# Patient Record
Sex: FEMALE | Race: WHITE | Employment: UNEMPLOYED | URBAN - METROPOLITAN AREA
[De-identification: names, ages, dates, MRNs, and addresses within clinical notes are randomized per-mention and may not be internally consistent; named-entity substitution may affect disease eponyms.]

---

## 2020-04-06 ENCOUNTER — HOSPITAL ENCOUNTER (EMERGENCY)
Age: 45
Discharge: HOME | End: 2020-04-07
Payer: COMMERCIAL

## 2020-04-06 VITALS
HEART RATE: 94 BPM | WEIGHT: 200 LBS | BODY MASS INDEX: 39.27 KG/M2 | SYSTOLIC BLOOD PRESSURE: 164 MMHG | OXYGEN SATURATION: 98 % | RESPIRATION RATE: 18 BRPM | TEMPERATURE: 97.6 F | DIASTOLIC BLOOD PRESSURE: 100 MMHG | HEIGHT: 60 IN

## 2020-04-06 LAB
HCG, URINE, POC: NEGATIVE
Lab: NORMAL
NEGATIVE QC PASS/FAIL: NORMAL
POSITIVE QC PASS/FAIL: NORMAL

## 2020-04-06 PROCEDURE — 81001 URINALYSIS AUTO W/SCOPE: CPT

## 2020-04-06 PROCEDURE — 99283 EMERGENCY DEPT VISIT LOW MDM: CPT

## 2020-04-06 RX ORDER — CYCLOBENZAPRINE HCL 10 MG
10 TABLET ORAL ONCE
Status: COMPLETED | OUTPATIENT
Start: 2020-04-07 | End: 2020-04-07

## 2020-04-06 RX ORDER — OXYCODONE HYDROCHLORIDE AND ACETAMINOPHEN 5; 325 MG/1; MG/1
1 TABLET ORAL ONCE
Status: COMPLETED | OUTPATIENT
Start: 2020-04-07 | End: 2020-04-07

## 2020-04-06 ASSESSMENT — PAIN SCALES - GENERAL: PAINLEVEL_OUTOF10: 8

## 2020-04-06 ASSESSMENT — PAIN DESCRIPTION - PAIN TYPE: TYPE: ACUTE PAIN

## 2020-04-06 ASSESSMENT — PAIN DESCRIPTION - LOCATION: LOCATION: BACK

## 2020-04-07 ENCOUNTER — APPOINTMENT (OUTPATIENT)
Dept: CT IMAGING | Age: 45
End: 2020-04-07
Payer: COMMERCIAL

## 2020-04-07 LAB
BACTERIA: ABNORMAL /HPF
BILIRUBIN URINE: NEGATIVE
BLOOD, URINE: ABNORMAL
CLARITY: ABNORMAL
COLOR: YELLOW
EPITHELIAL CELLS, UA: ABNORMAL /HPF
GLUCOSE URINE: NEGATIVE MG/DL
KETONES, URINE: NEGATIVE MG/DL
LEUKOCYTE ESTERASE, URINE: NEGATIVE
NITRITE, URINE: NEGATIVE
PH UA: 5.5 (ref 5–9)
PROTEIN UA: ABNORMAL MG/DL
RBC UA: ABNORMAL /HPF (ref 0–2)
SPECIFIC GRAVITY UA: >=1.03 (ref 1–1.03)
UROBILINOGEN, URINE: 0.2 E.U./DL
WBC UA: ABNORMAL /HPF (ref 0–5)

## 2020-04-07 PROCEDURE — 6370000000 HC RX 637 (ALT 250 FOR IP): Performed by: NURSE PRACTITIONER

## 2020-04-07 PROCEDURE — 72131 CT LUMBAR SPINE W/O DYE: CPT

## 2020-04-07 RX ORDER — ORPHENADRINE CITRATE 100 MG/1
100 TABLET, EXTENDED RELEASE ORAL 2 TIMES DAILY
Qty: 20 TABLET | Refills: 0 | Status: SHIPPED | OUTPATIENT
Start: 2020-04-07 | End: 2020-04-17

## 2020-04-07 RX ORDER — NAPROXEN 500 MG/1
500 TABLET ORAL 2 TIMES DAILY PRN
Qty: 28 TABLET | Refills: 0 | Status: SHIPPED | OUTPATIENT
Start: 2020-04-07 | End: 2020-05-05 | Stop reason: SDUPTHER

## 2020-04-07 RX ORDER — PREDNISONE 10 MG/1
TABLET ORAL
Qty: 20 TABLET | Refills: 0 | Status: SHIPPED | OUTPATIENT
Start: 2020-04-07 | End: 2020-04-17

## 2020-04-07 RX ORDER — HYDROCODONE BITARTRATE AND ACETAMINOPHEN 5; 325 MG/1; MG/1
1 TABLET ORAL EVERY 6 HOURS PRN
Qty: 8 TABLET | Refills: 0 | Status: SHIPPED | OUTPATIENT
Start: 2020-04-07 | End: 2020-04-10

## 2020-04-07 RX ADMIN — OXYCODONE AND ACETAMINOPHEN 1 TABLET: 5; 325 TABLET ORAL at 00:01

## 2020-04-07 RX ADMIN — CYCLOBENZAPRINE HYDROCHLORIDE 10 MG: 10 TABLET, FILM COATED ORAL at 00:00

## 2020-04-07 ASSESSMENT — PAIN SCALES - GENERAL
PAINLEVEL_OUTOF10: 9
PAINLEVEL_OUTOF10: 9

## 2020-04-07 NOTE — ED PROVIDER NOTES
Soft, non tender, non distended,   Extremities: Moves all extremities x 4. Warm and well perfused, cervical, thoracic, lumbar sacral spine are all midline. No step-off noted. Patient does have point tenderness to the left lateral lumbar region as well as left sacroiliac joint. Compartments are all soft. 2+ left and right dorsal pedal pulses. Sensations intact. Skin: warm and dry without rash  Neurologic: GCS 15,  Psych: Normal Affect      ------------------------------ ED COURSE/MEDICAL DECISION MAKING----------------------  Medications   oxyCODONE-acetaminophen (PERCOCET) 5-325 MG per tablet 1 tablet (1 tablet Oral Given 4/7/20 0001)   cyclobenzaprine (FLEXERIL) tablet 10 mg (10 mg Oral Given 4/7/20 0000)         ED COURSE:       Medical Decision Making:    Plan will be for labs will also obtain CT scan lumbar spine and medicate for symptom relief. Urinalysis negative for any concerning infectious etiology, pregnancy test negative. CT lumbar spine showing mild to moderate spinal canal stenosis in L3 and L4 as well as L4 and 5 levels showing broad posterior disc displacement which are more prominent on the left side. Patient was made aware of these findings. She was provided with referral to neurosurgery. Patient will be discharged home with Naprosyn, Norflex, Prednisone and Norco.  Patient was educated on good body mechanics as well as when to return back to the emergency department. Patient neurovascular and hemodynamically intact. Patient expressed understanding and safely discharged home    Counseling: The emergency provider has spoken with the patient and discussed todays results, in addition to providing specific details for the plan of care and counseling regarding the diagnosis and prognosis. Questions are answered at this time and they are agreeable with the plan.      --------------------------------- IMPRESSION AND DISPOSITION ---------------------------------    IMPRESSION  1.  Strain of lumbar region, initial encounter    2. Sciatica of left side        DISPOSITION  Disposition: Discharge to home  Patient condition is good      NOTE: This report was transcribed using voice recognition software.  Every effort was made to ensure accuracy; however, inadvertent computerized transcription errors may be present     LLUVIA Trevino CNP  04/07/20 0114

## 2021-11-23 ENCOUNTER — HOSPITAL ENCOUNTER (OUTPATIENT)
Dept: INFUSION THERAPY | Age: 46
Setting detail: INFUSION SERIES
Discharge: HOME | End: 2021-11-23
Payer: COMMERCIAL

## 2021-11-23 VITALS
HEART RATE: 87 BPM | SYSTOLIC BLOOD PRESSURE: 135 MMHG | TEMPERATURE: 97.3 F | OXYGEN SATURATION: 99 % | RESPIRATION RATE: 18 BRPM | DIASTOLIC BLOOD PRESSURE: 94 MMHG

## 2021-11-23 DIAGNOSIS — U07.1 COVID: Primary | ICD-10-CM

## 2021-11-23 PROCEDURE — 2580000003 HC RX 258: Performed by: INTERNAL MEDICINE

## 2021-11-23 PROCEDURE — 96365 THER/PROPH/DIAG IV INF INIT: CPT

## 2021-11-23 PROCEDURE — 6360000002 HC RX W HCPCS: Performed by: INTERNAL MEDICINE

## 2021-11-23 RX ORDER — DIPHENHYDRAMINE HYDROCHLORIDE 50 MG/ML
50 INJECTION INTRAMUSCULAR; INTRAVENOUS
Status: CANCELLED | OUTPATIENT
Start: 2021-11-23

## 2021-11-23 RX ORDER — SODIUM CHLORIDE 9 MG/ML
INJECTION, SOLUTION INTRAVENOUS CONTINUOUS
Status: DISCONTINUED | OUTPATIENT
Start: 2021-11-23 | End: 2021-11-24 | Stop reason: HOSPADM

## 2021-11-23 RX ORDER — SODIUM CHLORIDE 9 MG/ML
INJECTION, SOLUTION INTRAVENOUS CONTINUOUS
Status: CANCELLED | OUTPATIENT
Start: 2021-11-23

## 2021-11-23 RX ORDER — ALBUTEROL SULFATE 90 UG/1
4 AEROSOL, METERED RESPIRATORY (INHALATION) PRN
Status: CANCELLED | OUTPATIENT
Start: 2021-11-23

## 2021-11-23 RX ORDER — ONDANSETRON 2 MG/ML
8 INJECTION INTRAMUSCULAR; INTRAVENOUS
Status: CANCELLED | OUTPATIENT
Start: 2021-11-23

## 2021-11-23 RX ORDER — SODIUM CHLORIDE 0.9 % (FLUSH) 0.9 %
5-40 SYRINGE (ML) INJECTION PRN
Status: DISCONTINUED | OUTPATIENT
Start: 2021-11-23 | End: 2021-11-24 | Stop reason: HOSPADM

## 2021-11-23 RX ORDER — HEPARIN SODIUM (PORCINE) LOCK FLUSH IV SOLN 100 UNIT/ML 100 UNIT/ML
500 SOLUTION INTRAVENOUS PRN
Status: CANCELLED | OUTPATIENT
Start: 2021-11-23

## 2021-11-23 RX ORDER — EPINEPHRINE 1 MG/ML
0.3 INJECTION, SOLUTION, CONCENTRATE INTRAVENOUS PRN
Status: CANCELLED | OUTPATIENT
Start: 2021-11-23

## 2021-11-23 RX ORDER — ACETAMINOPHEN 325 MG/1
650 TABLET ORAL
Status: CANCELLED | OUTPATIENT
Start: 2021-11-23

## 2021-11-23 RX ORDER — HEPARIN SODIUM (PORCINE) LOCK FLUSH IV SOLN 100 UNIT/ML 100 UNIT/ML
500 SOLUTION INTRAVENOUS PRN
Status: DISCONTINUED | OUTPATIENT
Start: 2021-11-23 | End: 2021-11-24 | Stop reason: HOSPADM

## 2021-11-23 RX ORDER — SODIUM CHLORIDE 9 MG/ML
25 INJECTION, SOLUTION INTRAVENOUS PRN
Status: DISCONTINUED | OUTPATIENT
Start: 2021-11-23 | End: 2021-11-24 | Stop reason: HOSPADM

## 2021-11-23 RX ORDER — SODIUM CHLORIDE 0.9 % (FLUSH) 0.9 %
5-40 SYRINGE (ML) INJECTION PRN
Status: CANCELLED | OUTPATIENT
Start: 2021-11-23

## 2021-11-23 RX ORDER — SODIUM CHLORIDE 9 MG/ML
25 INJECTION, SOLUTION INTRAVENOUS PRN
Status: CANCELLED | OUTPATIENT
Start: 2021-11-23

## 2021-11-23 RX ADMIN — CASIRIVIMAB AND IMDEVIMAB: 600; 600 INJECTION, SOLUTION, CONCENTRATE INTRAVENOUS at 08:40

## 2021-11-23 RX ADMIN — SODIUM CHLORIDE: 9 INJECTION, SOLUTION INTRAVENOUS at 09:15

## 2021-11-23 RX ADMIN — SODIUM CHLORIDE, PRESERVATIVE FREE 10 ML: 5 INJECTION INTRAVENOUS at 08:40

## 2021-11-23 NOTE — PROGRESS NOTES
Pt tolerated infusion without problems. Pt waited 1 hour after NSS flush then was discharged with stable vitals and discharge instructions.

## 2021-11-28 ENCOUNTER — HOSPITAL ENCOUNTER (EMERGENCY)
Age: 46
Discharge: HOME | End: 2021-11-28
Attending: STUDENT IN AN ORGANIZED HEALTH CARE EDUCATION/TRAINING PROGRAM
Payer: COMMERCIAL

## 2021-11-28 ENCOUNTER — APPOINTMENT (OUTPATIENT)
Dept: CT IMAGING | Age: 46
End: 2021-11-28
Payer: COMMERCIAL

## 2021-11-28 VITALS
HEART RATE: 77 BPM | WEIGHT: 225 LBS | HEIGHT: 60 IN | BODY MASS INDEX: 44.17 KG/M2 | RESPIRATION RATE: 16 BRPM | SYSTOLIC BLOOD PRESSURE: 154 MMHG | DIASTOLIC BLOOD PRESSURE: 97 MMHG | OXYGEN SATURATION: 96 % | TEMPERATURE: 98.1 F

## 2021-11-28 DIAGNOSIS — T80.69XA SERUM SICKNESS, INITIAL ENCOUNTER: Primary | ICD-10-CM

## 2021-11-28 LAB
ALBUMIN SERPL-MCNC: 4.1 G/DL (ref 3.5–5.2)
ALP BLD-CCNC: 55 U/L (ref 35–104)
ALT SERPL-CCNC: 16 U/L (ref 0–32)
ANION GAP SERPL CALCULATED.3IONS-SCNC: 13 MMOL/L (ref 7–16)
AST SERPL-CCNC: 16 U/L (ref 0–31)
BACTERIA: ABNORMAL /HPF
BASOPHILS ABSOLUTE: 0.02 E9/L (ref 0–0.2)
BASOPHILS RELATIVE PERCENT: 0.3 % (ref 0–2)
BILIRUB SERPL-MCNC: 0.2 MG/DL (ref 0–1.2)
BILIRUBIN URINE: NEGATIVE
BLOOD, URINE: ABNORMAL
BUN BLDV-MCNC: 7 MG/DL (ref 6–20)
CALCIUM SERPL-MCNC: 9.4 MG/DL (ref 8.6–10.2)
CHLORIDE BLD-SCNC: 105 MMOL/L (ref 98–107)
CLARITY: ABNORMAL
CO2: 21 MMOL/L (ref 22–29)
COLOR: YELLOW
CREAT SERPL-MCNC: 0.5 MG/DL (ref 0.5–1)
EOSINOPHILS ABSOLUTE: 0.12 E9/L (ref 0.05–0.5)
EOSINOPHILS RELATIVE PERCENT: 2 % (ref 0–6)
EPITHELIAL CELLS, UA: ABNORMAL /HPF
GFR AFRICAN AMERICAN: >60
GFR NON-AFRICAN AMERICAN: >60 ML/MIN/1.73
GLUCOSE BLD-MCNC: 127 MG/DL (ref 74–99)
GLUCOSE URINE: NEGATIVE MG/DL
HCT VFR BLD CALC: 38 % (ref 34–48)
HEMOGLOBIN: 13 G/DL (ref 11.5–15.5)
IMMATURE GRANULOCYTES #: 0.02 E9/L
IMMATURE GRANULOCYTES %: 0.3 % (ref 0–5)
KETONES, URINE: NEGATIVE MG/DL
LEUKOCYTE ESTERASE, URINE: NEGATIVE
LYMPHOCYTES ABSOLUTE: 2.23 E9/L (ref 1.5–4)
LYMPHOCYTES RELATIVE PERCENT: 37.9 % (ref 20–42)
MAGNESIUM: 2.1 MG/DL (ref 1.6–2.6)
MCH RBC QN AUTO: 30.5 PG (ref 26–35)
MCHC RBC AUTO-ENTMCNC: 34.2 % (ref 32–34.5)
MCV RBC AUTO: 89.2 FL (ref 80–99.9)
MONOCYTES ABSOLUTE: 0.39 E9/L (ref 0.1–0.95)
MONOCYTES RELATIVE PERCENT: 6.6 % (ref 2–12)
NEUTROPHILS ABSOLUTE: 3.1 E9/L (ref 1.8–7.3)
NEUTROPHILS RELATIVE PERCENT: 52.9 % (ref 43–80)
NITRITE, URINE: NEGATIVE
PDW BLD-RTO: 13.3 FL (ref 11.5–15)
PH UA: 6 (ref 5–9)
PLATELET # BLD: 331 E9/L (ref 130–450)
PMV BLD AUTO: 9.8 FL (ref 7–12)
POTASSIUM SERPL-SCNC: 3.6 MMOL/L (ref 3.5–5)
PROTEIN UA: NEGATIVE MG/DL
RBC # BLD: 4.26 E12/L (ref 3.5–5.5)
RBC UA: ABNORMAL /HPF (ref 0–2)
SODIUM BLD-SCNC: 139 MMOL/L (ref 132–146)
SPECIFIC GRAVITY UA: <=1.005 (ref 1–1.03)
TOTAL CK: 87 U/L (ref 20–180)
TOTAL PROTEIN: 7 G/DL (ref 6.4–8.3)
TROPONIN, HIGH SENSITIVITY: <6 NG/L (ref 0–9)
UROBILINOGEN, URINE: 0.2 E.U./DL
WBC # BLD: 5.9 E9/L (ref 4.5–11.5)
WBC UA: ABNORMAL /HPF (ref 0–5)

## 2021-11-28 PROCEDURE — 83735 ASSAY OF MAGNESIUM: CPT

## 2021-11-28 PROCEDURE — 93005 ELECTROCARDIOGRAM TRACING: CPT | Performed by: NURSE PRACTITIONER

## 2021-11-28 PROCEDURE — 85025 COMPLETE CBC W/AUTO DIFF WBC: CPT

## 2021-11-28 PROCEDURE — 99284 EMERGENCY DEPT VISIT MOD MDM: CPT

## 2021-11-28 PROCEDURE — 80053 COMPREHEN METABOLIC PANEL: CPT

## 2021-11-28 PROCEDURE — 6370000000 HC RX 637 (ALT 250 FOR IP): Performed by: STUDENT IN AN ORGANIZED HEALTH CARE EDUCATION/TRAINING PROGRAM

## 2021-11-28 PROCEDURE — 81001 URINALYSIS AUTO W/SCOPE: CPT

## 2021-11-28 PROCEDURE — 82550 ASSAY OF CK (CPK): CPT

## 2021-11-28 PROCEDURE — 70450 CT HEAD/BRAIN W/O DYE: CPT

## 2021-11-28 PROCEDURE — 84484 ASSAY OF TROPONIN QUANT: CPT

## 2021-11-28 RX ORDER — GUAIFENESIN 400 MG/1
400 TABLET ORAL 4 TIMES DAILY PRN
COMMUNITY
End: 2021-12-20

## 2021-11-28 RX ORDER — DIPHENHYDRAMINE HCL 25 MG
25 CAPSULE ORAL NIGHTLY
Qty: 10 CAPSULE | Refills: 0 | Status: SHIPPED | OUTPATIENT
Start: 2021-11-28 | End: 2021-12-08

## 2021-11-28 RX ORDER — LORATADINE 10 MG/1
10 TABLET ORAL DAILY
COMMUNITY

## 2021-11-28 RX ORDER — PREDNISONE 20 MG/1
20 TABLET ORAL DAILY
Qty: 5 TABLET | Refills: 0 | Status: SHIPPED | OUTPATIENT
Start: 2021-11-28 | End: 2021-12-03

## 2021-11-28 RX ORDER — PREDNISONE 20 MG/1
60 TABLET ORAL ONCE
Status: COMPLETED | OUTPATIENT
Start: 2021-11-28 | End: 2021-11-28

## 2021-11-28 RX ORDER — DIPHENHYDRAMINE HCL 25 MG
50 TABLET ORAL ONCE
Status: COMPLETED | OUTPATIENT
Start: 2021-11-28 | End: 2021-11-28

## 2021-11-28 RX ADMIN — PREDNISONE 60 MG: 20 TABLET ORAL at 20:21

## 2021-11-28 RX ADMIN — DIPHENHYDRAMINE HCL 50 MG: 25 TABLET ORAL at 20:21

## 2021-11-29 ENCOUNTER — APPOINTMENT (OUTPATIENT)
Dept: CT IMAGING | Age: 46
End: 2021-11-29
Payer: COMMERCIAL

## 2021-11-29 ENCOUNTER — HOSPITAL ENCOUNTER (EMERGENCY)
Age: 46
Discharge: HOME | End: 2021-11-29
Attending: EMERGENCY MEDICINE
Payer: COMMERCIAL

## 2021-11-29 VITALS
HEART RATE: 96 BPM | DIASTOLIC BLOOD PRESSURE: 89 MMHG | SYSTOLIC BLOOD PRESSURE: 135 MMHG | TEMPERATURE: 97.2 F | WEIGHT: 225 LBS | HEIGHT: 60 IN | RESPIRATION RATE: 16 BRPM | BODY MASS INDEX: 44.17 KG/M2 | OXYGEN SATURATION: 98 %

## 2021-11-29 DIAGNOSIS — R53.1 GENERAL WEAKNESS: ICD-10-CM

## 2021-11-29 DIAGNOSIS — R53.83 FATIGUE, UNSPECIFIED TYPE: Primary | ICD-10-CM

## 2021-11-29 LAB
ALBUMIN SERPL-MCNC: 4.5 G/DL (ref 3.5–5.2)
ALP BLD-CCNC: 56 U/L (ref 35–104)
ALT SERPL-CCNC: 18 U/L (ref 0–32)
ANION GAP SERPL CALCULATED.3IONS-SCNC: 16 MMOL/L (ref 7–16)
AST SERPL-CCNC: 18 U/L (ref 0–31)
BACTERIA: ABNORMAL /HPF
BASOPHILS ABSOLUTE: 0.02 E9/L (ref 0–0.2)
BASOPHILS RELATIVE PERCENT: 0.3 % (ref 0–2)
BILIRUB SERPL-MCNC: 0.3 MG/DL (ref 0–1.2)
BILIRUBIN URINE: NEGATIVE
BLOOD, URINE: ABNORMAL
BUN BLDV-MCNC: 8 MG/DL (ref 6–20)
CALCIUM SERPL-MCNC: 9.8 MG/DL (ref 8.6–10.2)
CHLORIDE BLD-SCNC: 101 MMOL/L (ref 98–107)
CHP ED QC CHECK: YES
CLARITY: CLEAR
CO2: 22 MMOL/L (ref 22–29)
COLOR: YELLOW
CREAT SERPL-MCNC: 0.5 MG/DL (ref 0.5–1)
EKG ATRIAL RATE: 79 BPM
EKG ATRIAL RATE: 87 BPM
EKG ATRIAL RATE: 88 BPM
EKG P AXIS: 59 DEGREES
EKG P AXIS: 63 DEGREES
EKG P AXIS: 67 DEGREES
EKG P-R INTERVAL: 138 MS
EKG P-R INTERVAL: 138 MS
EKG P-R INTERVAL: 140 MS
EKG Q-T INTERVAL: 362 MS
EKG Q-T INTERVAL: 400 MS
EKG Q-T INTERVAL: 608 MS
EKG QRS DURATION: 72 MS
EKG QRS DURATION: 76 MS
EKG QRS DURATION: 78 MS
EKG QTC CALCULATION (BAZETT): 435 MS
EKG QTC CALCULATION (BAZETT): 458 MS
EKG QTC CALCULATION (BAZETT): 735 MS
EKG R AXIS: 36 DEGREES
EKG R AXIS: 47 DEGREES
EKG R AXIS: 52 DEGREES
EKG T AXIS: 41 DEGREES
EKG T AXIS: 47 DEGREES
EKG T AXIS: 65 DEGREES
EKG VENTRICULAR RATE: 79 BPM
EKG VENTRICULAR RATE: 87 BPM
EKG VENTRICULAR RATE: 88 BPM
EOSINOPHILS ABSOLUTE: 0.01 E9/L (ref 0.05–0.5)
EOSINOPHILS RELATIVE PERCENT: 0.1 % (ref 0–6)
EPITHELIAL CELLS, UA: ABNORMAL /HPF
GFR AFRICAN AMERICAN: >60
GFR NON-AFRICAN AMERICAN: >60 ML/MIN/1.73
GLUCOSE BLD-MCNC: 103 MG/DL (ref 74–99)
GLUCOSE URINE: NEGATIVE MG/DL
HCG(URINE) PREGNANCY TEST: NEGATIVE
HCT VFR BLD CALC: 39 % (ref 34–48)
HEMOGLOBIN: 13.5 G/DL (ref 11.5–15.5)
IMMATURE GRANULOCYTES #: 0.02 E9/L
IMMATURE GRANULOCYTES %: 0.3 % (ref 0–5)
KETONES, URINE: ABNORMAL MG/DL
LEUKOCYTE ESTERASE, URINE: NEGATIVE
LYMPHOCYTES ABSOLUTE: 1.99 E9/L (ref 1.5–4)
LYMPHOCYTES RELATIVE PERCENT: 24.9 % (ref 20–42)
MCH RBC QN AUTO: 29.7 PG (ref 26–35)
MCHC RBC AUTO-ENTMCNC: 34.6 % (ref 32–34.5)
MCV RBC AUTO: 85.9 FL (ref 80–99.9)
MONOCYTES ABSOLUTE: 0.47 E9/L (ref 0.1–0.95)
MONOCYTES RELATIVE PERCENT: 5.9 % (ref 2–12)
NEUTROPHILS ABSOLUTE: 5.47 E9/L (ref 1.8–7.3)
NEUTROPHILS RELATIVE PERCENT: 68.5 % (ref 43–80)
NITRITE, URINE: NEGATIVE
PDW BLD-RTO: 13.4 FL (ref 11.5–15)
PH UA: 6 (ref 5–9)
PLATELET # BLD: 388 E9/L (ref 130–450)
PMV BLD AUTO: 10.2 FL (ref 7–12)
POTASSIUM REFLEX MAGNESIUM: 3.8 MMOL/L (ref 3.5–5)
PROTEIN UA: NEGATIVE MG/DL
RBC # BLD: 4.54 E12/L (ref 3.5–5.5)
RBC UA: ABNORMAL /HPF (ref 0–2)
SODIUM BLD-SCNC: 139 MMOL/L (ref 132–146)
SPECIFIC GRAVITY UA: <=1.005 (ref 1–1.03)
TOTAL PROTEIN: 7.8 G/DL (ref 6.4–8.3)
UROBILINOGEN, URINE: 0.2 E.U./DL
WBC # BLD: 8 E9/L (ref 4.5–11.5)
WBC UA: ABNORMAL /HPF (ref 0–5)

## 2021-11-29 PROCEDURE — 81025 URINE PREGNANCY TEST: CPT

## 2021-11-29 PROCEDURE — 93010 ELECTROCARDIOGRAM REPORT: CPT | Performed by: INTERNAL MEDICINE

## 2021-11-29 PROCEDURE — 80053 COMPREHEN METABOLIC PANEL: CPT

## 2021-11-29 PROCEDURE — 72131 CT LUMBAR SPINE W/O DYE: CPT

## 2021-11-29 PROCEDURE — 81001 URINALYSIS AUTO W/SCOPE: CPT

## 2021-11-29 PROCEDURE — 85025 COMPLETE CBC W/AUTO DIFF WBC: CPT

## 2021-11-29 PROCEDURE — 93005 ELECTROCARDIOGRAM TRACING: CPT | Performed by: STUDENT IN AN ORGANIZED HEALTH CARE EDUCATION/TRAINING PROGRAM

## 2021-11-29 PROCEDURE — 99283 EMERGENCY DEPT VISIT LOW MDM: CPT

## 2021-11-29 ASSESSMENT — ENCOUNTER SYMPTOMS
SINUS PRESSURE: 0
EYE DISCHARGE: 0
CONSTIPATION: 1
COUGH: 0
WHEEZING: 0
EYE PAIN: 0
DIARRHEA: 0
SHORTNESS OF BREATH: 0
ABDOMINAL PAIN: 1
VOMITING: 0
NAUSEA: 0
EYE REDNESS: 0
ABDOMINAL DISTENTION: 0
SORE THROAT: 0
BACK PAIN: 1

## 2021-11-29 NOTE — ED PROVIDER NOTES
Department of Emergency Medicine   ED  Provider Note  Admit Date/RoomTime: 11/28/2021  7:41 PM  ED Room: [place] [facility]    History of Present Illness:  11/28/21, Time: 8:16 PM EST  Chief Complaint   Patient presents with    Tingling     Patient states that she had the antibody infusion for covid on tuesday now has numbness and tingling entire body and is having a hard time controlling her legs when she is walking      Adam Ward is a 48 y.o. female presenting to the ED for Tingling. Patient states she has tingling throughout her whole body. She had a Regeneron antibody infusion this past Tuesday. She states nothing makes it better or worse. She has not tried any medication for it at home. She states that when her pets lay on her this causes her symptoms act up. They are moderate in severity. She denies any weakness in her lower extremities but she states with movement she feels that her extremities feel like \"Jell-O.\"  Patient is not have any cervical thoracic or lumbar spinal tenderness. Denies any formal weakness in the lower extremities. Denies any fall subsequent to this. Denies any exposures to new medications aside from the Regeneron infusion she is aware of. Review of Systems:  Review of systems obtained and negative unless stated otherwise above in the HPI.    --------------------------------------------- PAST HISTORY ---------------------------------------------  Past Medical History:  has no past medical history on file. Past Surgical History:  has a past surgical history that includes LEEP; Tubal ligation; Ankle surgery; Elbow surgery; Dilation and curettage of uterus; Endometrial ablation (07/2016); Gallbladder surgery; and Appendectomy. Social History:  reports that she has been smoking. She has been smoking about 1.50 packs per day. She has never used smokeless tobacco. She reports current alcohol use. She reports that she does not use drugs.     Family History: family history includes Heart Failure in her mother. . Unless otherwise noted, family history is non contributory    The patients home medications have been reviewed. Allergies: Patient has no known allergies. I have reviewed the past medical history, past surgical history, social history, and family history    ---------------------------------------------------PHYSICAL EXAM--------------------------------------    Constitutional: Appears in no distress  Head: Normocephalic, atraumatic  Eyes: Non-icteric slcera, no conjunctival injection  ENT: Moist mucous membranes,  Neck: Trachea midline, no JVD  Respiratory: Nonlabored respirations. Lungs clear to auscultation bilaterally, no wheezes, rales, or rhonchi. Cardiovascular: Regular rate. Regular rhythm. No murmurs, no gallops, no rubs. Gastrointestinal: Abdomen Soft, Non tender, Non distended. No rebound tenderness, guarding, or rigidity. Extremities: No lower extremity edema  Genitourinary: No CVA tenderness, no suprapubic tenderness  Musculoskeletal: Moves all extremities, no deformity, 5 out of 5 muscle strength in the bilateral upper and lower extremities to all ranges of motion  Skin: Papular pruritic patches on the lower extremities as well as partially on the upper extremities  Neurologic: Alert, symmetric facies, no aphasia, 2-4 reflexes in the patellar and Achilles region, 24 brachial reflex, ambulates normally    -------------------------------------------------- RESULTS -------------------------------------------------  I have personally reviewed all laboratory and imaging results for this patient. Results are listed below.      LABS: (Lab results interpreted by me)  Results for orders placed or performed during the hospital encounter of 11/28/21   CBC auto differential   Result Value Ref Range    WBC 5.9 4.5 - 11.5 E9/L    RBC 4.26 3.50 - 5.50 E12/L    Hemoglobin 13.0 11.5 - 15.5 g/dL    Hematocrit 38.0 34.0 - 48.0 %    MCV 89.2 80.0 - 99.9 fL    MCH 30.5 26.0 - 0 - 2 /HPF    Epithelial Cells, UA MODERATE /HPF    Bacteria, UA FEW (A) None Seen /HPF   EKG 12 Lead   Result Value Ref Range    Ventricular Rate 79 BPM    Atrial Rate 79 BPM    P-R Interval 138 ms    QRS Duration 76 ms    Q-T Interval 400 ms    QTc Calculation (Bazett) 458 ms    P Axis 59 degrees    R Axis 36 degrees    T Axis 47 degrees   ,       RADIOLOGY:  Interpreted by Radiologist unless otherwise specified  CT HEAD WO CONTRAST   Final Result   No acute intracranial abnormality.               ------------------------- NURSING NOTES AND VITALS REVIEWED ---------------------------   The nursing notes within the ED encounter and vital signs as below have been reviewed by myself  BP (!) 154/97   Pulse 77   Temp 98.1 °F (36.7 °C)   Resp 16   Ht 5' (1.524 m)   Wt 225 lb (102.1 kg)   LMP 11/19/2021   SpO2 96%   BMI 43.94 kg/m²     Oxygen Saturation Interpretation: Normal    The patients available past medical records and past encounters were reviewed. ------------------------------ ED COURSE/MEDICAL DECISION MAKING----------------------  Medications   diphenhydrAMINE (BENADRYL) tablet 50 mg (has no administration in time range)   predniSONE (DELTASONE) tablet 60 mg (has no administration in time range)        Re-Evaluations: This patient's ED course included:a personal history and physicial examination, re-evaluation prior to disposition and multiple bedside re-evaluations    This patient has remained hemodynamically stable during their ED course. Consultations:  None      Medical Decision Making:   Patient presents emerged department with whole body tingling. Vital signs interpreted by myself as hypertensive otherwise normal.    History and physical examination findings most likely consistent with serum sickness secondary to Regeneron antibody infusion. Patient has no loss of sensation to light touch.   Additionally, the patient has normal reflexes as well as normal lower extremity strength and gait. Labs/imaging: Labs reviewed unremarkable for any acute concerning abnormality. Urinalysis negative. Date of EKG: November 28, 2021  Time: 1650 8 PM    Rhythm: Sinus  Rate: 79 bpm  Axis: Normal  Conduction: Normal  ST Segments: Normal  T Waves: Normal    Clinical Impression: Normal sinus rhythm no ischemia  Comparison to prior EKG: None    Patient is given Benadryl as well as prednisone in the emergency department with improvement. Patient will be discharged with outpatient follow-up. I asked pharmacy as well as perform review of Regeneron side effects. Patient has no red flag symptoms with her presentation can be discharged with outpatient follow-up and given return precautions. Counseling: The emergency provider has spoken with the patient and discussed todays results, in addition to providing specific details for the plan of care and counseling regarding the diagnosis and prognosis. Questions are answered at this time and they are agreeable with the plan.       --------------------------------- IMPRESSION AND DISPOSITION ---------------------------------    IMPRESSION  1. Serum sickness, initial encounter        DISPOSITION  Disposition: Discharge to home  Patient condition is stable    IDr. Mayra, am the primary provider of record    Mayra Mays DO  Emergency Medicine    NOTE: This report was transcribed using voice recognition software.  Every effort was made to ensure accuracy; however, inadvertent computerized transcription errors may be present         Mis Howard DO  11/28/21 2039

## 2021-11-29 NOTE — ED NOTES
Pt. Ambulated to bathroom with standby assistance from nurse. Pt. Used handrails for assistance to ambulate to bathroom.       Mehul Reyes RN  11/29/21 3563

## 2021-11-29 NOTE — ED NOTES
Bed: HAA  Expected date:   Expected time:   Means of arrival:   Comments:  triage     [address] [facility] 121, RN  11/28/21 1941

## 2021-11-29 NOTE — ED PROVIDER NOTES
The history is provided by the patient and medical records. 40-year-old female presented to the emergency department complaint of left leg weakness and feel unsteady on her feet. Patient states she got Covid over 1 week ago. Did receive the monoclonal antibody on Tuesday approximately 6 days ago and she states she was feeling better initially however over the past 4 to 5 days she has been getting progressively more fatigued and unsteady on her feet. She did come in yesterday had a negative CT scan of her head and negative work-up was discharged home. She states she is still weak, weaker in her left leg and it did cause her stumble and have a slight fall where she fell onto that left knee. She has no pain in that knee and is still able to walk. She is describes just feeling unsteady on her feet, is not lightheaded or dizzy. She also was having some slight periumbilical abdominal pain constipation. Otherwise no other complaints at this time. She denies any numbness to her groin or rectal region, is not losing control of her bowel bladder is is able to go on her own as well. No cauda equina symptoms. No trauma to the back as well. She also does have a history of a herniated disc at L4-L5 in her back that she does get epidurals for. She has had left leg weakness previously due to this as well as pain that shoots down her left leg. The patient presents with left leg weakness that has been going on for 1 week. These symptoms are moderate in severity. Symptoms are made better by nothig. Symptoms are made worse by nothng. Associated symptoms include fatigue abd pain, constipation. Review of Systems   Constitutional: Positive for fatigue. Negative for chills and fever. HENT: Negative for ear pain, sinus pressure and sore throat. Eyes: Negative for pain, discharge and redness. Respiratory: Negative for cough, shortness of breath and wheezing. Cardiovascular: Negative for chest pain. Gastrointestinal: Positive for abdominal pain and constipation. Negative for abdominal distention, diarrhea, nausea and vomiting. Genitourinary: Negative for dysuria and frequency. Musculoskeletal: Positive for back pain. Negative for arthralgias. Skin: Negative for rash and wound. Neurological: Negative for weakness and headaches. Hematological: Negative for adenopathy. Psychiatric/Behavioral: Negative for agitation. All other systems reviewed and are negative. Physical Exam  Vitals and nursing note reviewed. Constitutional:       Appearance: Normal appearance. She is normal weight. HENT:      Head: Normocephalic and atraumatic. Eyes:      Conjunctiva/sclera: Conjunctivae normal.   Cardiovascular:      Rate and Rhythm: Normal rate and regular rhythm. Pulses: Normal pulses. Heart sounds: Normal heart sounds. No murmur heard. No gallop. Pulmonary:      Effort: Pulmonary effort is normal. No respiratory distress. Breath sounds: Normal breath sounds. No wheezing or rales. Abdominal:      General: Abdomen is flat. Bowel sounds are normal. There is no distension. Palpations: Abdomen is soft. Tenderness: There is no abdominal tenderness. There is no guarding. Skin:     General: Skin is warm and dry. Capillary Refill: Capillary refill takes less than 2 seconds. Neurological:      General: No focal deficit present. Mental Status: She is alert and oriented to person, place, and time. Cranial Nerves: Cranial nerves are intact. No cranial nerve deficit, dysarthria or facial asymmetry. Sensory: Sensation is intact. No sensory deficit. Motor: Motor function is intact. No weakness. Coordination: Coordination is intact. Finger-Nose-Finger Test and Heel to Shin Test normal.          Procedures     MDM   58-year-old female present emergency department complaint of fatigue and lower extremity weakness.   Neurologically she is fully intact no signs of weakness in the lower extremities significant for 1 greater than other. She was able to ambulate on her own here in the emergency department. CT scan lumbar spine was unremarkable for any acute emergent pathology. Her EKG was unremarkable for any acute ST elevation depression or arrhythmia. Remaining laboratory work-up unremarkable urinalysis unremarkable for urinary tract infection. Patient was feeling better here in the emergency department and is safe to be discharged home at this time. Did advise she can continue with her medication she was prescribed here yesterday as well as following up with her primary care doctor. Patient agreeable this plan. ED Course as of 11/30/21 0618 Mon Nov 29, 2021   1245 EKG reviewed, no reading for the lead V3 and significant baseline artifact needs repeat EKG [CB]      ED Course User Index  [CB] Marco Cortez MD      EKG: This EKG is signed by emergency department physician. Rate: 88  Rhythm: Sinus  Interpretation: No acute ST elevation depressions sinus rhythm, normal axis, concern for possible prolonged QT, hard to discern the T waves on the EKG however. Comparison: changes compared to previous EKG         ED Course as of 11/30/21 0618 Mon Nov 29, 2021   1245 EKG reviewed, no reading for the lead V3 and significant baseline artifact needs repeat EKG [CB]      ED Course User Index  [ANGÉLICA] Marco Cortez MD       --------------------------------------------- PAST HISTORY ---------------------------------------------  Past Medical History:  has no past medical history on file. Past Surgical History:  has a past surgical history that includes LEEP; Tubal ligation; Ankle surgery; Elbow surgery; Dilation and curettage of uterus; Endometrial ablation (07/2016); Gallbladder surgery; and Appendectomy. Social History:  reports that she has been smoking. She has been smoking about 1.50 packs per day.  She has never used smokeless tobacco. She reports current alcohol use. She reports that she does not use drugs. Family History: family history includes Heart Failure in her mother. The patients home medications have been reviewed. Allergies: Patient has no known allergies.     -------------------------------------------------- RESULTS -------------------------------------------------  Labs:  Results for orders placed or performed during the hospital encounter of 11/29/21   CBC Auto Differential   Result Value Ref Range    WBC 8.0 4.5 - 11.5 E9/L    RBC 4.54 3.50 - 5.50 E12/L    Hemoglobin 13.5 11.5 - 15.5 g/dL    Hematocrit 39.0 34.0 - 48.0 %    MCV 85.9 80.0 - 99.9 fL    MCH 29.7 26.0 - 35.0 pg    MCHC 34.6 (H) 32.0 - 34.5 %    RDW 13.4 11.5 - 15.0 fL    Platelets 376 320 - 163 E9/L    MPV 10.2 7.0 - 12.0 fL    Neutrophils % 68.5 43.0 - 80.0 %    Immature Granulocytes % 0.3 0.0 - 5.0 %    Lymphocytes % 24.9 20.0 - 42.0 %    Monocytes % 5.9 2.0 - 12.0 %    Eosinophils % 0.1 0.0 - 6.0 %    Basophils % 0.3 0.0 - 2.0 %    Neutrophils Absolute 5.47 1.80 - 7.30 E9/L    Immature Granulocytes # 0.02 E9/L    Lymphocytes Absolute 1.99 1.50 - 4.00 E9/L    Monocytes Absolute 0.47 0.10 - 0.95 E9/L    Eosinophils Absolute 0.01 (L) 0.05 - 0.50 E9/L    Basophils Absolute 0.02 0.00 - 0.20 E9/L   Comprehensive Metabolic Panel w/ Reflex to MG   Result Value Ref Range    Sodium 139 132 - 146 mmol/L    Potassium reflex Magnesium 3.8 3.5 - 5.0 mmol/L    Chloride 101 98 - 107 mmol/L    CO2 22 22 - 29 mmol/L    Anion Gap 16 7 - 16 mmol/L    Glucose 103 (H) 74 - 99 mg/dL    BUN 8 6 - 20 mg/dL    CREATININE 0.5 0.5 - 1.0 mg/dL    GFR Non-African American >60 >=60 mL/min/1.73    GFR African American >60     Calcium 9.8 8.6 - 10.2 mg/dL    Total Protein 7.8 6.4 - 8.3 g/dL    Albumin 4.5 3.5 - 5.2 g/dL    Total Bilirubin 0.3 0.0 - 1.2 mg/dL    Alkaline Phosphatase 56 35 - 104 U/L    ALT 18 0 - 32 U/L    AST 18 0 - 31 U/L   Urinalysis, reflex to microscopic   Result Value Ref Range    Color, UA Yellow Straw/Yellow    Clarity, UA Clear Clear    Glucose, Ur Negative Negative mg/dL    Bilirubin Urine Negative Negative    Ketones, Urine TRACE (A) Negative mg/dL    Specific Gravity, UA <=1.005 1.005 - 1.030    Blood, Urine SMALL (A) Negative    pH, UA 6.0 5.0 - 9.0    Protein, UA Negative Negative mg/dL    Urobilinogen, Urine 0.2 <2.0 E.U./dL    Nitrite, Urine Negative Negative    Leukocyte Esterase, Urine Negative Negative   Pregnancy, urine   Result Value Ref Range    HCG(Urine) Pregnancy Test NEGATIVE NEGATIVE   Microscopic Urinalysis   Result Value Ref Range    WBC, UA NONE 0 - 5 /HPF    RBC, UA 0-1 0 - 2 /HPF    Epithelial Cells, UA MODERATE /HPF    Bacteria, UA FEW (A) None Seen /HPF   POCT Glucose   Result Value Ref Range    QC OK? yes    EKG 12 Lead   Result Value Ref Range    Ventricular Rate 87 BPM    Atrial Rate 87 BPM    P-R Interval 138 ms    QRS Duration 72 ms    Q-T Interval 362 ms    QTc Calculation (Bazett) 435 ms    P Axis 67 degrees    R Axis 52 degrees    T Axis 65 degrees   EKG 12 Lead   Result Value Ref Range    Ventricular Rate 88 BPM    Atrial Rate 88 BPM    P-R Interval 140 ms    QRS Duration 78 ms    Q-T Interval 608 ms    QTc Calculation (Bazett) 735 ms    P Axis 63 degrees    R Axis 47 degrees    T Axis 41 degrees       Radiology:  CT Lumbar Spine WO Contrast   Final Result   1. L3-4 acquired spinal stenosis with mild central canal narrowing and mild   foraminal narrowing on the left at this level. 2.  L4-5 acquired spinal stenosis with mild central canal narrowing and   moderate biforaminal narrowing of this level. 3.  L4-5 advanced facet joint arthritis and with mild subluxation of the   facets.   Further correlation could be obtained with lumbar spine flexion   extension views on a nonemergent basis to assess for lumbar spine instability.             ------------------------- NURSING NOTES AND VITALS REVIEWED ---------------------------  Date / Time Roomed: 11/29/2021 10:27 AM  ED Bed Assignment:  16/16    The nursing notes within the ED encounter and vital signs as below have been reviewed. /89   Pulse 96   Temp 97.2 °F (36.2 °C)   Resp 16   Ht 5' (1.524 m)   Wt 225 lb (102.1 kg)   LMP 11/19/2021   SpO2 98%   BMI 43.94 kg/m²   Oxygen Saturation Interpretation: Normal      ------------------------------------------ PROGRESS NOTES ------------------------------------------  10:39 AM EST  I have spoken with the patient and discussed todays results, in addition to providing specific details for the plan of care and counseling regarding the diagnosis and prognosis. Their questions are answered at this time and they are agreeable with the plan. I discussed at length with them reasons for immediate return here for re evaluation. They will followup with their primary care physician by calling their office on Monday.      --------------------------------- ADDITIONAL PROVIDER NOTES ---------------------------------  At this time the patient is without objective evidence of an acute process requiring hospitalization or inpatient management. They have remained hemodynamically stable throughout their entire ED visit and are stable for discharge with outpatient follow-up. The plan has been discussed in detail and they are aware of the specific conditions for emergent return, as well as the importance of follow-up. Discharge Medication List as of 11/29/2021  5:58 PM          Diagnosis:  1. Fatigue, unspecified type    2. General weakness        Disposition:  Patient's disposition: Discharge to home  Patient's condition is stable.        Gisela Burnett MD  Resident  11/30/21 8745

## 2021-12-20 ENCOUNTER — APPOINTMENT (OUTPATIENT)
Dept: MRI IMAGING | Age: 46
DRG: 421 | End: 2021-12-20
Payer: COMMERCIAL

## 2021-12-20 ENCOUNTER — APPOINTMENT (OUTPATIENT)
Dept: GENERAL RADIOLOGY | Age: 46
DRG: 421 | End: 2021-12-20
Payer: COMMERCIAL

## 2021-12-20 ENCOUNTER — HOSPITAL ENCOUNTER (INPATIENT)
Age: 46
LOS: 15 days | Discharge: NURSING, REHAB OR LONG-TERM CARE | DRG: 421 | End: 2022-01-05
Attending: EMERGENCY MEDICINE | Admitting: INTERNAL MEDICINE
Payer: COMMERCIAL

## 2021-12-20 DIAGNOSIS — M48.061 SPINAL STENOSIS OF LUMBAR REGION, UNSPECIFIED WHETHER NEUROGENIC CLAUDICATION PRESENT: ICD-10-CM

## 2021-12-20 DIAGNOSIS — R29.898 WEAKNESS OF BOTH LOWER EXTREMITIES: Primary | ICD-10-CM

## 2021-12-20 DIAGNOSIS — R90.89 T2 HYPERINTENSE FOCI PRESENT IN CEREBRAL CORTEX ON MAGNETIC RESONANCE IMAGING: ICD-10-CM

## 2021-12-20 LAB
ALBUMIN SERPL-MCNC: 4.3 G/DL (ref 3.5–5.2)
ALP BLD-CCNC: 5 U/L (ref 35–104)
ALT SERPL-CCNC: 15 U/L (ref 0–32)
ANION GAP SERPL CALCULATED.3IONS-SCNC: 12 MMOL/L (ref 7–16)
APTT: 29.8 SEC (ref 24.5–35.1)
AST SERPL-CCNC: 15 U/L (ref 0–31)
BACTERIA: ABNORMAL /HPF
BASOPHILS ABSOLUTE: 0.05 E9/L (ref 0–0.2)
BASOPHILS RELATIVE PERCENT: 0.5 % (ref 0–2)
BILIRUB SERPL-MCNC: <0.2 MG/DL (ref 0–1.2)
BILIRUBIN URINE: NEGATIVE
BLOOD, URINE: ABNORMAL
BUN BLDV-MCNC: 12 MG/DL (ref 6–20)
CALCIUM SERPL-MCNC: 10 MG/DL (ref 8.6–10.2)
CHLORIDE BLD-SCNC: 103 MMOL/L (ref 98–107)
CLARITY: CLEAR
CO2: 23 MMOL/L (ref 22–29)
COLOR: YELLOW
CREAT SERPL-MCNC: 0.6 MG/DL (ref 0.5–1)
EOSINOPHILS ABSOLUTE: 0.2 E9/L (ref 0.05–0.5)
EOSINOPHILS RELATIVE PERCENT: 1.9 % (ref 0–6)
GFR AFRICAN AMERICAN: >60
GFR NON-AFRICAN AMERICAN: >60 ML/MIN/1.73
GLUCOSE BLD-MCNC: 100 MG/DL (ref 74–99)
GLUCOSE URINE: NEGATIVE MG/DL
HCG(URINE) PREGNANCY TEST: NEGATIVE
HCT VFR BLD CALC: 38.9 % (ref 34–48)
HEMOGLOBIN: 13.2 G/DL (ref 11.5–15.5)
IMMATURE GRANULOCYTES #: 0.03 E9/L
IMMATURE GRANULOCYTES %: 0.3 % (ref 0–5)
INR BLD: 1
KETONES, URINE: NEGATIVE MG/DL
LEUKOCYTE ESTERASE, URINE: NEGATIVE
LYMPHOCYTES ABSOLUTE: 3.02 E9/L (ref 1.5–4)
LYMPHOCYTES RELATIVE PERCENT: 29.4 % (ref 20–42)
MCH RBC QN AUTO: 30.6 PG (ref 26–35)
MCHC RBC AUTO-ENTMCNC: 33.9 % (ref 32–34.5)
MCV RBC AUTO: 90.3 FL (ref 80–99.9)
MONOCYTES ABSOLUTE: 0.44 E9/L (ref 0.1–0.95)
MONOCYTES RELATIVE PERCENT: 4.3 % (ref 2–12)
NEUTROPHILS ABSOLUTE: 6.54 E9/L (ref 1.8–7.3)
NEUTROPHILS RELATIVE PERCENT: 63.6 % (ref 43–80)
NITRITE, URINE: NEGATIVE
PDW BLD-RTO: 13.6 FL (ref 11.5–15)
PH UA: 6 (ref 5–9)
PLATELET # BLD: 378 E9/L (ref 130–450)
PMV BLD AUTO: 9.4 FL (ref 7–12)
POTASSIUM REFLEX MAGNESIUM: 4.3 MMOL/L (ref 3.5–5)
PROTEIN UA: NEGATIVE MG/DL
PROTHROMBIN TIME: 10.5 SEC (ref 9.3–12.4)
RBC # BLD: 4.31 E12/L (ref 3.5–5.5)
RBC UA: ABNORMAL /HPF (ref 0–2)
SODIUM BLD-SCNC: 138 MMOL/L (ref 132–146)
SPECIFIC GRAVITY UA: 1.02 (ref 1–1.03)
TOTAL CK: 99 U/L (ref 20–180)
TOTAL PROTEIN: 7.6 G/DL (ref 6.4–8.3)
TROPONIN, HIGH SENSITIVITY: <6 NG/L (ref 0–9)
UROBILINOGEN, URINE: 0.2 E.U./DL
WBC # BLD: 10.3 E9/L (ref 4.5–11.5)
WBC UA: ABNORMAL /HPF (ref 0–5)

## 2021-12-20 PROCEDURE — 71045 X-RAY EXAM CHEST 1 VIEW: CPT

## 2021-12-20 PROCEDURE — 93005 ELECTROCARDIOGRAM TRACING: CPT | Performed by: EMERGENCY MEDICINE

## 2021-12-20 PROCEDURE — 85610 PROTHROMBIN TIME: CPT

## 2021-12-20 PROCEDURE — 82550 ASSAY OF CK (CPK): CPT

## 2021-12-20 PROCEDURE — 36415 COLL VENOUS BLD VENIPUNCTURE: CPT

## 2021-12-20 PROCEDURE — 80053 COMPREHEN METABOLIC PANEL: CPT

## 2021-12-20 PROCEDURE — 81025 URINE PREGNANCY TEST: CPT

## 2021-12-20 PROCEDURE — 99284 EMERGENCY DEPT VISIT MOD MDM: CPT

## 2021-12-20 PROCEDURE — 72146 MRI CHEST SPINE W/O DYE: CPT

## 2021-12-20 PROCEDURE — 85025 COMPLETE CBC W/AUTO DIFF WBC: CPT

## 2021-12-20 PROCEDURE — 72141 MRI NECK SPINE W/O DYE: CPT

## 2021-12-20 PROCEDURE — 85730 THROMBOPLASTIN TIME PARTIAL: CPT

## 2021-12-20 PROCEDURE — 70551 MRI BRAIN STEM W/O DYE: CPT

## 2021-12-20 PROCEDURE — G0378 HOSPITAL OBSERVATION PER HR: HCPCS

## 2021-12-20 PROCEDURE — 84484 ASSAY OF TROPONIN QUANT: CPT

## 2021-12-20 PROCEDURE — 81001 URINALYSIS AUTO W/SCOPE: CPT

## 2021-12-20 PROCEDURE — 72148 MRI LUMBAR SPINE W/O DYE: CPT

## 2021-12-20 RX ORDER — CYANOCOBALAMIN, ISOPROPYL ALCOHOL 1000MCG/ML
KIT INJECTION
Status: ON HOLD | COMMUNITY
End: 2021-12-24 | Stop reason: HOSPADM

## 2021-12-20 RX ORDER — ACETAMINOPHEN 500 MG
500 TABLET ORAL EVERY 6 HOURS PRN
COMMUNITY

## 2021-12-20 RX ORDER — DIPHENHYDRAMINE HCL 25 MG
25 CAPSULE ORAL NIGHTLY PRN
Status: ON HOLD | COMMUNITY
End: 2022-02-04 | Stop reason: HOSPADM

## 2021-12-20 RX ORDER — ERGOCALCIFEROL 1.25 MG/1
50000 CAPSULE ORAL WEEKLY
COMMUNITY
End: 2022-06-22

## 2021-12-20 ASSESSMENT — ENCOUNTER SYMPTOMS
ABDOMINAL PAIN: 0
BACK PAIN: 0
SORE THROAT: 0
WHEEZING: 0
NAUSEA: 0
SINUS PRESSURE: 0
COUGH: 0
DIARRHEA: 0
EYE PAIN: 0
SHORTNESS OF BREATH: 0
VOMITING: 0
EYE DISCHARGE: 0

## 2021-12-20 ASSESSMENT — PAIN DESCRIPTION - LOCATION: LOCATION: BACK

## 2021-12-20 ASSESSMENT — PAIN SCALES - GENERAL: PAINLEVEL_OUTOF10: 5

## 2021-12-20 ASSESSMENT — PAIN DESCRIPTION - PAIN TYPE: TYPE: CHRONIC PAIN

## 2021-12-20 NOTE — ED PROVIDER NOTES
ATTENDING PROVIDER ATTESTATION:     Edy Doe presented to the emergency department for evaluation of Extremity Weakness (leg weakness started after recieving covid antibody infusion. Infusion recieved on 11/23, Waist down weakness.)   and was initially evaluated by the Medical Resident. See Original ED Note for H&P and ED course above. I have reviewed and discussed the case, including pertinent history (medical, surgical, family and social) and exam findings with the Medical Resident assigned to Edy Doe. I have personally performed and/or participated in the history, exam, medical decision making, and procedures and agree with all pertinent clinical information and any additional changes or corrections are noted below in my assessment and plan. I have discussed this patient in detail with the resident, and provided the instruction and education,     I have reviewed my findings and recommendations with the assigned Medical Resident, Edy Doe and members of family present at the time of disposition. I have performed a history and physical examination of this patient and directly supervised the resident caring for this patient    HPI  This is a 40-year-old female who presents from [address] [place] [address] emergency department for lower extremity weakness as a transfer. The patient states that she had Covid on November 20. She received monoclonal infusion on November 23. Starting November 28 she has had lower extremity weakness. Bilateral lower extremities are weak and this seems to be progressively worsening. Since last Friday 10 days ago, the patient has been receiving vitamin B12. Over this time, her weakness is increasing and she has also become incontinent of both urine and stool. The patient is now experiencing perirectal paresthesias as well. The patient denies any trauma. She does have chronic low back pain with herniated disc and feels this pain is unchanged.   Laying flat seems to worsen her paresthesias and weakness. Nothing seems to improve them. She denies any history of IV drug use. She does have history of prior epidural steroid injections most recently 3 months ago however. The patient has been getting around with a walker that she has borrowed from her daughter's boyfriend. The patient denies headache, double vision, or slurred speech. Patient denies history of MS or family history of MS or neurological syndrome. ROS  A complete review of systems was performed and pertinent positives and negatives are stated within HPI, all other systems reviewed and are negative.    --------------------------------------------- PAST HISTORY ---------------------------------------------  Past Medical History:  has no past medical history on file. Past Surgical History:  has a past surgical history that includes LEEP; Tubal ligation; Ankle surgery; Elbow surgery; Dilation and curettage of uterus; Endometrial ablation (07/2016); Gallbladder surgery; and Appendectomy. Social History:  reports that she has been smoking. She has been smoking about 1.00 pack per day. She has never used smokeless tobacco. She reports current alcohol use. She reports that she does not use drugs. Family History: family history includes Heart Failure in her mother. Unless otherwise noted, family history is non contributory    The patients home medications have been reviewed. Allergies: Patient has no known allergies. Physical Exam  General: The patient is conversational and lying comfortably in bed. Head: Normocephalic and atraumatic. Eyes: Sclera non-icteric and no conjunctival injection  ENT: Nasal and oral membranes moist  Neck: Trachea midline. No JVD  Respiratory: Lungs clear to auscultation bilaterally. Patient speaking in full sentences. Cardiovascular: Regular rate. No pedal edema. Gastrointestinal:  Abdomen is soft and nondistended. Bowel sounds present. There is no tenderness.  There is no guarding or rebound. Musculoskeletal: No deformity. Minimal tenderness of the lumbar spine. No step-offs or deformities. Left-sided paraspinal muscle tenderness in the left lumbar. Negative straight leg raise. Pelvis stable. No bony tenderness of the lower extremities. Skin: Skin warm and dry. No rashes. Neurologic: Patient has decreased rank of the bilateral lower extremity. She is able to keep the legs up off of the bed for 5 seconds however does have drift noted. She cannot bend the hip at 40 degrees. She has some difficulty flexing at the knee as well. Able to wiggle her toes bilaterally. Patient endorses hyperesthesias from the mid abdomen down bilaterally. Resident physician performed rectal examination and does note rectal tone however the patient has decreased rectal squeeze. Perirectal paresthesias. 5 or 5 strength of the upper extremities. Negative ear nose testing. Pupils equal react light. Alert and oriented to person, place and time. Extraocular eye movements intact. Hyperreflexia of the lower extremities. No meningismus. Psychiatric: Normal affect. MDM  This is a 48 y.o. female presenting to the ED for lower extremity weakness. On initial presentation, the patient's vital signs are interpreted as hypertensive, afebrile and saturating well on room air. Based on history and physical exam, we have a broad differential.  Patient's physical exam is concerning for intracranial or spinal process. She has evidence of upper motor neuron lesions with hyperesthesias and hyperreflexia, we do feel she requires MRI of the brain and spine. Patient denies history of trauma or infectious symptoms. The patient was transferred from [facility] emergency department where they obtain basic laboratory studies, EKG. We will add a pregnancy test.    Laboratory studies show electrolytes within normal limits. Troponin negative. CPK not elevated. LFTs within normal limits. No leukocytosis or anemia. Urinalysis shows hematuria but no evidence of urinary tract infection. Pregnancy test negative. Chest xray shows No pneumonia or pleural effusion. MRI shows no high-grade canal or foraminal stenosis of the cervical spine, thoracic spine. No cord signal abnormality and no cauda equina. Brain MRI shows no acute infarction or intracranial hemorrhage or mass lesion. The patient does have mild nonspecific periventricular subcortical cerebral white matter FLAIR and hyperintensity. This can include migraine related angiopathy, trauma related white matter change, early chronic micropathic ischemic disease and cannot exclude demyelinating lesions. This is interpreted by radiology. With the patient's persistent symptoms, we do feel she requires neurology evaluation. She will be admitted for further work-up. She is resting comfortably and vitals are stable. Resident physician spoke with the admitting service was agreeable to the plan. She will be admitted to the care of Dr. Anand Henderson. I directly supervised any procedures performed by the resident and was present for the procedure including all critical portions of the procedure    The cardiac monitor revealed sinus rhythm with a heart rate in the 70s as interpreted by me. The cardiac monitor was ordered secondary to the patient's weakness and to monitor the patient for dysrhythmia. CPT [id number]    I, Dr. Catracho Ley, am the primary provider of record    Impression  1. Weakness of both lower extremities    2. T2 hyperintense foci present in cerebral cortex on magnetic resonance imaging    3.  Spinal stenosis of lumbar region, unspecified whether neurogenic claudication present           Catracho Ley MD  12/21/21 2619

## 2021-12-20 NOTE — ED PROVIDER NOTES
78-year-old female presented emergency department for lower extremity weakness. Is been ongoing since she got the Covid infusion on 11/23 with a [employer], has been going to physical therapy for it, but recently started vitamin B12 shots a week ago, and started developing saddle anesthesia, urinary and fecal incontinence, this was a new concerning symptom for her so she went and saw her primary care doctor about it, and then went to the ER. She denies any trauma to the back, denies any fevers, chills, night sweats, weight loss, IV drug use. States she still has sensation in her legs, just feels diminished, associated with fecal and urinary incontinence, symptoms persistent, gradually worsening, began close to a month ago, moderate in severity. Review of Systems   Constitutional: Negative for chills and fever. HENT: Negative for ear pain, sinus pressure and sore throat. Eyes: Negative for pain and discharge. Respiratory: Negative for cough, shortness of breath and wheezing. Cardiovascular: Negative for chest pain. Gastrointestinal: Negative for abdominal pain, diarrhea, nausea and vomiting. Genitourinary: Negative for dysuria and frequency. Musculoskeletal: Negative for arthralgias and back pain. Skin: Negative for rash and wound. Neurological: Positive for weakness and numbness. Negative for headaches. Hematological: Negative for adenopathy. All other systems reviewed and are negative. Physical Exam  Vitals and nursing note reviewed. Constitutional:       Appearance: Normal appearance. HENT:      Head: Normocephalic and atraumatic. Right Ear: External ear normal.      Left Ear: External ear normal.      Nose: Nose normal.      Mouth/Throat:      Mouth: Mucous membranes are moist.   Eyes:      Extraocular Movements: Extraocular movements intact. Pupils: Pupils are equal, round, and reactive to light.    Cardiovascular:      Rate and Rhythm: Normal rate and presented emerged part for lower extremity weakness, ongoing for some time, had hyperesthesia and hyperreflexia on lower extremity exam so performed a MRI brain to rule out a demyelinating lesion. Did see a T2 flair but no other cord compression seen on MRI of spine, patient diminished saddle sensation, was complaining of fecal and urine incontinence, patient was admitted to hospital for further work-up management.  --------------------------------------------- PAST HISTORY ---------------------------------------------  Past Medical History:  has no past medical history on file. Past Surgical History:  has a past surgical history that includes LEEP; Tubal ligation; Ankle surgery; Elbow surgery; Dilation and curettage of uterus; Endometrial ablation (07/2016); Gallbladder surgery; and Appendectomy. Social History:  reports that she has been smoking. She has been smoking about 1.00 pack per day. She has never used smokeless tobacco. She reports current alcohol use. She reports that she does not use drugs. Family History: family history includes Heart Failure in her mother. The patients home medications have been reviewed. Allergies: Patient has no known allergies.     -------------------------------------------------- RESULTS -------------------------------------------------    Lab  Results for orders placed or performed during the hospital encounter of 12/20/21   CBC Auto Differential   Result Value Ref Range    WBC 10.3 4.5 - 11.5 E9/L    RBC 4.31 3.50 - 5.50 E12/L    Hemoglobin 13.2 11.5 - 15.5 g/dL    Hematocrit 38.9 34.0 - 48.0 %    MCV 90.3 80.0 - 99.9 fL    MCH 30.6 26.0 - 35.0 pg    MCHC 33.9 32.0 - 34.5 %    RDW 13.6 11.5 - 15.0 fL    Platelets 232 109 - 287 E9/L    MPV 9.4 7.0 - 12.0 fL    Neutrophils % 63.6 43.0 - 80.0 %    Immature Granulocytes % 0.3 0.0 - 5.0 %    Lymphocytes % 29.4 20.0 - 42.0 %    Monocytes % 4.3 2.0 - 12.0 %    Eosinophils % 1.9 0.0 - 6.0 %    Basophils % 0.5 0.0 - 2.0 %    Neutrophils Absolute 6.54 1.80 - 7.30 E9/L    Immature Granulocytes # 0.03 E9/L    Lymphocytes Absolute 3.02 1.50 - 4.00 E9/L    Monocytes Absolute 0.44 0.10 - 0.95 E9/L    Eosinophils Absolute 0.20 0.05 - 0.50 E9/L    Basophils Absolute 0.05 0.00 - 0.20 E9/L   Comprehensive Metabolic Panel w/ Reflex to MG   Result Value Ref Range    Sodium 138 132 - 146 mmol/L    Potassium reflex Magnesium 4.3 3.5 - 5.0 mmol/L    Chloride 103 98 - 107 mmol/L    CO2 23 22 - 29 mmol/L    Anion Gap 12 7 - 16 mmol/L    Glucose 100 (H) 74 - 99 mg/dL    BUN 12 6 - 20 mg/dL    CREATININE 0.6 0.5 - 1.0 mg/dL    GFR Non-African American >60 >=60 mL/min/1.73    GFR African American >60     Calcium 10.0 8.6 - 10.2 mg/dL    Total Protein 7.6 6.4 - 8.3 g/dL    Albumin 4.3 3.5 - 5.2 g/dL    Total Bilirubin <0.2 0.0 - 1.2 mg/dL    Alkaline Phosphatase 5 (L) 35 - 104 U/L    ALT 15 0 - 32 U/L    AST 15 0 - 31 U/L   Troponin   Result Value Ref Range    Troponin, High Sensitivity <6 0 - 9 ng/L   Protime-INR   Result Value Ref Range    Protime 10.5 9.3 - 12.4 sec    INR 1.0    APTT   Result Value Ref Range    aPTT 29.8 24.5 - 35.1 sec   Urinalysis, reflex to microscopic   Result Value Ref Range    Color, UA Yellow Straw/Yellow    Clarity, UA Clear Clear    Glucose, Ur Negative Negative mg/dL    Bilirubin Urine Negative Negative    Ketones, Urine Negative Negative mg/dL    Specific Gravity, UA 1.020 1.005 - 1.030    Blood, Urine LARGE (A) Negative    pH, UA 6.0 5.0 - 9.0    Protein, UA Negative Negative mg/dL    Urobilinogen, Urine 0.2 <2.0 E.U./dL    Nitrite, Urine Negative Negative    Leukocyte Esterase, Urine Negative Negative   CK   Result Value Ref Range    Total CK 99 20 - 180 U/L   Microscopic Urinalysis   Result Value Ref Range    WBC, UA NONE 0 - 5 /HPF    RBC, UA 10-20 (A) 0 - 2 /HPF    Bacteria, UA FEW (A) None Seen /HPF   Pregnancy, urine   Result Value Ref Range    HCG(Urine) Pregnancy Test NEGATIVE NEGATIVE   EKG 12 Lead   Result Value Ref Range    Ventricular Rate 83 BPM    Atrial Rate 83 BPM    P-R Interval 134 ms    QRS Duration 72 ms    Q-T Interval 394 ms    QTc Calculation (Bazett) 462 ms    P Axis 62 degrees    R Axis 33 degrees    T Axis 42 degrees       Radiology  MRI LUMBAR SPINE WO CONTRAST   Final Result   Cervical spine MRI: No cord signal abnormality. No high-grade canal or foraminal stenosis. Thoracic spine: No cord signal abnormality. No high-grade canal or foraminal stenosis. Lumbar spine MRI:      No cord signal abnormality and no cauda equina nerve root compression. At L4-L5, mild canal stenosis and severe bilateral neural foraminal narrowing. At L3-L4, moderate canal stenosis and moderate bilateral neural foraminal   narrowing. Brain MRI:      No acute infarction, intracranial hemorrhage or mass lesion. Mild nonspecific foci of periventricular and subcortical cerebral white   matter T2/FLAIR hyperintensity. .  Differential possibilities include migraine   related angiopathy, trauma related white matter change, early chronic   microangiopathic ischemic disease. The lesions do not have typical   distribution of demyelinating lesions however this diagnosis cannot be   completely excluded. RECOMMENDATIONS:   Unavailable         MRI THORACIC SPINE WO CONTRAST   Final Result   Cervical spine MRI: No cord signal abnormality. No high-grade canal or foraminal stenosis. Thoracic spine: No cord signal abnormality. No high-grade canal or foraminal stenosis. Lumbar spine MRI:      No cord signal abnormality and no cauda equina nerve root compression. At L4-L5, mild canal stenosis and severe bilateral neural foraminal narrowing. At L3-L4, moderate canal stenosis and moderate bilateral neural foraminal   narrowing. Brain MRI:      No acute infarction, intracranial hemorrhage or mass lesion.       Mild nonspecific foci of periventricular and subcortical cerebral white   matter T2/FLAIR hyperintensity. .  Differential possibilities include migraine   related angiopathy, trauma related white matter change, early chronic   microangiopathic ischemic disease. The lesions do not have typical   distribution of demyelinating lesions however this diagnosis cannot be   completely excluded. RECOMMENDATIONS:   Unavailable         MRI CERVICAL SPINE WO CONTRAST   Final Result   Cervical spine MRI: No cord signal abnormality. No high-grade canal or foraminal stenosis. Thoracic spine: No cord signal abnormality. No high-grade canal or foraminal stenosis. Lumbar spine MRI:      No cord signal abnormality and no cauda equina nerve root compression. At L4-L5, mild canal stenosis and severe bilateral neural foraminal narrowing. At L3-L4, moderate canal stenosis and moderate bilateral neural foraminal   narrowing. Brain MRI:      No acute infarction, intracranial hemorrhage or mass lesion. Mild nonspecific foci of periventricular and subcortical cerebral white   matter T2/FLAIR hyperintensity. .  Differential possibilities include migraine   related angiopathy, trauma related white matter change, early chronic   microangiopathic ischemic disease. The lesions do not have typical   distribution of demyelinating lesions however this diagnosis cannot be   completely excluded. RECOMMENDATIONS:   Unavailable         MRI BRAIN WO CONTRAST   Final Result   Cervical spine MRI: No cord signal abnormality. No high-grade canal or foraminal stenosis. Thoracic spine: No cord signal abnormality. No high-grade canal or foraminal stenosis. Lumbar spine MRI:      No cord signal abnormality and no cauda equina nerve root compression. At L4-L5, mild canal stenosis and severe bilateral neural foraminal narrowing. At L3-L4, moderate canal stenosis and moderate bilateral neural foraminal   narrowing.       Brain MRI:      No acute infarction, intracranial hemorrhage or mass lesion. Mild nonspecific foci of periventricular and subcortical cerebral white   matter T2/FLAIR hyperintensity. .  Differential possibilities include migraine   related angiopathy, trauma related white matter change, early chronic   microangiopathic ischemic disease. The lesions do not have typical   distribution of demyelinating lesions however this diagnosis cannot be   completely excluded. RECOMMENDATIONS:   Unavailable         XR CHEST PORTABLE   Final Result   No pneumonia or pleural effusion.               ------------------------- NURSING NOTES AND VITALS REVIEWED ---------------------------  Date / Time Roomed:  12/20/2021  1:09 PM  ED Bed Assignment:  [id number]    The nursing notes within the ED encounter and vital signs as below have been reviewed. Patient Vitals for the past 24 hrs:   BP Temp Temp src Pulse Resp SpO2   12/20/21 2307 (!) 142/89 -- -- 79 -- 97 %   12/20/21 1946 (!) 154/87 -- -- 79 17 96 %   12/20/21 1826 (!) 154/87 -- -- 84 18 96 %   12/20/21 1707 (!) 166/88 -- -- 88 18 97 %   12/20/21 1624 (!) 164/128 -- -- 90 18 98 %   12/20/21 1619 (!) 127/107 98.3 °F (36.8 °C) -- 90 16 98 %   12/20/21 1309 -- -- -- -- 18 --   12/20/21 1231 (!) 148/90 97.9 °F (36.6 °C) Temporal 95 -- 97 %       Oxygen Saturation Interpretation: Normal      ------------------------------------------ PROGRESS NOTES ------------------------------------------      I have spoken with the patient and discussed todays results, in addition to providing specific details for the plan of care and counseling regarding the diagnosis and prognosis. Their questions are answered at this time and they are agreeable with the plan.      --------------------------------- ADDITIONAL PROVIDER NOTES ---------------------------------  Consultations:  Spoke with Dr. Shannan Pisano,  They will admit this patient.     This patient's ED course included: a personal history and physicial examination, re-evaluation prior to disposition, multiple bedside re-evaluations, IV medications, cardiac monitoring, continuous pulse oximetry and complex medical decision making and emergency management    This patient has remained hemodynamically stable and been closely monitored during their ED course. Please note that the withdrawal or failure to initiate urgent interventions for this patient would likely result in a life threatening deterioration or permanent disability. Accordingly this patient received 33 minutes of critical care time, excluding separately billable procedures. Clinical Impression  1. Weakness of both lower extremities    2. T2 hyperintense foci present in cerebral cortex on magnetic resonance imaging    3. Spinal stenosis of lumbar region, unspecified whether neurogenic claudication present          Disposition  Patient's disposition: Admit to med/surg floor  Patient's condition is stable.        Gagan Radford MD  Resident  12/20/21 0254

## 2021-12-20 NOTE — LETTER
RE:  Janneth Stevenson    Dear Ms YVONNE JOVANA Acharya,     Enclosed you will find a copy of the requested EMG on your patient, Janneth Stevenson completed 12/27/2021. EMG Findings:       Stu Leblanc MD   Physician   Internal Medicine   Procedures      Signed   Date of Service:  12/27/2021  4:51 PM              Procedure Orders   EMG [[id number]] ordered by LLUVIA Taylor CNP at 12/25/21 1321     Procedures   EMG [NEU5]          Signed              Show:Clear all  [x]Manual[x]Template[x]Copied    Added by:  Jesusita Jacome MD      []Zandra for details    EMG [facility]   Electrodiagnostic Laboratory  [place]         Full Name:    Missy Vázquez               Gender:             Female  MRN:             [id number]                 YOB: 1975  Location[de-identified]     7366T      Visit Date:                          12/27/2021 15:22  Age:                                   48 Years 1 Months Old  Examining Physician:      Dr. Lucy Mccullough  Referring Physician:        Lashaun Mcmahon. JOVANA Abdullahi  Technician:                       Yeison Kim   Height:                               5 feet 0 inch  Weight:                              226 lbs  Notes:                                Weakness both lower ext. Motor NCS      Nerve / Sites Lat. Amplitude Distance Lat Diff Velocity Temp. Amp. 1-2     ms mV cm ms m/s °C %   R Peroneal - EDB      Ankle 4.69 4.9 8     31.1 100      Pop fossa 11.25 4.8 31 6.56 47 31.1 99   L Peroneal - EDB      Ankle 6.04 2.7 8     31 100      Pop fossa 13.33 2.7 33 7.29 45 31 98.3   R Tibial - AH      Ankle 3.49 0.9 8     31.1 100      Pop fossa 11.04 0.9 36 7.55 48 31.1 102   L Tibial - AH      Ankle 4.53 3.2 8     31 100      Pop fossa 12.24 1.2 35 7.71 45 31 37.8       Sensory NCS      Nerve / Sites Onset Lat Peak Lat PP Amp Distance Velocity Temp.     ms ms µV cm m/s °C   R Superficial peroneal - Ankle      Lat leg 2.45 3.07 7.4 10 41 31.2 L Superficial peroneal - Ankle      Lat leg 2.50 3.13 11.5 10 40 31   R Sural - Ankle (Calf)      Calf 3.54 3.96 8.9 14 40 31.1   L Sural - Ankle (Calf)      Calf 4.32 4.95 5.3 14 32 31       F  Wave      Nerve F Lat M Lat F-M Lat     ms ms ms   R Peroneal - EDB 44.8 4.8 40.0   R Tibial - AH 46.3 4.4 41.9   L Peroneal - EDB 50.8 6.7 44.1   L Tibial - AH 48.6 6.4 42.2       H Reflex      Nerve Lat Hmax     ms   R Tibial - Soleus 29.9   L Tibial - Soleus 28.4       EMG                     EMG Summary Table       Spontaneous MUAP Recruitment   Muscle IA Fib PSW Fasc H.F. Amp Dur. PPP Pattern   L. Extensor hallucis longus N None None None 3+Serrated 3+ 3+ 3+ Markedly Dec   R. Extensor hallucis longus N None None None 3+Serrated 3+ 3+ 3+ Markedly Dec   L. Flexor digitorum longus N None None None 3+Serrated 3+ 3+ 3+ Markedly Dec   R. Flexor digitorum longus N None None None 3+Serrated 3+ 3+ 3+ Markedly Dec   L. Tibialis anterior N None None None 3+Serrated 3+ 3+ 3+ Markedly Dec   R. Tibialis anterior N None None None 3+Serrated 3+ 3+ 3+ Markedly Dec   L. Gastrocnemius (Medial head) N None None None 3+Serrated 3+ 3+ 3+ Markedly Dec   R. Gastrocnemius (Medial head) N None None None 3+Serrated 3+ 3+ 3+ Markedly Dec   L. Vastus medialis N None None None 2+Serrated 2+ 2+ 2+ Decr   R. Vastus medialis N None None None 2+Serrated 2+ 2+ 2+ Decr   L.&R Vastus lateralis N None None None 2+Serrated 2+ 2+ 2+ Decr   L. Lumbar paraspinals (low) N None None None 3+Serrated N 3+ 3+ N   R. Lumbar paraspinals (low) N None None None 3+Serrated N 3+ 3+ N          Is the first electrodiagnostic study for this patient. Was informed of the benefits, risks, indications, and alternatives to this examination. She voices understanding and consent to proceed. She tolerated the procedure well without complications. There were no serious restrictions, or limitations to this examination.   Examination revealed abnormalities in muscle examination as well as nerve conductions.     Summary:  Nerve conduction studies in the lower extremities reveal normal mixed motor latency of the right peroneal and upper limits of normal left peroneal.  Amplitudes were within normal limits. Conduction velocity within normal limits. Temperature measured as normal.     Tibial mixed motor latencies were normal, amplitudes were reduced bilaterally. Velocities normal..     Sensory studies in the lower extremities reveals peak latency of the bilateral superficial peroneal and right sural nerves to be within normal limits. Amplitudes were also normal.  Left sural peak latency was prolonged amplitude normal.     F responses for the peroneal and tibial nerves were within normal limits. H reflex normal bilaterally. .     Insertional activity in the lower extremity revealing no evidence of fasciculations, fibrillations, or positive waves in the muscles examined as can be reflected in the summarization table found above. Serrated potentials, polyphasic units, marked diminishment in number and marked increased in amplitude and duration is present diffusely in all muscles sampled in the lower extremities as well as in the paraspinal region. .     Impression:  Study compatible with the following:     #1 nerve conduction studies reflect diminished amplitude in the tibial distribution bilaterally, reflecting axonal pathology. Minor sensory abnormalities in the left sural distribution reflex possible changes in the mylin portion of this nerve. .     #2 following nerves were within normal limits to stimulation: Right peroneal, left peroneal, right superficial peroneal, left superficial peroneal and right sural.     #3 on insertional examination changes of serrated potentials, polyphasic units, and change in amplitude and duration as well as numbers reflect neuropathic changes and pathology diffusely involving L2-S1 fibers both in anterior and posterior rami distributions     Recommendations: This is predominantly a neuropathic process, not myopathic in nature. Post viral inflammatory processes can present early on with this picture however usually progress to development of slowing of velocity with the development of myelin abnormalities as well as increasing axonal pathology.     Clinical correlation recommended.           Electronically signed by Bear Shelton MD on 13/94/7176 at 4:51 PM                          History:  Darya Scott was admitted to the hospital 1220. She relates that in November she was exposed to Covid and 8 days later began complaining of progressive weakness that was initially diagnosed as \"serum sickness\". She was appropriately treated and released from the emergency room. Because of progressive weakness in the lower extremities, she was admitted on 12/20. She has undergone several tests including CT and MRIs of the cervical and thoracic through the lumbar spines. .     ROS:   In general she states that she is enjoyed good health other than problems with weight control, as well as vitamin B 12 deficiency, and ankle surgery. Further system review negative    Meds:    Prior to Admission medications    Medication Sig Start Date End Date Taking?  Authorizing Provider   ferrous sulfate (IRON 325) 325 (65 Fe) MG tablet Take 1 tablet by mouth daily (with breakfast) 12/26/21  Yes Félix Campos MD   Multiple Vitamin (MULTIVITAMIN) TABS tablet Take 1 tablet by mouth daily 12/27/21  Yes Félix Campos MD   vitamin B-12 (CYANOCOBALAMIN) 100 MCG tablet Take 10 tablets by mouth daily 12/24/21 12/24/22 Yes Félix Campos MD   vitamin D (ERGOCALCIFEROL) 1.25 MG (86056 UT) CAPS capsule Take 50,000 Units by mouth once a week   Yes Historical Provider, MD   acetaminophen (TYLENOL) 500 MG tablet Take 500 mg by mouth every 6 hours as needed for Pain   Yes Historical Provider, MD   diphenhydrAMINE (BENADRYL) 25 MG capsule Take 25 mg by mouth nightly as needed for Itching   Yes Historical Provider, MD   loratadine (CLARITIN) 10 MG tablet Take 10 mg by mouth daily   Yes Historical Provider, MD   mirabegron (MYRBETRIQ) 25 MG TB24 Take 25 mg by mouth daily   Yes Historical Provider, MD   naproxen (NAPROSYN) 500 MG tablet Take 1 tablet by mouth 2 times daily as needed for Pain 8/5/20 12/20/21 Yes Lacy Mckeon MD   fluticasone (FLONASE) 50 MCG/ACT nasal spray 1 spray by Nasal route daily   Yes Historical Provider, MD       PMH:   History reviewed. No pertinent past medical history. PSH:    Past Surgical History:   Procedure Laterality Date    ANKLE SURGERY      bilateral    APPENDECTOMY      DILATION AND CURETTAGE OF UTERUS      ELBOW SURGERY      right    ENDOMETRIAL ABLATION  07/2016    GALLBLADDER SURGERY      LEEP      cervical conization    TUBAL LIGATION         Social History:    Social History     Socioeconomic History    Marital status:      Spouse name: None    Number of children: None    Years of education: None    Highest education level: None   Occupational History    None   Tobacco Use    Smoking status: Current Every Day Smoker     Packs/day: 1.00    Smokeless tobacco: Never Used   Vaping Use    Vaping Use: Never used   Substance and Sexual Activity    Alcohol use: Yes     Comment: rarely per patient questionnaire    Drug use: No    Sexual activity: None   Other Topics Concern    None   Social History Narrative    None     Social Determinants of Health     Financial Resource Strain:     Difficulty of Paying Living Expenses: Not on file   Food Insecurity:     Worried About Running Out of Food in the Last Year: Not on file    Shaina of Food in the Last Year: Not on file   Transportation Needs:     Lack of Transportation (Medical): Not on file    Lack of Transportation (Non-Medical):  Not on file   Physical Activity:     Days of Exercise per Week: Not on file    Minutes of Exercise per Session: Not on file Stress:     Feeling of Stress : Not on file   Social Connections:     Frequency of Communication with Friends and Family: Not on file    Frequency of Social Gatherings with Friends and Family: Not on file    Attends Restorationism Services: Not on file    Active Member of [address] [place] [address] [place] or Organizations: Not on file    Attends Club or Organization Meetings: Not on file    Marital Status: Not on file   Intimate Partner Violence:     Fear of Current or Ex-Partner: Not on file    Emotionally Abused: Not on file    Physically Abused: Not on file    Sexually Abused: Not on file   Housing Stability:     Unable to Pay for Housing in the Last Year: Not on file    Number of [place] in the Last Year: Not on file    Unstable Housing in the Last Year: Not on file       Family History:    Family History   Problem Relation Age of Onset    Heart Failure Mother        Exam: Exam reveals a 75-year-old female 5 foot 0 inches, 226 pounds. Cognitively intact and following commands. Skin: Skin the lower extremity is normal in color, temperature, texture. No open lesions. Motor examination of the lower extremities reveals P to P- strength in most musculature of the lower extremities bilaterally. Patient is not antigravity lying position. Alexis Rich Upon stimulation of the distal lower extremities some spasticity is noted. Sensory examination is intact to pinprick distally in the lower extremities. .     Reflexes are hyperactive in the lower extremities with upgoing toes. .     Discussion: Please see the results of the electrodiagnostic study. If there are any questions, please contact me for discussion. Thank you once again for allowing me to participate along with you in his behalf.             Electronically signed by Solitario Connell MD on 62/25/2763 at 5:04 PM

## 2021-12-20 NOTE — ED PROVIDER NOTES
HPI:  12/20/21, Time: 2:43 PM MARY Hyman is a 48 y.o. female presenting to the ED for bilateral lower extremity weakness, beginning on 11/28/21  The complaint has been persistent, moderate in severity, and worsened by nothing. Patient states that she had Covid and received the monoclonal antibody infusion in November. She states that a few after the infusion she began having bilateral lower extremity weakness. She was seen in the emergency department. Workup was negative. She was diagnosed with serum sickness and discharged home on Benadryl and prednisone. Patient says that she went to the emergency department the next day because the weakness progressed. She had another negative workup and was discharged home with a diagnosis of generalized weakness and fatigue. She was told to continue the benadryl and prednisone and follow up with her PCP. Patient states that she has been following up with her primary care physician for the weakness. She's been having to ambulate with the aid of a walker. She has an MRI of her head scheduled for tomorrow. Patient states that over the last few days she has been having worsening numbness, tingling, weakness to her bilateral lower extremities as well as bowel and bladder incontinence. Patient states that she has been wearing a diaper as she has been urinating and defecating on herself. She has been unable to ambulate secondary to the weakness. She contacted her primary care physician today and was sent to the emergency department to rule out cauda equina syndrome. ROS:   Pertinent positives and negatives are stated within HPI, all other systems reviewed and are negative.  --------------------------------------------- PAST HISTORY ---------------------------------------------  Past Medical History:  has no past medical history on file. Past Surgical History:  has a past surgical history that includes LEEP; Tubal ligation;  Ankle surgery; Elbow surgery; Dilation and curettage of uterus; Endometrial ablation (07/2016); Gallbladder surgery; and Appendectomy. Social History:  reports that she has been smoking. She has been smoking about 1.00 pack per day. She has never used smokeless tobacco. She reports current alcohol use. She reports that she does not use drugs. Family History: family history includes Heart Failure in her mother. The patients home medications have been reviewed. Allergies: Patient has no known allergies. ---------------------------------------------------PHYSICAL EXAM--------------------------------------    Constitutional/General: Alert and oriented x3, well appearing, non toxic in NAD  Head: Normocephalic and atraumatic  Eyes: PERRL, EOMI  Mouth: Oropharynx clear, handling secretions, no trismus  Neck: Supple, full ROM, non tender to palpation in the midline, no stridor, no crepitus, no meningeal signs  Pulmonary: Lungs clear to auscultation bilaterally, no wheezes, rales, or rhonchi. Not in respiratory distress  Cardiovascular:  Regular rate. Regular rhythm. No murmurs, gallops, or rubs. 2+ distal pulses  Chest: no chest wall tenderness  Abdomen: Soft. Non tender. Non distended. +BS. No rebound, guarding, or rigidity. No pulsatile masses appreciated. Musculoskeletal: Moves all extremities x 4. Warm and well perfused, no clubbing, cyanosis, or edema. Capillary refill <3 seconds  Skin: warm and dry. No rashes. Neurologic: GCS 15, CN 2-12 grossly intact, no focal deficits, strength in the lower extremities is a 2/5. Numbness and tingling to the bilateral lower extremities. Patient has 1+ patella reflex. Patient has poor rectal tone. Minimal rectal sensation. No rectal squeeze. Psych: Normal Affect    -------------------------------------------------- RESULTS -------------------------------------------------  I have personally reviewed all laboratory and imaging results for this patient. Results are listed below. LABS:  Results for orders placed or performed during the hospital encounter of 12/20/21   CBC Auto Differential   Result Value Ref Range    WBC 10.3 4.5 - 11.5 E9/L    RBC 4.31 3.50 - 5.50 E12/L    Hemoglobin 13.2 11.5 - 15.5 g/dL    Hematocrit 38.9 34.0 - 48.0 %    MCV 90.3 80.0 - 99.9 fL    MCH 30.6 26.0 - 35.0 pg    MCHC 33.9 32.0 - 34.5 %    RDW 13.6 11.5 - 15.0 fL    Platelets 103 893 - 156 E9/L    MPV 9.4 7.0 - 12.0 fL    Neutrophils % 63.6 43.0 - 80.0 %    Immature Granulocytes % 0.3 0.0 - 5.0 %    Lymphocytes % 29.4 20.0 - 42.0 %    Monocytes % 4.3 2.0 - 12.0 %    Eosinophils % 1.9 0.0 - 6.0 %    Basophils % 0.5 0.0 - 2.0 %    Neutrophils Absolute 6.54 1.80 - 7.30 E9/L    Immature Granulocytes # 0.03 E9/L    Lymphocytes Absolute 3.02 1.50 - 4.00 E9/L    Monocytes Absolute 0.44 0.10 - 0.95 E9/L    Eosinophils Absolute 0.20 0.05 - 0.50 E9/L    Basophils Absolute 0.05 0.00 - 0.20 E9/L   Protime-INR   Result Value Ref Range    Protime 10.5 9.3 - 12.4 sec    INR 1.0    APTT   Result Value Ref Range    aPTT 29.8 24.5 - 35.1 sec   Urinalysis, reflex to microscopic   Result Value Ref Range    Color, UA Yellow Straw/Yellow    Clarity, UA Clear Clear    Glucose, Ur Negative Negative mg/dL    Bilirubin Urine Negative Negative    Ketones, Urine Negative Negative mg/dL    Specific Gravity, UA 1.020 1.005 - 1.030    Blood, Urine LARGE (A) Negative    pH, UA 6.0 5.0 - 9.0    Protein, UA Negative Negative mg/dL    Urobilinogen, Urine 0.2 <2.0 E.U./dL    Nitrite, Urine Negative Negative    Leukocyte Esterase, Urine Negative Negative   CK   Result Value Ref Range    Total CK 99 20 - 180 U/L   EKG 12 Lead   Result Value Ref Range    Ventricular Rate 83 BPM    Atrial Rate 83 BPM    P-R Interval 134 ms    QRS Duration 72 ms    Q-T Interval 394 ms    QTc Calculation (Bazett) 462 ms    P Axis 62 degrees    R Axis 33 degrees    T Axis 42 degrees       RADIOLOGY:  Interpreted by Radiologist.  XR CHEST PORTABLE   Final Result No pneumonia or pleural effusion. EKG Interpretation  Interpreted by emergency department physician    Rhythm: normal sinus   Rate: normal  Axis: normal  Conduction: normal  ST Segments: no acute change  T Waves: inversion in  v4, v5 and v6    Clinical Impression: non-specific EKG  Comparison to prior EKG: changes compared to previous EKG      ------------------------- NURSING NOTES AND VITALS REVIEWED ---------------------------   The nursing notes within the ED encounter and vital signs as below have been reviewed by myself. BP (!) 148/90 Comment: manual righ  Pulse 95   Temp 97.9 °F (36.6 °C) (Temporal)   Resp 18   SpO2 97%   Oxygen Saturation Interpretation: Normal    The patients available past medical records and past encounters were reviewed. ------------------------------ ED COURSE/MEDICAL DECISION MAKING----------------------  Medications - No data to display          Medical Decision Making:    Vitals stable. Physical exam remarkable for diminished strength and pins/needle sensation in the lower extremities with poor patella reflexes. Rectal exam done. Poor tone. Minimal anal sensation. No squeeze. Patient's complaints are concerning for cauda equina syndrome versus guillian barre syndrome. Emergent MRI needed. I spoke with Dr. Debbie Clarke at [facility]. He accepts transfer. Re-Evaluations:             Re-evaluation. Patients symptoms show no change        This patient's ED course included: a personal history and physicial examination, re-evaluation prior to disposition, multiple bedside re-evaluations, cardiac monitoring, continuous pulse oximetry and a personal history and physicial eaxmination    This patient has remained hemodynamically stable during their ED course. Counseling:    The emergency provider has spoken with the patient and discussed todays results, in addition to providing specific details for the plan of care and counseling regarding the diagnosis and prognosis. Questions are answered at this time and they are agreeable with the plan.       --------------------------------- IMPRESSION AND DISPOSITION ---------------------------------    IMPRESSION  1. Weakness of both lower extremities        DISPOSITION  Disposition: Transfer to [facility] to ED  Patient condition is stable        NOTE: This report was transcribed using voice recognition software.  Every effort was made to ensure accuracy; however, inadvertent computerized transcription errors may be present       Sunitha Mancilla MD  12/20/21 Gamaliel Cabrales MD  12/20/21 9650

## 2021-12-20 NOTE — Clinical Note
Patient Class: Inpatient [101]   REQUIRED: Diagnosis: Complaints of weakness of lower extremity [[id number]]   Estimated Length of Stay: Estimated stay of more than 2 midnights   Future Attending Provider: Khadra Snyder [[id number]]   Telemetry/Cardiac Monitoring Required?: No

## 2021-12-20 NOTE — ED NOTES
Bed: 18B-18  Expected date:   Expected time:   Means of arrival:   Comments:  Ha Styles RN  12/20/21 7678

## 2021-12-20 NOTE — LETTER
[place] Medicaid  CERTIFICATION OF NECESSITY  FOR NON-EMERGENCY TRANSPORTATION   BY GROUND AMBULANCE      Individual Information   1. Name: Keenan Hinojosa 2. [place] Medicaid Billing Number:    3. Address: [place] [address]      Transportation Provider Information   4. Provider Name:    5. [place] Medicaid Provider Number:  National Provider Identifier (NPI):      Certification  7. Criteria:  During transport, this individual requires:  [x] Medical treatment or continuous     supervision by an EMT. [] The administration or regulation of oxygen by another person. [] Supervised protective restraint. 8. Period Beginning Date: 12/30/2021     9. Length  [x] Not more than 10 day(s)  [] One Year     Additional Information Relevant to Certification   10. Comments or Explanations, If Necessary or Appropriate Bilateral Leg Weakness, Weakness of both lower extremities, Spina Stenosis of lower extremities. Ambulatory dysfunction,Iron Deficiency Anemia, Bilateral sciatica, Vit. B12 deficeincy     Certifying Practitioner Information   11. Name of Practitioner: Dr. Chava Coelho MD   12. [place] Medicaid Provider Number, If Applicable:  [address] Provider Identifier (NPI):      Signature Information   14. Date of Signature: 12/30/2021   15. Name of Person Signing:   Agnes Hartman     16. Signature and Professional Designation:   Electronically signed by KING Hartman on 12/30/2021 at 3:02 PM       OD 55470  Rev. 7/2015          [address] [place] [address] Encounter Date/Time: 12/20/2021 [id number] [employer] Account: [de-identified]    MRN: [id number]    Patient: Keenan Hinojosa    Contact Serial #: [id number]      ENCOUNTER          Patient Class: I Private Enc? No Unit  [id number] Service: Med/Surg   Encounter DX:  Bilateral leg weakness [*   ADM Provider: Dorene Joel DO   Procedure:     ATT Provider: Chava Coelho MD   REF Provider:        Admission DX: Bilateral leg weakness, Weakness of both lower extremities, Complaints of weakness of lower extremity, T2 hyperintense foci present in cerebral cortex on magnetic resonance imaging, Spinal stenosis of lumbar region, unspecified whether neurogenic claudication present, Ambulatory dysfunction and DX codes: R29.898, R29.898, R29.898, R90.89, M48.061, R26.2      PATIENT                 Name: Nathaly Roman : 1975  (48 yrs)   Address: [address] [place] [address] Sex: Female   [facility] [address]         Marital Status:    Employer: NOT EMPLOYED         Temple: [employer]   Primary Care Provider: Lalo Torres DO         Primary Phone: [phone]   EMERGENCY CONTACT   Contact Name Legal Guardian? Relationship to Patient Home Phone Work Phone   1. Inga Wright  2. Holdennithin Mariella    No Other  Spouse [phone]              GUARANTOR            Guarantor: Nathaly Roman     : 1975   Address: [facility] Sex: Female     [place] [address]     Relation to Patient: Self       Home Phone: [phone]   Guarantor ID: [id number]       Work Phone:     Guarantor Employer: [employer]         Status: FULL TIME      COVERAGE  PRIMARY INSURANCE   Payor: Fleet  Plan: [facility] MEDICAID   Payor Address: CLAIMS DEPARTMENT; [address] [facility],  [address] [place] [address], [address] [place] [address]       Group Number: [id number] Insurance Type: INDEMNITY   Subscriber Name: Norris Lr : 1975   Subscriber ID: [id number] Pat. Rel. to Sub: Self   SECONDARY INSURANCE   Payor:   Plan:     Payor Address:  ,           Group Number:   Insurance Type:     Subscriber Name:   Subscriber :     Subscriber ID:   Pat.  Rel. to Sub:             CSN: [id number]

## 2021-12-21 LAB
ANION GAP SERPL CALCULATED.3IONS-SCNC: 13 MMOL/L (ref 7–16)
BUN BLDV-MCNC: 12 MG/DL (ref 6–20)
CALCIUM SERPL-MCNC: 9.1 MG/DL (ref 8.6–10.2)
CHLORIDE BLD-SCNC: 104 MMOL/L (ref 98–107)
CO2: 21 MMOL/L (ref 22–29)
CREAT SERPL-MCNC: 0.5 MG/DL (ref 0.5–1)
GFR AFRICAN AMERICAN: >60
GFR NON-AFRICAN AMERICAN: >60 ML/MIN/1.73
GLUCOSE BLD-MCNC: 93 MG/DL (ref 74–99)
HCT VFR BLD CALC: 37.3 % (ref 34–48)
HEMOGLOBIN: 12.4 G/DL (ref 11.5–15.5)
MCH RBC QN AUTO: 30.1 PG (ref 26–35)
MCHC RBC AUTO-ENTMCNC: 33.2 % (ref 32–34.5)
MCV RBC AUTO: 90.5 FL (ref 80–99.9)
PDW BLD-RTO: 13.8 FL (ref 11.5–15)
PLATELET # BLD: 365 E9/L (ref 130–450)
PMV BLD AUTO: 9.4 FL (ref 7–12)
POTASSIUM REFLEX MAGNESIUM: 3.7 MMOL/L (ref 3.5–5)
RBC # BLD: 4.12 E12/L (ref 3.5–5.5)
SODIUM BLD-SCNC: 138 MMOL/L (ref 132–146)
WBC # BLD: 7.1 E9/L (ref 4.5–11.5)

## 2021-12-21 PROCEDURE — 97165 OT EVAL LOW COMPLEX 30 MIN: CPT

## 2021-12-21 PROCEDURE — G0378 HOSPITAL OBSERVATION PER HR: HCPCS

## 2021-12-21 PROCEDURE — 99254 IP/OBS CNSLTJ NEW/EST MOD 60: CPT | Performed by: NEUROLOGICAL SURGERY

## 2021-12-21 PROCEDURE — 6360000002 HC RX W HCPCS: Performed by: FAMILY MEDICINE

## 2021-12-21 PROCEDURE — 36415 COLL VENOUS BLD VENIPUNCTURE: CPT

## 2021-12-21 PROCEDURE — 85027 COMPLETE CBC AUTOMATED: CPT

## 2021-12-21 PROCEDURE — 80048 BASIC METABOLIC PNL TOTAL CA: CPT

## 2021-12-21 PROCEDURE — 6370000000 HC RX 637 (ALT 250 FOR IP): Performed by: FAMILY MEDICINE

## 2021-12-21 PROCEDURE — 2580000003 HC RX 258: Performed by: FAMILY MEDICINE

## 2021-12-21 PROCEDURE — 1200000000 HC SEMI PRIVATE

## 2021-12-21 PROCEDURE — 97530 THERAPEUTIC ACTIVITIES: CPT

## 2021-12-21 PROCEDURE — 99254 IP/OBS CNSLTJ NEW/EST MOD 60: CPT | Performed by: PSYCHIATRY & NEUROLOGY

## 2021-12-21 PROCEDURE — 97162 PT EVAL MOD COMPLEX 30 MIN: CPT

## 2021-12-21 RX ORDER — PROMETHAZINE HYDROCHLORIDE 25 MG/1
12.5 TABLET ORAL EVERY 6 HOURS PRN
Status: DISCONTINUED | OUTPATIENT
Start: 2021-12-21 | End: 2022-01-05 | Stop reason: HOSPADM

## 2021-12-21 RX ORDER — ACETAMINOPHEN 650 MG/1
650 SUPPOSITORY RECTAL EVERY 6 HOURS PRN
Status: DISCONTINUED | OUTPATIENT
Start: 2021-12-21 | End: 2021-12-24

## 2021-12-21 RX ORDER — SODIUM CHLORIDE 9 MG/ML
25 INJECTION, SOLUTION INTRAVENOUS PRN
Status: DISCONTINUED | OUTPATIENT
Start: 2021-12-21 | End: 2021-12-28 | Stop reason: SDUPTHER

## 2021-12-21 RX ORDER — SODIUM CHLORIDE 0.9 % (FLUSH) 0.9 %
10 SYRINGE (ML) INJECTION EVERY 12 HOURS SCHEDULED
Status: DISCONTINUED | OUTPATIENT
Start: 2021-12-21 | End: 2021-12-28 | Stop reason: SDUPTHER

## 2021-12-21 RX ORDER — SODIUM CHLORIDE 0.9 % (FLUSH) 0.9 %
10 SYRINGE (ML) INJECTION PRN
Status: DISCONTINUED | OUTPATIENT
Start: 2021-12-21 | End: 2021-12-28 | Stop reason: SDUPTHER

## 2021-12-21 RX ORDER — FLUTICASONE PROPIONATE 50 MCG
1 SPRAY, SUSPENSION (ML) NASAL DAILY
Status: DISCONTINUED | OUTPATIENT
Start: 2021-12-21 | End: 2022-01-05 | Stop reason: HOSPADM

## 2021-12-21 RX ORDER — ACETAMINOPHEN 325 MG/1
650 TABLET ORAL EVERY 6 HOURS PRN
Status: DISCONTINUED | OUTPATIENT
Start: 2021-12-21 | End: 2021-12-24

## 2021-12-21 RX ORDER — TROSPIUM CHLORIDE 20 MG/1
20 TABLET, FILM COATED ORAL
Status: DISCONTINUED | OUTPATIENT
Start: 2021-12-21 | End: 2021-12-29

## 2021-12-21 RX ORDER — ONDANSETRON 2 MG/ML
4 INJECTION INTRAMUSCULAR; INTRAVENOUS EVERY 6 HOURS PRN
Status: DISCONTINUED | OUTPATIENT
Start: 2021-12-21 | End: 2022-01-05 | Stop reason: HOSPADM

## 2021-12-21 RX ORDER — POLYETHYLENE GLYCOL 3350 17 G/17G
17 POWDER, FOR SOLUTION ORAL DAILY PRN
Status: DISCONTINUED | OUTPATIENT
Start: 2021-12-21 | End: 2022-01-05 | Stop reason: HOSPADM

## 2021-12-21 RX ORDER — NAPROXEN 500 MG/1
500 TABLET ORAL 2 TIMES DAILY PRN
Status: DISCONTINUED | OUTPATIENT
Start: 2021-12-21 | End: 2022-01-05 | Stop reason: HOSPADM

## 2021-12-21 RX ADMIN — ACETAMINOPHEN 650 MG: 325 TABLET ORAL at 17:26

## 2021-12-21 RX ADMIN — ENOXAPARIN SODIUM 40 MG: 100 INJECTION SUBCUTANEOUS at 07:41

## 2021-12-21 RX ADMIN — TROSPIUM CHLORIDE 20 MG: 20 TABLET, FILM COATED ORAL at 05:37

## 2021-12-21 RX ADMIN — ACETAMINOPHEN 650 MG: 325 TABLET ORAL at 04:52

## 2021-12-21 RX ADMIN — Medication 10 ML: at 20:22

## 2021-12-21 RX ADMIN — Medication 10 ML: at 07:42

## 2021-12-21 RX ADMIN — TROSPIUM CHLORIDE 20 MG: 20 TABLET, FILM COATED ORAL at 15:51

## 2021-12-21 RX ADMIN — FLUTICASONE PROPIONATE 1 SPRAY: 50 SPRAY, METERED NASAL at 07:41

## 2021-12-21 ASSESSMENT — PAIN DESCRIPTION - LOCATION: LOCATION: FOOT

## 2021-12-21 ASSESSMENT — PAIN DESCRIPTION - PAIN TYPE: TYPE: ACUTE PAIN

## 2021-12-21 ASSESSMENT — PAIN SCALES - GENERAL
PAINLEVEL_OUTOF10: 5
PAINLEVEL_OUTOF10: 5
PAINLEVEL_OUTOF10: 7
PAINLEVEL_OUTOF10: 0
PAINLEVEL_OUTOF10: 7

## 2021-12-21 ASSESSMENT — PAIN DESCRIPTION - FREQUENCY: FREQUENCY: CONTINUOUS

## 2021-12-21 ASSESSMENT — PAIN DESCRIPTION - DESCRIPTORS: DESCRIPTORS: THROBBING

## 2021-12-21 NOTE — CARE COORDINATION
Care Coordination  The patient was admitted die to lower extremity weakness and she was feeling fine until she received her covid antibody Infusion regeneron  On 11/23/21. She developed saddle anesthesia and urinary incontinence. Mri of the T spine,and L spine  And C spine are all pending. Ua + large amount of blood. Neurology and neurosurgery on consult. Pt Ot to see. Plan to r.o demyelination disease. She is on lovenox sq . I went in and spoke to the patient and she lives with her fience who has covid currently at home and she saty on the first floor. She has a borrowed wheelchair and a wheeled walker from a friend but will need to give it back soon. She has no pmh of [facility] or a skilled facility. Her PCP is Dr Leti Ta and her pharmacy is [facility] and this is new. Pt Ot to see  And if she is able to go home she will need a wheeled walker on discharge and I offered choice list and she has no preference. She is interested in home care as well if that is the plan.  I will follow

## 2021-12-21 NOTE — PLAN OF CARE
Problem: Falls - Risk of:  Goal: Will remain free from falls  Description: Will remain free from falls  12/21/2021 0320 by Michelle Cuello RN  Outcome: Met This Shift  12/21/2021 0124 by Michelle Cuello RN  Outcome: Met This Shift  Goal: Absence of physical injury  Description: Absence of physical injury  12/21/2021 0320 by Michelle Cuello RN  Outcome: Met This Shift  12/21/2021 0124 by Michelle Cuello RN  Outcome: Met This Shift     Problem: Pain:  Goal: Pain level will decrease  Description: Pain level will decrease  12/21/2021 0320 by Michelle Cuello RN  Outcome: Met This Shift  12/21/2021 0124 by Michelle Cuello RN  Outcome: Met This Shift  Goal: Control of acute pain  Description: Control of acute pain  12/21/2021 0320 by Michelle Cuello RN  Outcome: Met This Shift  12/21/2021 0124 by Michelle Cuello RN  Outcome: Met This Shift  Goal: Control of chronic pain  Description: Control of chronic pain  12/21/2021 0320 by Michelle Cuello RN  Outcome: Met This Shift  12/21/2021 0124 by Michelle Cuello RN  Outcome: Met This Shift

## 2021-12-21 NOTE — CONSULTS
NEUROSURGERY CONSULTATION     Chief Complaint: bilateral lower extremity weakness    HPI:   Fabienne Still is a 48 y.o.  female seen by neurosurgery for bilateral lower extremity weakness. She states this first began about 1 month ago when she was diagnosed with Covid. She received an antibody infusion and after that began to experience lower extremity weakness. She has gotten no relief from her symptoms and states they have gotten progressively worse since onset. History reviewed. No pertinent past medical history. Past Surgical History:   Procedure Laterality Date    ANKLE SURGERY      bilateral    APPENDECTOMY      DILATION AND CURETTAGE OF UTERUS      ELBOW SURGERY      right    ENDOMETRIAL ABLATION  07/2016    GALLBLADDER SURGERY      LEEP      cervical conization    TUBAL LIGATION        Family History   Problem Relation Age of Onset    Heart Failure Mother       Social History     Socioeconomic History    Marital status:      Spouse name: Not on file    Number of children: Not on file    Years of education: Not on file    Highest education level: Not on file   Occupational History    Not on file   Tobacco Use    Smoking status: Current Every Day Smoker     Packs/day: 1.00    Smokeless tobacco: Never Used   Vaping Use    Vaping Use: Never used   Substance and Sexual Activity    Alcohol use: Yes     Comment: rarely per patient questionnaire    Drug use: No    Sexual activity: Not on file   Other Topics Concern    Not on file   Social History Narrative    Not on file     Social Determinants of Health     Financial Resource Strain:     Difficulty of Paying Living Expenses: Not on file   Food Insecurity:     Worried About Running Out of Food in the Last Year: Not on file    Sahina of Food in the Last Year: Not on file   Transportation Needs:     Lack of Transportation (Medical): Not on file    Lack of Transportation (Non-Medical):  Not on file   Physical Activity:     Days of Exercise per Week: Not on file    Minutes of Exercise per Session: Not on file   Stress:     Feeling of Stress : Not on file   Social Connections:     Frequency of Communication with Friends and Family: Not on file    Frequency of Social Gatherings with Friends and Family: Not on file    Attends Mormon Services: Not on file    Active Member of [address] [place] [address] or Organizations: Not on file    Attends Club or Organization Meetings: Not on file    Marital Status: Not on file   Intimate Partner Violence:     Fear of Current or Ex-Partner: Not on file    Emotionally Abused: Not on file    Physically Abused: Not on file    Sexually Abused: Not on file   Housing Stability:     Unable to Pay for Housing in the Last Year: Not on file    Number of [place] in the Last Year: Not on file    Unstable Housing in the Last Year: Not on file       Medications:   Current Facility-Administered Medications   Medication Dose Route Frequency Provider Last Rate Last Admin    fluticasone (FLONASE) 50 MCG/ACT nasal spray 1 spray  1 spray Nasal Daily Flor Frantz, DO   1 spray at 12/21/21 0741    trospium (SANCTURA) tablet 20 mg  20 mg Oral BID AC Flor Frantz, DO   20 mg at 12/21/21 0537    sodium chloride flush 0.9 % injection 10 mL  10 mL IntraVENous 2 times per day Flor Frantz, DO   10 mL at 12/21/21 0742    sodium chloride flush 0.9 % injection 10 mL  10 mL IntraVENous PRN Flor Frantz, DO        0.9 % sodium chloride infusion  25 mL IntraVENous PRN Flor Frantz, DO        enoxaparin (LOVENOX) injection 40 mg  40 mg SubCUTAneous Daily Flor Frantz, DO   40 mg at 12/21/21 0741    promethazine (PHENERGAN) tablet 12.5 mg  12.5 mg Oral Q6H PRN Flor Frantz, DO        Or    ondansetron [facility]) injection 4 mg  4 mg IntraVENous Q6H PRN Flor Frantz, DO        polyethylene glycol (GLYCOLAX) packet 17 g  17 g Oral Daily PRN Flor Frantz, DO        acetaminophen (TYLENOL) tablet 650 mg  650 mg Oral Q6H PRN Roxianne Elliest Jorge Luis, DO   650 mg at 12/21/21 2701    Or    acetaminophen (TYLENOL) suppository 650 mg  650 mg Rectal Q6H PRN Flor Landry DO            Allergies:    Patient has no known allergies. Review of Systems   Constitutional: Negative for fever. HENT: Negative for trouble swallowing. Eyes: Negative for visual disturbance. Respiratory: Negative for shortness of breath. Cardiovascular: Negative for chest pain. Gastrointestinal: Negative for abdominal distention, nausea and vomiting. Endocrine: Negative for polyuria. Genitourinary: Negative for dysuria. Musculoskeletal: Negative for back pain and neck pain. Skin: Negative for rash. Neurological: Negative for facial asymmetry and numbness. Psychiatric/Behavioral: Negative for confusion. Physical Exam  Constitutional:       Appearance: Normal appearance. HENT:      Head: Normocephalic and atraumatic. Eyes:      Extraocular Movements: Extraocular movements intact. Conjunctiva/sclera: Conjunctivae normal.      Pupils: Pupils are equal, round, and reactive to light. Cardiovascular:      Rate and Rhythm: Normal rate. Pulmonary:      Effort: Pulmonary effort is normal.      Breath sounds: Normal breath sounds. Abdominal:      General: Abdomen is flat. There is no distension. Palpations: Abdomen is soft. Musculoskeletal:         General: Normal range of motion. Cervical back: Normal range of motion and neck supple. Skin:     General: Skin is warm and dry. Neurological:      Mental Status: She is alert. Comments: Alert and oriented x3  CN 3-12 intact  Motor strength 5/5 in hip flexors bilaterally. 3/5 dorsiflexion bilaterally. Sensation intact to LT  Reflexes normal  Bilateral upgoing toes on babinski exam  + Montoya's sign bilaterally   Psychiatric:         Mood and Affect: Mood normal.         Thought Content:  Thought content normal.          /87   Pulse 85   Temp 96.8 °F (36 °C) (Temporal) Resp 14   Ht 5' (1.524 m)   Wt 225 lb (102.1 kg)   SpO2 95%   BMI 43.94 kg/m²        Assessment:   · 48year old female with bilateral lower extremity weakness. Abnormal brain MRI with T2 FLAIR. Plan:  · MRI brain w/wo contrast  · MRI cervical, thoracic, and lumbar spine w/wo contrast  · Further planning once imaging complete. Electronically signed by Uzair Owens on 12/21/2021 at 11:28 AM     Nsx Attending:    Patient was seen and examined by me with the team.  I personally reviewed all pertinent radiological images. I concur with Mr. Cherie Parnell clinical assessment and plan except as below. In brief, 48year old woman presents with progressive LE weakness. She denied radicular pain. There is no loss of bowel/bladder. 2/5 HF, 4/5 KE DF1/5 PF 3/5 with upgoing plantar response. Plan as above. Thank you so much for allowing us to participate in the care of this patient. NOTE: This report was transcribed using voice recognition software.  Every effort was made to ensure accuracy; however, inadvertent computerized transcription errors may be present

## 2021-12-21 NOTE — ED NOTES
Report received from DEPARTMENT OF [facility]. Pt on monitor, call light in reach, waiting for MRI, NAD.       Lea Gibbs RN  12/20/21 1922

## 2021-12-21 NOTE — ED NOTES
Pt brought to bathroom in wheelchair. Pt able to pivot independently but unable to walk.  Urine Preg obtained and sent     Bautista Courtney RN  12/20/21 2013

## 2021-12-21 NOTE — H&P
Hospitalist History & Physical      PCP: Tati Kendall DO    Date of Service: Pt seen/examined on 12/20/2021     Chief Complaint:  had concerns including Extremity Weakness (leg weakness started after recieving covid antibody infusion. Infusion recieved on 11/23, Waist down weakness. ). History Of Present Illness:    Ms. Edy Doe, a 48y.o. year old female  who  has no past medical history on file. Patient presented to the emergency department with complaints of lower extremity weakness. This has been ongoing since November 23 when she received Regeneron. She is seen physical therapy and recently started getting B12 shots. She then started to develop saddle anesthesia, urinary and fecal incontinence. Reports that feeling in her legs has diminished. Denies fever, chills, nausea, vomiting, shortness of breath, chest pain, abdominal pain, trauma, dysuria. Vital signs within normal limits and stable. MRI of the C-spine, T-spine and L-spine was obtained and revealed L4-L5 severe bilateral neural foraminal narrowing and nonspecific foci of periventricular and subcortical cerebral white matter T2 flair hyperintensity. Differential possibilities include migraine related  angiopathy, trauma related white matter change, early chronic microangiopathic ischemic disease. These lesions do not have typical distribution of demyelinating lesions however this diagnosis cannot be completely excluded. Medicine consulted for admission. History reviewed. No pertinent past medical history. Past Surgical History:   Procedure Laterality Date    ANKLE SURGERY      bilateral    APPENDECTOMY      DILATION AND CURETTAGE OF UTERUS      ELBOW SURGERY      right    ENDOMETRIAL ABLATION  07/2016    GALLBLADDER SURGERY      LEEP      cervical conization    TUBAL LIGATION         Prior to Admission medications    Medication Sig Start Date End Date Taking?  Authorizing Provider   Cyanocobalamin (B-12 COMPLIANCE INJECTION) 1000 MCG/ML KIT Inject as directed   Yes Historical Provider, MD   vitamin D (ERGOCALCIFEROL) 1.25 MG (71273 UT) CAPS capsule Take 50,000 Units by mouth once a week   Yes Historical Provider, MD   acetaminophen (TYLENOL) 500 MG tablet Take 500 mg by mouth every 6 hours as needed for Pain   Yes Historical Provider, MD   diphenhydrAMINE (BENADRYL) 25 MG capsule Take 25 mg by mouth nightly as needed for Itching   Yes Historical Provider, MD   loratadine (CLARITIN) 10 MG tablet Take 10 mg by mouth daily   Yes Historical Provider, MD   mirabegron (MYRBETRIQ) 25 MG TB24 Take 25 mg by mouth daily   Yes Historical Provider, MD   naproxen (NAPROSYN) 500 MG tablet Take 1 tablet by mouth 2 times daily as needed for Pain 8/5/20 12/20/21 Yes Steven Trevizo MD   fluticasone (FLONASE) 50 MCG/ACT nasal spray 1 spray by Nasal route daily   Yes Historical Provider, MD         Allergies:  Patient has no known allergies. Social History:    TOBACCO:   reports that she has been smoking. She has been smoking about 1.00 pack per day. She has never used smokeless tobacco.  ETOH:   reports current alcohol use. Family History:    Reviewed in detail and negative for DM, CAD, Cancer, CVA. Positive as follows\"      Problem Relation Age of Onset    Heart Failure Mother        REVIEW OF SYSTEMS:   Pertinent positives as noted in the HPI. All other systems reviewed and negative. PHYSICAL EXAM:  BP (!) 142/89   Pulse 79   Temp 98.3 °F (36.8 °C)   Resp 17   SpO2 97%   General appearance: No apparent distress, appears stated age and cooperative. HEENT: Normal cephalic, atraumatic without obvious deformity. Pupils equal, round, and reactive to light. Extra ocular muscles intact. Conjunctivae/corneas clear. Neck: Supple, with full range of motion. No jugular venous distention. Trachea midline.   Respiratory: Clear to auscultation bilaterally  Cardiovascular: Regular rate and rhythm  Abdomen: Soft, nontender, FACTORS, NEURO EXAM NORMAL TECHNOLOGIST PROVIDED HISTORY: Has a \"code stroke\" or \"stroke alert\" been called? ->No Reason for exam:->bodywide tingling Decision Support Exception - unselect if not a suspected or confirmed emergency medical condition->Emergency Medical Condition ([place]) What reading provider will be dictating this exam?->CRC FINDINGS: BRAIN/VENTRICLES: There is no acute intracranial hemorrhage, mass effect or midline shift. No abnormal extra-axial fluid collection. The gray-white differentiation is maintained without evidence of an acute infarct. There is no evidence of hydrocephalus. ORBITS: The visualized portion of the orbits demonstrate no acute abnormality. SINUSES: The visualized paranasal sinuses and mastoid air cells demonstrate no acute abnormality. SOFT TISSUES/SKULL:  No acute abnormality of the visualized skull or soft tissues. No acute intracranial abnormality. CT Lumbar Spine WO Contrast    Result Date: 11/29/2021  EXAMINATION: CT OF THE LUMBAR SPINE WITHOUT CONTRAST  11/29/2021 TECHNIQUE: CT of the lumbar spine was performed without the administration of intravenous contrast. Multiplanar reformatted images are provided for review. Adjustment of mA and/or kV according to patient size was utilized. Dose modulation, iterative reconstruction, and/or weight based adjustment of the mA/kV was utilized to reduce the radiation dose to as low as reasonably achievable. COMPARISON: 04/07/2020 HISTORY: ORDERING SYSTEM PROVIDED HISTORY: back pain, left leg weakness TECHNOLOGIST PROVIDED HISTORY: Reason for exam:->back pain, left leg weakness Decision Support Exception - unselect if not a suspected or confirmed emergency medical condition->Emergency Medical Condition ([place]) What reading provider will be dictating this exam?->CRC FINDINGS: The lumbar vertebra are normal in height. No fracture or acute osseous abnormality. Atherosclerotic abdominal aorta. Bilateral SI joints remain open.  L1-2 and L2-3: The disc space heights are preserved. No posterior disc protrusion, foraminal narrowing, or spinal stenosis. L3-4: The disc space height is preserved and with mild posterior endplate spurring on the left. Mild circumferential disc bulging and with degenerative calcification of the posterior disc annulus on the far lateral left. Bilateral facet joint arthritis and ligamentum flavum thickening and dystrophic calcification of the ligamentum flavum on the right with secondary mild central canal narrowing. Foraminal narrowing on the left which is multifactorial secondary to facet joint arthritis and disc-osteophyte complex. L4-5: Moderate circumferential disc bulging and mild disc space narrowing posteriorly. Advanced facet joint arthritis with mild subluxation of the facets and accompanied by minor anterolisthesis of L4 on L5. Bilateral ligamentum flavum thickening. Acquired spinal stenosis with mild central canal narrowing and moderate biforaminal narrowing of this level. L5-S1: The disc space height is preserved. Minor posterior disc bulging. No significant central canal or foraminal narrowing. 1. L3-4 acquired spinal stenosis with mild central canal narrowing and mild foraminal narrowing on the left at this level. 2.  L4-5 acquired spinal stenosis with mild central canal narrowing and moderate biforaminal narrowing of this level. 3.  L4-5 advanced facet joint arthritis and with mild subluxation of the facets. Further correlation could be obtained with lumbar spine flexion extension views on a nonemergent basis to assess for lumbar spine instability.      MRI CERVICAL SPINE WO CONTRAST    Result Date: 12/20/2021  EXAMINATION: MRI OF THE CERVICAL SPINE WITHOUT CONTRAST; MRI OF THE THORACIC SPINE WITHOUT CONTRAST; MRI OF THE LUMBAR SPINE WITHOUT CONTRAST; MRI OF THE BRAIN WITHOUT CONTRAST 12/20/2021 8:46 pm TECHNIQUE: Multiplanar multisequence MRI of the cervical spine was performed without the administration of intravenous contrast.; Multiplanar multisequence MRI of the thoracic spine was performed without the administration of intravenous contrast.; Multiplanar multisequence MRI of the lumbar spine was performed without the administration of intravenous contrast.; Multiplanar multisequence MRI of the brain was performed without the administration of intravenous contrast. COMPARISON: None. HISTORY: ORDERING SYSTEM PROVIDED HISTORY: concern for cauda equina TECHNOLOGIST PROVIDED HISTORY: Reason for exam:->concern for cauda equina Decision Support Exception - unselect if not a suspected or confirmed emergency medical condition->Emergency Medical Condition ([place]) What reading provider will be dictating this exam?->CRC FINDINGS: BONES/ALIGNMENT: There is normal alignment of the spine. The vertebral body heights are maintained. The bone marrow signal appears unremarkable. SPINAL CORD: No abnormal cord signal is seen. SOFT TISSUES: No paraspinal mass identified. C2-C3: There is no significant disc protrusion, spinal canal stenosis or neural foraminal narrowing. Subtle disc bulging. C3-C4: There is no significant disc protrusion, spinal canal stenosis or neural foraminal narrowing. C4-C5: There is no significant disc protrusion, spinal canal stenosis or neural foraminal narrowing. 2 mm disc bulging effaces the anterior thecal sac. C5-C6: 2 mm disc bulging. Mild left facet arthropathy. Mild left neural foraminal narrowing C6-C7: 3 mm disc bulging effaces the anterior thecal sac. Mild bilateral facet arthropathy. Mild left neural foraminal narrowing. C7-T1: There is no significant disc protrusion, spinal canal stenosis or neural foraminal narrowing. Thoracic spine: BONES/ALIGNMENT: There is normal alignment of the spine. The vertebral body heights are maintained. The bone marrow signal appears unremarkable.  Scattered areas of T1 and T2 hyperintensity within the T11-T10 T6 and T4 vertebral bodies are mostly consistent with focal fat/hemangioma. SPINAL CORD: No abnormal cord signal is seen. SOFT TISSUES: No paraspinal mass identified. DEGENERATIVE CHANGES: At T11-T12: 2 mm disc bulging effaces the anterior thecal sac. No canal or foraminal stenosis. At T9-T10, 2 mm disc bulging effaces the anterior thecal sac. Mild bilateral facet arthropathy. Mild bilateral neural foraminal narrowing. Lumbar spine: BONES/ALIGNMENT: There is normal alignment of the spine. The vertebral body heights are maintained. The bone marrow signal appears unremarkable. SPINAL CORD: No abnormal cord signal is seen. SOFT TISSUES: No paraspinal mass identified. DEGENERATIVE CHANGES: L5-S1: Moderate bilateral facet arthropathy results in mild bilateral neural foraminal narrowing. L4-L5: Mild loss of disc signal.  2 mm disc bulge effaces the anterior thecal sac. Severe bilateral facet arthropathy. 5 mm left-sided intraspinal synovial cyst contributes to narrowing of left subarticular recess. Findings resulting in severe bilateral neural foraminal narrowing and mild canal stenosis. L3-L4, mild loss of disc signal.  Grade 1 retrolisthesis, disc bulging with 2 mm central disc protrusion effaces the anterior thecal sac. Severe bilateral facet arthropathy and ligamentum flavum thickening. Findings resulting in moderate canal stenosis and moderate bilateral neural foraminal narrowing L2-L3: Mild loss of disc signal.  2 mm disc bulge effaces the anterior thecal sac. Mild bilateral facet arthropathy. Mild bilateral neural foraminal narrowing. L1-L2 and T12-L1: Unremarkable. MRI brain: There is no acute infarction, intracranial hemorrhage, or intracranial mass lesion. No mass effect, midline shift, or extra-axial collection is noted. There are mild nonspecific foci of periventricular and subcortical cerebral white matter T2/FLAIR hyperintensity. .  The brain parenchyma is otherwise normal.  The pituitary gland is normal in appearance.  The cerebellar tonsils are in normal position. The ventricles, sulci, and cisterns are within normal size and range. No significant volume loss. .  The intracranial flow voids are preserved. The globes and orbits are within normal limits. The visualized extracranial structures including paranasal sinuses and mastoid air cells are unremarkable. Cervical spine MRI: No cord signal abnormality. No high-grade canal or foraminal stenosis. Thoracic spine: No cord signal abnormality. No high-grade canal or foraminal stenosis. Lumbar spine MRI: No cord signal abnormality and no cauda equina nerve root compression. At L4-L5, mild canal stenosis and severe bilateral neural foraminal narrowing. At L3-L4, moderate canal stenosis and moderate bilateral neural foraminal narrowing. Brain MRI: No acute infarction, intracranial hemorrhage or mass lesion. Mild nonspecific foci of periventricular and subcortical cerebral white matter T2/FLAIR hyperintensity. .  Differential possibilities include migraine related angiopathy, trauma related white matter change, early chronic microangiopathic ischemic disease. The lesions do not have typical distribution of demyelinating lesions however this diagnosis cannot be completely excluded.  RECOMMENDATIONS: Unavailable     MRI THORACIC SPINE WO CONTRAST    Result Date: 12/20/2021  EXAMINATION: MRI OF THE CERVICAL SPINE WITHOUT CONTRAST; MRI OF THE THORACIC SPINE WITHOUT CONTRAST; MRI OF THE LUMBAR SPINE WITHOUT CONTRAST; MRI OF THE BRAIN WITHOUT CONTRAST 12/20/2021 8:46 pm TECHNIQUE: Multiplanar multisequence MRI of the cervical spine was performed without the administration of intravenous contrast.; Multiplanar multisequence MRI of the thoracic spine was performed without the administration of intravenous contrast.; Multiplanar multisequence MRI of the lumbar spine was performed without the administration of intravenous contrast.; Multiplanar multisequence MRI of the brain was performed without the administration of intravenous contrast. COMPARISON: None. HISTORY: ORDERING SYSTEM PROVIDED HISTORY: concern for cauda equina TECHNOLOGIST PROVIDED HISTORY: Reason for exam:->concern for cauda equina Decision Support Exception - unselect if not a suspected or confirmed emergency medical condition->Emergency Medical Condition ([place]) What reading provider will be dictating this exam?->CRC FINDINGS: BONES/ALIGNMENT: There is normal alignment of the spine. The vertebral body heights are maintained. The bone marrow signal appears unremarkable. SPINAL CORD: No abnormal cord signal is seen. SOFT TISSUES: No paraspinal mass identified. C2-C3: There is no significant disc protrusion, spinal canal stenosis or neural foraminal narrowing. Subtle disc bulging. C3-C4: There is no significant disc protrusion, spinal canal stenosis or neural foraminal narrowing. C4-C5: There is no significant disc protrusion, spinal canal stenosis or neural foraminal narrowing. 2 mm disc bulging effaces the anterior thecal sac. C5-C6: 2 mm disc bulging. Mild left facet arthropathy. Mild left neural foraminal narrowing C6-C7: 3 mm disc bulging effaces the anterior thecal sac. Mild bilateral facet arthropathy. Mild left neural foraminal narrowing. C7-T1: There is no significant disc protrusion, spinal canal stenosis or neural foraminal narrowing. Thoracic spine: BONES/ALIGNMENT: There is normal alignment of the spine. The vertebral body heights are maintained. The bone marrow signal appears unremarkable. Scattered areas of T1 and T2 hyperintensity within the T11-T10 T6 and T4 vertebral bodies are mostly consistent with focal fat/hemangioma. SPINAL CORD: No abnormal cord signal is seen. SOFT TISSUES: No paraspinal mass identified. DEGENERATIVE CHANGES: At T11-T12: 2 mm disc bulging effaces the anterior thecal sac. No canal or foraminal stenosis. At T9-T10, 2 mm disc bulging effaces the anterior thecal sac. Mild bilateral facet arthropathy.   Mild bilateral neural foraminal narrowing. Lumbar spine: BONES/ALIGNMENT: There is normal alignment of the spine. The vertebral body heights are maintained. The bone marrow signal appears unremarkable. SPINAL CORD: No abnormal cord signal is seen. SOFT TISSUES: No paraspinal mass identified. DEGENERATIVE CHANGES: L5-S1: Moderate bilateral facet arthropathy results in mild bilateral neural foraminal narrowing. L4-L5: Mild loss of disc signal.  2 mm disc bulge effaces the anterior thecal sac. Severe bilateral facet arthropathy. 5 mm left-sided intraspinal synovial cyst contributes to narrowing of left subarticular recess. Findings resulting in severe bilateral neural foraminal narrowing and mild canal stenosis. L3-L4, mild loss of disc signal.  Grade 1 retrolisthesis, disc bulging with 2 mm central disc protrusion effaces the anterior thecal sac. Severe bilateral facet arthropathy and ligamentum flavum thickening. Findings resulting in moderate canal stenosis and moderate bilateral neural foraminal narrowing L2-L3: Mild loss of disc signal.  2 mm disc bulge effaces the anterior thecal sac. Mild bilateral facet arthropathy. Mild bilateral neural foraminal narrowing. L1-L2 and T12-L1: Unremarkable. MRI brain: There is no acute infarction, intracranial hemorrhage, or intracranial mass lesion. No mass effect, midline shift, or extra-axial collection is noted. There are mild nonspecific foci of periventricular and subcortical cerebral white matter T2/FLAIR hyperintensity. .  The brain parenchyma is otherwise normal.  The pituitary gland is normal in appearance. The cerebellar tonsils are in normal position. The ventricles, sulci, and cisterns are within normal size and range. No significant volume loss. .  The intracranial flow voids are preserved. The globes and orbits are within normal limits. The visualized extracranial structures including paranasal sinuses and mastoid air cells are unremarkable.      Cervical spine MRI: No cord signal abnormality. No high-grade canal or foraminal stenosis. Thoracic spine: No cord signal abnormality. No high-grade canal or foraminal stenosis. Lumbar spine MRI: No cord signal abnormality and no cauda equina nerve root compression. At L4-L5, mild canal stenosis and severe bilateral neural foraminal narrowing. At L3-L4, moderate canal stenosis and moderate bilateral neural foraminal narrowing. Brain MRI: No acute infarction, intracranial hemorrhage or mass lesion. Mild nonspecific foci of periventricular and subcortical cerebral white matter T2/FLAIR hyperintensity. .  Differential possibilities include migraine related angiopathy, trauma related white matter change, early chronic microangiopathic ischemic disease. The lesions do not have typical distribution of demyelinating lesions however this diagnosis cannot be completely excluded. RECOMMENDATIONS: Unavailable     MRI LUMBAR SPINE WO CONTRAST    Result Date: 12/20/2021  EXAMINATION: MRI OF THE CERVICAL SPINE WITHOUT CONTRAST; MRI OF THE THORACIC SPINE WITHOUT CONTRAST; MRI OF THE LUMBAR SPINE WITHOUT CONTRAST; MRI OF THE BRAIN WITHOUT CONTRAST 12/20/2021 8:46 pm TECHNIQUE: Multiplanar multisequence MRI of the cervical spine was performed without the administration of intravenous contrast.; Multiplanar multisequence MRI of the thoracic spine was performed without the administration of intravenous contrast.; Multiplanar multisequence MRI of the lumbar spine was performed without the administration of intravenous contrast.; Multiplanar multisequence MRI of the brain was performed without the administration of intravenous contrast. COMPARISON: None.  HISTORY: ORDERING SYSTEM PROVIDED HISTORY: concern for cauda equina TECHNOLOGIST PROVIDED HISTORY: Reason for exam:->concern for cauda equina Decision Support Exception - unselect if not a suspected or confirmed emergency medical condition->Emergency Medical Condition ([place]) What reading provider will be dictating this exam?->CRC FINDINGS: BONES/ALIGNMENT: There is normal alignment of the spine. The vertebral body heights are maintained. The bone marrow signal appears unremarkable. SPINAL CORD: No abnormal cord signal is seen. SOFT TISSUES: No paraspinal mass identified. C2-C3: There is no significant disc protrusion, spinal canal stenosis or neural foraminal narrowing. Subtle disc bulging. C3-C4: There is no significant disc protrusion, spinal canal stenosis or neural foraminal narrowing. C4-C5: There is no significant disc protrusion, spinal canal stenosis or neural foraminal narrowing. 2 mm disc bulging effaces the anterior thecal sac. C5-C6: 2 mm disc bulging. Mild left facet arthropathy. Mild left neural foraminal narrowing C6-C7: 3 mm disc bulging effaces the anterior thecal sac. Mild bilateral facet arthropathy. Mild left neural foraminal narrowing. C7-T1: There is no significant disc protrusion, spinal canal stenosis or neural foraminal narrowing. Thoracic spine: BONES/ALIGNMENT: There is normal alignment of the spine. The vertebral body heights are maintained. The bone marrow signal appears unremarkable. Scattered areas of T1 and T2 hyperintensity within the T11-T10 T6 and T4 vertebral bodies are mostly consistent with focal fat/hemangioma. SPINAL CORD: No abnormal cord signal is seen. SOFT TISSUES: No paraspinal mass identified. DEGENERATIVE CHANGES: At T11-T12: 2 mm disc bulging effaces the anterior thecal sac. No canal or foraminal stenosis. At T9-T10, 2 mm disc bulging effaces the anterior thecal sac. Mild bilateral facet arthropathy. Mild bilateral neural foraminal narrowing. Lumbar spine: BONES/ALIGNMENT: There is normal alignment of the spine. The vertebral body heights are maintained. The bone marrow signal appears unremarkable. SPINAL CORD: No abnormal cord signal is seen. SOFT TISSUES: No paraspinal mass identified.  DEGENERATIVE CHANGES: L5-S1: Moderate bilateral facet arthropathy results in mild bilateral neural foraminal narrowing. L4-L5: Mild loss of disc signal.  2 mm disc bulge effaces the anterior thecal sac. Severe bilateral facet arthropathy. 5 mm left-sided intraspinal synovial cyst contributes to narrowing of left subarticular recess. Findings resulting in severe bilateral neural foraminal narrowing and mild canal stenosis. L3-L4, mild loss of disc signal.  Grade 1 retrolisthesis, disc bulging with 2 mm central disc protrusion effaces the anterior thecal sac. Severe bilateral facet arthropathy and ligamentum flavum thickening. Findings resulting in moderate canal stenosis and moderate bilateral neural foraminal narrowing L2-L3: Mild loss of disc signal.  2 mm disc bulge effaces the anterior thecal sac. Mild bilateral facet arthropathy. Mild bilateral neural foraminal narrowing. L1-L2 and T12-L1: Unremarkable. MRI brain: There is no acute infarction, intracranial hemorrhage, or intracranial mass lesion. No mass effect, midline shift, or extra-axial collection is noted. There are mild nonspecific foci of periventricular and subcortical cerebral white matter T2/FLAIR hyperintensity. .  The brain parenchyma is otherwise normal.  The pituitary gland is normal in appearance. The cerebellar tonsils are in normal position. The ventricles, sulci, and cisterns are within normal size and range. No significant volume loss. .  The intracranial flow voids are preserved. The globes and orbits are within normal limits. The visualized extracranial structures including paranasal sinuses and mastoid air cells are unremarkable. Cervical spine MRI: No cord signal abnormality. No high-grade canal or foraminal stenosis. Thoracic spine: No cord signal abnormality. No high-grade canal or foraminal stenosis. Lumbar spine MRI: No cord signal abnormality and no cauda equina nerve root compression. At L4-L5, mild canal stenosis and severe bilateral neural foraminal narrowing.  At L3-L4, moderate canal stenosis and moderate bilateral neural foraminal narrowing. Brain MRI: No acute infarction, intracranial hemorrhage or mass lesion. Mild nonspecific foci of periventricular and subcortical cerebral white matter T2/FLAIR hyperintensity. .  Differential possibilities include migraine related angiopathy, trauma related white matter change, early chronic microangiopathic ischemic disease. The lesions do not have typical distribution of demyelinating lesions however this diagnosis cannot be completely excluded. RECOMMENDATIONS: Unavailable     XR CHEST PORTABLE    Result Date: 12/20/2021  EXAMINATION: ONE XRAY VIEW OF THE CHEST 12/20/2021 3:02 pm COMPARISON: December 25, 2007 HISTORY: ORDERING SYSTEM PROVIDED HISTORY: weakness TECHNOLOGIST PROVIDED HISTORY: Reason for exam:->weakness FINDINGS: No airspace opacity or pleural effusion. The heart is normal size. No pneumothorax. No free air beneath the hemidiaphragms. No pneumonia or pleural effusion. MRI BRAIN WO CONTRAST    Result Date: 12/20/2021  EXAMINATION: MRI OF THE CERVICAL SPINE WITHOUT CONTRAST; MRI OF THE THORACIC SPINE WITHOUT CONTRAST; MRI OF THE LUMBAR SPINE WITHOUT CONTRAST; MRI OF THE BRAIN WITHOUT CONTRAST 12/20/2021 8:46 pm TECHNIQUE: Multiplanar multisequence MRI of the cervical spine was performed without the administration of intravenous contrast.; Multiplanar multisequence MRI of the thoracic spine was performed without the administration of intravenous contrast.; Multiplanar multisequence MRI of the lumbar spine was performed without the administration of intravenous contrast.; Multiplanar multisequence MRI of the brain was performed without the administration of intravenous contrast. COMPARISON: None.  HISTORY: ORDERING SYSTEM PROVIDED HISTORY: concern for cauda equina TECHNOLOGIST PROVIDED HISTORY: Reason for exam:->concern for cauda equina Decision Support Exception - unselect if not a suspected or confirmed emergency medical condition->Emergency Medical Condition (MA) What reading provider will be dictating this exam?->CRC FINDINGS: BONES/ALIGNMENT: There is normal alignment of the spine. The vertebral body heights are maintained. The bone marrow signal appears unremarkable. SPINAL CORD: No abnormal cord signal is seen. SOFT TISSUES: No paraspinal mass identified. C2-C3: There is no significant disc protrusion, spinal canal stenosis or neural foraminal narrowing. Subtle disc bulging. C3-C4: There is no significant disc protrusion, spinal canal stenosis or neural foraminal narrowing. C4-C5: There is no significant disc protrusion, spinal canal stenosis or neural foraminal narrowing. 2 mm disc bulging effaces the anterior thecal sac. C5-C6: 2 mm disc bulging. Mild left facet arthropathy. Mild left neural foraminal narrowing C6-C7: 3 mm disc bulging effaces the anterior thecal sac. Mild bilateral facet arthropathy. Mild left neural foraminal narrowing. C7-T1: There is no significant disc protrusion, spinal canal stenosis or neural foraminal narrowing. Thoracic spine: BONES/ALIGNMENT: There is normal alignment of the spine. The vertebral body heights are maintained. The bone marrow signal appears unremarkable. Scattered areas of T1 and T2 hyperintensity within the T11-T10 T6 and T4 vertebral bodies are mostly consistent with focal fat/hemangioma. SPINAL CORD: No abnormal cord signal is seen. SOFT TISSUES: No paraspinal mass identified. DEGENERATIVE CHANGES: At T11-T12: 2 mm disc bulging effaces the anterior thecal sac. No canal or foraminal stenosis. At T9-T10, 2 mm disc bulging effaces the anterior thecal sac. Mild bilateral facet arthropathy. Mild bilateral neural foraminal narrowing. Lumbar spine: BONES/ALIGNMENT: There is normal alignment of the spine. The vertebral body heights are maintained. The bone marrow signal appears unremarkable. SPINAL CORD: No abnormal cord signal is seen. SOFT TISSUES: No paraspinal mass identified. DEGENERATIVE CHANGES: L5-S1: Moderate bilateral facet arthropathy results in mild bilateral neural foraminal narrowing. L4-L5: Mild loss of disc signal.  2 mm disc bulge effaces the anterior thecal sac. Severe bilateral facet arthropathy. 5 mm left-sided intraspinal synovial cyst contributes to narrowing of left subarticular recess. Findings resulting in severe bilateral neural foraminal narrowing and mild canal stenosis. L3-L4, mild loss of disc signal.  Grade 1 retrolisthesis, disc bulging with 2 mm central disc protrusion effaces the anterior thecal sac. Severe bilateral facet arthropathy and ligamentum flavum thickening. Findings resulting in moderate canal stenosis and moderate bilateral neural foraminal narrowing L2-L3: Mild loss of disc signal.  2 mm disc bulge effaces the anterior thecal sac. Mild bilateral facet arthropathy. Mild bilateral neural foraminal narrowing. L1-L2 and T12-L1: Unremarkable. MRI brain: There is no acute infarction, intracranial hemorrhage, or intracranial mass lesion. No mass effect, midline shift, or extra-axial collection is noted. There are mild nonspecific foci of periventricular and subcortical cerebral white matter T2/FLAIR hyperintensity. .  The brain parenchyma is otherwise normal.  The pituitary gland is normal in appearance. The cerebellar tonsils are in normal position. The ventricles, sulci, and cisterns are within normal size and range. No significant volume loss. .  The intracranial flow voids are preserved. The globes and orbits are within normal limits. The visualized extracranial structures including paranasal sinuses and mastoid air cells are unremarkable. Cervical spine MRI: No cord signal abnormality. No high-grade canal or foraminal stenosis. Thoracic spine: No cord signal abnormality. No high-grade canal or foraminal stenosis. Lumbar spine MRI: No cord signal abnormality and no cauda equina nerve root compression.  At L4-L5, mild canal stenosis and severe bilateral neural foraminal narrowing. At L3-L4, moderate canal stenosis and moderate bilateral neural foraminal narrowing. Brain MRI: No acute infarction, intracranial hemorrhage or mass lesion. Mild nonspecific foci of periventricular and subcortical cerebral white matter T2/FLAIR hyperintensity. .  Differential possibilities include migraine related angiopathy, trauma related white matter change, early chronic microangiopathic ischemic disease. The lesions do not have typical distribution of demyelinating lesions however this diagnosis cannot be completely excluded. RECOMMENDATIONS: Unavailable       ASSESSMENT:  -Bilateral lower extremity weakness  -Bowel and bladder incontinence  -Ambulatory dysfunction      PLAN:  -Admit to medicine  -Consult neurology  -Consult neurosurgery  -PT/OT        Diet: No diet orders on file  Code Status: No Order  Surrogate decision maker confirmed with patient:   Extended Emergency Contact Information  Primary Emergency Contact: Inga Wright  Address: [address] [place] [address] Phone: [phone]  Relation: Other  Secondary Emergency Contact: belia Shahab[address]  Phone: [phone]  Relation: Spouse  Preferred language: English   needed? No    DVT Prophylaxis: []Lovenox []Heparin []PCD [] [address] []Encouraged ambulation  Disposition: []Med/Surg [] Intermediate [] ICU/CCU  Admit status: [] Observation [] Inpatient     +++++++++++++++++++++++++++++++++++++++++++++++++  Judson Carpenter DO  [address]  +++++++++++++++++++++++++++++++++++++++++++++++++  NOTE: This report was transcribed using voice recognition software. Every effort was made to ensure accuracy; however, inadvertent computerized transcription errors may be present.

## 2021-12-21 NOTE — CONSULTS
[facility]  Neurology Consult    Date:  12/21/2021  Patient Name:  Rishabh Lopez  YOB: 1975  MRN: [id number]     PCP:  Lopez Garcia DO   Referring:  No ref. provider found      Chief Complaint: Bilateral leg weakness    History obtained from: Patient, daughter, Grabiel Wade is a 48 y.o. female   who presents due to progressively worsening bilateral leg weakness associated with numbness tingling most prominent along L3 through L5 dermatomes. Patient felt symptoms worst after receiving regeneron and receiving B12 shots. Presentation concerning for lumbar radiculopathy that correlates with MRI findings. No evidence for CNS lesions including MS or myelitis on MRI brain/spine imaging. Plan  Recommend EMG/Nerve conduction study  Follow-up neurosurgery recs  PT/OT        History of Present Illness:  Rishabh Lopez is a 48 y.o. right handed female presenting for evaluation of sudden onset bilateral leg weakness. Patient has a PMHX of sciatica, B12 deficiency, previous Covid 19 infection s/p regeneron and Vit D deficiency and follows with pain management for epidural injections for her sciatica. Patient reports that about a month ago after receiving regeneron, she experienced worsening weakness that started in her lower extremities and became more pronounced over time. Weakness is associated with urinary and fecal incontinence, as well as numbness/tingling and pins and needles sensations. After being diagnosed with B12 deficiency at her last ED visit, she received a shot of B12 and noticed a significant decline. Patient went to ED after falling on her L knee d/t the weakness while going to walk her dog. Daughter notes that her mother was so weak she couldn't lift her legs to get into her car. Nothing improves or worsens the symptoms. Patient denies hx of migraine.     Review of Systems:  Constitutional  Weight loss: No  Fever: No    Eyes  Double Vision: No  Visions loss: No    Ears, Nose, Mouth, and Throat  Difficulty swallowing: No    Cardiovascular  Chest Pain: No    Respiratory  Shortness of Breath: No    Gastrointestinal  Abdominal Pain: No    Genitourinary  Difficulty with Urination: Yes      Musculoskeletal  Back Pain: Yes-chronically    Neurological  Headaches: No  Weakness: Yes  Numbness: Yes  Seizures: No  Difficulty with Memory: No  Further symptoms noted in HPI    Complete 10-point review of systems is negative except as noted above in my HPI      Medical History:   History reviewed. No pertinent past medical history.      Surgical History:   Past Surgical History:   Procedure Laterality Date    ANKLE SURGERY      bilateral    APPENDECTOMY      DILATION AND CURETTAGE OF UTERUS      ELBOW SURGERY      right    ENDOMETRIAL ABLATION  07/2016    GALLBLADDER SURGERY      LEEP      cervical conization    TUBAL LIGATION          Family History:   Family History   Problem Relation Age of Onset    Heart Failure Mother        Social History:  Social History     Tobacco Use    Smoking status: Current Every Day Smoker     Packs/day: 1.00    Smokeless tobacco: Never Used   Vaping Use    Vaping Use: Never used   Substance Use Topics    Alcohol use: Yes     Comment: rarely per patient questionnaire    Drug use: No        Current Medications:      Current Facility-Administered Medications   Medication Dose Route Frequency Provider Last Rate Last Admin    fluticasone (FLONASE) 50 MCG/ACT nasal spray 1 spray  1 spray Nasal Daily [place]e Sessions, DO   1 spray at 12/21/21 0741    trospium (SANCTURA) tablet 20 mg  20 mg Oral BID AC [place] Sessions, DO   20 mg at 12/21/21 0537    sodium chloride flush 0.9 % injection 10 mL  10 mL IntraVENous 2 times per day [place] Sessions, DO   10 mL at 12/21/21 0742    sodium chloride flush 0.9 % injection 10 mL  10 mL IntraVENous PRN [place] Sessions, DO        0.9 % sodium chloride infusion  25 mL IntraVENous PRN [place] Sessions, DO        enoxaparin (LOVENOX) injection 40 mg  40 mg SubCUTAneous Daily Rains Pane, DO   40 mg at 12/21/21 0741    promethazine (PHENERGAN) tablet 12.5 mg  12.5 mg Oral Q6H PRN Rains Pane, DO        Or    [facility]ron [facility]) injection 4 mg  4 mg IntraVENous Q6H PRN Rains Pane, DO        polyethylene glycol (GLYCOLAX) packet 17 g  17 g Oral Daily PRN Rains Pane, DO        acetaminophen (TYLENOL) tablet 650 mg  650 mg Oral Q6H PRN Rains Pane, DO   650 mg at 12/21/21 2747    Or    acetaminophen (TYLENOL) suppository 650 mg  650 mg Rectal Q6H PRN Rains Pane, DO            Allergies:      No Known Allergies     Physical Examination  Vitals   Vitals:    12/20/21 1946 12/20/21 2307 12/21/21 0115 12/21/21 0730   BP: (!) 154/87 (!) 142/89 (!) 138/96 124/87   Pulse: 79 79 81 85   Resp: 17  16 14   Temp:   97.2 °F (36.2 °C) 96.8 °F (36 °C)   TempSrc:   Temporal Temporal   SpO2: 96% 97%  95%   Weight:   225 lb (102.1 kg)    Height:   5' (1.524 m)         General: Patient appears in no acute distress. Awake and oriented x 3. HEENT: Normocephalic, atraumatic  Chest: Clear to auscultation bilaterally  Heart: No murmurs appreciated  Extremities/Peripheral vascular: No edema/swelling noted. No cold limbs noted. Neurologic Examination    Mental Status  Alert, and oriented to person, place and time. Speech is fluent with intact comprehension. No evidence of memory impairment. Attention and concentration appeared normal.     Cranial Nerves  II. Visual fields full to confrontation bilaterally. III, IV, VI: Pupils equally round and reactive to light, 3 to 2 mm bilaterally. EOMs: full, no nystagmus. V. Facial sensation intact to light touch bilaterally  VII: Facial movements symmetric and strong  VIII: Hearing intact to voice  IX,X: Palate elevates symmetrically.  No dysarthria  XI: Sternocleidomastoid and trapezius 5/5 bilaterally   XII: Tongue is midline    Motor     Right Left   Right Left   Deltoid 5 5  Hip Flexion 4 4 Biceps      5  5  Knee Extension 3+ 3+   Triceps 5 5  Knee Flexion 4+ 4+   Handgrip 5 5  Ankle Dorsiflexion 3 3       Ankle Plantarflexion 4 4   Great toe extension: R: 3, L:3+    Tone: Normal in all four limbs    Bulk: Normal in all four limbs with no evidence of atrophy    Pronator drift: absent bilaterally    Sensation  Light Touch: Intact superior to umbilicus. Pins and needles sensation progressively worse distally. Worst at great toe, inner thigh. Proprioception: Intact distally in all four limbs    Reflexes     Right Left   Biceps 2+ 2+   Brachioradialis 2+ 2+   Triceps - -   Patellar 3 3   Achilles 3 3   ankle clonus none none     Absent babinski x2    Coordination  Rapid alternating movements normal in bilateral upper extremities  Finger to nose testing normal bilaterally    Gait  Deferred for safety/fall consideration      Labs  Recent Labs     12/20/21  1500 12/20/21  1500 12/21/21  0426      < > 138   K 4.3   < > 3.7      < > 104   CO2 23   < > 21*   BUN 12   < > 12   CREATININE 0.6   < > 0.5   GLUCOSE 100*   < > 93   CALCIUM 10.0   < > 9.1   PROT 7.6  --   --    LABALBU 4.3  --   --    BILITOT <0.2  --   --    ALKPHOS 5*  --   --    AST 15  --   --    ALT 15  --   --    WBC 10.3   < > 7.1   RBC 4.31   < > 4.12   HGB 13.2   < > 12.4   HCT 38.9   < > 37.3   MCV 90.3   < > 90.5   MCH 30.6   < > 30.1   MCHC 33.9   < > 33.2   RDW 13.6   < > 13.8      < > 365   MPV 9.4   < > 9.4    < > = values in this interval not displayed. Imaging  MRI LUMBAR SPINE WO CONTRAST   Final Result   Cervical spine MRI: No cord signal abnormality. No high-grade canal or foraminal stenosis. Thoracic spine: No cord signal abnormality. No high-grade canal or foraminal stenosis. Lumbar spine MRI:      No cord signal abnormality and no cauda equina nerve root compression. At L4-L5, mild canal stenosis and severe bilateral neural foraminal narrowing.       At L3-L4, moderate canal stenosis and moderate bilateral neural foraminal   narrowing. Brain MRI:      No acute infarction, intracranial hemorrhage or mass lesion. Mild nonspecific foci of periventricular and subcortical cerebral white   matter T2/FLAIR hyperintensity. .  Differential possibilities include migraine   related angiopathy, trauma related white matter change, early chronic   microangiopathic ischemic disease. The lesions do not have typical   distribution of demyelinating lesions however this diagnosis cannot be   completely excluded. RECOMMENDATIONS:   Unavailable         MRI THORACIC SPINE WO CONTRAST   Final Result   Cervical spine MRI: No cord signal abnormality. No high-grade canal or foraminal stenosis. Thoracic spine: No cord signal abnormality. No high-grade canal or foraminal stenosis. Lumbar spine MRI:      No cord signal abnormality and no cauda equina nerve root compression. At L4-L5, mild canal stenosis and severe bilateral neural foraminal narrowing. At L3-L4, moderate canal stenosis and moderate bilateral neural foraminal   narrowing. Brain MRI:      No acute infarction, intracranial hemorrhage or mass lesion. Mild nonspecific foci of periventricular and subcortical cerebral white   matter T2/FLAIR hyperintensity. .  Differential possibilities include migraine   related angiopathy, trauma related white matter change, early chronic   microangiopathic ischemic disease. The lesions do not have typical   distribution of demyelinating lesions however this diagnosis cannot be   completely excluded. RECOMMENDATIONS:   Unavailable         MRI CERVICAL SPINE WO CONTRAST   Final Result   Cervical spine MRI: No cord signal abnormality. No high-grade canal or foraminal stenosis. Thoracic spine: No cord signal abnormality. No high-grade canal or foraminal stenosis.       Lumbar spine MRI:      No cord signal abnormality and no cauda equina nerve root compression. At L4-L5, mild canal stenosis and severe bilateral neural foraminal narrowing. At L3-L4, moderate canal stenosis and moderate bilateral neural foraminal   narrowing. Brain MRI:      No acute infarction, intracranial hemorrhage or mass lesion. Mild nonspecific foci of periventricular and subcortical cerebral white   matter T2/FLAIR hyperintensity. .  Differential possibilities include migraine   related angiopathy, trauma related white matter change, early chronic   microangiopathic ischemic disease. The lesions do not have typical   distribution of demyelinating lesions however this diagnosis cannot be   completely excluded. RECOMMENDATIONS:   Unavailable         MRI BRAIN WO CONTRAST   Final Result   Cervical spine MRI: No cord signal abnormality. No high-grade canal or foraminal stenosis. Thoracic spine: No cord signal abnormality. No high-grade canal or foraminal stenosis. Lumbar spine MRI:      No cord signal abnormality and no cauda equina nerve root compression. At L4-L5, mild canal stenosis and severe bilateral neural foraminal narrowing. At L3-L4, moderate canal stenosis and moderate bilateral neural foraminal   narrowing. Brain MRI:      No acute infarction, intracranial hemorrhage or mass lesion. Mild nonspecific foci of periventricular and subcortical cerebral white   matter T2/FLAIR hyperintensity. .  Differential possibilities include migraine   related angiopathy, trauma related white matter change, early chronic   microangiopathic ischemic disease. The lesions do not have typical   distribution of demyelinating lesions however this diagnosis cannot be   completely excluded. RECOMMENDATIONS:   Unavailable         XR CHEST PORTABLE   Final Result   No pneumonia or pleural effusion.                  Electronically signed by Nhi Argueta MD on 12/21/2021 at 10:09 AM     I have reviewed the chief complaint and history of present illness and review of symptoms as well as the past medical/social/family history sections for this patient. I have examined this patient, and participated in the care of this patient. I have reviewed the pertinent clinical information including physical exam, labs, radiographic studies and the plan of care. This patient was seen in coordination and conjunction with the Internal Medicine Resident. I have modified the note as necessary to accurately reflect my findings and recommendations.     Davon Bond MD

## 2021-12-21 NOTE — PROGRESS NOTES
[address]       YI:                                                  Patient Name: Noe Nath  MRN: [id number]  : 1975  Room: [address] [place] [address]    Evaluating OT: Alicia Marcus OTR/L #[id number]    Referring Provider and Specific Provider Orders/Date:      21  OT eval and treat  Start:  21,   End:  21,   ONE TIME,   Standing Count:  1 Occurrences,   R         Alicia Bañuelos,      Diagnosis: Bilateral leg weakness [R29.898]  Weakness of both lower extremities [R29.898]  Complaints of weakness of lower extremity [R29.898]  T2 hyperintense foci present in cerebral cortex on magnetic resonance imaging [R90.89]  Spinal stenosis of lumbar region, unspecified whether neurogenic claudication present [M48.061]     Surgery: None      Pertinent Medical History:  has no past medical history on file.        Precautions:  Fall Risk, B LE weakness and tingling/numbess     Assessment of current deficits   [x] Functional mobility  [x]ADLs  [x] Strength               []Cognition   [x] Functional transfers   [x] IADLs         [] Safety Awareness   []Endurance   [] Fine Coordination              [x] Balance      [] Vision/perception   [x]Sensation    []Gross Motor Coordination  [] ROM  [] Delirium                   [] Motor Control     OT PLAN OF CARE   OT POC based on physician orders, patient diagnosis and results of clinical assessment    Frequency/Duration:  1-3 days/wk for 2 weeks PRN   Specific OT Treatment to include:   * Instruction/training on adapted ADL techniques and AE recommendations to increase functional independence within precautions       * Training on energy conservation strategies, correct breathing pattern and techniques to improve independence/tolerance for self-care routine  * Functional transfer/mobility training/DME recommendations for increased independence, safety, and fall prevention  * Patient/Family education to increase follow through with safety techniques and functional independence  * Recommendation of environmental modifications for increased safety with functional transfers/mobility and ADLs  * Therapeutic exercise to improve motor endurance, ROM, and functional strength for ADLs/functional transfers  * Therapeutic activities to facilitate/challenge dynamic balance, stand tolerance for increased safety and independence with ADLs    Recommended Adaptive Equipment: wheeled walker, sock aide/reacher, possible bedside commode and tub transfer bench (pt states her tub is too high for one)    Home Living: Lives with fiance and 22 y/o son, single family home, 2 story with 1st floor set-up, 3+1 step to enter without. Pt's workspace is located on 2nd level. Bathroom set-up: tub/shower         Equipment owned: pt borrowed a wheeled walker, rollator, and tub transfer bench from a family member - does not own any DME    Prior Level of Function: Independent with ADLs , Independent with IADLs; ambulated indep at baseline but has recently been using a wheeled walker d/t LE weakness. 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 Cognition: A&O: 4/4; Follows 3 step directions   Memory: good    Sequencing: good    Problem solving: good    Judgement/safety: good     Functional Assessment:  AM-PAC Daily Activity Raw Score: 19/24   Initial Eval Status  Date: 12/21/21 Treatment Status  Date: STGs = LTGs  Time frame: 10-14 days   Feeding Independent     Independent    Grooming Stand by Assist     Moderate [place]    UB Dressing Stand by Assist     Moderate [place]    LB Dressing Maximal Assist   From seated position to doff/don socks initially.    Pt trialed AE to doff/don socks requiring Supervision/min cueing for technique. Moderate [place]    Bathing Minimal Assist     Moderate [place]    Toileting Minimal Assist     Moderate [place]    Bed Mobility  Supine to sit: Supervision   Sit to supine: Supervision   Supine to sit: Independent   Sit to supine: Independent    Functional Transfers Sit to stand: Minimal Assist  Stand to sit: Minimal Assist    Transfer training with verbal cues for hand placement to improve safety/prevent pulling on on wheeled walker. Moderate [place] with use of walker    Functional Mobility Minimal Assist with walker to improve balance short distances at bedside, verbal cues for safety. Moderate [place] with use of walker    Balance Sitting:     Static: Supervision     Dynamic: Supervision   Standing: CGA/MIN A with walker   Sitting:     Static: indep    Dynamic: indep   Standing: indep with walker      Activity Tolerance fair  plus   Increase standing tolerance >3 minutes for improved engagement with functional transfers and indep in ADLs   Visual/  Perceptual Glasses: Yes   NA      Hand Dominance: Right      AROM (PROM) Strength Additional Info:    RUE  WFL 4/5 Good  and wfl FMC/dexterity noted during ADL tasks     LUE WFL 4/5 Good  and wfl FMC/dexterity noted during ADL tasks       Hearing:  WFL   Sensation:   No c/o numbness or tingling  Tone:  WFL   Edema:    Comments: RN cleared patient for OT. Upon arrival patient in supine. Therapist facilitated and instructed pt on adapted  techniques & compensatory strategies to improve safety and independence with basic ADLs, bed mobility, functional transfers and mobility to allow pt to achieve highest level of independence and safely. AE trialed and recommended to ease indep and safety with LB dressing. Pt demonstrated good understanding of education & follow through. At end of session, patient was supine with call light and phone within reach, all lines and tubes intact. Overall, patient demonstrated  decreased independence and safety during completion of ADL tasks. Pt would benefit from continued skilled OT to increase safety and independence with completion of ADL tasks and functional mobility for improved quality of life. Rehab Potential: Good for established goals. LTG: maximize independence with ADLs to return to PLOF     Patient / Family Goal: Return home       Patient and/or family were instructed on functional diagnosis, prognosis/goals and OT plan of care. Demonstrated good understanding. Eval Complexity: Low    Time In: 2:35 PM   Time Out: 2:55 PM    Total Treatment Time: 5       Min Units   OT Eval Low 97165  X  1    OT Eval Medium 34809      OT Eval High 94884      OT Re-Eval [id number]            ADL/Self Care 33325     Therapeutic Activities 01000       Therapeutic Ex 96866       Orthotic Management 06076       Manual 90453     Neuro Re-Ed 93280       Non-Billable Time        Evaluation Time additionally includes thorough review of current medical information, gathering information on past medical history/social history and prior level of function, interpretation of standardized testing/informal observation of tasks, assessment of data and development of plan of care and goals.         Evaluating OT: Mireya Arciniega OTR/L #[id number]

## 2021-12-21 NOTE — PROGRESS NOTES
Hospitalist Progress Note      SYNOPSIS: Patient admitted on 2021  presented to the emergency department with complaints of lower extremity weakness. This has been ongoing since  when she received Regeneron. She is seen physical therapy and recently started getting B12 shots. She then started to develop saddle anesthesia, urinary and fecal incontinence. Reports that feeling in her legs has diminished. Denies fever, chills, nausea, vomiting, shortness of breath, chest pain, abdominal pain, trauma, dysuria. Vital signs within normal limits and stable. MRI of the C-spine, T-spine and L-spine was obtained and revealed L4-L5 severe bilateral neural foraminal narrowing and nonspecific foci of periventricular and subcortical cerebral white matter T2 flair hyperintensity. Differential possibilities include migraine related  angiopathy, trauma related white matter change, early chronic microangiopathic ischemic disease. These lesions do not have typical distribution of demyelinating lesions however this diagnosis cannot be completely excluded. Medicine consulted for admission. SUBJECTIVE:  Stable overnight. No other overnight issues reported. Patient seen and examined  Records reviewed. Admits to bilateral lower extremity weakness  Neuro and neurosurgery following      Temp (24hrs), Av.6 °F (36.4 °C), Min:96.8 °F (36 °C), Max:98.3 °F (36.8 °C)    DIET: ADULT DIET; Regular  CODE: Full Code  No intake or output data in the 24 hours ending 21 1041    Review of Systems  All bolded are positive; please see HPI  General:  Fever, chills, diaphoresis, fatigue, malaise, night sweats, weight loss  Psychological:  Anxiety, disorientation, hallucinations. ENT:  Epistaxis, headaches, vertigo, visual changes. Cardiovascular:  Chest pain, irregular heartbeats, palpitations, paroxysmal nocturnal dyspnea.   Respiratory:  Shortness of breath, coughing, sputum production, hemoptysis, wheezing, orthopnea. Gastrointestinal:  Nausea, vomiting, diarrhea, heartburn, constipation, abdominal pain, hematemesis, hematochezia, melena, acholic stools  Genito-Urinary:  Dysuria, urgency, frequency, hematuria  Musculoskeletal:  Joint pain, joint stiffness, joint swelling, muscle pain  Neurology:  Headache, focal neurological deficits, weakness, numbness, paresthesia  Derm:  Rashes, ulcers, excoriations, bruising  Extremities:  Decreased ROM, peripheral edema, mottling      OBJECTIVE:    /87   Pulse 85   Temp 96.8 °F (36 °C) (Temporal)   Resp 14   Ht 5' (1.524 m)   Wt 225 lb (102.1 kg)   SpO2 95%   BMI 43.94 kg/m²     General appearance:  awake, alert, and oriented to person, place, time, and purpose; appears stated age and cooperative; no apparent distress no labored breathing  HEENT:  Conjunctivae/corneas clear. Neck: Supple. No jugular venous distention. Respiratory: symmetrical; clear to auscultation bilaterally; no wheezes; no rhonchi; no rales  Cardiovascular: rhythm regular; rate controlled; no murmurs  Abdomen: Soft, nontender, nondistended  Extremities:  peripheral pulses present; no peripheral edema; no ulcers  Musculoskeletal: No clubbing, cyanosis, no bilateral lower extremity edema. Brisk capillary refill. 3/5 strength lowers  Skin:  No rashes  on visible skin  Neurologic: awake, alert and following commands     ASSESSMENT and PLAN:  · Bilateral lower extremity weakness-  Poss lumbar radiculopathyAt L4-L5, mild canal stenosis and severe bilateral neural foraminal narrowing. At L3-L4, moderate canal stenosis and moderate bilateral neural foraminal narrowing. Mild nonspecific foci of periventricular and subcortical cerebral white matter T2/FLAIR hyperintensity. .  Differential possibilities include migraine related angiopathy, trauma related white matter change, early chronic microangiopathic ischemic disease Neuro following along with neurosurgery.  Check MRI brain/cervical/thoracic/lumbar spine. Further recommendations pending results. · Bowel and bladder incontinence  · Ambulatory dysfunction            DISPOSITION: Continue current plan of care    Medications:  REVIEWED DAILY    Infusion Medications    sodium chloride       Scheduled Medications    fluticasone  1 spray Nasal Daily    trospium  20 mg Oral BID AC    sodium chloride flush  10 mL IntraVENous 2 times per day    enoxaparin  40 mg SubCUTAneous Daily     PRN Meds: sodium chloride flush, sodium chloride, promethazine **OR** ondansetron, polyethylene glycol, acetaminophen **OR** acetaminophen    Labs:     Recent Labs     12/20/21  1500 12/21/21  0426   WBC 10.3 7.1   HGB 13.2 12.4   HCT 38.9 37.3    365       Recent Labs     12/20/21  1500 12/21/21  0426    138   K 4.3 3.7    104   CO2 23 21*   BUN 12 12   CREATININE 0.6 0.5   CALCIUM 10.0 9.1       Recent Labs     12/20/21  1500   PROT 7.6   ALKPHOS 5*   ALT 15   AST 15   BILITOT <0.2       Recent Labs     12/20/21  1500   INR 1.0       Recent Labs     12/20/21  1514   CKTOTAL 99       Chronic labs:    Lab Results   Component Value Date    INR 1.0 12/20/2021       Radiology: REVIEWED DAILY    +++++++++++++++++++++++++++++++++++++++++++++++++  Vince Bright DO   [address]  +++++++++++++++++++++++++++++++++++++++++++++++++  NOTE: This report was transcribed using voice recognition software. Every effort was made to ensure accuracy; however, inadvertent computerized transcription errors may be present.

## 2021-12-21 NOTE — PROGRESS NOTES
Physical Therapy   Initial Assessment     Name: Dimitry Unger  : 1975  MRN: [id number]      Date of Service: 2021    Evaluating PT:  Yovany Caldwell. [place], [id number]    Room #:  1361/8202-R  Diagnosis:  Bilateral leg weakness [R29.898]  Weakness of both lower extremities [R29.898]  Complaints of weakness of lower extremity [R29.898]  T2 hyperintense foci present in cerebral cortex on magnetic resonance imaging [R90.89]  Spinal stenosis of lumbar region, unspecified whether neurogenic claudication present [M48.061]  PMHx/PSHx:     has no past medical history on file. has a past surgical history that includes LEEP; Tubal ligation; Ankle surgery; Elbow surgery; Dilation and curettage of uterus; Endometrial ablation (2016); Gallbladder surgery; and Appendectomy. Precautions:  Fall risk  Equipment Needs: railing or ramp to enter home    SUBJECTIVE:    Pt lives with her fiancee (currently on O2 and her son who works 50 hours/week) in a 2 story home with first floor set up with 3+1 stairs to enter and no rail. Pt ambulated with no AD PTA but had been using a FWW and Rolator WW in the days leading up to admission due to BLE weakness. OBJECTIVE:   Initial Evaluation  Date: 21 Treatment Short Term/ Long Term   Goals   AM-PAC 6 Clicks 85/49     Was pt agreeable to Eval/treatment? Yes     Does pt have pain?  No c/o pain     Bed Mobility  Rolling: Independent  Supine to sit: Modified Independent  Sit to supine: Modified Independent  Scooting: Modified Independent     Transfers Sit to stand: [place]  Stand to sit: [place]  Stand pivot: [place] with [employer]  Modified Independent   Ambulation    20 feet with [employer] with Vannessa  >100 feet with AAD with Modified [place]   Stair negotiation: ascended and descended  NT  3 steps with 1 rail with SBA   BLE ROM WNL     BLE Strength RLE: grossly 4-/5  LLE: grossly 3+/5     Balance Sitting: Independent  Standing: [place] with [employer]  Modified Independent     Pt is A & O x 4  Sensation: Reported \"pins and needles\" from middle of the trunk to toes  Edema:  None noted in BLE    Therapeutic Exercises:    Ambulation: 2x20 feet with [place]  Sit<>stand x2 with [place]    Patient education  Pt educated on sequencing with [employer], transfer technique. Patient response to education:   Pt verbalized understanding Pt demonstrated skill Pt requires further education in this area   Yes Yes Reinforce      ASSESSMENT:    Conditions Requiring Skilled Therapeutic Intervention:    [x]Decreased strength     []Decreased ROM  [x]Decreased functional mobility  [x]Decreased balance   [x]Decreased endurance   []Decreased posture  []Decreased sensation  []Decreased coordination   []Decreased vision  [x]Decreased safety awareness   []Increased pain       Comments: The pt was assisted with upright posture during ambulation, required cuing for sequencing with walker, L knee \"locking\" per pt, difficulty bringing the LLE forward during ambulation due to rigidity in the LLE. The pt ambulates with circumducted gait pattern due to B ankle weakness resulting in poor B dorsiflexion during swing phase. The pt uses trunk rotation to advance feet bilaterally. The pt was able to ambulate within the room, BLE weakness prevented her from ambulating farther and caused her to require increased time for all functional mobility. Treatment:  Patient practiced and was instructed in the following treatment:     Transfer training: verbal cues for hand placement sit to stand transfer and assistance for anterior weight shift in order to facilitate initiation of the stand.  Gait training: verbal cues for step sequencing and safety, verbal cues for appropriate step length and positioning within walker with dynamic activities, physical assist for walker management, and steadying     Pt's/ family goals   1. To return home and get stronger    Prognosis is good for reaching above PT goals.     Patient and or family understand(s) diagnosis, prognosis, and plan of care. Yes    PHYSICAL THERAPY PLAN OF CARE:    PT POC is established based on physician order and patient diagnosis     Referring provider/PT Order:  Solitario Lujan DO  Diagnosis:  Bilateral leg weakness [R29.898]  Weakness of both lower extremities [R29.898]  Complaints of weakness of lower extremity [R29.898]  T2 hyperintense foci present in cerebral cortex on magnetic resonance imaging [R90.89]  Spinal stenosis of lumbar region, unspecified whether neurogenic claudication present [M48.061]  Specific instructions for next treatment:  Progress ambulation    Current Treatment Recommendations:     [x] Strengthening to improve independence with functional mobility   [] ROM to improve independence with functional mobility   [x] Balance Training to improve static/dynamic balance and to reduce fall risk  [x] Endurance Training to improve activity tolerance during functional mobility   [x] Transfer Training to improve safety and independence with all functional transfers   [x] Gait Training to improve gait mechanics, endurance and assess need for appropriate assistive device  [x] Stair Training in preparation for safe discharge home and/or into the community   [] Positioning to prevent skin breakdown and contractures  [x] Safety and Education Training   [] Patient/Caregiver Education   [] HEP  [] Other     PT long term treatment goals are located in above grid    Frequency of treatments: 2-5x/week x 1-2 weeks. Time in  1114  Time out  1145    Total Treatment Time  15 minutes     Evaluation Time includes thorough review of current medical information, gathering information on past medical history/social history and prior level of function, completion of standardized testing/informal observation of tasks, assessment of data and education on plan of care and goals.     CPT codes:  [] Low Complexity PT evaluation 83255  [x] Moderate Complexity PT evaluation 72919  [] High Complexity PT evaluation 89997  [] PT Re-evaluation [id number]  [] Gait training 55720 0 minutes  [] Manual therapy 49519 0 minutes  [x] Therapeutic activities 61920 15 minutes  [] Therapeutic exercises 41803 0 minutes  [] Neuromuscular reeducation 50124 0 minutes     Alejandro Code.  [address]  [id number]

## 2021-12-21 NOTE — PLAN OF CARE
Problem: Falls - Risk of:  Goal: Will remain free from falls  Description: Will remain free from falls  12/21/2021 7883 by Violette Steinberg RN  Outcome: Ongoing  12/21/2021 0320 by Jessica Morales RN  Outcome: Met This Shift  12/21/2021 0124 by Jessica Morales RN  Outcome: Met This Shift  Goal: Absence of physical injury  Description: Absence of physical injury  12/21/2021 1406 by Violette Steinberg RN  Outcome: Ongoing  12/21/2021 0320 by Jessica Morales RN  Outcome: Met This Shift  12/21/2021 0124 by Jessica Morales RN  Outcome: Met This Shift     Problem: Pain:  Goal: Pain level will decrease  Description: Pain level will decrease  12/21/2021 2722 by Violette Steinberg RN  Outcome: Ongoing  12/21/2021 0320 by Jessica Morales RN  Outcome: Met This Shift  12/21/2021 0124 by Jessica Morales RN  Outcome: Met This Shift  Goal: Control of acute pain  Description: Control of acute pain  12/21/2021 7766 by Violette Steinberg RN  Outcome: Ongoing  12/21/2021 0320 by Jessica Morales RN  Outcome: Met This Shift  12/21/2021 0124 by Jessica Morales RN  Outcome: Met This Shift  Goal: Control of chronic pain  Description: Control of chronic pain  12/21/2021 7755 by Violette Steinberg RN  Outcome: Ongoing  12/21/2021 0320 by Jessica Moarles RN  Outcome: Met This Shift  12/21/2021 0124 by Jessica Morales RN  Outcome: Met This Shift     Problem: Skin Integrity:  Goal: Will show no infection signs and symptoms  Description: Will show no infection signs and symptoms  Outcome: Ongoing  Goal: Absence of new skin breakdown  Description: Absence of new skin breakdown  Outcome: Ongoing

## 2021-12-22 ENCOUNTER — APPOINTMENT (OUTPATIENT)
Dept: MRI IMAGING | Age: 46
DRG: 421 | End: 2021-12-22
Payer: COMMERCIAL

## 2021-12-22 PROBLEM — R26.2 AMBULATORY DYSFUNCTION: Status: ACTIVE | Noted: 2021-12-22

## 2021-12-22 LAB
EKG ATRIAL RATE: 83 BPM
EKG P AXIS: 62 DEGREES
EKG P-R INTERVAL: 134 MS
EKG Q-T INTERVAL: 394 MS
EKG QRS DURATION: 72 MS
EKG QTC CALCULATION (BAZETT): 462 MS
EKG R AXIS: 33 DEGREES
EKG T AXIS: 42 DEGREES
EKG VENTRICULAR RATE: 83 BPM
FOLATE: 8.9 NG/ML (ref 4.8–24.2)
VITAMIN B-12: 575 PG/ML (ref 211–946)

## 2021-12-22 PROCEDURE — 6360000004 HC RX CONTRAST MEDICATION: Performed by: RADIOLOGY

## 2021-12-22 PROCEDURE — 99233 SBSQ HOSP IP/OBS HIGH 50: CPT | Performed by: NURSE PRACTITIONER

## 2021-12-22 PROCEDURE — 82746 ASSAY OF FOLIC ACID SERUM: CPT

## 2021-12-22 PROCEDURE — 6360000002 HC RX W HCPCS: Performed by: FAMILY MEDICINE

## 2021-12-22 PROCEDURE — A9579 GAD-BASE MR CONTRAST NOS,1ML: HCPCS | Performed by: RADIOLOGY

## 2021-12-22 PROCEDURE — G0378 HOSPITAL OBSERVATION PER HR: HCPCS

## 2021-12-22 PROCEDURE — 6370000000 HC RX 637 (ALT 250 FOR IP): Performed by: FAMILY MEDICINE

## 2021-12-22 PROCEDURE — 1200000000 HC SEMI PRIVATE

## 2021-12-22 PROCEDURE — 36415 COLL VENOUS BLD VENIPUNCTURE: CPT

## 2021-12-22 PROCEDURE — 97110 THERAPEUTIC EXERCISES: CPT

## 2021-12-22 PROCEDURE — 82607 VITAMIN B-12: CPT

## 2021-12-22 PROCEDURE — 2580000003 HC RX 258: Performed by: FAMILY MEDICINE

## 2021-12-22 PROCEDURE — 72158 MRI LUMBAR SPINE W/O & W/DYE: CPT

## 2021-12-22 PROCEDURE — 97530 THERAPEUTIC ACTIVITIES: CPT

## 2021-12-22 PROCEDURE — 93010 ELECTROCARDIOGRAM REPORT: CPT | Performed by: INTERNAL MEDICINE

## 2021-12-22 PROCEDURE — 70553 MRI BRAIN STEM W/O & W/DYE: CPT

## 2021-12-22 RX ADMIN — ACETAMINOPHEN 650 MG: 325 TABLET ORAL at 20:01

## 2021-12-22 RX ADMIN — GADOTERIDOL 20 ML: 279.3 INJECTION, SOLUTION INTRAVENOUS at 07:53

## 2021-12-22 RX ADMIN — Medication 10 ML: at 08:48

## 2021-12-22 RX ADMIN — ENOXAPARIN SODIUM 40 MG: 100 INJECTION SUBCUTANEOUS at 08:48

## 2021-12-22 RX ADMIN — ACETAMINOPHEN 650 MG: 325 TABLET ORAL at 04:25

## 2021-12-22 RX ADMIN — FLUTICASONE PROPIONATE 1 SPRAY: 50 SPRAY, METERED NASAL at 13:02

## 2021-12-22 RX ADMIN — Medication 10 ML: at 20:01

## 2021-12-22 RX ADMIN — TROSPIUM CHLORIDE 20 MG: 20 TABLET, FILM COATED ORAL at 08:48

## 2021-12-22 RX ADMIN — TROSPIUM CHLORIDE 20 MG: 20 TABLET, FILM COATED ORAL at 17:09

## 2021-12-22 ASSESSMENT — PAIN SCALES - GENERAL
PAINLEVEL_OUTOF10: 0
PAINLEVEL_OUTOF10: 4
PAINLEVEL_OUTOF10: 2
PAINLEVEL_OUTOF10: 6

## 2021-12-22 NOTE — PROGRESS NOTES
Perfect serve message sent and read by Cornell MCDERMOTT regarding results of MRI from this morning as requested by radiology.

## 2021-12-22 NOTE — PROGRESS NOTES
Physical Therapy   Initial Assessment     Name: Sheryl Keys  : 1975  MRN: [id number]      Date of Service: 2021    Evaluating PT:  Dino Ga. Thony Monzon, [id number]    Room #:  5582/7427-W  Diagnosis:  Bilateral leg weakness [R29.898]  Weakness of both lower extremities [R29.898]  Complaints of weakness of lower extremity [R29.898]  T2 hyperintense foci present in cerebral cortex on magnetic resonance imaging [R90.89]  Spinal stenosis of lumbar region, unspecified whether neurogenic claudication present [M48.061]  PMHx/PSHx:     has no past medical history on file. has a past surgical history that includes LEEP; Tubal ligation; Ankle surgery; Elbow surgery; Dilation and curettage of uterus; Endometrial ablation (2016); Gallbladder surgery; and Appendectomy. Precautions:  Fall risk  Equipment Needs: railing or ramp to enter home    SUBJECTIVE:    Pt lives with her fiancee (currently on O2 and her son who works 50 hours/week) in a 2 story home with first floor set up with 3+1 stairs to enter and no rail. Pt ambulated with no AD PTA but had been using a FWW and Rolator WW in the days leading up to admission due to BLE weakness. OBJECTIVE:   Initial Evaluation  Date: 21 Treatment  21 Short Term/ Long Term   Goals   AM-PAC 6 Clicks 45/36     Was pt agreeable to Eval/treatment? Yes Yes     Does pt have pain? No c/o pain No c/o pain    Bed Mobility  Rolling: Independent  Supine to sit: Modified Independent  Sit to supine: Modified Independent  Scooting: Modified Independent Rolling: Independent  Supine to sit: Supervision   Sit to supine: NT  Scooting: SBA to EOB Rolling: Independent  Supine to sit: Mod I  Sit to supine: Mod I  Scooting:  Mod I   Transfers Sit to stand: [place]  Stand to sit: [place]  Stand pivot: [place]essa with [employer] Sit to stand: [place]  Stand to sit: [place]  Stand pivot: [place] with [employer] Modified Independent   Ambulation    20 feet with [employer] with Vannessa 15 feet and 10 feet with [employer] Vannessa >100 feet with AAD with Modified [place]   Stair negotiation: ascended and descended  NT NT 3 steps with 1 rail with SBA   BLE ROM WNL     BLE Strength RLE: grossly 4-/5  LLE: grossly 3+/5     Balance Sitting: Independent  Standing: [place] with [employer] Sitting: SBA  Dynamic Standing: [place] with [employer] Modified Independent     Pt is A & O x 4  Sensation:  Pt reports tingling from mid trunk to B toes  Edema:  None noted      Therapeutic Exercises:    Seated hamstring curl with towel under feet to decrease friction 2x5 simultaneously   Seated LAQ 2x5 B  Seated AAROM ankle dorsiflexion 2x5 B     Patient education  Pt educated on continued mobility to maintain functional strength, potential need for rehabilitation at [place], maintaining ankle dorsiflexion with use of towel to stretch, safe use of [employer]    Patient response to education:   Pt verbalized understanding Pt demonstrated skill Pt requires further education in this area   yes yes yes     ASSESSMENT:    Comments:  Patient semi-supine in bed upon entry and agreeable to PT treatment. Patient able to perform bed mobility with no hands on assist, but required extra time and use of bed rail to complete. At EOB, patient demonstrated initial unsteadiness, but was able to self correct. Patient able to stand and pivot to bedside chair with light steadying assist. Two bouts of ambulation with WW performed with patient demonstrating significantly limited B hip flexion with L more affected than R as well as B foot drop and extensive use of trunk and weight shifting to advance BLEs. SOB with activity noted that resolved quickly with seated rest. Therapeutic exercises completed as listed above following ambulation bouts. Education provided about potential need for rehabilitation at [place]. Treatment:  Patient practiced and was instructed in the following treatment:     Bed Mobility: VCs provided for sequencing and safety during mobility.    Transfer Training: Verbal and tactile cueing provided for sequencing and safety during mobility. Manual assist provided for completion of task   Gait Training: Ambulation with [employer] and verbal cues for proper technique and safety. Manual assist provided for completion of task   Therapeutic exercises: hamstring curls, LAQs, AAROM ankle dorsiflexion   Patient Education: Education provided on safe use of WW, rehabilitation procedures, continued mobility of B ankles into dorsiflexion to maintain functional ROM    PLAN:    Patient is making good progress towards established goals. Will continue with current POC.       Time in  0901  Time out  0942    Total Treatment Time  41 minutes     CPT codes:  [] Gait training 55108 - minutes  [] Manual therapy 73278 - minutes  [x] Therapeutic activities 47509 41 minutes  [] Therapeutic exercises 73814 - minutes  [] Neuromuscular reeducation 41164 - minutes    Lucy Rhoades PT, DPT  [id number]

## 2021-12-22 NOTE — PLAN OF CARE
Problem: Falls - Risk of:  Goal: Will remain free from falls  Description: Will remain free from falls  12/22/2021 1801 by Kayla Castillo RN  Outcome: Met This Shift  12/22/2021 1101 by Beata Welch RN  Outcome: Met This Shift  Goal: Absence of physical injury  Description: Absence of physical injury  12/22/2021 1801 by Kayla Castillo RN  Outcome: Met This Shift  12/22/2021 1101 by Beata Welch RN  Outcome: Met This Shift     Problem: Pain:  Goal: Pain level will decrease  Description: Pain level will decrease  12/22/2021 1801 by Kayla Castillo RN  Outcome: Met This Shift  12/22/2021 1101 by Beata Welch RN  Outcome: Met This Shift  Goal: Control of acute pain  Description: Control of acute pain  12/22/2021 1801 by Kayla Castillo RN  Outcome: Met This Shift  12/22/2021 1101 by Beata Welch RN  Outcome: Met This Shift  Goal: Control of chronic pain  Description: Control of chronic pain  12/22/2021 1801 by Kayla Castillo RN  Outcome: Met This Shift  12/22/2021 1101 by Beata Welch RN  Outcome: Met This Shift     Problem: Skin Integrity:  Goal: Will show no infection signs and symptoms  Description: Will show no infection signs and symptoms  12/22/2021 1801 by Kayla Castillo RN  Outcome: Met This Shift  12/22/2021 1101 by Beata Welch RN  Outcome: Met This Shift  Goal: Absence of new skin breakdown  Description: Absence of new skin breakdown  12/22/2021 1801 by Kayla Castillo RN  Outcome: Met This Shift  12/22/2021 1101 by Beata Welch RN  Outcome: Met This Shift

## 2021-12-22 NOTE — PROGRESS NOTES
Hospitalist Progress Note      SYNOPSIS: Patient admitted on 2021  presented to the emergency department with complaints of lower extremity weakness. This has been ongoing since  when she received Regeneron. She is seen physical therapy and recently started getting B12 shots. She then started to develop saddle anesthesia, urinary and fecal incontinence. Reports that feeling in her legs has diminished. Denies fever, chills, nausea, vomiting, shortness of breath, chest pain, abdominal pain, trauma, dysuria. Vital signs within normal limits and stable. MRI of the C-spine, T-spine and L-spine was obtained and revealed L4-L5 severe bilateral neural foraminal narrowing and nonspecific foci of periventricular and subcortical cerebral white matter T2 flair hyperintensity. Differential possibilities include migraine related  angiopathy, trauma related white matter change, early chronic microangiopathic ischemic disease. These lesions do not have typical distribution of demyelinating lesions however this diagnosis cannot be completely excluded. Medicine consulted for admission. SUBJECTIVE:  Stable overnight. No other overnight issues reported. Patient seen and examined  Records reviewed. Admits to bilateral lower extremity weakness  Neuro and neurosurgery following  Awaiting MRI results      Temp (24hrs), Av.8 °F (36.6 °C), Min:97.5 °F (36.4 °C), Max:98 °F (36.7 °C)    DIET: ADULT DIET; Regular  CODE: Full Code  No intake or output data in the 24 hours ending 21 4280    Review of Systems  All bolded are positive; please see HPI  General:  Fever, chills, diaphoresis, fatigue, malaise, night sweats, weight loss  Psychological:  Anxiety, disorientation, hallucinations. ENT:  Epistaxis, headaches, vertigo, visual changes. Cardiovascular:  Chest pain, irregular heartbeats, palpitations, paroxysmal nocturnal dyspnea.   Respiratory:  Shortness of breath, coughing, sputum production, hemoptysis, wheezing, orthopnea. Gastrointestinal:  Nausea, vomiting, diarrhea, heartburn, constipation, abdominal pain, hematemesis, hematochezia, melena, acholic stools  Genito-Urinary:  Dysuria, urgency, frequency, hematuria  Musculoskeletal:  Joint pain, joint stiffness, joint swelling, muscle pain  Neurology:  Headache, focal neurological deficits, weakness, numbness, paresthesia  Derm:  Rashes, ulcers, excoriations, bruising  Extremities:  Decreased ROM, peripheral edema, mottling      OBJECTIVE:    /83   Pulse 83   Temp 97.5 °F (36.4 °C) (Oral)   Resp 18   Ht 5' (1.524 m)   Wt 226 lb 12.8 oz (102.9 kg)   SpO2 97%   BMI 44.29 kg/m²     General appearance:  awake, alert, and oriented to person, place, time, and purpose; appears stated age and cooperative; no apparent distress no labored breathing  HEENT:  Conjunctivae/corneas clear. Neck: Supple. No jugular venous distention. Respiratory: symmetrical; clear to auscultation bilaterally; no wheezes; no rhonchi; no rales  Cardiovascular: rhythm regular; rate controlled; no murmurs  Abdomen: Soft, nontender, nondistended  Extremities:  peripheral pulses present; no peripheral edema; no ulcers  Musculoskeletal: No clubbing, cyanosis, no bilateral lower extremity edema. Brisk capillary refill. 3/5 strength lowers  Skin:  No rashes  on visible skin  Neurologic: awake, alert and following commands     ASSESSMENT and PLAN:  · Bilateral lower extremity weakness-  Poss lumbar radiculopathyAt L4-L5, mild canal stenosis and severe bilateral neural foraminal narrowing. At L3-L4, moderate canal stenosis and moderate bilateral neural foraminal narrowing. Mild nonspecific foci of periventricular and subcortical cerebral white matter T2/FLAIR hyperintensity. .  Differential possibilities include migraine related angiopathy, trauma related white matter change, early chronic microangiopathic ischemic disease Neuro following along with neurosurgery.  Check MRI brain/cervical/thoracic/lumbar spine. Further recommendations pending results. · Bowel and bladder incontinence  · Ambulatory dysfunction            DISPOSITION: Continue current plan of care    Medications:  REVIEWED DAILY    Infusion Medications    sodium chloride       Scheduled Medications    fluticasone  1 spray Nasal Daily    trospium  20 mg Oral BID AC    sodium chloride flush  10 mL IntraVENous 2 times per day    enoxaparin  40 mg SubCUTAneous Daily     PRN Meds: sodium chloride flush, sodium chloride, promethazine **OR** ondansetron, polyethylene glycol, acetaminophen **OR** acetaminophen, naproxen    Labs:     Recent Labs     12/20/21  1500 12/21/21  0426   WBC 10.3 7.1   HGB 13.2 12.4   HCT 38.9 37.3    365       Recent Labs     12/20/21  1500 12/21/21  0426    138   K 4.3 3.7    104   CO2 23 21*   BUN 12 12   CREATININE 0.6 0.5   CALCIUM 10.0 9.1       Recent Labs     12/20/21  1500   PROT 7.6   ALKPHOS 5*   ALT 15   AST 15   BILITOT <0.2       Recent Labs     12/20/21  1500   INR 1.0       Recent Labs     12/20/21  1514   CKTOTAL 99       Chronic labs:    Lab Results   Component Value Date    INR 1.0 12/20/2021       Radiology: REVIEWED DAILY    +++++++++++++++++++++++++++++++++++++++++++++++++  Ashleigh Duarte DO   [address]  +++++++++++++++++++++++++++++++++++++++++++++++++  NOTE: This report was transcribed using voice recognition software. Every effort was made to ensure accuracy; however, inadvertent computerized transcription errors may be present.

## 2021-12-22 NOTE — CARE COORDINATION
Spoke with Pt to confirm that no preference for [address] or DME. Pt needs FWW and PT /OT at home at discharge. Referral made to [facility]. Referral made to [address] [place] [address]. Discharge plan is to return home when medically stable. SW/CM to follow for discharge needs.    CRISTAL Rodriguez.  [phone]

## 2021-12-22 NOTE — PLAN OF CARE
Problem: Falls - Risk of:  Goal: Will remain free from falls  Description: Will remain free from falls  12/22/2021 1101 by Rj Clark RN  Outcome: Met This Shift  12/22/2021 0226 by Fanta Lemus RN  Outcome: Met This Shift  Goal: Absence of physical injury  Description: Absence of physical injury  12/22/2021 1101 by Rj Clark RN  Outcome: Met This Shift  12/22/2021 0226 by Fanta Lemus RN  Outcome: Met This Shift     Problem: Pain:  Goal: Pain level will decrease  Description: Pain level will decrease  12/22/2021 1101 by Rj Clark RN  Outcome: Met This Shift  12/22/2021 0226 by Fanta Lemus RN  Outcome: Met This Shift  Goal: Control of acute pain  Description: Control of acute pain  12/22/2021 1101 by Rj Clark RN  Outcome: Met This Shift  12/22/2021 0226 by Fanta Lemus RN  Outcome: Met This Shift  Goal: Control of chronic pain  Description: Control of chronic pain  12/22/2021 1101 by Rj Clark RN  Outcome: Met This Shift  12/22/2021 0226 by Fanta Lemus RN  Outcome: Met This Shift     Problem: Skin Integrity:  Goal: Will show no infection signs and symptoms  Description: Will show no infection signs and symptoms  12/22/2021 1101 by Rj Clark RN  Outcome: Met This Shift  12/22/2021 0226 by Fanta Lemus RN  Outcome: Met This Shift  Goal: Absence of new skin breakdown  Description: Absence of new skin breakdown  12/22/2021 1101 by Rj Clark RN  Outcome: Met This Shift  12/22/2021 0226 by Fanta Lemus RN  Outcome: Met This Shift

## 2021-12-22 NOTE — PROGRESS NOTES
Keenan Hinojosa is a 48 y.o. right handed female     Neurology following for BLE weakness    PMH of covid 19 back in November, lumbar epidurals    Presented to ED with worsening BLE weakness associated with tingling in dermatomes L3 - L5. She notes worsening started with B12 injections and receiving regeneron after having covid. MRI brain showed a couple T2 hyperintensities seen axial and sagital.  MRI L spine showed possible arachnoiditis at L4/5 with moderate bilateral facet joint effusions and heterogenous enhancement as well. NSGY is following. Spoke with patient and she has bowel incontinence, numbness/tingling and sensation of heaviness in her BLE. Her legs are weak and she has trouble standing and walking worse when lying down. She notes having her most recent lumbar NASRIN Sept 21 this year she has gotten 5 total every four months. She has no symptoms or complaints with her BUE. Discussed MRI brain results and L spine results. Would follow up MRI brain in a year but L spine scan will be evaluated by NSGY for recommendations. No chest pain or palpitations  No coughing or wheezing    No itching or bruising appreciated    ROS otherwise negative      Objective:     BP (!) 116/93   Pulse 94   Temp 98.5 °F (36.9 °C) (Oral)   Resp 17   Ht 5' (1.524 m)   Wt 226 lb 12.8 oz (102.9 kg)   SpO2 96%   BMI 44.29 kg/m²     General appearance: alert, appears stated age, cooperative and no distress  Head: normocephalic, without obvious abnormality, atraumatic  Eyes: conjunctivae/corneas clear  Neck: supple, symmetrical, trachea midline   Lungs: respirations non labored   Extremities:  normal, atraumatic, no cyanosis or edema  Skin: color, texture, turgor normal---no rashes or lesions      Mental Status: Alert to self, time and place. Follows commands as able.     Appropriate attention/concentration  Intact fundus of knowledge  Repetition intact  Intact memories      Speech: no dysarthria  Language: no aphasias     Cranial Nerves:  I: smell NA   II: visual acuity  NA   II: visual fields Full to confrontation   II: pupils JONNIE   III,VII: ptosis None   III,IV,VI: extraocular muscles  Full ROM   V: mastication Normal   V: facial light touch sensation  Normal   V,VII: corneal reflex     VII: facial muscle function - upper  Normal   VII: facial muscle function - lower Normal   VIII: hearing Normal   IX: soft palate elevation  Normal   IX,X: gag reflex    XI: trapezius strength  5/5   XI: sternocleidomastoid strength 5/5   XI: neck extension strength  5/5   XII: tongue strength  Normal     Motor:  5/5 throughout, 2/5 strength BLE  Normal bulk and tone  + drift BLE  No abnormal movements    Sensory:  LT and PP normal  Vibration normal    Coordination:   FN, FFM and JOSE CARLOS normal  HS unable to perform    DTR:   Right Brachioradialis reflex 2+  Left Brachioradialis reflex 2+  Right Biceps reflex 2+  Left Biceps reflex 2+  Right Triceps reflex 2+  Left Triceps reflex 2+  Right Quadriceps reflex 3+  Left Quadriceps reflex 3+  Right Achilles reflex 3+  Left Achilles reflex 3+    No Babinskis  No Montoya's    No other pathological reflexes  Laboratory/Radiology:     CBC with Differential:    Lab Results   Component Value Date    WBC 7.1 12/21/2021    RBC 4.12 12/21/2021    HGB 12.4 12/21/2021    HCT 37.3 12/21/2021     12/21/2021    MCV 90.5 12/21/2021    MCH 30.1 12/21/2021    MCHC 33.2 12/21/2021    RDW 13.8 12/21/2021    LYMPHOPCT 29.4 12/20/2021    MONOPCT 4.3 12/20/2021    BASOPCT 0.5 12/20/2021    MONOSABS 0.44 12/20/2021    LYMPHSABS 3.02 12/20/2021    EOSABS 0.20 12/20/2021    BASOSABS 0.05 12/20/2021     CMP:    Lab Results   Component Value Date     12/21/2021    K 3.7 12/21/2021     12/21/2021    CO2 21 12/21/2021    BUN 12 12/21/2021    CREATININE 0.5 12/21/2021    GFRAA >60 12/21/2021    LABGLOM >60 12/21/2021    GLUCOSE 93 12/21/2021    PROT 7.6 12/20/2021    LABALBU 4.3 12/20/2021    CALCIUM 9.1 12/21/2021    BILITOT <0.2 12/20/2021    ALKPHOS 5 12/20/2021    AST 15 12/20/2021    ALT 15 12/20/2021     Hepatic Function Panel:    Lab Results   Component Value Date    ALKPHOS 5 12/20/2021    ALT 15 12/20/2021    AST 15 12/20/2021    PROT 7.6 12/20/2021    BILITOT <0.2 12/20/2021    LABALBU 4.3 12/20/2021     MRI L spine: 1.  Slightly limited exam  2.  There appear to be post-surgical alterations of prior left-sided  hemilaminotomy at L3 and L4, for prior partial discectomy at L3/4 and L4/5.  3.  Moderate bilateral facet joint effusions and heterogeneous enhancement  involve both L4/5 facet joints and surrounding marrow, with slightly lesser  extension into both subjacent posterolateral aspects of the traversing  epidural space, followed by surrounding paraspinal musculature, lessening  both superiorly to L3/4, and inferiorly to L5/S1.  While the above findings  could be degenerative and/or inflammatory arthro-pathic in nature, clinical  correlation is advised to exclude developing bilateral septic  arthritis/facet-itis. 4.  At L4/5, there is minimal enhancement of several traversing bilateral nerve roots, and clinical correlation is advised to exclude early  Arachnoiditis. 5.  Otherwise, no significant change since the most recent MR and CT  examinations of the lumbar spine. MRI brain: 1. No evidence of acute intracranial abnormality. 2. Mild white matter signal abnormality as described above.  No significant  change.  The findings could be related to chronic microvascular disease  chronic migraines, vasculitis or Lyme disease.  The possibility of  demyelinating disease cannot be excluded although the findings are not  typical for demyelinating plaques.     All labs and imaging studies reviewed independently today    Assessment:     BLE weakness--- likely due to inflammation noted on MRI L spine   --- hx of LESI with most recent 9/21/21  --- hx of Covid 19 in Nov 2021 with regeneron infusion  ---

## 2021-12-22 NOTE — PROGRESS NOTES
Occupational Therapy  OT BEDSIDE TREATMENT NOTE      Date:2021  Patient Name: James Mccauley  MRN: [id number]  : 1975  Room: [address] [place], [place] [address]     Evaluating OT: Robson Sarabia OTR/L #[id number]     Referring Provider and Specific Provider Orders/Date:      21   OT eval and treat  Start:  21,   End:  21,   ONE TIME,   Standing Count:  1 Occurrences,   R         Ellie Checo, DO      Diagnosis: Bilateral leg weakness [R29.898]  Weakness of both lower extremities [R29.898]  Complaints of weakness of lower extremity [R29.898]  T2 hyperintense foci present in cerebral cortex on magnetic resonance imaging [R90.89]  Spinal stenosis of lumbar region, unspecified whether neurogenic claudication present [M48.061]      Surgery: None       Pertinent Medical History:  has no past medical history on file. Precautions:  Fall Risk, B LE weakness and tingling/numbess      Assessment of current deficits   [x]? Functional mobility           [x]? ADLs           [x]? Strength                  []?Cognition   [x]? Functional transfers         [x]? IADLs         []? Safety Awareness   []? Endurance   []? Fine Coordination                         [x]? Balance      []? Vision/perception   [x]? Sensation     []? Gross Motor Coordination             []? ROM           []?  Delirium                   []? Motor Control      OT PLAN OF CARE   OT POC based on physician orders, patient diagnosis and results of clinical assessment     Frequency/Duration:  1-3 days/wk for 2 weeks PRN   Specific OT Treatment to include:   * Instruction/training on adapted ADL techniques and AE recommendations to increase functional independence within precautions       * Training on energy conservation strategies, correct breathing pattern and techniques to improve independence/tolerance for self-care routine  * Functional transfer/mobility training/DME recommendations for increased independence, safety, and fall prevention  * Patient/Family education to increase follow through with safety techniques and functional independence  * Recommendation of environmental modifications for increased safety with functional transfers/mobility and ADLs  * Therapeutic exercise to improve motor endurance, ROM, and functional strength for ADLs/functional transfers  * Therapeutic activities to facilitate/challenge dynamic balance, stand tolerance for increased safety and independence with ADLs     Recommended Adaptive Equipment: wheeled walker, sock aide/reacher, possible bedside commode and tub transfer bench (pt states her tub is too high for one)     Home Living: Lives with fiance and 22 y/o son, single family home, 2 story with 1st floor set-up, 3+1 step to enter without. Pt's workspace is located on 2nd level. Bathroom set-up: tub/shower          Equipment owned: pt borrowed a wheeled walker, rollator, and tub transfer bench from a family member - does not own any DME     Prior Level of Function: Independent with ADLs , Independent with IADLs; ambulated indep at baseline but has recently been using a wheeled walker d/t LE weakness.      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   Enjoys: Crafting       Pain Level: No c/o pain            Cognition: A&O: 4/4             Memory: good              Sequencing: good              Problem solving: good              Judgement/safety: good      Functional Assessment:  AM-PAC Daily Activity Raw Score: 19/24    Initial Eval Status  Date: 12/21/21 Treatment Status  Date:12/22/21 STGs = LTGs  Time frame: 10-14 days   Feeding Independent       Independent    Grooming Stand by Assist      Moderate [place]    UB Dressing Stand by Assist       Moderate [place]    LB Dressing Maximal Assist   From seated position to doff/don socks initially. Pt trialed AE to doff/don socks requiring Supervision/min cueing for technique.   Pt declined need to review reacher/ sock aid.   Pt stated manage/training  [id number]     Non-Billable Time       Time In: 10:50  Time Out: 11:08  Total Time: [address] [place] [id number]

## 2021-12-23 ENCOUNTER — APPOINTMENT (OUTPATIENT)
Dept: MRI IMAGING | Age: 46
DRG: 421 | End: 2021-12-23
Payer: COMMERCIAL

## 2021-12-23 PROCEDURE — 6370000000 HC RX 637 (ALT 250 FOR IP): Performed by: INTERNAL MEDICINE

## 2021-12-23 PROCEDURE — 2580000003 HC RX 258: Performed by: FAMILY MEDICINE

## 2021-12-23 PROCEDURE — 1200000000 HC SEMI PRIVATE

## 2021-12-23 PROCEDURE — 6360000004 HC RX CONTRAST MEDICATION: Performed by: RADIOLOGY

## 2021-12-23 PROCEDURE — 72157 MRI CHEST SPINE W/O & W/DYE: CPT

## 2021-12-23 PROCEDURE — 97530 THERAPEUTIC ACTIVITIES: CPT

## 2021-12-23 PROCEDURE — 6360000002 HC RX W HCPCS: Performed by: FAMILY MEDICINE

## 2021-12-23 PROCEDURE — A9579 GAD-BASE MR CONTRAST NOS,1ML: HCPCS | Performed by: RADIOLOGY

## 2021-12-23 PROCEDURE — 6370000000 HC RX 637 (ALT 250 FOR IP): Performed by: FAMILY MEDICINE

## 2021-12-23 PROCEDURE — 72156 MRI NECK SPINE W/O & W/DYE: CPT

## 2021-12-23 RX ADMIN — FLUTICASONE PROPIONATE 1 SPRAY: 50 SPRAY, METERED NASAL at 10:24

## 2021-12-23 RX ADMIN — Medication 10 ML: at 10:23

## 2021-12-23 RX ADMIN — Medication 10 ML: at 20:31

## 2021-12-23 RX ADMIN — GADOTERIDOL 20 ML: 279.3 INJECTION, SOLUTION INTRAVENOUS at 08:27

## 2021-12-23 RX ADMIN — NAPROXEN 500 MG: 500 TABLET ORAL at 17:32

## 2021-12-23 RX ADMIN — ACETAMINOPHEN 650 MG: 325 TABLET ORAL at 05:47

## 2021-12-23 RX ADMIN — ENOXAPARIN SODIUM 40 MG: 100 INJECTION SUBCUTANEOUS at 10:23

## 2021-12-23 RX ADMIN — TROSPIUM CHLORIDE 20 MG: 20 TABLET, FILM COATED ORAL at 05:45

## 2021-12-23 RX ADMIN — TROSPIUM CHLORIDE 20 MG: 20 TABLET, FILM COATED ORAL at 17:32

## 2021-12-23 ASSESSMENT — PAIN SCALES - GENERAL
PAINLEVEL_OUTOF10: 6
PAINLEVEL_OUTOF10: 0
PAINLEVEL_OUTOF10: 6
PAINLEVEL_OUTOF10: 0

## 2021-12-23 ASSESSMENT — PAIN DESCRIPTION - PAIN TYPE: TYPE: ACUTE PAIN

## 2021-12-23 NOTE — PROGRESS NOTES
Case reviewed with Dr. Kyler Arnold. Patient has potential for ARU, but workup needs to be completed to determine etiological diagnosis. We will continue to follow patient's progress. Art Minor RN, Pre-screener for Acute Rehab  P: [phone]  F: [phone]  [email or web address]

## 2021-12-23 NOTE — PROGRESS NOTES
Consult received. Dr. Daphnie Hernandez to evaluate patient's appropriateness for ARU. Dorene Epperson RN, Pre-screener for Acute Rehab  P: [phone]  F: [phone]  [email or web address]

## 2021-12-23 NOTE — PROGRESS NOTES
stools  Genito-Urinary:  Dysuria, urgency, frequency, hematuria  Musculoskeletal:  Joint pain, joint stiffness, joint swelling, muscle pain  Neurology:  Headache, focal neurological deficits, weakness, numbness, paresthesia  Derm:  Rashes, ulcers, excoriations, bruising  Extremities:  Decreased ROM, peripheral edema, mottling      OBJECTIVE:    BP (!) 144/98   Pulse 97   Temp 97.8 °F (36.6 °C) (Oral)   Resp 18   Ht 5' (1.524 m)   Wt 226 lb 12.8 oz (102.9 kg)   SpO2 98%   BMI 44.29 kg/m²     General appearance:  awake, alert, and oriented to person, place, time, and purpose; appears stated age and cooperative; no apparent distress no labored breathing  HEENT:  Conjunctivae/corneas clear. Neck: Supple. No jugular venous distention. Respiratory: symmetrical; clear to auscultation bilaterally; no wheezes; no rhonchi; no rales  Cardiovascular: rhythm regular; rate controlled; no murmurs  Abdomen: Soft, nontender, nondistended  Extremities:  peripheral pulses present; no peripheral edema; no ulcers  Musculoskeletal: No clubbing, cyanosis, no bilateral lower extremity edema. Brisk capillary refill. 3/5 strength lowers  Skin:  No rashes  on visible skin  Neurologic: awake, alert and following commands     ASSESSMENT and PLAN:  · Bilateral lower extremity weakness-  Poss lumbar radiculopathyAt L4-L5, mild canal stenosis and severe bilateral neural foraminal narrowing. At L3-L4, moderate canal stenosis and moderate bilateral neural foraminal narrowing. Mild nonspecific foci of periventricular and subcortical cerebral white matter T2/FLAIR hyperintensity. .  Differential possibilities include migraine related angiopathy, trauma related white matter change, early chronic microangiopathic ischemic disease Neuro following along with neurosurgery. Check MRI brain/cervical/thoracic/lumbar spine. Further recommendations pending results.    · Bowel and bladder incontinence  · Ambulatory dysfunction            DISPOSITION:

## 2021-12-23 NOTE — PROGRESS NOTES
At 0400 today patient was getting up tp bedside commode and leg gave out. Pt slid to the ground and then used to the call light for help to get off the floor. Vitals were stable and pt did not hit her head. Physician was notified via [employer].

## 2021-12-23 NOTE — CARE COORDINATION
Pt agreed to a PM & R consult. Pt wants to know what is wrong so that she can get better. Discharge Plan is to return home if able verses ARU. JOEL/CM to follow for discharge needs.    Theoplis Query, L.S.W.  [phone]

## 2021-12-23 NOTE — PROGRESS NOTES
Physical Therapy   Initial Assessment     Name: Dany Morataya  : 1975  MRN: [id number]      Date of Service: 2021    Evaluating PT:  Rayo Serrano, [id number]    Room #:  5679/3044-W  Diagnosis:  Bilateral leg weakness [R29.898]  Weakness of both lower extremities [R29.898]  Complaints of weakness of lower extremity [R29.898]  T2 hyperintense foci present in cerebral cortex on magnetic resonance imaging [R90.89]  Spinal stenosis of lumbar region, unspecified whether neurogenic claudication present [M48.061]  Ambulatory dysfunction [R26.2]  PMHx/PSHx:     has no past medical history on file. has a past surgical history that includes LEEP; Tubal ligation; Ankle surgery; Elbow surgery; Dilation and curettage of uterus; Endometrial ablation (2016); Gallbladder surgery; and Appendectomy. Precautions:  Fall risk  Equipment Needs: railing or ramp to enter home    SUBJECTIVE:    Pt lives with her fiancee (currently on O2 and her son who works 50 hours/week) in a 2 story home with first floor set up with 3+1 stairs to enter and no rail. Pt ambulated with no AD PTA but had been using a FWW and Rolator WW in the days leading up to admission due to BLE weakness. OBJECTIVE:   Initial Evaluation  Date: 21 Treatment  21 Short Term/ Long Term   Goals   AM-PAC 6 Clicks 89/71 92/57    Was pt agreeable to Eval/treatment? Yes Yes     Does pt have pain? No c/o pain No c/o pain    Bed Mobility  Rolling: Independent  Supine to sit: Modified Independent  Sit to supine: Modified Independent  Scooting: Modified Independent Rolling: NT  Supine to sit: NT  Sit to supine: NT  Scooting: NT Rolling: Independent  Supine to sit: Mod I  Sit to supine: Mod I  Scooting:  Mod I   Transfers Sit to stand: Vann[place]  Stand to sit: Vannessa  Stand pivot: Vannessa with [employer] Sit to stand: Vannessa  Stand to sit: Vannessa  Stand pivot: Vannessa with [employer] Modified Independent   Ambulation    20 feet with [employer] with Vannessa 10 feet and 8 feet with [employer] >100 feet with AAD with Modified [place]   Stair negotiation: ascended and descended  NT NT 3 steps with 1 rail with SBA   BLE ROM WNL     BLE Strength RLE: grossly 4-/5  LLE: grossly 3+/5     Balance Sitting: Independent  Standing: Vannessa with [employer] Sitting: NT, patient in chair   Dynamic Standing: Vannessa with [employer] Modified Independent     Pt is A & O x 4  Sensation:  Pt reports tingling from mid trunk to B toes  Edema:  None noted      Therapeutic Exercises:    Seated hamstring curl with towel under feet to decrease friction 2x5 simultaneously     Patient education  Pt educated on continued mobility to maintain functional strength, maintaining ankle dorsiflexion with use of towel to stretch, safe use of [employer]    Patient response to education:   Pt verbalized understanding Pt demonstrated skill Pt requires further education in this area   yes yes yes     ASSESSMENT:    Comments:  Patient sitting in bedside chair upon entry and agreeable to PT treatment. Patient with increased fatigue this date as well as experienced a LOB to B knees this AM with no injury occurring per patient. Patient able to stand and ambulate short distances this date with light assist for balance as well as to flex and advance LLE. During ambulation, patient demonstrates inversion of L ankle with fatigue requiring light external support. Therapeutic exercises completed as listed above. Patient left sitting in chair at end of session with all needs met. Treatment:  Patient practiced and was instructed in the following treatment:     Transfer Training: Verbal and tactile cueing provided for sequencing and safety during mobility. Manual assist provided for completion of task   Gait Training: Ambulation with [employer] and verbal cues for proper technique and safety.  Manual assist provided for completion of task   Therapeutic exercises: hamstring curls with towel for decreased friction   Patient Education: Education provided on safe use of [employer],

## 2021-12-24 PROCEDURE — 6370000000 HC RX 637 (ALT 250 FOR IP): Performed by: FAMILY MEDICINE

## 2021-12-24 PROCEDURE — 6360000002 HC RX W HCPCS: Performed by: FAMILY MEDICINE

## 2021-12-24 PROCEDURE — 1200000000 HC SEMI PRIVATE

## 2021-12-24 PROCEDURE — 6370000000 HC RX 637 (ALT 250 FOR IP): Performed by: INTERNAL MEDICINE

## 2021-12-24 PROCEDURE — 2580000003 HC RX 258: Performed by: FAMILY MEDICINE

## 2021-12-24 RX ORDER — UBIDECARENONE 75 MG
1000 CAPSULE ORAL DAILY
Qty: 30 TABLET | Refills: 3 | Status: ON HOLD | OUTPATIENT
Start: 2021-12-24 | End: 2022-02-04 | Stop reason: HOSPADM

## 2021-12-24 RX ORDER — MECOBALAMIN 5000 MCG
5 TABLET,DISINTEGRATING ORAL NIGHTLY
Status: DISCONTINUED | OUTPATIENT
Start: 2021-12-24 | End: 2022-01-05 | Stop reason: HOSPADM

## 2021-12-24 RX ORDER — ACETAMINOPHEN 500 MG
1000 TABLET ORAL EVERY 6 HOURS PRN
Status: DISCONTINUED | OUTPATIENT
Start: 2021-12-24 | End: 2022-01-05 | Stop reason: HOSPADM

## 2021-12-24 RX ORDER — MECOBALAMIN 5000 MCG
5 TABLET,DISINTEGRATING ORAL NIGHTLY PRN
Status: DISCONTINUED | OUTPATIENT
Start: 2021-12-24 | End: 2022-01-05 | Stop reason: HOSPADM

## 2021-12-24 RX ADMIN — Medication 10 ML: at 21:23

## 2021-12-24 RX ADMIN — NAPROXEN 500 MG: 500 TABLET ORAL at 06:20

## 2021-12-24 RX ADMIN — ACETAMINOPHEN 1000 MG: 500 TABLET ORAL at 17:41

## 2021-12-24 RX ADMIN — TROSPIUM CHLORIDE 20 MG: 20 TABLET, FILM COATED ORAL at 17:41

## 2021-12-24 RX ADMIN — FLUTICASONE PROPIONATE 1 SPRAY: 50 SPRAY, METERED NASAL at 10:45

## 2021-12-24 RX ADMIN — Medication 5 MG: at 21:23

## 2021-12-24 RX ADMIN — ENOXAPARIN SODIUM 40 MG: 100 INJECTION SUBCUTANEOUS at 10:46

## 2021-12-24 RX ADMIN — Medication 10 ML: at 10:46

## 2021-12-24 RX ADMIN — TROSPIUM CHLORIDE 20 MG: 20 TABLET, FILM COATED ORAL at 06:17

## 2021-12-24 ASSESSMENT — PAIN DESCRIPTION - DESCRIPTORS: DESCRIPTORS: ACHING

## 2021-12-24 ASSESSMENT — PAIN DESCRIPTION - FREQUENCY: FREQUENCY: CONTINUOUS

## 2021-12-24 ASSESSMENT — PAIN DESCRIPTION - ORIENTATION: ORIENTATION: LOWER

## 2021-12-24 ASSESSMENT — PAIN DESCRIPTION - LOCATION: LOCATION: BACK

## 2021-12-24 ASSESSMENT — PAIN SCALES - GENERAL
PAINLEVEL_OUTOF10: 3
PAINLEVEL_OUTOF10: 6
PAINLEVEL_OUTOF10: 5
PAINLEVEL_OUTOF10: 0

## 2021-12-24 ASSESSMENT — PAIN DESCRIPTION - PAIN TYPE: TYPE: ACUTE PAIN

## 2021-12-24 NOTE — PROGRESS NOTES
0850-Assessment complete and charted patient awake in the chair denies chest pain and Sob with BLE weakness noted call light within reach will continue to monitor.

## 2021-12-24 NOTE — PROGRESS NOTES
Hospitalist Progress Note            Patient: Lisy Jernigan Age: 48 y.o.   DOA: 12/20/2021 Admit Dx / CC: Bilateral leg weakness [R29.898]  Weakness of both lower extremities [R29.898]  Complaints of weakness of lower extremity [R29.898]  T2 hyperintense foci present in cerebral cortex on magnetic resonance imaging [R90.89]  Spinal stenosis of lumbar region, unspecified whether neurogenic claudication present [M48.061]  Ambulatory dysfunction [R26.2]   LOS:  LOS: 3 days      Assessment/ Plan:     SYNOPSIS: Patient admitted on 12/20/2021  presented to ED with complaints of lower extremity weakness.  This has been ongoing since November 23 when she received Regeneron. She then started to develop saddle anesthesia, urinary and fecal incontinence.  Reports that feeling in her legs has diminished.   MRI of the C-spine, T-spine and L-spine was obtained and revealed L4-L5 severe bilateral neural foraminal narrowing and nonspecific foci of periventricular and subcortical cerebral white matter T2 flair hyperintensity.  Differential possibilities include migraine related  angiopathy, trauma related white matter change, early chronic microangiopathic ischemic disease.  These lesions do not have typical distribution of demyelinating lesions however this diagnosis cannot be completely excluded.      Bilateral lower extremity weakness/Ambulatory dysfunction with report of bowel and bladder incontinance  ? lumbar radiculopathy at L4-L5, mild canal stenosis and severe bilateral neural foraminal narrowing. At L3-L4, moderate canal stenosis and moderate bilateral neural foraminal narrowing. Mild nonspecific foci of periventricular and subcortical cerebral white matter T2/FLAIR hyperintensity.   hx of LESI with most recent 9/21/21  hx of Covid 19 in Nov 2021 with regeneron infusion  paresthesias to BLE but her exam showed no sensory deficit, per neuro pt was able to feel LT, PP and Vibration, hyperreflexia   Per neuro not typical presentation of GBS   Awaiting NSx input    DVT ppx: Loveox  Code status: Full code    Plan discharge to ARU pend NSx input/plans    Plan of care discussed with: patient and bedside RN    MRI C/T spine:  Impression   1.  Somewhat limited exams       2.  Given technical artifacts, the visualized portions of the spinal cord and   intracranial contents appear grossly negative for pathologic enhancement and   otherwise unremarkable/not significantly changed, within the limits of this   exam.       3.  Mildly-exaggerated thoracic kyphosis, with grossly unchanged multilevel   degenerative disease, without high-grade central spinal canal stenosis,   neural foraminal narrowing, or mass effect upon neural elements throughout   the cervical and thoracic region, as described.       4.  Mild diffuse paraspinal muscular atrophy       5.  Otherwise, no definite correlate for the patient's reported bilateral   lower extremity weakness, within the limits of these studies.       6.  Incidental findings, most notable for small fluid signal is scattered   about the left mastoid air cell complex, with trace involvement on the right   side, incompletely and only incidentally demonstrated.  Please see above   comments.      MRI L spine:  Impression   1.  Slightly limited exam       2.  There appear to be post-surgical alterations of prior left-sided   hemilaminotomy at L3 and L4, for prior partial discectomy at L3/4 and L4/5.       3.  Moderate bilateral facet joint effusions and heterogeneous enhancement   involve both L4/5 facet joints and surrounding marrow, with slightly lesser   extension into both subjacent posterolateral aspects of the traversing   epidural space, followed by surrounding paraspinal musculature, lessening   both superiorly to L3/4, and inferiorly to L5/S1.  While the above findings   could be degenerative and/or inflammatory arthro-pathic in nature, clinical   correlation is advised to exclude developing bilateral septic   arthritis/facet-itis.       4.  At L4/5, there is minimal enhancement of several traversing bilateral   nerve roots, and clinical correlation is advised to exclude early   arachnoiditis.       5.  Otherwise, no significant change since the most recent MR and CT   examinations of the lumbar spine.       .       RECOMMENDATIONS:   1.  Impression 1: Recommend post-treatment follow-up MRI of the lumbar spine,   without and with contrast, in 4 - 6 weeks, or as directed clinically. MRI brain; Impression   1. No evidence of acute intracranial abnormality. 2. Mild white matter signal abnormality as described above.  No significant   change.  The findings could be related to chronic microvascular disease   chronic migraines, vasculitis or Lyme disease.  The possibility of   demyelinating disease cannot be excluded although the findings are not   typical for demyelinating plaques. Patient Active Problem List   Diagnosis    COVID    Complaints of weakness of lower extremity    Bilateral leg weakness    Ambulatory dysfunction        Medications:  Reviewed    Infusion Medications    sodium chloride       Scheduled Medications    melatonin  5 mg Oral Nightly    fluticasone  1 spray Nasal Daily    trospium  20 mg Oral BID AC    sodium chloride flush  10 mL IntraVENous 2 times per day    enoxaparin  40 mg SubCUTAneous Daily     PRN Meds: acetaminophen **OR** [DISCONTINUED] acetaminophen, melatonin, sodium chloride flush, sodium chloride, promethazine **OR** ondansetron, polyethylene glycol, naproxen    I/O    Intake/Output Summary (Last 24 hours) at 12/24/2021 1348  Last data filed at 12/24/2021 0450  Gross per 24 hour   Intake 300 ml   Output --   Net 300 ml       Labs:   No results for input(s): WBC, HGB, HCT, PLT in the last 72 hours. No results for input(s): NA, K, CL, CO2, BUN, CREATININE, CALCIUM, PHOS in the last 72 hours.     Invalid input(s): MAGNES    No results for input(s):

## 2021-12-24 NOTE — PLAN OF CARE
Problem: Falls - Risk of:  Goal: Will remain free from falls  Description: Will remain free from falls  Outcome: Ongoing     Problem: Falls - Risk of:  Goal: Absence of physical injury  Description: Absence of physical injury  Outcome: Ongoing     Problem: Pain:  Description: Pain management should include both nonpharmacologic and pharmacologic interventions. Goal: Pain level will decrease  Description: Pain level will decrease  Outcome: Ongoing     Problem: Pain:  Description: Pain management should include both nonpharmacologic and pharmacologic interventions. Goal: Control of acute pain  Description: Control of acute pain  Outcome: Ongoing     Problem: Pain:  Description: Pain management should include both nonpharmacologic and pharmacologic interventions.   Goal: Control of chronic pain  Description: Control of chronic pain  Outcome: Ongoing     Problem: Skin Integrity:  Goal: Will show no infection signs and symptoms  Description: Will show no infection signs and symptoms  Outcome: Ongoing     Problem: Skin Integrity:  Goal: Absence of new skin breakdown  Description: Absence of new skin breakdown  Outcome: Ongoing

## 2021-12-24 NOTE — PLAN OF CARE
Plan of care    Stopped in to check on patient. She was crying and unsure what will happen. She would like to speak to [address] [place] [address] regarding their evaluation of her MRI's. She continues to complain of BLE weakness.

## 2021-12-25 ENCOUNTER — APPOINTMENT (OUTPATIENT)
Dept: ULTRASOUND IMAGING | Age: 46
DRG: 421 | End: 2021-12-25
Payer: COMMERCIAL

## 2021-12-25 PROCEDURE — 2580000003 HC RX 258: Performed by: FAMILY MEDICINE

## 2021-12-25 PROCEDURE — 93925 LOWER EXTREMITY STUDY: CPT | Performed by: RADIOLOGY

## 2021-12-25 PROCEDURE — 93971 EXTREMITY STUDY: CPT | Performed by: RADIOLOGY

## 2021-12-25 PROCEDURE — 99232 SBSQ HOSP IP/OBS MODERATE 35: CPT | Performed by: NEUROLOGICAL SURGERY

## 2021-12-25 PROCEDURE — 93925 LOWER EXTREMITY STUDY: CPT

## 2021-12-25 PROCEDURE — 1200000000 HC SEMI PRIVATE

## 2021-12-25 PROCEDURE — 93970 EXTREMITY STUDY: CPT

## 2021-12-25 PROCEDURE — 6360000002 HC RX W HCPCS: Performed by: FAMILY MEDICINE

## 2021-12-25 PROCEDURE — 6370000000 HC RX 637 (ALT 250 FOR IP): Performed by: NURSE PRACTITIONER

## 2021-12-25 PROCEDURE — 99233 SBSQ HOSP IP/OBS HIGH 50: CPT | Performed by: NURSE PRACTITIONER

## 2021-12-25 PROCEDURE — 6370000000 HC RX 637 (ALT 250 FOR IP): Performed by: INTERNAL MEDICINE

## 2021-12-25 PROCEDURE — 6370000000 HC RX 637 (ALT 250 FOR IP): Performed by: FAMILY MEDICINE

## 2021-12-25 RX ORDER — BACLOFEN 10 MG/1
10 TABLET ORAL 2 TIMES DAILY
Status: DISCONTINUED | OUTPATIENT
Start: 2021-12-25 | End: 2022-01-05 | Stop reason: HOSPADM

## 2021-12-25 RX ORDER — LANOLIN ALCOHOL/MO/W.PET/CERES
1000 CREAM (GRAM) TOPICAL DAILY
Status: DISCONTINUED | OUTPATIENT
Start: 2021-12-25 | End: 2022-01-05 | Stop reason: HOSPADM

## 2021-12-25 RX ORDER — MULTIVITAMIN WITH IRON
1 TABLET ORAL DAILY
Status: DISCONTINUED | OUTPATIENT
Start: 2021-12-25 | End: 2022-01-05 | Stop reason: HOSPADM

## 2021-12-25 RX ADMIN — BACLOFEN 10 MG: 10 TABLET ORAL at 20:51

## 2021-12-25 RX ADMIN — TROSPIUM CHLORIDE 20 MG: 20 TABLET, FILM COATED ORAL at 17:10

## 2021-12-25 RX ADMIN — FLUTICASONE PROPIONATE 1 SPRAY: 50 SPRAY, METERED NASAL at 11:03

## 2021-12-25 RX ADMIN — Medication 10 ML: at 11:04

## 2021-12-25 RX ADMIN — ACETAMINOPHEN 1000 MG: 500 TABLET ORAL at 05:20

## 2021-12-25 RX ADMIN — NAPROXEN 500 MG: 500 TABLET ORAL at 07:15

## 2021-12-25 RX ADMIN — ENOXAPARIN SODIUM 40 MG: 100 INJECTION SUBCUTANEOUS at 11:03

## 2021-12-25 RX ADMIN — Medication 10 ML: at 20:53

## 2021-12-25 RX ADMIN — TROSPIUM CHLORIDE 20 MG: 20 TABLET, FILM COATED ORAL at 07:15

## 2021-12-25 ASSESSMENT — PAIN SCALES - GENERAL: PAINLEVEL_OUTOF10: 6

## 2021-12-25 NOTE — PROGRESS NOTES
Nila Snow is a 48 y.o. right handed female     Neurology following for BLE weakness    PMH of covid 19 back in November, lumbar epidurals    Synopsis:  November she was diagnosed with Covid--she received antibiotic treatment--and 3 days later she developed generalized numbness and tingling followed by BLE weakness. She was seen in the hospital diagnosed with \"serum sickness\" and put on steroids. She was also recently diagnosed with B12 deficiency and had started shots. Unfortunately, her BLE weakness worsened prompting readmission. She does have a history of LS radiculopathy and gets epidurals, MRI L spine showed possible arachnoiditis at L4/5 with moderate bilateral facet joint effusions and heterogenous enhancement as well. NSGY is following. HPI  She feels that her leg weakness and paresthesia have worsened. She is able to stand but is unable to walk. She also feels spasticity and stiffness in both legs, but no severe pain--chronic low back pains are unchanged. Mild tingling in L hand but no UE weakness. No bulbar symptoms. Neurosurgery has ordered RENETTA and venous duplex of her legs due to cold and mottled appearance. The patient is frustrated and emotional again today. B12 and folate levels were drawn on 12/22 and normal.  The patient states she is due for another injection.  No family present    No chest pain or palpitations  No coughing or wheezing    No itching or bruising appreciated  No speech or swallowing troubles    ROS otherwise negative      Current Facility-Administered Medications   Medication Dose Route Frequency Provider Last Rate Last Admin    vitamin B-12 (CYANOCOBALAMIN) tablet 1,000 mcg  1,000 mcg Oral Daily Noe Tirado MD        multivitamin 1 tablet  1 tablet Oral Daily Noe Tirado MD        acetaminophen (TYLENOL) tablet 1,000 mg  1,000 mg Oral Q6H PRN Noe Tirado MD   1,000 mg at 12/25/21 0520    melatonin disintegrating tablet 5 mg  5 mg Oral Nightly PRN Elizabeth Pope MD        melatonin disintegrating tablet 5 mg  5 mg Oral Nightly Elizabeth Pope MD   5 mg at 12/24/21 2123    fluticasone (FLONASE) 50 MCG/ACT nasal spray 1 spray  1 spray Nasal Daily Tip Nugentn, DO   1 spray at 12/25/21 1103    trospium (SANCTURA) tablet 20 mg  20 mg Oral BID AC Tip Nugentn, DO   20 mg at 12/25/21 0715    sodium chloride flush 0.9 % injection 10 mL  10 mL IntraVENous 2 times per day Tip Nugentn, DO   10 mL at 12/25/21 1104    sodium chloride flush 0.9 % injection 10 mL  10 mL IntraVENous PRN Tip Figeuroa, DO        0.9 % sodium chloride infusion  25 mL IntraVENous PRN Tip Figueroa, DO        enoxaparin (LOVENOX) injection 40 mg  40 mg SubCUTAneous Daily Tip Figueroa, DO   40 mg at 12/25/21 1103    promethazine (PHENERGAN) tablet 12.5 mg  12.5 mg Oral Q6H PRN Tip Figueroa, DO        Or    ondansetron [facility]) injection 4 mg  4 mg IntraVENous Q6H PRN Tip Nugentn, DO        polyethylene glycol (GLYCOLAX) packet 17 g  17 g Oral Daily PRN Tip Figueroa, DO        naproxen (NAPROSYN) tablet 500 mg  500 mg Oral BID PRN Sharmin Yang, DO   500 mg at 12/25/21 0715     Objective:     BP (!) 142/94   Pulse 92   Temp 96.4 °F (35.8 °C) (Temporal)   Resp 18   Ht 5' (1.524 m)   Wt 226 lb 12.8 oz (102.9 kg)   SpO2 98%   BMI 44.29 kg/m²     General appearance: alert, appears stated age, cooperative and no distress sitting up in the chair  Head: normocephalic, without obvious abnormality, atraumatic  Eyes: conjunctivae/corneas clear  Neck: Full range of motion without cervicalgia  Lungs: respirations non labored   Heart: Regular rate and rhythm  Extremities: Cool, mottled, no edema--cap refill greater than 3 seconds  Pulses: 2+ radial; 1+ DP      Mental Status: Alert, oriented x4--emotional but pleasant and cooperative    Appropriate attention/concentration  Intact fundus of knowledge  Repetition intact  Intact memories    Speech: no exclude developing bilateral septic  arthritis/facet-itis. 4.  At L4/5, there is minimal enhancement of several traversing bilateral nerve roots, and clinical correlation is advised to exclude early  Arachnoiditis. 5.  Otherwise, no significant change since the most recent MR and CT  examinations of the lumbar spine. MRI brain: 1. No evidence of acute intracranial abnormality. 2. Mild white matter signal abnormality as described above.  No significant  change.  The findings could be related to chronic microvascular disease  chronic migraines, vasculitis or Lyme disease.  The possibility of  demyelinating disease cannot be excluded although the findings are not  typical for demyelinating plaques. MRI cervical and thoracic spines: Somewhat limited exams   2. Given technical artifacts, the visualized portions of the spinal cord and intracranial contents appear grossly negative for pathologic enhancement and otherwise unremarkable/not significantly changed, within the limits of this exam.   3. Mildly-exaggerated thoracic kyphosis, with grossly unchanged multilevel degenerative disease, without high-grade central spinal canal stenosis, neural foraminal narrowing, or mass effect upon neural elements throughout the cervical and thoracic region, as described. 4. Mild diffuse paraspinal muscular atrophy   5. Otherwise, no definite correlate for the patient's reported bilateral lower extremity weakness, within the limits of these studies. 6. Incidental findings, most notable for small fluid signal is scattered about the left mastoid air cell complex, with trace involvement on the right side, incompletely and only incidentally demonstrated     All labs and imaging studies reviewed independently today    Assessment:     Bilateral leg weakness: progressing since COVID-19 infection, though her symptoms and exam do not fit with typical GBS.  She has upper motor neuron signs on exam (leg spasticity), with intact quad reflexes but absent ankle reflexes. Her chronic lumbosacral disease, possible arachnoiditis, and B12 deficiency, are contributing, but she requires EMG to further classify her symptoms.     Plan:     EMG both legs--will not be done until Monday    Consider LP pending clinical course    Baclofen 10 mg BID    Recommend continuation of B12 supplementation--defer to primary    PT/OT    Discussed with Dr. Norman Samples    Will follow    LLUVIA Lambert CNP  12:05 PM  12/25/2021

## 2021-12-25 NOTE — PROGRESS NOTES
Hospitalist Progress Note            Patient: Janneth Stevenson Age: 48 y.o.   DOA: 12/20/2021 Admit Dx / CC: Bilateral leg weakness [R29.898]  Weakness of both lower extremities [R29.898]  Complaints of weakness of lower extremity [R29.898]  T2 hyperintense foci present in cerebral cortex on magnetic resonance imaging [R90.89]  Spinal stenosis of lumbar region, unspecified whether neurogenic claudication present [M48.061]  Ambulatory dysfunction [R26.2]   LOS:  LOS: 4 days      Assessment/ Plan:     SYNOPSIS: Patient admitted on 12/20/2021  presented to ED with complaints of lower extremity weakness.  This has been ongoing since November 23 when she received Regeneron. She then started to develop saddle anesthesia, urinary and fecal incontinence.  Reports that feeling in her legs has diminished.   MRI of the C-spine, T-spine and L-spine was obtained and revealed L4-L5 severe bilateral neural foraminal narrowing and nonspecific foci of periventricular and subcortical cerebral white matter T2 flair hyperintensity.  Differential possibilities include migraine related  angiopathy, trauma related white matter change, early chronic microangiopathic ischemic disease.  These lesions do not have typical distribution of demyelinating lesions however this diagnosis cannot be completely excluded.      Bilateral lower extremity weakness/Ambulatory dysfunction with report of bowel and bladder incontinance  ? lumbar radiculopathy at L4-L5, mild canal stenosis and severe bilateral neural foraminal narrowing. At L3-L4, moderate canal stenosis and moderate bilateral neural foraminal narrowing. Mild nonspecific foci of periventricular and subcortical cerebral white matter T2/FLAIR hyperintensity.   hx of LESI with most recent 9/21/21  hx of Covid 19 in Nov 2021 with regeneron infusion  Today she c/o cold b/l LE  paresthesias to BLE but her exam showed no sensory deficit, per neuro pt was able to feel LT, PP and Vibration, hyperreflexia   Per neuro not typical presentation of GBS   D/w Dr Sanders/NSx and advised MRI does not explain pt's complaints. Recommended B/L LE arterial and venous PVL and EMG if can be done as OP    DVT ppx: Loveox  Code status: Full code    Plan discharge to ARU when bed available    Plan of care discussed with: patient and bedside RN, d/w Tommy    MRI C/T spine:  Impression   1.  Somewhat limited exams       2.  Given technical artifacts, the visualized portions of the spinal cord and   intracranial contents appear grossly negative for pathologic enhancement and   otherwise unremarkable/not significantly changed, within the limits of this   exam.       3.  Mildly-exaggerated thoracic kyphosis, with grossly unchanged multilevel   degenerative disease, without high-grade central spinal canal stenosis,   neural foraminal narrowing, or mass effect upon neural elements throughout   the cervical and thoracic region, as described.       4.  Mild diffuse paraspinal muscular atrophy       5.  Otherwise, no definite correlate for the patient's reported bilateral   lower extremity weakness, within the limits of these studies.       6.  Incidental findings, most notable for small fluid signal is scattered   about the left mastoid air cell complex, with trace involvement on the right   side, incompletely and only incidentally demonstrated.  Please see above   comments.      MRI L spine:  Impression   1.  Slightly limited exam       2.  There appear to be post-surgical alterations of prior left-sided   hemilaminotomy at L3 and L4, for prior partial discectomy at L3/4 and L4/5.       3.  Moderate bilateral facet joint effusions and heterogeneous enhancement   involve both L4/5 facet joints and surrounding marrow, with slightly lesser   extension into both subjacent posterolateral aspects of the traversing   epidural space, followed by surrounding paraspinal musculature, lessening   both superiorly to L3/4, and inferiorly to L5/S1.  While the above findings   could be degenerative and/or inflammatory arthro-pathic in nature, clinical   correlation is advised to exclude developing bilateral septic   arthritis/facet-itis.       4.  At L4/5, there is minimal enhancement of several traversing bilateral   nerve roots, and clinical correlation is advised to exclude early   arachnoiditis.       5.  Otherwise, no significant change since the most recent MR and CT   examinations of the lumbar spine.       .       RECOMMENDATIONS:   1.  Impression 1: Recommend post-treatment follow-up MRI of the lumbar spine,   without and with contrast, in 4 - 6 weeks, or as directed clinically. MRI brain; Impression   1. No evidence of acute intracranial abnormality. 2. Mild white matter signal abnormality as described above.  No significant   change.  The findings could be related to chronic microvascular disease   chronic migraines, vasculitis or Lyme disease.  The possibility of   demyelinating disease cannot be excluded although the findings are not   typical for demyelinating plaques.          Patient Active Problem List   Diagnosis    COVID    Complaints of weakness of lower extremity    Bilateral leg weakness    Ambulatory dysfunction        Medications:  Reviewed    Infusion Medications    sodium chloride       Scheduled Medications    vitamin B-12  1,000 mcg Oral Daily    multivitamin  1 tablet Oral Daily    melatonin  5 mg Oral Nightly    fluticasone  1 spray Nasal Daily    trospium  20 mg Oral BID AC    sodium chloride flush  10 mL IntraVENous 2 times per day    enoxaparin  40 mg SubCUTAneous Daily     PRN Meds: acetaminophen **OR** [DISCONTINUED] acetaminophen, melatonin, sodium chloride flush, sodium chloride, promethazine **OR** ondansetron, polyethylene glycol, naproxen    I/O    Intake/Output Summary (Last 24 hours) at 12/25/2021 1123  Last data filed at 12/25/2021 0521  Gross per 24 hour   Intake 240 ml   Output --   Net 240 ml       Labs:   No results for input(s): WBC, HGB, HCT, PLT in the last 72 hours. No results for input(s): NA, K, CL, CO2, BUN, CREATININE, CALCIUM, PHOS in the last 72 hours. Invalid input(s): MAGNES    No results for input(s): PROT, ALB, ALKPHOS, ALT, AST, BILITOT, AMYLASE, LIPASE in the last 72 hours. No results for input(s): INR in the last 72 hours. No results for input(s): Chyrel Lions in the last 72 hours. Chronic labs:  Lab Results   Component Value Date    INR 1.0 12/20/2021       Radiology:  Imaging studies reviewed today. Subjective:     Fabienne Cheeks is tearful, she got herself up to chair and has been getting up to bedside commode, today's her complaints not so much her back and numbness in LE but cold LE and swelling    Objective:     Physical Exam:   BP (!) 142/94   Pulse 92   Temp 96.4 °F (35.8 °C) (Temporal)   Resp 18   Ht 5' (1.524 m)   Wt 226 lb 12.8 oz (102.9 kg)   SpO2 98%   BMI 44.29 kg/m²     General appearance:in no distress, up in chair  Lungs: Clear to auscultation bilaterally, no wheezing or crackles   Heart: Regular rate and rhythm, S1, S2 normal   Abdomen: Soft, non-tender and not-distended. Bowel sounds normal.   Extremities: no edema but there is mottling of B/L LE up to thighs L>R   Skin: LE slightly cold to touch  Neurologic: Grossly normal and non focal, CN II-XII intact.  Patient able to move B/L LE against resistance      Marco Chan MD   Hospitalist Service   12/25/21 11:23 AM

## 2021-12-25 NOTE — PLAN OF CARE
Problem: Falls - Risk of:  Goal: Will remain free from falls  Description: Will remain free from falls  12/25/2021 0146 by Yuli Polanco RN  Outcome: Met This Shift  12/24/2021 1502 by Samara Cooper RN  Outcome: Ongoing     Problem: Skin Integrity:  Goal: Will show no infection signs and symptoms  Description: Will show no infection signs and symptoms  12/25/2021 0146 by Yuli Polanco RN  Outcome: Met This Shift  12/24/2021 1502 by Samara Cooper RN  Outcome: Ongoing

## 2021-12-25 NOTE — PROGRESS NOTES
Department of Neurosurgery  Progress Note    CHIEF COMPLAINT: weakness    SUBJECTIVE:  Tearful again today. No new neurological changes but complains that her legs are cold    REVIEW OF SYSTEMS :  Constitutional: Negative for chills and fever. Neurological: Negative for dizziness, tremors and speech change.      OBJECTIVE:   VITALS:  BP (!) 142/94   Pulse 92   Temp 96.4 °F (35.8 °C) (Temporal)   Resp 18   Ht 5' (1.524 m)   Wt 226 lb 12.8 oz (102.9 kg)   SpO2 98%   BMI 44.29 kg/m²   PHYSICAL:  Sitting in chair  Mottled LEs cool to touch cf UEs  Power 2/5 HF, KE 4/5, DF1/5, PF 3/5    DATA:  CBC:   Lab Results   Component Value Date    WBC 7.1 12/21/2021    RBC 4.12 12/21/2021    HGB 12.4 12/21/2021    HCT 37.3 12/21/2021    MCV 90.5 12/21/2021    MCH 30.1 12/21/2021    MCHC 33.2 12/21/2021    RDW 13.8 12/21/2021     12/21/2021    MPV 9.4 12/21/2021     BMP:    Lab Results   Component Value Date     12/21/2021    K 3.7 12/21/2021     12/21/2021    CO2 21 12/21/2021    BUN 12 12/21/2021    LABALBU 4.3 12/20/2021    CREATININE 0.5 12/21/2021    CALCIUM 9.1 12/21/2021    GFRAA >60 12/21/2021    LABGLOM >60 12/21/2021    GLUCOSE 93 12/21/2021     PT/INR:    Lab Results   Component Value Date    PROTIME 10.5 12/20/2021    INR 1.0 12/20/2021     PTT:    Lab Results   Component Value Date    APTT 29.8 12/20/2021   [APTT}    Current Inpatient Medications  Current Facility-Administered Medications: vitamin B-12 (CYANOCOBALAMIN) tablet 1,000 mcg, 1,000 mcg, Oral, Daily  multivitamin 1 tablet, 1 tablet, Oral, Daily  acetaminophen (TYLENOL) tablet 1,000 mg, 1,000 mg, Oral, Q6H PRN **OR** [DISCONTINUED] acetaminophen (TYLENOL) suppository 650 mg, 650 mg, Rectal, Q6H PRN  melatonin disintegrating tablet 5 mg, 5 mg, Oral, Nightly PRN  melatonin disintegrating tablet 5 mg, 5 mg, Oral, Nightly  fluticasone (FLONASE) 50 MCG/ACT nasal spray 1 spray, 1 spray, Nasal, Daily  trospium (SANCTURA) tablet 20 mg, 20 mg, Oral, BID AC  sodium chloride flush 0.9 % injection 10 mL, 10 mL, IntraVENous, 2 times per day  sodium chloride flush 0.9 % injection 10 mL, 10 mL, IntraVENous, PRN  0.9 % sodium chloride infusion, 25 mL, IntraVENous, PRN  enoxaparin (LOVENOX) injection 40 mg, 40 mg, SubCUTAneous, Daily  promethazine (PHENERGAN) tablet 12.5 mg, 12.5 mg, Oral, Q6H PRN **OR** ondansetron (ZOFRAN) injection 4 mg, 4 mg, IntraVENous, Q6H PRN  polyethylene glycol (GLYCOLAX) packet 17 g, 17 g, Oral, Daily PRN  naproxen (NAPROSYN) tablet 500 mg, 500 mg, Oral, BID PRN    ASSESSMENT:   · LE weakness    PLAN:  · D/w both IM and Neurology and appreciate their input  · EMG, RENETTA and venous duplex USD  · All patient questions were answered at bedside yesterday and today. Thank you so much for allowing us to participate in the care of this patient.       Electronically signed by Roger Prescott MD on 12/25/2021 at 12:04 PM

## 2021-12-26 PROBLEM — M54.32 BILATERAL SCIATICA: Status: ACTIVE | Noted: 2021-12-26

## 2021-12-26 PROBLEM — M54.31 BILATERAL SCIATICA: Status: ACTIVE | Noted: 2021-12-26

## 2021-12-26 PROBLEM — D50.9 IRON DEFICIENCY ANEMIA: Status: ACTIVE | Noted: 2021-12-26

## 2021-12-26 LAB
ALBUMIN SERPL-MCNC: 3.8 G/DL (ref 3.5–5.2)
ALP BLD-CCNC: 52 U/L (ref 35–104)
ALT SERPL-CCNC: 12 U/L (ref 0–32)
ANION GAP SERPL CALCULATED.3IONS-SCNC: 12 MMOL/L (ref 7–16)
AST SERPL-CCNC: 13 U/L (ref 0–31)
BASOPHILS ABSOLUTE: 0.04 E9/L (ref 0–0.2)
BASOPHILS RELATIVE PERCENT: 0.6 % (ref 0–2)
BILIRUB SERPL-MCNC: 0.3 MG/DL (ref 0–1.2)
BUN BLDV-MCNC: 10 MG/DL (ref 6–20)
CALCIUM SERPL-MCNC: 9.2 MG/DL (ref 8.6–10.2)
CHLORIDE BLD-SCNC: 105 MMOL/L (ref 98–107)
CO2: 25 MMOL/L (ref 22–29)
CREAT SERPL-MCNC: 0.6 MG/DL (ref 0.5–1)
EOSINOPHILS ABSOLUTE: 0.3 E9/L (ref 0.05–0.5)
EOSINOPHILS RELATIVE PERCENT: 4.3 % (ref 0–6)
GFR AFRICAN AMERICAN: >60
GFR NON-AFRICAN AMERICAN: >60 ML/MIN/1.73
GLUCOSE BLD-MCNC: 96 MG/DL (ref 74–99)
HCT VFR BLD CALC: 36.6 % (ref 34–48)
HEMOGLOBIN: 12.2 G/DL (ref 11.5–15.5)
IMMATURE GRANULOCYTES #: 0.02 E9/L
IMMATURE GRANULOCYTES %: 0.3 % (ref 0–5)
IRON SATURATION: 13 % (ref 15–50)
IRON: 54 MCG/DL (ref 37–145)
LYMPHOCYTES ABSOLUTE: 2.36 E9/L (ref 1.5–4)
LYMPHOCYTES RELATIVE PERCENT: 34 % (ref 20–42)
MAGNESIUM: 2 MG/DL (ref 1.6–2.6)
MCH RBC QN AUTO: 29.5 PG (ref 26–35)
MCHC RBC AUTO-ENTMCNC: 33.3 % (ref 32–34.5)
MCV RBC AUTO: 88.6 FL (ref 80–99.9)
MONOCYTES ABSOLUTE: 0.35 E9/L (ref 0.1–0.95)
MONOCYTES RELATIVE PERCENT: 5 % (ref 2–12)
NEUTROPHILS ABSOLUTE: 3.88 E9/L (ref 1.8–7.3)
NEUTROPHILS RELATIVE PERCENT: 55.8 % (ref 43–80)
PDW BLD-RTO: 13.4 FL (ref 11.5–15)
PLATELET # BLD: 390 E9/L (ref 130–450)
PMV BLD AUTO: 9.6 FL (ref 7–12)
POTASSIUM SERPL-SCNC: 4.5 MMOL/L (ref 3.5–5)
RBC # BLD: 4.13 E12/L (ref 3.5–5.5)
SODIUM BLD-SCNC: 142 MMOL/L (ref 132–146)
TOTAL IRON BINDING CAPACITY: 402 MCG/DL (ref 250–450)
TOTAL PROTEIN: 6.9 G/DL (ref 6.4–8.3)
TSH SERPL DL<=0.05 MIU/L-ACNC: 1.46 UIU/ML (ref 0.27–4.2)
VITAMIN D 25-HYDROXY: 34 NG/ML (ref 30–100)
WBC # BLD: 7 E9/L (ref 4.5–11.5)

## 2021-12-26 PROCEDURE — 82306 VITAMIN D 25 HYDROXY: CPT

## 2021-12-26 PROCEDURE — 83550 IRON BINDING TEST: CPT

## 2021-12-26 PROCEDURE — 6360000002 HC RX W HCPCS: Performed by: INTERNAL MEDICINE

## 2021-12-26 PROCEDURE — 80053 COMPREHEN METABOLIC PANEL: CPT

## 2021-12-26 PROCEDURE — 6370000000 HC RX 637 (ALT 250 FOR IP): Performed by: NURSE PRACTITIONER

## 2021-12-26 PROCEDURE — 84630 ASSAY OF ZINC: CPT

## 2021-12-26 PROCEDURE — 6370000000 HC RX 637 (ALT 250 FOR IP): Performed by: FAMILY MEDICINE

## 2021-12-26 PROCEDURE — 83735 ASSAY OF MAGNESIUM: CPT

## 2021-12-26 PROCEDURE — 1200000000 HC SEMI PRIVATE

## 2021-12-26 PROCEDURE — 6370000000 HC RX 637 (ALT 250 FOR IP): Performed by: INTERNAL MEDICINE

## 2021-12-26 PROCEDURE — 36415 COLL VENOUS BLD VENIPUNCTURE: CPT

## 2021-12-26 PROCEDURE — 85025 COMPLETE CBC W/AUTO DIFF WBC: CPT

## 2021-12-26 PROCEDURE — 84443 ASSAY THYROID STIM HORMONE: CPT

## 2021-12-26 PROCEDURE — 2580000003 HC RX 258: Performed by: INTERNAL MEDICINE

## 2021-12-26 PROCEDURE — 6360000002 HC RX W HCPCS: Performed by: FAMILY MEDICINE

## 2021-12-26 PROCEDURE — 2580000003 HC RX 258: Performed by: FAMILY MEDICINE

## 2021-12-26 PROCEDURE — 83540 ASSAY OF IRON: CPT

## 2021-12-26 RX ORDER — MULTIVITAMIN WITH IRON
1 TABLET ORAL DAILY
Qty: 1 TABLET | Refills: 0 | Status: ON HOLD
Start: 2021-12-27 | End: 2022-02-04 | Stop reason: SDUPTHER

## 2021-12-26 RX ORDER — FERROUS SULFATE 325(65) MG
325 TABLET ORAL
Qty: 180 TABLET | Refills: 1 | Status: ON HOLD
Start: 2021-12-26 | End: 2022-02-04 | Stop reason: SDUPTHER

## 2021-12-26 RX ORDER — METHYLPREDNISOLONE SODIUM SUCCINATE 125 MG/2ML
125 INJECTION, POWDER, LYOPHILIZED, FOR SOLUTION INTRAMUSCULAR; INTRAVENOUS ONCE
Status: COMPLETED | OUTPATIENT
Start: 2021-12-26 | End: 2021-12-26

## 2021-12-26 RX ADMIN — Medication 5 MG: at 21:12

## 2021-12-26 RX ADMIN — Medication 10 ML: at 21:12

## 2021-12-26 RX ADMIN — ACETAMINOPHEN 1000 MG: 500 TABLET ORAL at 03:05

## 2021-12-26 RX ADMIN — Medication 10 ML: at 09:01

## 2021-12-26 RX ADMIN — TROSPIUM CHLORIDE 20 MG: 20 TABLET, FILM COATED ORAL at 06:33

## 2021-12-26 RX ADMIN — TROSPIUM CHLORIDE 20 MG: 20 TABLET, FILM COATED ORAL at 17:03

## 2021-12-26 RX ADMIN — FLUTICASONE PROPIONATE 1 SPRAY: 50 SPRAY, METERED NASAL at 09:01

## 2021-12-26 RX ADMIN — Medication 1000 MCG: at 09:01

## 2021-12-26 RX ADMIN — SODIUM CHLORIDE 125 MG: 9 INJECTION, SOLUTION INTRAVENOUS at 14:11

## 2021-12-26 RX ADMIN — ENOXAPARIN SODIUM 40 MG: 100 INJECTION SUBCUTANEOUS at 09:00

## 2021-12-26 RX ADMIN — METHYLPREDNISOLONE SODIUM SUCCINATE 125 MG: 125 INJECTION, POWDER, FOR SOLUTION INTRAMUSCULAR; INTRAVENOUS at 14:11

## 2021-12-26 RX ADMIN — NAPROXEN 500 MG: 500 TABLET ORAL at 06:33

## 2021-12-26 RX ADMIN — BACLOFEN 10 MG: 10 TABLET ORAL at 09:01

## 2021-12-26 RX ADMIN — Medication 1 TABLET: at 09:01

## 2021-12-26 RX ADMIN — BACLOFEN 10 MG: 10 TABLET ORAL at 21:12

## 2021-12-26 ASSESSMENT — PAIN SCALES - GENERAL
PAINLEVEL_OUTOF10: 6
PAINLEVEL_OUTOF10: 0
PAINLEVEL_OUTOF10: 6
PAINLEVEL_OUTOF10: 0
PAINLEVEL_OUTOF10: 0

## 2021-12-26 NOTE — PLAN OF CARE
Problem: Falls - Risk of:  Goal: Will remain free from falls  Description: Will remain free from falls  12/26/2021 1026 by Juani Dior RN  Outcome: Met This Shift  12/26/2021 0110 by Shantell Patrick RN  Outcome: Met This Shift  Goal: Absence of physical injury  Description: Absence of physical injury  12/26/2021 1026 by Juani Dior RN  Outcome: Met This Shift  12/26/2021 0110 by Shantell Patrick RN  Outcome: Met This Shift     Problem: Pain:  Goal: Pain level will decrease  Description: Pain level will decrease  12/26/2021 1026 by Juani Dior RN  Outcome: Met This Shift  12/26/2021 0110 by Shantell Patrick RN  Outcome: Met This Shift  Goal: Control of acute pain  Description: Control of acute pain  12/26/2021 1026 by Juani Dior RN  Outcome: Met This Shift  12/26/2021 0110 by Shantell Patrick RN  Outcome: Met This Shift  Goal: Control of chronic pain  Description: Control of chronic pain  12/26/2021 1026 by Juani Dior RN  Outcome: Met This Shift  12/26/2021 0110 by Shantell Patrick RN  Outcome: Met This Shift     Problem: Skin Integrity:  Goal: Will show no infection signs and symptoms  Description: Will show no infection signs and symptoms  12/26/2021 1026 by Juani Dior RN  Outcome: Met This Shift  12/26/2021 0110 by Shantell Patrick RN  Outcome: Met This Shift  Goal: Absence of new skin breakdown  Description: Absence of new skin breakdown  12/26/2021 1026 by Juani Dior RN  Outcome: Met This Shift  12/26/2021 0110 by Shantell Patrick RN  Outcome: Met This Shift

## 2021-12-26 NOTE — PLAN OF CARE
Notes reviewed. No neuro changes reported overnight. EMG planned for tomorrow. Will follow up at that time. Please call with any neuro concerns in the interim.

## 2021-12-26 NOTE — PROGRESS NOTES
Hospitalist Progress Note            Patient: Delia Silva Age: 48 y.o.   DOA: 12/20/2021 Admit Dx / CC: Bilateral leg weakness [R29.898]  Weakness of both lower extremities [R29.898]  Complaints of weakness of lower extremity [R29.898]  T2 hyperintense foci present in cerebral cortex on magnetic resonance imaging [R90.89]  Spinal stenosis of lumbar region, unspecified whether neurogenic claudication present [M48.061]  Ambulatory dysfunction [R26.2]   LOS:  LOS: 5 days      Assessment/ Plan:     SYNOPSIS: Patient admitted on 12/20/2021  presented to ED with complaints of lower extremity weakness.  This has been ongoing since November 23 when she received Regeneron. She then started to develop saddle anesthesia, urinary and fecal incontinence.  Reports that feeling in her legs has diminished.   MRI of the C-spine, T-spine and L-spine was obtained and revealed L4-L5 severe bilateral neural foraminal narrowing and nonspecific foci of periventricular and subcortical cerebral white matter T2 flair hyperintensity.  Differential possibilities include migraine related  angiopathy, trauma related white matter change, early chronic microangiopathic ischemic disease.  These lesions do not have typical distribution of demyelinating lesions however this diagnosis cannot be completely excluded.      Bilateral lower extremity weakness/Ambulatory dysfunction with report of bowel and bladder incontinance  ? lumbar radiculopathy at L4-L5, mild canal stenosis and severe bilateral neural foraminal narrowing. At L3-L4, moderate canal stenosis and moderate bilateral neural foraminal narrowing. Mild nonspecific foci of periventricular and subcortical cerebral white matter T2/FLAIR hyperintensity.   hx of LESI with most recent 9/21/21  hx of Covid 19 in Nov 2021 with regeneron infusion  paresthesias to BLE but her exam showed no sensory deficit, per neuro pt was able to feel LT, PP and Vibration, hyperreflexia   Per neuro not typical presentation of GBS   B/L LE arterial and venous PVLs are negative for DVT or stenosis  EMG ? tomorrow otherwise as OP  x1 dose IV methylprednisolone for sciatica (pt was due for her NASRIN with pain mx as OP)  D/w Dr Sanders/Sophia and advised MRI does not explain pt's complaints. Iron deficiency anemia  Load with IV iron    DVT ppx: Loveox  Code status: Full code    Medically stable for discharge to ARU when bed available    Plan of care discussed with: patient and bedside RN    MRI C/T spine:  Impression   1.  Somewhat limited exams       2.  Given technical artifacts, the visualized portions of the spinal cord and   intracranial contents appear grossly negative for pathologic enhancement and   otherwise unremarkable/not significantly changed, within the limits of this   exam.       3.  Mildly-exaggerated thoracic kyphosis, with grossly unchanged multilevel   degenerative disease, without high-grade central spinal canal stenosis,   neural foraminal narrowing, or mass effect upon neural elements throughout   the cervical and thoracic region, as described.       4.  Mild diffuse paraspinal muscular atrophy       5.  Otherwise, no definite correlate for the patient's reported bilateral   lower extremity weakness, within the limits of these studies.       6.  Incidental findings, most notable for small fluid signal is scattered   about the left mastoid air cell complex, with trace involvement on the right   side, incompletely and only incidentally demonstrated.  Please see above   comments.      MRI L spine:  Impression   1.  Slightly limited exam       2.  There appear to be post-surgical alterations of prior left-sided   hemilaminotomy at L3 and L4, for prior partial discectomy at L3/4 and L4/5.       3.  Moderate bilateral facet joint effusions and heterogeneous enhancement   involve both L4/5 facet joints and surrounding marrow, with slightly lesser   extension into both subjacent posterolateral aspects of the traversing   epidural space, followed by surrounding paraspinal musculature, lessening   both superiorly to L3/4, and inferiorly to L5/S1.  While the above findings   could be degenerative and/or inflammatory arthro-pathic in nature, clinical   correlation is advised to exclude developing bilateral septic   arthritis/facet-itis.       4.  At L4/5, there is minimal enhancement of several traversing bilateral   nerve roots, and clinical correlation is advised to exclude early   arachnoiditis.       5.  Otherwise, no significant change since the most recent MR and CT   examinations of the lumbar spine.       .       RECOMMENDATIONS:   1.  Impression 1: Recommend post-treatment follow-up MRI of the lumbar spine,   without and with contrast, in 4 - 6 weeks, or as directed clinically. MRI brain; Impression   1. No evidence of acute intracranial abnormality. 2. Mild white matter signal abnormality as described above.  No significant   change.  The findings could be related to chronic microvascular disease   chronic migraines, vasculitis or Lyme disease.  The possibility of   demyelinating disease cannot be excluded although the findings are not   typical for demyelinating plaques.          Patient Active Problem List   Diagnosis    COVID    Complaints of weakness of lower extremity    Bilateral leg weakness    Ambulatory dysfunction    Iron deficiency anemia    Bilateral sciatica        Medications:  Reviewed    Infusion Medications    sodium chloride       Scheduled Medications    ferric gluconate (FERRLECIT) IVPB  125 mg IntraVENous Once    methylPREDNISolone  125 mg IntraVENous Once    vitamin B-12  1,000 mcg Oral Daily    multivitamin  1 tablet Oral Daily    baclofen  10 mg Oral BID    melatonin  5 mg Oral Nightly    fluticasone  1 spray Nasal Daily    trospium  20 mg Oral BID AC    sodium chloride flush  10 mL IntraVENous 2 times per day    enoxaparin  40 mg SubCUTAneous Daily     PRN Meds:

## 2021-12-26 NOTE — PROGRESS NOTES
Physician informed that patient was found sitting on her backside on the floor. She claims to have not hit her head or have any injuries from the fall. No new orders given.

## 2021-12-27 ENCOUNTER — APPOINTMENT (OUTPATIENT)
Dept: NEUROLOGY | Age: 46
DRG: 421 | End: 2021-12-27
Payer: COMMERCIAL

## 2021-12-27 PROCEDURE — 1200000000 HC SEMI PRIVATE

## 2021-12-27 PROCEDURE — 95886 MUSC TEST DONE W/N TEST COMP: CPT

## 2021-12-27 PROCEDURE — 6370000000 HC RX 637 (ALT 250 FOR IP): Performed by: INTERNAL MEDICINE

## 2021-12-27 PROCEDURE — 95911 NRV CNDJ TEST 9-10 STUDIES: CPT

## 2021-12-27 PROCEDURE — 97535 SELF CARE MNGMENT TRAINING: CPT

## 2021-12-27 PROCEDURE — 6360000002 HC RX W HCPCS: Performed by: FAMILY MEDICINE

## 2021-12-27 PROCEDURE — 6370000000 HC RX 637 (ALT 250 FOR IP): Performed by: NURSE PRACTITIONER

## 2021-12-27 PROCEDURE — 97530 THERAPEUTIC ACTIVITIES: CPT

## 2021-12-27 PROCEDURE — 99232 SBSQ HOSP IP/OBS MODERATE 35: CPT | Performed by: PHYSICIAN ASSISTANT

## 2021-12-27 PROCEDURE — 2580000003 HC RX 258: Performed by: FAMILY MEDICINE

## 2021-12-27 PROCEDURE — 6370000000 HC RX 637 (ALT 250 FOR IP): Performed by: FAMILY MEDICINE

## 2021-12-27 RX ADMIN — BACLOFEN 10 MG: 10 TABLET ORAL at 09:04

## 2021-12-27 RX ADMIN — TROSPIUM CHLORIDE 20 MG: 20 TABLET, FILM COATED ORAL at 17:58

## 2021-12-27 RX ADMIN — Medication 10 ML: at 21:28

## 2021-12-27 RX ADMIN — NAPROXEN 500 MG: 500 TABLET ORAL at 05:22

## 2021-12-27 RX ADMIN — NAPROXEN 500 MG: 500 TABLET ORAL at 21:27

## 2021-12-27 RX ADMIN — Medication 1 TABLET: at 09:04

## 2021-12-27 RX ADMIN — Medication 5 MG: at 21:27

## 2021-12-27 RX ADMIN — Medication 1000 MCG: at 09:04

## 2021-12-27 RX ADMIN — ENOXAPARIN SODIUM 40 MG: 100 INJECTION SUBCUTANEOUS at 09:04

## 2021-12-27 RX ADMIN — TROSPIUM CHLORIDE 20 MG: 20 TABLET, FILM COATED ORAL at 05:19

## 2021-12-27 RX ADMIN — BACLOFEN 10 MG: 10 TABLET ORAL at 21:27

## 2021-12-27 RX ADMIN — Medication 10 ML: at 09:04

## 2021-12-27 RX ADMIN — FLUTICASONE PROPIONATE 1 SPRAY: 50 SPRAY, METERED NASAL at 11:26

## 2021-12-27 ASSESSMENT — PAIN SCALES - GENERAL
PAINLEVEL_OUTOF10: 0
PAINLEVEL_OUTOF10: 0
PAINLEVEL_OUTOF10: 7
PAINLEVEL_OUTOF10: 6

## 2021-12-27 ASSESSMENT — ENCOUNTER SYMPTOMS
VOMITING: 0
ABDOMINAL DISTENTION: 0
SHORTNESS OF BREATH: 0
BACK PAIN: 0
NAUSEA: 0
TROUBLE SWALLOWING: 0

## 2021-12-27 NOTE — CARE COORDINATION
Discharge order noted. Per Marlene from our Acute Rehab note today, PMR continues to follow patient's progress. Medical workup is pending. Patient to have EMG today. OT note is in from today. Therapy department notified of need for PT note.   Nancie Mejía RN CM

## 2021-12-27 NOTE — PLAN OF CARE
Problem: Falls - Risk of:  Goal: Will remain free from falls  Description: Will remain free from falls  12/26/2021 2334 by Chuyita Maldonado RN  Outcome: Met This Shift  12/26/2021 1026 by Marcia Belle RN  Outcome: Met This Shift  Goal: Absence of physical injury  Description: Absence of physical injury  12/26/2021 2334 by Chuyita Maldonado RN  Outcome: Met This Shift  12/26/2021 1026 by Marcia Belle RN  Outcome: Met This Shift     Problem: Pain:  Goal: Pain level will decrease  Description: Pain level will decrease  12/26/2021 2334 by Chuyita Maldonado RN  Outcome: Met This Shift  12/26/2021 1026 by Marcia Belle RN  Outcome: Met This Shift  Goal: Control of acute pain  Description: Control of acute pain  12/26/2021 2334 by Chuyita Maldonado RN  Outcome: Met This Shift  12/26/2021 1026 by Marcia Belle RN  Outcome: Met This Shift  Goal: Control of chronic pain  Description: Control of chronic pain  12/26/2021 2334 by Chuyita Maldonado RN  Outcome: Met This Shift  12/26/2021 1026 by Marcia Belle RN  Outcome: Met This Shift     Problem: Skin Integrity:  Goal: Will show no infection signs and symptoms  Description: Will show no infection signs and symptoms  12/26/2021 2334 by Chuyita Maldonado RN  Outcome: Met This Shift  12/26/2021 1026 by Marcia Belle RN  Outcome: Met This Shift  Goal: Absence of new skin breakdown  Description: Absence of new skin breakdown  12/26/2021 2334 by Chuyita Maldonado RN  Outcome: Met This Shift  12/26/2021 1026 by Marcia Belle RN  Outcome: Met This Shift

## 2021-12-27 NOTE — PROCEDURES
[facility]   Electrodiagnostic Laboratory  [place]        Full Name: Sabiha Mcduffie Gender: Female  MRN: [id number] YOB: 1975  Location[de-identified] 12A      Visit Date: 12/27/2021 15:22  Age: 48 Years 1 Months Old  Examining Physician: Dr. Ignacio Childers  Referring Physician: Yarelis Arizmendi. LLUVIA Peralta-CNP  Technician: Reta Ventura   Height: 5 feet 0 inch  Weight: 226 lbs  Notes: Weakness both lower ext. Motor NCS      Nerve / Sites Lat. Amplitude Distance Lat Diff Velocity Temp. Amp. 1-2    ms mV cm ms m/s °C %   R Peroneal - EDB      Ankle 4.69 4.9 8   31.1 100      Pop fossa 11.25 4.8 31 6.56 47 31.1 99   L Peroneal - EDB      Ankle 6.04 2.7 8   31 100      Pop fossa 13.33 2.7 33 7.29 45 31 98.3   R Tibial - AH      Ankle 3.49 0.9 8   31.1 100      Pop fossa 11.04 0.9 36 7.55 48 31.1 102   L Tibial - AH      Ankle 4.53 3.2 8   31 100      Pop fossa 12.24 1.2 35 7.71 45 31 37.8       Sensory NCS      Nerve / Sites Onset Lat Peak Lat PP Amp Distance Velocity Temp.    ms ms µV cm m/s °C   R Superficial peroneal - Ankle      Lat leg 2.45 3.07 7.4 10 41 31.2   L Superficial peroneal - Ankle      Lat leg 2.50 3.13 11.5 10 40 31   R Sural - Ankle (Calf)      Calf 3.54 3.96 8.9 14 40 31.1   L Sural - Ankle (Calf)      Calf 4.32 4.95 5.3 14 32 31       F  Wave      Nerve F Lat M Lat F-M Lat    ms ms ms   R Peroneal - EDB 44.8 4.8 40.0   R Tibial - AH 46.3 4.4 41.9   L Peroneal - EDB 50.8 6.7 44.1   L Tibial - AH 48.6 6.4 42.2       H Reflex      Nerve Lat Hmax    ms   R Tibial - Soleus 29.9   L Tibial - Soleus 28.4       EMG         EMG Summary Table     Spontaneous MUAP Recruitment   Muscle IA Fib PSW Fasc H.F. Amp Dur. PPP Pattern   L. Extensor hallucis longus N None None None 3+Serrated 3+ 3+ 3+ Markedly Dec   R. Extensor hallucis longus N None None None 3+Serrated 3+ 3+ 3+ Markedly Dec   L.  Flexor digitorum longus N None None None 3+Serrated 3+ 3+ 3+ Markedly Dec   R. Flexor digitorum longus N None None None 3+Serrated 3+ 3+ 3+ Markedly Dec   L. Tibialis anterior N None None None 3+Serrated 3+ 3+ 3+ Markedly Dec   R. Tibialis anterior N None None None 3+Serrated 3+ 3+ 3+ Markedly Dec   L. Gastrocnemius (Medial head) N None None None 3+Serrated 3+ 3+ 3+ Markedly Dec   R. Gastrocnemius (Medial head) N None None None 3+Serrated 3+ 3+ 3+ Markedly Dec   L. Vastus medialis N None None None 2+Serrated 2+ 2+ 2+ Decr   R. Vastus medialis N None None None 2+Serrated 2+ 2+ 2+ Decr   L.&R Vastus lateralis N None None None 2+Serrated 2+ 2+ 2+ Decr   L. Lumbar paraspinals (low) N None None None 3+Serrated N 3+ 3+ N   R. Lumbar paraspinals (low) N None None None 3+Serrated N 3+ 3+ N         Is the first electrodiagnostic study for this patient. Was informed of the benefits, risks, indications, and alternatives to this examination. She voices understanding and consent to proceed. She tolerated the procedure well without complications. There were no serious restrictions, or limitations to this examination. Examination revealed abnormalities in muscle examination as well as nerve conductions. Summary:  Nerve conduction studies in the lower extremities reveal normal mixed motor latency of the right peroneal and upper limits of normal left peroneal.  Amplitudes were within normal limits. Conduction velocity within normal limits. Temperature measured as normal.    Tibial mixed motor latencies were normal, amplitudes were reduced bilaterally. Velocities normal..    Sensory studies in the lower extremities reveals peak latency of the bilateral superficial peroneal and right sural nerves to be within normal limits. Amplitudes were also normal.  Left sural peak latency was prolonged amplitude normal.    F responses for the peroneal and tibial nerves were within normal limits. H reflex normal bilaterally. .    Insertional activity in the lower extremity revealing no evidence of fasciculations, fibrillations, or positive waves in the muscles examined as can be reflected in the summarization table found above. Serrated potentials, polyphasic units, marked diminishment in number and marked increased in amplitude and duration is present diffusely in all muscles sampled in the lower extremities as well as in the paraspinal region. .    Impression:  Study compatible with the following:    #1 nerve conduction studies reflect diminished amplitude in the tibial distribution bilaterally, reflecting axonal pathology. Minor sensory abnormalities in the left sural distribution reflex possible changes in the mylin portion of this nerve. .    #2 following nerves were within normal limits to stimulation: Right peroneal, left peroneal, right superficial peroneal, left superficial peroneal and right sural.    #3 on insertional examination changes of serrated potentials, polyphasic units, and change in amplitude and duration as well as numbers reflect neuropathic changes and pathology diffusely involving L2-S1 fibers both in anterior and posterior rami distributions    Recommendations: This is predominantly a neuropathic process, not myopathic in nature. Post viral inflammatory processes can present early on with this picture however usually progress to development of slowing of velocity with the development of myelin abnormalities as well as increasing axonal pathology. Clinical correlation recommended.         Electronically signed by Yarelis Hoff MD on 05/13/2825 at 4:51 PM

## 2021-12-27 NOTE — PROGRESS NOTES
Physical Therapy        Name: Anna Hughes  : 1975  MRN: [id number]      Date of Service: 2021    Evaluating PT:  Kole Beck. Fernando Oleary, [id number]    Room #:  5037/5717-Y  Diagnosis:  Bilateral leg weakness [R29.898]  Weakness of both lower extremities [R29.898]  Complaints of weakness of lower extremity [R29.898]  T2 hyperintense foci present in cerebral cortex on magnetic resonance imaging [R90.89]  Spinal stenosis of lumbar region, unspecified whether neurogenic claudication present [M48.061]  Ambulatory dysfunction [R26.2]  PMHx/PSHx:     has no past medical history on file. has a past surgical history that includes LEEP; Tubal ligation; Ankle surgery; Elbow surgery; Dilation and curettage of uterus; Endometrial ablation (2016); Gallbladder surgery; and Appendectomy. Precautions:  Fall risk  Equipment Needs: railing or ramp to enter home    SUBJECTIVE:    Pt lives with her fiancee (currently on O2 and her son who works 50 hours/week) in a 2 story home with first floor set up with 3+1 stairs to enter and no rail. Pt ambulated with no AD PTA but had been using a FWW and Rolator WW in the days leading up to admission due to BLE weakness. OBJECTIVE:   Initial Evaluation  Date: 21 Treatment  21 Short Term/ Long Term   Goals   AM-PAC 6 Clicks     Was pt agreeable to Eval/treatment? Yes Yes     Does pt have pain? No c/o pain No c/o pain    Bed Mobility  Rolling: Independent  Supine to sit: Modified Independent  Sit to supine: Modified Independent  Scooting: Modified Independent Rolling: NT  Supine to sit: NT  Sit to supine: NT  Scooting: NT Rolling: Independent  Supine to sit: Mod I  Sit to supine: Mod I  Scooting:  Mod I   Transfers Sit to stand: Vannessa  Stand to sit: Vannessa  Stand pivot: Vannessa with [employer] Sit to stand: MaxA  Stand to sit:MaxA  Stand pivot: N/T  Modified Independent   Ambulation    20 feet with WW with Vannessa Attempted pt unable to  >100 feet with AAD with Modified - minutes  [] Neuromuscular reeducation 99812 - minutes    Vipin Phelan [id number]

## 2021-12-27 NOTE — PROGRESS NOTES
Occupational Therapy  OT BEDSIDE TREATMENT NOTE      Date:2021  Patient Name: dEy Doe  MRN: [id number]  : 1975  Room: [address] [place], [place] [address]     Evaluating OT: Mahnaz PAT/BETHANY #[id number]     Referring Provider and Specific Provider Orders/Date:      21   OT eval and treat  Start:  21,   End:  21,   ONE TIME,   Standing Count:  1 Occurrences,   R         Luke Esposito DO      Diagnosis: Bilateral leg weakness [R29.898]  Weakness of both lower extremities [R29.898]  Complaints of weakness of lower extremity [R29.898]  T2 hyperintense foci present in cerebral cortex on magnetic resonance imaging [R90.89]  Spinal stenosis of lumbar region, unspecified whether neurogenic claudication present [M48.061]      Surgery: None       Pertinent Medical History:  has no past medical history on file. Precautions:  Fall Risk, B LE weakness and tingling/numbess      Assessment of current deficits   [x]? Functional mobility           [x]? ADLs           [x]? Strength                  []?Cognition   [x]? Functional transfers         [x]? IADLs         []? Safety Awareness   []? Endurance   []? Fine Coordination                         [x]? Balance      []? Vision/perception   [x]? Sensation     []? Gross Motor Coordination             []? ROM           []?  Delirium                   []? Motor Control      OT PLAN OF CARE   OT POC based on physician orders, patient diagnosis and results of clinical assessment     Frequency/Duration:  1-3 days/wk for 2 weeks PRN   Specific OT Treatment to include:   * Instruction/training on adapted ADL techniques and AE recommendations to increase functional independence within precautions       * Training on energy conservation strategies, correct breathing pattern and techniques to improve independence/tolerance for self-care routine  * Functional transfer/mobility training/DME recommendations for increased independence, safety, and fall prevention  * Patient/Family education to increase follow through with safety techniques and functional independence  * Recommendation of environmental modifications for increased safety with functional transfers/mobility and ADLs  * Therapeutic exercise to improve motor endurance, ROM, and functional strength for ADLs/functional transfers  * Therapeutic activities to facilitate/challenge dynamic balance, stand tolerance for increased safety and independence with ADLs     Recommended Adaptive Equipment: wheeled walker, sock aide/reacher, possible bedside commode and tub transfer bench (pt states her tub is too high for one)     Home Living: Lives with fiance and 20 y/o son, single family home, 2 story with 1st floor set-up, 3+1 step to enter without. Pt's workspace is located on 2nd level.    Bathroom set-up: tub/shower          Equipment owned: pt borrowed a wheeled walker, rollator, and tub transfer bench from a family member - does not own any DME     Prior Level of Function: Independent with ADLs , Independent with IADLs; ambulated indep at baseline but has recently been using a wheeled walker d/t LE weakness.      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   Enjoys: Crafting       Pain Level: Pt coplained of BLE pain this session       Cognition: A&O: 4/4             Memory: good              Sequencing: good              Problem solving: good              Judgement/safety: good      Functional Assessment:  AM-PAC Daily Activity Raw Score: 17/24    Initial Eval Status  Date: 12/21/21 Treatment Status  Date:12/27/21 STGs = LTGs  Time frame: 10-14 days   Feeding Independent      Independent  Pt seated upright tin chair eating of breakfast meal, able to open packages, hold utenisls Independent    Grooming Stand by Assist     Setup  Pt washed face, applied deodorant seated upright in chair Moderate [place]    UB Dressing Stand by Assist      Setup  Pt donned/doffed hospital gown seated upright in chair Moderate [place]    LB Dressing Maximal Assist   From seated position to doff/don socks initially. Pt trialed AE to doff/don socks requiring Supervision/min cueing for technique. SBA-socks  Pt donned/doffed hospital socks with use of reacher and sockaide seated upright in chair    DNT pants this date, assistance to stand and pull over hips as simulated task    Pt stating of ordering reacher and sockaide from [facility], having it at home Moderate [place]    Bathing Minimal Assist     modA  Pt compelted sponge bathing task seated/standing, with pt able to wash of UB, bending at hips to wash of LB, with assistance to stand and wash of buttocks/adela area due to poor standing balance/endurance Moderate Woods    Toileting Minimal Assist     Francesco  Pt completed toileting task on BSC, with pt able to manage of gown, and complete of hygiene to buttocks, assistance to transfer, poor safety/balance  Moderate Woods    Bed Mobility  Supine to sit: Supervision   Sit to supine: Supervision  DNT  Pt seated upright in chair upon arrival and at end of session Supine to sit: Independent   Sit to supine: Independent    Functional Transfers Sit to stand: Minimal Assist  Stand to sit: Minimal Assist     Transfer training with verbal cues for hand placement to improve safety/prevent pulling on on wheeled walker.  min/modA  Sit to Stand  Stand to Sit  Stand Pivot Transfer EOB<>BSC     Poor safety, poor standing balance/endurance, cueing and assistance with walker management Moderate Woods with use of walker    Functional Mobility Minimal Assist with walker to improve balance short distances at bedside, verbal cues for safety.    DNT  Francesco per previous level Moderate Woods with use of walker    Balance Sitting:     Static: Supervision     Dynamic: Supervision   Standing: CGA/MIN A with walker  Sitting:  Independent    Standing:  Min/modA Sitting:     Static: indep    Dynamic: indep   Standing: indep

## 2021-12-27 NOTE — PROGRESS NOTES
PMR continues to follow patient's progress. Medical workup is pending. Patient to have EMG today. Romayne Doom RN, Pre-screener for Acute Rehab  P: [phone]  F: [phone]  [email or web address]

## 2021-12-27 NOTE — PLAN OF CARE
Problem: Falls - Risk of:  Goal: Will remain free from falls  Description: Will remain free from falls  12/27/2021 1125 by Alvina Bermudez RN  Outcome: Met This Shift  12/27/2021 0003 by María Elena Pete RN  Outcome: Met This Shift  12/26/2021 2334 by Chary Redman RN  Outcome: Met This Shift  Goal: Absence of physical injury  Description: Absence of physical injury  12/27/2021 1125 by Alvina Bermudez RN  Outcome: Met This Shift  12/27/2021 0003 by María Elena Pete RN  Outcome: Met This Shift  12/26/2021 2334 by Chary Redman RN  Outcome: Met This Shift     Problem: Pain:  Goal: Pain level will decrease  Description: Pain level will decrease  12/27/2021 1125 by Alvina Bermudez RN  Outcome: Met This Shift  12/27/2021 0003 by María Elena Pete RN  Outcome: Met This Shift  12/26/2021 2334 by Chary Redman RN  Outcome: Met This Shift  Goal: Control of acute pain  Description: Control of acute pain  12/27/2021 1125 by Alvina Bermudez RN  Outcome: Met This Shift  12/27/2021 0003 by María Elena Pete RN  Outcome: Met This Shift  12/26/2021 2334 by Chary Redman RN  Outcome: Met This Shift  Goal: Control of chronic pain  Description: Control of chronic pain  12/27/2021 1125 by Alvina Bermudez RN  Outcome: Met This Shift  12/27/2021 0003 by María Elena Pete RN  Outcome: Met This Shift  12/26/2021 2334 by Chary Redman RN  Outcome: Met This Shift     Problem: Skin Integrity:  Goal: Will show no infection signs and symptoms  Description: Will show no infection signs and symptoms  12/27/2021 1125 by Alvina Bermudez RN  Outcome: Met This Shift  12/27/2021 0003 by María Elena Pete RN  Outcome: Met This Shift  12/26/2021 2334 by Chary Redman RN  Outcome: Met This Shift  Goal: Absence of new skin breakdown  Description: Absence of new skin breakdown  12/27/2021 1125 by Alvina Bermudez RN  Outcome: Met This Shift  12/27/2021 0003 by Carvin Juan R, RN  Outcome: Met This Shift  12/26/2021 2334 by Chary Redman RN  Outcome: Met This Shift

## 2021-12-27 NOTE — PLAN OF CARE
Problem: Falls - Risk of:  Goal: Will remain free from falls  12/27/2021 0003 by María Elena Pete RN  Outcome: Met This Shift  12/26/2021 2334 by Chary Redman RN  Outcome: Met This Shift  12/26/2021 1026 by Alvina Bermudez RN  Outcome: Met This Shift  Goal: Absence of physical injury  12/27/2021 0003 by María Elena Pete RN  Outcome: Met This Shift  12/26/2021 2334 by Chary Redman RN  Outcome: Met This Shift  12/26/2021 1026 by Alvina Bermudez RN  Outcome: Met This Shift     Problem: Pain:  Description: Pain management should include both nonpharmacologic and pharmacologic interventions.   Goal: Pain level will decrease  12/27/2021 0003 by María Elena Pete RN  Outcome: Met This Shift  12/26/2021 2334 by Chary Redman RN  Outcome: Met This Shift  12/26/2021 1026 by Alvina Bermudez RN  Outcome: Met This Shift  Goal: Control of acute pain  12/27/2021 0003 by María Elena Pete RN  Outcome: Met This Shift  12/26/2021 2334 by Chary Redman RN  Outcome: Met This Shift  12/26/2021 1026 by Alvina Bermudez RN  Outcome: Met This Shift  Goal: Control of chronic pain  12/27/2021 0003 by María Elena Pete RN  Outcome: Met This Shift  12/26/2021 2334 by Chary Redman RN  Outcome: Met This Shift  12/26/2021 1026 by Alvina Bermudez RN  Outcome: Met This Shift     Problem: Skin Integrity:  Goal: Will show no infection signs and symptoms  12/27/2021 0003 by María Elena Pete RN  Outcome: Met This Shift  12/26/2021 2334 by Chary Redman RN  Outcome: Met This Shift  12/26/2021 1026 by Alvina Bermudez RN  Outcome: Met This Shift  Goal: Absence of new skin breakdown  12/27/2021 0003 by María Elena Pete RN  Outcome: Met This Shift  12/26/2021 2334 by Chary Redman RN  Outcome: Met This Shift  12/26/2021 1026 by Alvina Bermudez RN  Outcome: Met This Shift

## 2021-12-27 NOTE — PROGRESS NOTES
Hospitalist Progress Note            Patient: Lisy Jernigan Age: 48 y.o.   DOA: 12/20/2021 Admit Dx / CC: Bilateral leg weakness [R29.898]  Weakness of both lower extremities [R29.898]  Complaints of weakness of lower extremity [R29.898]  T2 hyperintense foci present in cerebral cortex on magnetic resonance imaging [R90.89]  Spinal stenosis of lumbar region, unspecified whether neurogenic claudication present [M48.061]  Ambulatory dysfunction [R26.2]   LOS:  LOS: 6 days      Assessment/ Plan:     SYNOPSIS: Patient admitted on 12/20/2021  presented to ED with complaints of lower extremity weakness.  This has been ongoing since November 23 when she received Regeneron. She then started to develop saddle anesthesia, urinary and fecal incontinence.  Reports that feeling in her legs has diminished.   MRI of the C-spine, T-spine and L-spine was obtained and revealed L4-L5 severe bilateral neural foraminal narrowing and nonspecific foci of periventricular and subcortical cerebral white matter T2 flair hyperintensity.  Differential possibilities include migraine related  angiopathy, trauma related white matter change, early chronic microangiopathic ischemic disease.  These lesions do not have typical distribution of demyelinating lesions however this diagnosis cannot be completely excluded.      Bilateral lower extremity weakness/Ambulatory dysfunction with report of bowel and bladder incontinance  ? lumbar radiculopathy at L4-L5, mild canal stenosis and severe bilateral neural foraminal narrowing. At L3-L4, moderate canal stenosis and moderate bilateral neural foraminal narrowing. Mild nonspecific foci of periventricular and subcortical cerebral white matter T2/FLAIR hyperintensity.   hx of LESI with most recent 9/21/21  hx of Covid 19 in Nov 2021 with regeneron infusion  paresthesias to BLE but her exam showed no sensory deficit, per neuro pt was able to feel LT, PP and Vibration, hyperreflexia   Per neuro not typical presentation of GBS   B/L LE arterial and venous PVLs are negative for DVT or stenosis  EMG is pend but can be done as OP  S/p x1 dose IV methylprednisolone for sciatica (pt was due for her NASRIN with pain mx as OP) and says slightly helped with back pain but not leg pain  D/w Dr Sanders/Sophia and advised MRI does not explain pt's complaints. Iron deficiency anemia  received IV iron    DVT ppx: Loveox  Code status: Full code    Medically stable for discharge to ARU when bed available    Plan of care discussed with: patient and bedside RN, CM    MRI C/T spine:  Impression   1.  Somewhat limited exams       2.  Given technical artifacts, the visualized portions of the spinal cord and   intracranial contents appear grossly negative for pathologic enhancement and   otherwise unremarkable/not significantly changed, within the limits of this   exam.       3.  Mildly-exaggerated thoracic kyphosis, with grossly unchanged multilevel   degenerative disease, without high-grade central spinal canal stenosis,   neural foraminal narrowing, or mass effect upon neural elements throughout   the cervical and thoracic region, as described.       4.  Mild diffuse paraspinal muscular atrophy       5.  Otherwise, no definite correlate for the patient's reported bilateral   lower extremity weakness, within the limits of these studies.       6.  Incidental findings, most notable for small fluid signal is scattered   about the left mastoid air cell complex, with trace involvement on the right   side, incompletely and only incidentally demonstrated.  Please see above   comments.      MRI L spine:  Impression   1.  Slightly limited exam       2.  There appear to be post-surgical alterations of prior left-sided   hemilaminotomy at L3 and L4, for prior partial discectomy at L3/4 and L4/5.       3.  Moderate bilateral facet joint effusions and heterogeneous enhancement   involve both L4/5 facet joints and surrounding marrow, with slightly lesser   extension into both subjacent posterolateral aspects of the traversing   epidural space, followed by surrounding paraspinal musculature, lessening   both superiorly to L3/4, and inferiorly to L5/S1.  While the above findings   could be degenerative and/or inflammatory arthro-pathic in nature, clinical   correlation is advised to exclude developing bilateral septic   arthritis/facet-itis.       4.  At L4/5, there is minimal enhancement of several traversing bilateral   nerve roots, and clinical correlation is advised to exclude early   arachnoiditis.       5.  Otherwise, no significant change since the most recent MR and CT   examinations of the lumbar spine.       .       RECOMMENDATIONS:   1.  Impression 1: Recommend post-treatment follow-up MRI of the lumbar spine,   without and with contrast, in 4 - 6 weeks, or as directed clinically. MRI brain; Impression   1. No evidence of acute intracranial abnormality. 2. Mild white matter signal abnormality as described above.  No significant   change.  The findings could be related to chronic microvascular disease   chronic migraines, vasculitis or Lyme disease.  The possibility of   demyelinating disease cannot be excluded although the findings are not   typical for demyelinating plaques.          Patient Active Problem List   Diagnosis    COVID    Complaints of weakness of lower extremity    Bilateral leg weakness    Ambulatory dysfunction    Iron deficiency anemia    Bilateral sciatica        Medications:  Reviewed    Infusion Medications    sodium chloride       Scheduled Medications    vitamin B-12  1,000 mcg Oral Daily    multivitamin  1 tablet Oral Daily    baclofen  10 mg Oral BID    melatonin  5 mg Oral Nightly    fluticasone  1 spray Nasal Daily    trospium  20 mg Oral BID AC    sodium chloride flush  10 mL IntraVENous 2 times per day    enoxaparin  40 mg SubCUTAneous Daily     PRN Meds: acetaminophen **OR** [DISCONTINUED] acetaminophen, melatonin, sodium chloride flush, sodium chloride, promethazine **OR** ondansetron, polyethylene glycol, naproxen    I/O    Intake/Output Summary (Last 24 hours) at 12/27/2021 1049  Last data filed at 12/26/2021 1411  Gross per 24 hour   Intake 460 ml   Output --   Net 460 ml       Labs:   Recent Labs     12/26/21 0449   WBC 7.0   HGB 12.2   HCT 36.6          Recent Labs     12/26/21 0449      K 4.5      CO2 25   BUN 10   CREATININE 0.6   CALCIUM 9.2       Recent Labs     12/26/21 0449   PROT 6.9   ALKPHOS 52   ALT 12   AST 13   BILITOT 0.3       No results for input(s): INR in the last 72 hours. No results for input(s): Haydee Galeano in the last 72 hours. Chronic labs:  Lab Results   Component Value Date    TSH 1.460 12/26/2021    INR 1.0 12/20/2021       Radiology:  Imaging studies reviewed today. Subjective:     Nikole Sandy had a call but did not hit head yesterday, her legs gave out, her LE weakness has not improved with steroids but her back pain has    Objective:     Physical Exam:   /84   Pulse 94   Temp 97.2 °F (36.2 °C) (Temporal)   Resp 16   Ht 5' (1.524 m)   Wt 226 lb 12.8 oz (102.9 kg)   SpO2 96%   BMI 44.29 kg/m²     General appearance:in no distress, up in chair  Lungs: Clear to auscultation bilaterally, no wheezing or crackles   Heart: Regular rate and rhythm, S1, S2 normal   Abdomen: Soft, non-tender and not-distended. Bowel sounds normal.   Extremities: no edema   Skin: B/L LE warm to touch  Neurologic: Grossly normal and non focal, CN II-XII intact.  Patient able to move B/L LE against resistance      Azael Watson MD   Hospitalist Service   12/27/21 10:49 AM

## 2021-12-27 NOTE — PROGRESS NOTES
Janneth Stevenson is a 48 y.o. right handed female     Neurology following for BLE weakness    PMH of covid 19 back in November, lumbar epidurals    November she was diagnosed with Covid--she received antibiotic treatment--and 3 days later she developed generalized numbness and tingling followed by BLE weakness. She was seen in the hospital diagnosed with \"serum sickness\" and put on steroids. She was also recently diagnosed with B12 deficiency and had started shots. Unfortunately, her BLE weakness worsened prompting readmission. She does have a history of LS radiculopathy and gets epidurals, MRI L spine showed possible arachnoiditis at L4/5 with moderate bilateral facet joint effusions and heterogenous enhancement as well. NSGY is following. She continues to feel her weakness is worsening. Paresthesias have improved some. She is tearful. Sitting up in chair. US LE was normal.     B12 and folate levels were drawn on 12/22 and normal.  The patient states she is due for another injection.  No family present    No chest pain or palpitations  No coughing or wheezing    No itching or bruising appreciated  No speech or swallowing troubles    ROS otherwise negative      Current Facility-Administered Medications   Medication Dose Route Frequency Provider Last Rate Last Admin    vitamin B-12 (CYANOCOBALAMIN) tablet 1,000 mcg  1,000 mcg Oral Daily Stevo Alonso MD   1,000 mcg at 12/27/21 0949    multivitamin 1 tablet  1 tablet Oral Daily Stevo Alonso MD   1 tablet at 12/27/21 6658    baclofen (LIORESAL) tablet 10 mg  10 mg Oral BID LLUVIA Taylor CNP   10 mg at 12/27/21 0904    acetaminophen (TYLENOL) tablet 1,000 mg  1,000 mg Oral Q6H PRN Stevo Alonso MD   1,000 mg at 12/26/21 0305    melatonin disintegrating tablet 5 mg  5 mg Oral Nightly PRN Stevo Alonso MD        melatonin disintegrating tablet 5 mg  5 mg Oral Nightly Stevo Alonso MD   5 mg at 12/26/21 2112    fluticasone light touch sensation  Normal   V,VII: corneal reflex     VII: facial muscle function - upper  Normal   VII: facial muscle function - lower Normal   VIII: hearing Normal   IX: soft palate elevation  Normal   IX,X: gag reflex    XI: trapezius strength  5/5   XI: sternocleidomastoid strength 5/5   XI: neck extension strength  5/5   XII: tongue strength  Normal     Motor:  5/5 BUE    2+/5 b/l IPS  3/5 b/l quads  2-3/5 b/l gastrocs  0/5 b/l anti tibs  Spastic legs; normal bulk  No abnormal movements    Sensory:  LT and PP normal in all limbs  Vibration mod-severely impaired both ankles only    Coordination:   FN, FFM  normal    DTR:   Right Brachioradialis reflex 2+  Left Brachioradialis reflex 2+  Right Biceps reflex 2+  Left Biceps reflex 2+  Right Triceps reflex 2+  Left Triceps reflex 2+  Right Quadriceps reflex 3+  Left Quadriceps reflex 3+  Right Achilles reflex 2+  Left Achilles reflex 2+    No Babinskis    No other pathological reflexes    Laboratory/Radiology:     B12/folate 12/22 normal    MRI L spine: 1.  Slightly limited exam  2.  There appear to be post-surgical alterations of prior left-sided  hemilaminotomy at L3 and L4, for prior partial discectomy at L3/4 and L4/5.  3.  Moderate bilateral facet joint effusions and heterogeneous enhancement  involve both L4/5 facet joints and surrounding marrow, with slightly lesser  extension into both subjacent posterolateral aspects of the traversing  epidural space, followed by surrounding paraspinal musculature, lessening  both superiorly to L3/4, and inferiorly to L5/S1.  While the above findings  could be degenerative and/or inflammatory arthro-pathic in nature, clinical  correlation is advised to exclude developing bilateral septic  arthritis/facet-itis. 4.  At L4/5, there is minimal enhancement of several traversing bilateral nerve roots, and clinical correlation is advised to exclude early  Arachnoiditis.  5.  Otherwise, no significant change since the most recent MR and CT  examinations of the lumbar spine. MRI brain: 1. No evidence of acute intracranial abnormality. 2. Mild white matter signal abnormality as described above.  No significant  change.  The findings could be related to chronic microvascular disease  chronic migraines, vasculitis or Lyme disease.  The possibility of  demyelinating disease cannot be excluded although the findings are not  typical for demyelinating plaques. MRI cervical and thoracic spines: Somewhat limited exams   2. Given technical artifacts, the visualized portions of the spinal cord and intracranial contents appear grossly negative for pathologic enhancement and otherwise unremarkable/not significantly changed, within the limits of this exam.   3. Mildly-exaggerated thoracic kyphosis, with grossly unchanged multilevel degenerative disease, without high-grade central spinal canal stenosis, neural foraminal narrowing, or mass effect upon neural elements throughout the cervical and thoracic region, as described. 4. Mild diffuse paraspinal muscular atrophy   5. Otherwise, no definite correlate for the patient's reported bilateral lower extremity weakness, within the limits of these studies. 6. Incidental findings, most notable for small fluid signal is scattered about the left mastoid air cell complex, with trace involvement on the right side, incompletely and only incidentally demonstrated     All labs and imaging studies reviewed independently today    Assessment:     Bilateral leg weakness: progressing since COVID-19 infection, though her symptoms and exam do not fit with typical GBS. She has upper motor neuron signs on exam (leg spasticity), with intact quad reflexes. Her chronic lumbosacral disease, possible arachnoiditis, and B12 deficiency, are contributing, but she requires EMG to further classify her symptoms.     Plan:     EMG both legs--to be completed today     Baclofen 10 mg BID    Recommend continuation of B12 supplementation--defer to primary    PT/OT    Will follow    Misti Graf PA-C  1:02 PM  12/27/2021

## 2021-12-28 LAB
FOLATE: 7.3 NG/ML (ref 4.8–24.2)
HOMOCYSTEINE: 12.9 UMOL/L (ref 0–15)
VITAMIN B-12: >2000 PG/ML (ref 211–946)

## 2021-12-28 PROCEDURE — 82607 VITAMIN B-12: CPT

## 2021-12-28 PROCEDURE — 83090 ASSAY OF HOMOCYSTEINE: CPT

## 2021-12-28 PROCEDURE — 2580000003 HC RX 258: Performed by: PHYSICIAN ASSISTANT

## 2021-12-28 PROCEDURE — 82746 ASSAY OF FOLIC ACID SERUM: CPT

## 2021-12-28 PROCEDURE — 6370000000 HC RX 637 (ALT 250 FOR IP): Performed by: INTERNAL MEDICINE

## 2021-12-28 PROCEDURE — 6370000000 HC RX 637 (ALT 250 FOR IP): Performed by: FAMILY MEDICINE

## 2021-12-28 PROCEDURE — 6370000000 HC RX 637 (ALT 250 FOR IP): Performed by: NURSE PRACTITIONER

## 2021-12-28 PROCEDURE — 1200000000 HC SEMI PRIVATE

## 2021-12-28 PROCEDURE — 99233 SBSQ HOSP IP/OBS HIGH 50: CPT | Performed by: PHYSICIAN ASSISTANT

## 2021-12-28 PROCEDURE — 36415 COLL VENOUS BLD VENIPUNCTURE: CPT

## 2021-12-28 PROCEDURE — 82525 ASSAY OF COPPER: CPT

## 2021-12-28 PROCEDURE — 6370000000 HC RX 637 (ALT 250 FOR IP): Performed by: PHYSICIAN ASSISTANT

## 2021-12-28 PROCEDURE — 83921 ORGANIC ACID SINGLE QUANT: CPT

## 2021-12-28 PROCEDURE — 6360000002 HC RX W HCPCS: Performed by: INTERNAL MEDICINE

## 2021-12-28 RX ORDER — CYANOCOBALAMIN 1000 UG/ML
1000 INJECTION INTRAMUSCULAR; SUBCUTANEOUS ONCE
Status: COMPLETED | OUTPATIENT
Start: 2021-12-29 | End: 2021-12-29

## 2021-12-28 RX ORDER — CYANOCOBALAMIN 1000 UG/ML
1000 INJECTION INTRAMUSCULAR; SUBCUTANEOUS ONCE
Status: COMPLETED | OUTPATIENT
Start: 2021-12-28 | End: 2021-12-28

## 2021-12-28 RX ORDER — FOLIC ACID 1 MG/1
1 TABLET ORAL DAILY
Status: DISCONTINUED | OUTPATIENT
Start: 2021-12-28 | End: 2022-01-05 | Stop reason: HOSPADM

## 2021-12-28 RX ORDER — FOLIC ACID 1 MG/1
1 TABLET ORAL DAILY
Qty: 30 TABLET | Refills: 3 | Status: ON HOLD
Start: 2021-12-28 | End: 2022-02-04 | Stop reason: SDUPTHER

## 2021-12-28 RX ORDER — SODIUM CHLORIDE 0.9 % (FLUSH) 0.9 %
5-40 SYRINGE (ML) INJECTION EVERY 12 HOURS SCHEDULED
Status: DISCONTINUED | OUTPATIENT
Start: 2021-12-28 | End: 2022-01-05 | Stop reason: HOSPADM

## 2021-12-28 RX ORDER — SODIUM CHLORIDE 0.9 % (FLUSH) 0.9 %
5-40 SYRINGE (ML) INJECTION PRN
Status: DISCONTINUED | OUTPATIENT
Start: 2021-12-28 | End: 2022-01-05 | Stop reason: HOSPADM

## 2021-12-28 RX ORDER — SODIUM CHLORIDE 9 MG/ML
25 INJECTION, SOLUTION INTRAVENOUS PRN
Status: DISCONTINUED | OUTPATIENT
Start: 2021-12-28 | End: 2022-01-05 | Stop reason: HOSPADM

## 2021-12-28 RX ADMIN — BACLOFEN 10 MG: 10 TABLET ORAL at 09:20

## 2021-12-28 RX ADMIN — CYANOCOBALAMIN 1000 MCG: 1000 INJECTION, SOLUTION INTRAMUSCULAR; SUBCUTANEOUS at 12:37

## 2021-12-28 RX ADMIN — SODIUM CHLORIDE, PRESERVATIVE FREE 10 ML: 5 INJECTION INTRAVENOUS at 20:34

## 2021-12-28 RX ADMIN — Medication 5 MG: at 20:34

## 2021-12-28 RX ADMIN — BACLOFEN 10 MG: 10 TABLET ORAL at 20:34

## 2021-12-28 RX ADMIN — Medication 1 TABLET: at 09:20

## 2021-12-28 RX ADMIN — TROSPIUM CHLORIDE 20 MG: 20 TABLET, FILM COATED ORAL at 16:22

## 2021-12-28 RX ADMIN — TROSPIUM CHLORIDE 20 MG: 20 TABLET, FILM COATED ORAL at 05:24

## 2021-12-28 RX ADMIN — FOLIC ACID 1 MG: 1 TABLET ORAL at 12:38

## 2021-12-28 RX ADMIN — SODIUM CHLORIDE, PRESERVATIVE FREE 10 ML: 5 INJECTION INTRAVENOUS at 09:20

## 2021-12-28 RX ADMIN — Medication 1000 MCG: at 09:20

## 2021-12-28 RX ADMIN — FLUTICASONE PROPIONATE 1 SPRAY: 50 SPRAY, METERED NASAL at 09:21

## 2021-12-28 ASSESSMENT — PAIN SCALES - GENERAL
PAINLEVEL_OUTOF10: 0
PAINLEVEL_OUTOF10: 0

## 2021-12-28 NOTE — PROGRESS NOTES
Ana Leary is a 48 y.o. right handed female     Neurology following for BLE weakness    PMH of covid 19 back in November, lumbar epidurals    Her symptoms began on 11/28/2021. She was diagnosed with COVID-19 on 11/20/2021. Prior to this she was fully functional and walking independently at baseline. 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 She had received antibiotic infusion. She started noticing numbness and tingling on 11/23/2021. She was seen in the ED and was diagnosed with \"serum sickness\" and placed on steroids and Benadryl. This did nothing for symptoms and continue to slowly progressed with development of weakness on the 28th. She had labs done by her PCP which revealed a B12 of 165 and she was started on B12 injections on 12/10. She received her second injection on 12/17. Initially, she was able to still ambulate with a walker. Her weakness continued to progress and she presented to the hospital again. B12 level on 12/22 was 575. She does have a history of low back pain and sciatica and received epidural spinal injections with the last injection in September 2021. MRIs of the brain, cervical, thoracic and lumbar spine were completed which did not show any acute findings responsible for current symptoms. EMG showed a predominantly neuropathic process. She reports some pins and needle sensations in the fingers that began yesterday. Otherwise, she denies any changes in her symptoms. She is reluctant to receive additional B12 injections as she feels that this has worsened some of her symptoms. Patient was seen and examined with Dr. Cecilia Lozoya at this time.     No chest pain or palpitations  No coughing or wheezing    No itching or bruising appreciated  No speech or swallowing troubles    ROS otherwise negative      Current Facility-Administered Medications   Medication Dose Route Frequency Provider Last Rate Last Admin  sodium chloride flush 0.9 % injection 5-40 mL  5-40 mL IntraVENous 2 times per day Tiago Flores PA   10 mL at 12/28/21 0920    sodium chloride flush 0.9 % injection 5-40 mL  5-40 mL IntraVENous PRN SHARRON Fong        0.9 % sodium chloride infusion  25 mL IntraVENous PRN SHARRON Fong        folic acid (FOLVITE) tablet 1 mg  1 mg Oral Daily SHARRON Fong        vitamin B-12 (CYANOCOBALAMIN) tablet 1,000 mcg  1,000 mcg Oral Daily Elmer Gonzales MD   1,000 mcg at 12/28/21 0920    multivitamin 1 tablet  1 tablet Oral Daily Elmer Gonzales MD   1 tablet at 12/28/21 0920    baclofen (LIORESAL) tablet 10 mg  10 mg Oral BID LLUVIA Greenwood - CNP   10 mg at 12/28/21 0920    acetaminophen (TYLENOL) tablet 1,000 mg  1,000 mg Oral Q6H PRN Elmer Gonzales MD   1,000 mg at 12/26/21 0305    melatonin disintegrating tablet 5 mg  5 mg Oral Nightly PRN Elmer Gonzales MD        melatonin disintegrating tablet 5 mg  5 mg Oral Nightly Elmer Gonzales MD   5 mg at 12/27/21 2127    fluticasone (FLONASE) 50 MCG/ACT nasal spray 1 spray  1 spray Nasal Daily Rickford Manger, DO   1 spray at 12/28/21 4568    trospium (SANCTURA) tablet 20 mg  20 mg Oral BID AC Concetta Molinaer, DO   20 mg at 12/28/21 0524    [Held by provider] enoxaparin (LOVENOX) injection 40 mg  40 mg SubCUTAneous Daily Rickford Manger, DO   40 mg at 12/27/21 9907    promethazine (PHENERGAN) tablet 12.5 mg  12.5 mg Oral Q6H PRN Rickford Manger, DO        Or    ondansetron [facility]) injection 4 mg  4 mg IntraVENous Q6H PRN Rickford Manger, DO        polyethylene glycol (GLYCOLAX) packet 17 g  17 g Oral Daily PRN Rickford Manger, DO        naproxen (NAPROSYN) tablet 500 mg  500 mg Oral BID PRN Lonia Stamp, DO   500 mg at 12/27/21 2127     Objective:     BP (!) 137/104   Pulse 95   Temp 98.7 °F (37.1 °C) (Oral)   Resp 18   Ht 5' (1.524 m)   Wt 226 lb 12.8 oz (102.9 kg)   SpO2 98%   BMI 44.29 kg/m²     General appearance: alert, appears stated age, cooperative and no distress sitting up in the chair  Head: normocephalic, without obvious abnormality, atraumatic  Eyes: conjunctivae/corneas clear  Neck: Full range of motion without cervicalgia  Lungs: respirations non labored   Heart: Regular rate and rhythm      Mental Status: Alert, oriented x4-- pleasant and cooperative    Appropriate attention/concentration  Intact fundus of knowledge  Repetition intact  Intact memories    Speech: no dysarthria  Language: no aphasias     Cranial Nerves:  I: smell    II: visual acuity     II: visual fields Full to confrontation   II: pupils JONNIE   III,VII: ptosis None   III,IV,VI: extraocular muscles  Full ROM   V: mastication Normal   V: facial light touch sensation  Normal   V,VII: corneal reflex     VII: facial muscle function - upper  Normal   VII: facial muscle function - lower Normal   VIII: hearing Normal   IX: soft palate elevation  Normal   IX,X: gag reflex    XI: trapezius strength  5/5   XI: sternocleidomastoid strength 5/5   XI: neck extension strength  5/5   XII: tongue strength  Normal     Motor:  5/5 BUE    2+/5 b/l IPS  3/5 b/l quads  2-3/5 b/l gastrocs  0/5 b/l anti tibs  Spastic legs; normal bulk  No abnormal movements    Sensory:  LT normal in all limbs  Mild decrease in PP to level of T6-8  Vibration decreased to level of elbows     Slight sensory level around T6-T8     Coordination:   FN, FFM  Normal  HTS unable r/t weakness     DTR:   +3 arms and knees   +2 ankles     No Babinskis    No other pathological reflexes    Laboratory/Radiology:     B12/folate 12/22 normal    MRI L spine: 1.  Slightly limited exam  2.  There appear to be post-surgical alterations of prior left-sided  hemilaminotomy at L3 and L4, for prior partial discectomy at L3/4 and L4/5.  3.  Moderate bilateral facet joint effusions and heterogeneous enhancement  involve both L4/5 facet joints and surrounding marrow, with slightly lesser  extension into both subjacent posterolateral aspects of the traversing  epidural space, followed by surrounding paraspinal musculature, lessening  both superiorly to L3/4, and inferiorly to L5/S1.  While the above findings  could be degenerative and/or inflammatory arthro-pathic in nature, clinical  correlation is advised to exclude developing bilateral septic  arthritis/facet-itis. 4.  At L4/5, there is minimal enhancement of several traversing bilateral nerve roots, and clinical correlation is advised to exclude early  Arachnoiditis. 5.  Otherwise, no significant change since the most recent MR and CT  examinations of the lumbar spine. MRI brain: 1. No evidence of acute intracranial abnormality. 2. Mild white matter signal abnormality as described above.  No significant  change.  The findings could be related to chronic microvascular disease  chronic migraines, vasculitis or Lyme disease.  The possibility of  demyelinating disease cannot be excluded although the findings are not  typical for demyelinating plaques. MRI cervical and thoracic spines: Somewhat limited exams   2. Given technical artifacts, the visualized portions of the spinal cord and intracranial contents appear grossly negative for pathologic enhancement and otherwise unremarkable/not significantly changed, within the limits of this exam.   3. Mildly-exaggerated thoracic kyphosis, with grossly unchanged multilevel degenerative disease, without high-grade central spinal canal stenosis, neural foraminal narrowing, or mass effect upon neural elements throughout the cervical and thoracic region, as described. 4. Mild diffuse paraspinal muscular atrophy   5. Otherwise, no definite correlate for the patient's reported bilateral lower extremity weakness, within the limits of these studies.    6. Incidental findings, most notable for small fluid signal is scattered about the left mastoid air cell complex, with trace involvement on the right side, incompletely and only incidentally demonstrated     EMG   Study compatible with the following:     #1 nerve conduction studies reflect diminished amplitude in the tibial distribution bilaterally, reflecting axonal pathology. Minor sensory abnormalities in the left sural distribution reflex possible changes in the mylin portion of this nerve. .     #2 following nerves were within normal limits to stimulation: Right peroneal, left peroneal, right superficial peroneal, left superficial peroneal and right sural.     #3 on insertional examination changes of serrated potentials, polyphasic units, and change in amplitude and duration as well as numbers reflect neuropathic changes and pathology diffusely involving L2-S1 fibers both in anterior and posterior rami distributions     Recommendations: This is predominantly a neuropathic process, not myopathic in nature. Post viral inflammatory processes can present early on with this picture however usually progress to development of slowing of velocity with the development of myelin abnormalities as well as increasing axonal pathology. All labs and imaging studies reviewed independently today    Assessment:     Subacute combined degeneration 2/2 B12 deficiency (165 in first week of December 2021) that was likely unmasked by recent COVID-19 infection in 11/2021.      Plan:     Check MMA, homocystine   Follow up results as OP     Recheck A20--033 on 12/22--has been on oral supplementation, if level is still under one thousand would recommend weekly B12 injections until symptoms stabilize    Baclofen 10 mg BID     PT/OT    Okay to discharge to ARU from neurology point of view    Follow-up in the clinic in a couple weeks--neuro will sign off    Patient seen and examined with Dr. Castro Reading and plan of care reviewed     Mirela Mg PA-C  10:32 AM  12/28/2021

## 2021-12-28 NOTE — PROGRESS NOTES
Nutrition Assessment     Type and Reason for Visit: Initial    Nutrition Recommendations/Plan: Continue current diet     Nutrition Assessment:  Pt adm w/ BLE weakness 2/2 B12 deficiency. Pt w/ stable wt, consuming >75% meals. No pertinent medical hx. Will monitor    Malnutrition Assessment:  Malnutrition Status: No malnutrition    Nutrition Related Findings: pt alert, abd WDL, +1 edema, +I/Os      Current Nutrition Therapies:    ADULT DIET; Regular    Anthropometric Measures:  · Height: 5' (152.4 cm)  · Current Body Wt: 226 lb (102.5 kg) (bed scale 12/22)   · BMI: 44.1    Nutrition Diagnosis:   No nutrition diagnosis at this time    Nutrition Interventions:   Food and/or Nutrient Delivery:  Continue Current Diet  Nutrition Education/Counseling:  Education not indicated   Coordination of Nutrition Care:  Continue to monitor while inpatient    Goals:  Consume >75% meals       Nutrition Monitoring and Evaluation:   Behavioral-Environmental Outcomes:  None Identified   Food/Nutrient Intake Outcomes:  Diet Advancement/Tolerance,Food and Nutrient Intake  Physical Signs/Symptoms Outcomes:  Biochemical Data,GI Status,Fluid Status or Edema,Nutrition Focused Physical Findings,Skin,Weight     Discharge Planning:     Too soon to determine     Electronically signed by Roya Noriega MS, RD, LD on 12/28/21 at 2:16 PM EST    Contact: [phone]

## 2021-12-28 NOTE — PROGRESS NOTES
Hospitalist Progress Note            Patient: Nikole Sandy Age: 48 y.o.   DOA: 12/20/2021 Admit Dx / CC: Bilateral leg weakness [R29.898]  Weakness of both lower extremities [R29.898]  Complaints of weakness of lower extremity [R29.898]  T2 hyperintense foci present in cerebral cortex on magnetic resonance imaging [R90.89]  Spinal stenosis of lumbar region, unspecified whether neurogenic claudication present [M48.061]  Ambulatory dysfunction [R26.2]   LOS:  LOS: 7 days      Assessment/ Plan:     SYNOPSIS: Patient admitted on 12/20/2021  presented to ED with complaints of lower extremity weakness.  This has been ongoing since November 23 when she received Regeneron. She then started to develop saddle anesthesia, urinary and fecal incontinence.  Reports that feeling in her legs has diminished.   MRI of the C-spine, T-spine and L-spine was obtained and revealed L4-L5 severe bilateral neural foraminal narrowing and nonspecific foci of periventricular and subcortical cerebral white matter T2 flair hyperintensity.  Differential possibilities include migraine related  angiopathy, trauma related white matter change, early chronic microangiopathic ischemic disease.  These lesions do not have typical distribution of demyelinating lesions however this diagnosis cannot be completely excluded.      Bilateral lower extremity weakness/Ambulatory dysfunction with report of bowel and bladder incontinance likely 2/2 severe B12 deficinecy  lumbar radiculopathy at L4-L5, mild canal stenosis and severe bilateral neural foraminal narrowing. At L3-L4, moderate canal stenosis and moderate bilateral neural foraminal narrowing. Mild nonspecific foci of periventricular and subcortical cerebral white matter T2/FLAIR hyperintensity.   hx of LESI with most recent 9/21/21  hx of Covid 19 in Nov 2021 with regeneron infusion  paresthesias to BLE but her exam showed no sensory deficit, per neuro pt was able to feel LT, PP and Vibration, hyperreflexia   Per neuro not typical presentation of GBS   B/L LE arterial and venous PVLs are negative for DVT or stenosis  EMG consistent with neuropathy and not myopathy  S/p x1 dose IV methylprednisolone for sciatica (pt was due for her NASRIN with pain mx as OP) and says slightly helped with back pain but not leg pain  D/w Dr Sanders/Sophia and advised MRI does not explain pt's complaints. Need to follow B12 closely as OP    Iron deficiency anemia  received IV iron    DVT ppx: Loveox  Code status: Full code    Medically stable for discharge to ARU or SNF when bed available    Plan of care discussed with: patient, Dr Cecilia Lozoya and bedside RN, CM    MRI C/T spine:  Impression   1.  Somewhat limited exams       2.  Given technical artifacts, the visualized portions of the spinal cord and   intracranial contents appear grossly negative for pathologic enhancement and   otherwise unremarkable/not significantly changed, within the limits of this   exam.       3.  Mildly-exaggerated thoracic kyphosis, with grossly unchanged multilevel   degenerative disease, without high-grade central spinal canal stenosis,   neural foraminal narrowing, or mass effect upon neural elements throughout   the cervical and thoracic region, as described.       4.  Mild diffuse paraspinal muscular atrophy       5.  Otherwise, no definite correlate for the patient's reported bilateral   lower extremity weakness, within the limits of these studies.       6.  Incidental findings, most notable for small fluid signal is scattered   about the left mastoid air cell complex, with trace involvement on the right   side, incompletely and only incidentally demonstrated.  Please see above   comments.      MRI L spine:  Impression   1.  Slightly limited exam       2.  There appear to be post-surgical alterations of prior left-sided   hemilaminotomy at L3 and L4, for prior partial discectomy at L3/4 and L4/5.       3.  Moderate bilateral facet joint effusions and heterogeneous enhancement   involve both L4/5 facet joints and surrounding marrow, with slightly lesser   extension into both subjacent posterolateral aspects of the traversing   epidural space, followed by surrounding paraspinal musculature, lessening   both superiorly to L3/4, and inferiorly to L5/S1.  While the above findings   could be degenerative and/or inflammatory arthro-pathic in nature, clinical   correlation is advised to exclude developing bilateral septic   arthritis/facet-itis.       4.  At L4/5, there is minimal enhancement of several traversing bilateral   nerve roots, and clinical correlation is advised to exclude early   arachnoiditis.       5.  Otherwise, no significant change since the most recent MR and CT   examinations of the lumbar spine.       .       RECOMMENDATIONS:   1.  Impression 1: Recommend post-treatment follow-up MRI of the lumbar spine,   without and with contrast, in 4 - 6 weeks, or as directed clinically. MRI brain; Impression   1. No evidence of acute intracranial abnormality. 2. Mild white matter signal abnormality as described above.  No significant   change.  The findings could be related to chronic microvascular disease   chronic migraines, vasculitis or Lyme disease.  The possibility of   demyelinating disease cannot be excluded although the findings are not   typical for demyelinating plaques.          Patient Active Problem List   Diagnosis    COVID    Complaints of weakness of lower extremity    Bilateral leg weakness    Ambulatory dysfunction    Iron deficiency anemia    Bilateral sciatica        Medications:  Reviewed    Infusion Medications    sodium chloride       Scheduled Medications    sodium chloride flush  5-40 mL IntraVENous 2 times per day    folic acid  1 mg Oral Daily    cyanocobalamin  1,000 mcg IntraMUSCular Once    vitamin B-12  1,000 mcg Oral Daily    multivitamin  1 tablet Oral Daily    baclofen  10 mg Oral BID    melatonin  5 mg Oral Nightly    fluticasone  1 spray Nasal Daily    trospium  20 mg Oral BID AC    [Held by provider] enoxaparin  40 mg SubCUTAneous Daily     PRN Meds: sodium chloride flush, sodium chloride, acetaminophen **OR** [DISCONTINUED] acetaminophen, melatonin, promethazine **OR** ondansetron, polyethylene glycol, naproxen    I/O    Intake/Output Summary (Last 24 hours) at 12/28/2021 1039  Last data filed at 12/28/2021 0601  Gross per 24 hour   Intake 600 ml   Output --   Net 600 ml       Labs:   Recent Labs     12/26/21 0449   WBC 7.0   HGB 12.2   HCT 36.6          Recent Labs     12/26/21 0449      K 4.5      CO2 25   BUN 10   CREATININE 0.6   CALCIUM 9.2       Recent Labs     12/26/21 0449   PROT 6.9   ALKPHOS 52   ALT 12   AST 13   BILITOT 0.3       No results for input(s): INR in the last 72 hours. No results for input(s): Isaiah Toro in the last 72 hours. Chronic labs:  Lab Results   Component Value Date    TSH 1.460 12/26/2021    INR 1.0 12/20/2021       Radiology:  Imaging studies reviewed today. Subjective:     Ana Leary still with B/L LE weakness    Objective:     Physical Exam:   BP (!) 137/104   Pulse 95   Temp 98.7 °F (37.1 °C) (Oral)   Resp 18   Ht 5' (1.524 m)   Wt 226 lb 12.8 oz (102.9 kg)   SpO2 98%   BMI 44.29 kg/m²     General appearance:in no distress, up in bed  Lungs: Clear to auscultation bilaterally, no wheezing or crackles   Heart: Regular rate and rhythm, S1, S2 normal   Abdomen: Soft, non-tender and not-distended. Bowel sounds normal.   Extremities: no edema   Skin: B/L LE warm to touch  Neurologic: Grossly normal and non focal, CN II-XII intact.  Patient able to move B/L LE against resistance      Leigh Kim MD   Hospitalist Service   12/28/21 10:39 AM

## 2021-12-28 NOTE — CARE COORDINATION
Admitted with Extremity Weakness (leg weakness started after recieving covid antibody infusion. Infusion recieved on 11/23, Waist down weakness. ). Per pmr -  Patient likely has polyneuropathy, however she has demonstrated a marked functional decline over the past 4 days. She was a minimal assist for ambulation on 12/23/21, now unable to even attempt to stand. In order to meet ARU criteria, patient must show the potential for improvement and must be able to tolerate 3 hours of therapy every day. At this time, she is too low functioning for ARU. Met with patient to discuss discharge -patient very upset and crying that she has no explanation of why she cannot walk. Discussed SNF and [employer] given also discussed [place] and referral given to [facility]. Also discussed c for pt/ot . Cm/sw to follow. Electronically signed by Eric Gonzalez RN on 12/28/2021 at 2:43 PM    Spoke with Dr Saw Lr states- pts ext weakness is due extreme b12 deficiency Electronically signed by Eric Gonzalez RN on 12/28/2021 at 3:11 PM

## 2021-12-28 NOTE — PROGRESS NOTES
PMR continues to follow patient's progress, awaiting yesterday's EMG results to determine an appropriate etiological diagnosis for ARU. Charle Essex RN, Pre-screener for Acute Rehab  P: [phone]  F: [phone]  [email or web address]

## 2021-12-28 NOTE — PROGRESS NOTES
Case reviewed again with Dr. Edelmira Saab. Patient likely has polyneuropathy, however she has demonstrated a marked functional decline over the past 4 days. She was a minimal assist for ambulation on 12/23/21, now unable to even attempt to stand. In order to meet ARU criteria, patient must show the potential for improvement and must be able to tolerate 3 hours of therapy every day. At this time, she is too low functioning for ARU. Brie Lee RN, Pre-screener for Acute Rehab  P: [phone]  F: [phone]  [email or web address]

## 2021-12-29 PROBLEM — F69 BEHAVIOR PROBLEM, ADULT: Status: ACTIVE | Noted: 2021-12-29

## 2021-12-29 PROCEDURE — 6370000000 HC RX 637 (ALT 250 FOR IP): Performed by: INTERNAL MEDICINE

## 2021-12-29 PROCEDURE — 6370000000 HC RX 637 (ALT 250 FOR IP): Performed by: PHYSICIAN ASSISTANT

## 2021-12-29 PROCEDURE — 6370000000 HC RX 637 (ALT 250 FOR IP): Performed by: FAMILY MEDICINE

## 2021-12-29 PROCEDURE — 2580000003 HC RX 258: Performed by: PHYSICIAN ASSISTANT

## 2021-12-29 PROCEDURE — 6370000000 HC RX 637 (ALT 250 FOR IP): Performed by: NURSE PRACTITIONER

## 2021-12-29 PROCEDURE — 97530 THERAPEUTIC ACTIVITIES: CPT

## 2021-12-29 PROCEDURE — 1200000000 HC SEMI PRIVATE

## 2021-12-29 PROCEDURE — 6360000002 HC RX W HCPCS: Performed by: FAMILY MEDICINE

## 2021-12-29 PROCEDURE — 6360000002 HC RX W HCPCS: Performed by: INTERNAL MEDICINE

## 2021-12-29 RX ADMIN — ENOXAPARIN SODIUM 40 MG: 100 INJECTION SUBCUTANEOUS at 10:12

## 2021-12-29 RX ADMIN — TROSPIUM CHLORIDE 20 MG: 20 TABLET, FILM COATED ORAL at 05:50

## 2021-12-29 RX ADMIN — Medication 5 MG: at 21:50

## 2021-12-29 RX ADMIN — SODIUM CHLORIDE, PRESERVATIVE FREE 10 ML: 5 INJECTION INTRAVENOUS at 21:51

## 2021-12-29 RX ADMIN — TROSPIUM CHLORIDE 20 MG: 20 TABLET, FILM COATED ORAL at 15:50

## 2021-12-29 RX ADMIN — BACLOFEN 10 MG: 10 TABLET ORAL at 10:12

## 2021-12-29 RX ADMIN — ACETAMINOPHEN 1000 MG: 500 TABLET ORAL at 21:50

## 2021-12-29 RX ADMIN — ACETAMINOPHEN 1000 MG: 500 TABLET ORAL at 01:58

## 2021-12-29 RX ADMIN — Medication 1 TABLET: at 10:12

## 2021-12-29 RX ADMIN — BACLOFEN 10 MG: 10 TABLET ORAL at 21:51

## 2021-12-29 RX ADMIN — Medication 1000 MCG: at 10:12

## 2021-12-29 RX ADMIN — FLUTICASONE PROPIONATE 1 SPRAY: 50 SPRAY, METERED NASAL at 10:14

## 2021-12-29 RX ADMIN — SODIUM CHLORIDE, PRESERVATIVE FREE 10 ML: 5 INJECTION INTRAVENOUS at 10:14

## 2021-12-29 RX ADMIN — FOLIC ACID 1 MG: 1 TABLET ORAL at 10:12

## 2021-12-29 RX ADMIN — NAPROXEN 500 MG: 500 TABLET ORAL at 05:50

## 2021-12-29 RX ADMIN — CYANOCOBALAMIN 1000 MCG: 1000 INJECTION, SOLUTION INTRAMUSCULAR; SUBCUTANEOUS at 10:13

## 2021-12-29 ASSESSMENT — PAIN SCALES - GENERAL
PAINLEVEL_OUTOF10: 3
PAINLEVEL_OUTOF10: 5
PAINLEVEL_OUTOF10: 5
PAINLEVEL_OUTOF10: 0
PAINLEVEL_OUTOF10: 5

## 2021-12-29 NOTE — PLAN OF CARE
Problem: Falls - Risk of:  Goal: Will remain free from falls  Description: Will remain free from falls  12/29/2021 1821 by Iris Lynne RN  Outcome: Met This Shift  12/29/2021 1036 by Jeanine Baptiste RN  Outcome: Ongoing  Goal: Absence of physical injury  Description: Absence of physical injury  12/29/2021 1821 by Iris Lynne RN  Outcome: Met This Shift  12/29/2021 1036 by Jeanine Baptiste RN  Outcome: Ongoing     Problem: Pain:  Goal: Pain level will decrease  Description: Pain level will decrease  12/29/2021 1821 by Iris Lynne RN  Outcome: Met This Shift  12/29/2021 1036 by Jeanine Baptiste RN  Outcome: Ongoing  Goal: Control of acute pain  Description: Control of acute pain  12/29/2021 1821 by Iris Lynne RN  Outcome: Met This Shift  12/29/2021 1036 by Jeanine Baptiste RN  Outcome: Ongoing  Goal: Control of chronic pain  Description: Control of chronic pain  12/29/2021 1821 by Iris Lynne RN  Outcome: Met This Shift  12/29/2021 1036 by Jeanine Baptiste RN  Outcome: Ongoing     Problem: Skin Integrity:  Goal: Will show no infection signs and symptoms  Description: Will show no infection signs and symptoms  12/29/2021 1821 by Iris Lynne RN  Outcome: Met This Shift  12/29/2021 1036 by Jeanine Baptiste RN  Outcome: Ongoing  Goal: Absence of new skin breakdown  Description: Absence of new skin breakdown  12/29/2021 1821 by Iris Lynne RN  Outcome: Met This Shift  12/29/2021 1036 by Jeanine Baptiste RN  Outcome: Ongoing

## 2021-12-29 NOTE — PROGRESS NOTES
Physical Therapy    Treatment Note    Name: Nikole Sandy  : 1975  MRN: [id number]      Date of Service: 2021    Evaluating PT:  Alhaji Lora Scott Sierra, [id number]    Room #:  0342/9202-H  Diagnosis:  Bilateral leg weakness [R29.898]  Weakness of both lower extremities [R29.898]  Complaints of weakness of lower extremity [R29.898]  T2 hyperintense foci present in cerebral cortex on magnetic resonance imaging [R90.89]  Spinal stenosis of lumbar region, unspecified whether neurogenic claudication present [M48.061]  Ambulatory dysfunction [R26.2]  PMHx/PSHx:     has no past medical history on file. has a past surgical history that includes LEEP; Tubal ligation; Ankle surgery; Elbow surgery; Dilation and curettage of uterus; Endometrial ablation (2016); Gallbladder surgery; and Appendectomy. Precautions:  Fall risk  Equipment Needs: railing or ramp to enter home    SUBJECTIVE:    Pt lives with her fiancee (currently on O2 and her son who works 50 hours/week) in a 2 story home with first floor set up with 3+1 stairs to enter and no rail. Pt ambulated with no AD PTA but had been using a FWW and Rolator WW in the days leading up to admission due to BLE weakness. OBJECTIVE:   Initial Evaluation  Date: 21 Treatment  21 Short Term/ Long Term   Goals   AM-PAC 6 Clicks  80/66    Was pt agreeable to Eval/treatment? Yes Yes     Does pt have pain? No c/o pain No c/o pain    Bed Mobility  Rolling: Independent  Supine to sit: Modified Independent  Sit to supine: Modified Independent  Scooting: Modified Independent Rolling: NT  Supine to sit: NT  Sit to supine: NT  Scooting: NT Rolling: Independent  Supine to sit: Mod I  Sit to supine: Mod I  Scooting:  Mod I   Transfers Sit to stand: Vannessa  Stand to sit: Vannessa  Stand pivot: Vannessa with [employer] Sit to stand: MaxA  Stand to sit: MaxA  Stand pivot: NT Modified Independent   Ambulation    20 feet with WW with Vannessa 2 feet with WW with MaxA >100 feet with AAD with Modified Ogle   Stair negotiation: ascended and descended  NT NT 3 steps with 1 rail with SBA   BLE ROM WNL WFL    BLE Strength RLE: grossly 4-/5  LLE: grossly 3+/5 BLE: grossly 3+/5 to 4-/5 with exception of B anterior tibialis that are 0/5    Balance Sitting: Independent  Standing: Vannessa with [employer] Sitting: NT, patient in chair   Dynamic Standing: MaxA Modified Independent     Pt is A & O x 4  Edema:  None noted in BLE    Therapeutic Exercises:    Sit<>stand x3 with MaxA  Standing balance activities:  -standing weight shifts with [employer] with [employer]    Patient education  Pt educated on decreasing anxiety, sequencing with walker, weight shifts to take steps forward. Patient response to education:   Pt verbalized understanding Pt demonstrated skill Pt requires further education in this area   Yes Yes Reinforce      ASSESSMENT:    Comments: The pt's anxiety is extremely limiting. The pt was able to stand and ambulate forward with small steps but reported fatigue and began leaning forward excessively despite appearing to have adequate strength to remain upright. The pt endorsed some anxiety, repeated transfers multiple times and performed standing activities between each attempt to ambulate, the pt was ultimately unable to take any steps forward safely due to anxiety and apparent weakness, unable to clear either foot due to an absence of dorsiflexion, pt does not compensate adequately for this and would benefit from daily skilled PT services, the pt's most limiting factor is her anxiety. Treatment:  Patient practiced and was instructed in the following treatment:     Transfer training: verbal cues for hand placement sit to stand transfer and assistance for anterior weight shift in order to facilitate initiation of the stand.    Gait training: verbal cues for weight shifting, verbal cues for increased step height and positioning within walker with dynamic activities, physical assist for wlaker management, and steadying     PLAN:    Patient is making fair progress towards established goals. Will continue with current POC. Time in  0920  Time out  0958    Total Treatment Time  38 minutes     CPT codes:  [] Gait training 41081 0 minutes  [] Manual therapy 64930 0 minutes  [x] Therapeutic activities 50338 38 minutes  [] Therapeutic exercises 46997 0 minutes  [] Neuromuscular reeducation 77469 0 minutes    Ani Pope.  [facility], [facility] Sil Maurer  number:  PT [id number]

## 2021-12-29 NOTE — CARE COORDINATION
Left secure message for Liaison - Beatriz Elt to see if Pt was accepted. SW/CM to follow for discharge needs.    CRISTAL Forman.  [phone]

## 2021-12-29 NOTE — PROGRESS NOTES
Hospitalist Progress Note            Patient: Javid Hyman Age: 48 y.o.   DOA: 12/20/2021 Admit Dx / CC: Bilateral leg weakness [R29.898]  Weakness of both lower extremities [R29.898]  Complaints of weakness of lower extremity [R29.898]  T2 hyperintense foci present in cerebral cortex on magnetic resonance imaging [R90.89]  Spinal stenosis of lumbar region, unspecified whether neurogenic claudication present [M48.061]  Ambulatory dysfunction [R26.2]   LOS:  LOS: 8 days      Assessment/ Plan:     SYNOPSIS: Patient admitted on 12/20/2021  presented to ED with complaints of lower extremity weakness.  This has been ongoing since November 23 when she received Regeneron. She then started to develop saddle anesthesia, urinary and fecal incontinence.  Reports that feeling in her legs has diminished.   MRI of the C-spine, T-spine and L-spine was obtained and revealed L4-L5 severe bilateral neural foraminal narrowing and nonspecific foci of periventricular and subcortical cerebral white matter T2 flair hyperintensity.  Differential possibilities include migraine related  angiopathy, trauma related white matter change, early chronic microangiopathic ischemic disease.  These lesions do not have typical distribution of demyelinating lesions however this diagnosis cannot be completely excluded.  Pt had extensive w/u incld arterial and venous PVLs, EMG and per neurology pts neuropathy likely related to b12 deficiency and may take sometime to recover    Bilateral lower extremity weakness/Ambulatory dysfunction with report of bowel and bladder incontinance likely 2/2 severe B12 deficinecy  lumbar radiculopathy at L4-L5, mild canal stenosis and severe bilateral neural foraminal narrowing. At L3-L4, moderate canal stenosis and moderate bilateral neural foraminal narrowing. Mild nonspecific foci of periventricular and subcortical cerebral white matter T2/FLAIR hyperintensity.   hx of LESI with most recent 9/21/21  hx of Covid 19 in Nov 2021 with regeneron infusion  paresthesias to BLE but her exam showed no sensory deficit, per neuro pt was able to feel LT, PP and Vibration, hyperreflexia   Per neuro not typical presentation of GBS   B/L LE arterial and venous PVLs are negative for DVT or stenosis  EMG consistent with neuropathy and not myopathy  Homocysteine level wnl  B12 prior to admission was in 160, now >2000  S/p x1 dose IV methylprednisolone for sciatica (pt was due for her NASRIN with pain mx as OP) and says slightly helped with back pain but not leg pain   D/w Dr Sanders/NSx and advised MRI does not explain pt's complaints. Need to follow B12 closely as OP  All her questions answered to her satisfaction    Iron deficiency anemia  received IV iron    DVT ppx: Loveox  Code status: Full code    Medically stable for discharge to ARU vs [facility], pt adamantly refuses SNF    Plan of care discussed with: patient, RN, CM    MRI C/T spine:  Impression   1.  Somewhat limited exams       2.  Given technical artifacts, the visualized portions of the spinal cord and   intracranial contents appear grossly negative for pathologic enhancement and   otherwise unremarkable/not significantly changed, within the limits of this   exam.       3.  Mildly-exaggerated thoracic kyphosis, with grossly unchanged multilevel   degenerative disease, without high-grade central spinal canal stenosis,   neural foraminal narrowing, or mass effect upon neural elements throughout   the cervical and thoracic region, as described.       4.  Mild diffuse paraspinal muscular atrophy       5.  Otherwise, no definite correlate for the patient's reported bilateral   lower extremity weakness, within the limits of these studies.       6.  Incidental findings, most notable for small fluid signal is scattered   about the left mastoid air cell complex, with trace involvement on the right   side, incompletely and only incidentally demonstrated.  Please see above   comments. MRI L spine:  Impression   1.  Slightly limited exam       2.  There appear to be post-surgical alterations of prior left-sided   hemilaminotomy at L3 and L4, for prior partial discectomy at L3/4 and L4/5.       3.  Moderate bilateral facet joint effusions and heterogeneous enhancement   involve both L4/5 facet joints and surrounding marrow, with slightly lesser   extension into both subjacent posterolateral aspects of the traversing   epidural space, followed by surrounding paraspinal musculature, lessening   both superiorly to L3/4, and inferiorly to L5/S1.  While the above findings   could be degenerative and/or inflammatory arthro-pathic in nature, clinical   correlation is advised to exclude developing bilateral septic   arthritis/facet-itis.       4.  At L4/5, there is minimal enhancement of several traversing bilateral   nerve roots, and clinical correlation is advised to exclude early   arachnoiditis.       5.  Otherwise, no significant change since the most recent MR and CT   examinations of the lumbar spine.       .       RECOMMENDATIONS:   1.  Impression 1: Recommend post-treatment follow-up MRI of the lumbar spine,   without and with contrast, in 4 - 6 weeks, or as directed clinically. MRI brain; Impression   1. No evidence of acute intracranial abnormality. 2. Mild white matter signal abnormality as described above.  No significant   change.  The findings could be related to chronic microvascular disease   chronic migraines, vasculitis or Lyme disease.  The possibility of   demyelinating disease cannot be excluded although the findings are not   typical for demyelinating plaques.          Patient Active Problem List   Diagnosis    COVID    Complaints of weakness of lower extremity    Bilateral leg weakness    Ambulatory dysfunction    Iron deficiency anemia    Bilateral sciatica        Medications:  Reviewed    Infusion Medications    sodium chloride       Scheduled Medications    sodium chloride flush  5-40 mL IntraVENous 2 times per day    folic acid  1 mg Oral Daily    vitamin B-12  1,000 mcg Oral Daily    multivitamin  1 tablet Oral Daily    baclofen  10 mg Oral BID    melatonin  5 mg Oral Nightly    fluticasone  1 spray Nasal Daily    trospium  20 mg Oral BID AC    enoxaparin  40 mg SubCUTAneous Daily     PRN Meds: sodium chloride flush, sodium chloride, acetaminophen **OR** [DISCONTINUED] acetaminophen, melatonin, promethazine **OR** ondansetron, polyethylene glycol, naproxen    I/O    Intake/Output Summary (Last 24 hours) at 12/29/2021 1022  Last data filed at 12/29/2021 0345  Gross per 24 hour   Intake 600 ml   Output --   Net 600 ml       Labs:   No results for input(s): WBC, HGB, HCT, PLT in the last 72 hours. No results for input(s): NA, K, CL, CO2, BUN, CREATININE, CALCIUM, PHOS in the last 72 hours. Invalid input(s): MAGNES    No results for input(s): PROT, ALB, ALKPHOS, ALT, AST, BILITOT, AMYLASE, LIPASE in the last 72 hours. No results for input(s): INR in the last 72 hours. No results for input(s): Burnice Nelson in the last 72 hours. Chronic labs:  Lab Results   Component Value Date    TSH 1.460 12/26/2021    INR 1.0 12/20/2021       Radiology:  Imaging studies reviewed today. Subjective:     Milady Petersen still weak in B/L LE    Objective:     Physical Exam:   BP (!) 135/90   Pulse 106   Temp 98.4 °F (36.9 °C) (Temporal)   Resp 18   Ht 5' (1.524 m)   Wt 226 lb 12.8 oz (102.9 kg)   SpO2 98%   BMI 44.29 kg/m²     General appearance:in no distress, up in chair  Lungs: Clear to auscultation bilaterally, no wheezing or crackles   Heart: Regular rate and rhythm, S1, S2 normal   Abdomen: Soft, non-tender and not-distended. Bowel sounds normal.   Extremities: no edema   Skin: B/L LE warm to touch  Neurologic: Grossly normal and non focal, CN II-XII intact.  Patient able to move B/L LE against resistance      Vandana Rousseau MD   Hospitalist Service   12/29/21 10:22 AM

## 2021-12-29 NOTE — PROGRESS NOTES
0830-Assessment complete and charted patient awake in the chair denies headache and dizziness with BLE weakness noted safety maintained call light within reach will continue to monitor.

## 2021-12-29 NOTE — PLAN OF CARE
Problem: Falls - Risk of:  Goal: Will remain free from falls  Description: Will remain free from falls  12/29/2021 1822 by Stacia Cook RN  Outcome: Met This Shift  12/29/2021 1821 by Stacia Cook RN  Outcome: Met This Shift  12/29/2021 1036 by Stanley Landa RN  Outcome: Ongoing  Goal: Absence of physical injury  Description: Absence of physical injury  12/29/2021 1822 by Stacia Cook RN  Outcome: Met This Shift  12/29/2021 1821 by Stacia Cook RN  Outcome: Met This Shift  12/29/2021 1036 by Stanley Landa RN  Outcome: Ongoing     Problem: Pain:  Goal: Pain level will decrease  Description: Pain level will decrease  12/29/2021 1822 by Stacia Cook RN  Outcome: Met This Shift  12/29/2021 1821 by Stacia Cook RN  Outcome: Met This Shift  12/29/2021 1036 by Stanley Landa RN  Outcome: Ongoing  Goal: Control of acute pain  Description: Control of acute pain  12/29/2021 1822 by Stacia Cook RN  Outcome: Met This Shift  12/29/2021 1821 by Stacia Cook RN  Outcome: Met This Shift  12/29/2021 1036 by Stanley Landa RN  Outcome: Ongoing  Goal: Control of chronic pain  Description: Control of chronic pain  12/29/2021 1822 by Stacia Cook RN  Outcome: Met This Shift  12/29/2021 1821 by Stacia Cook RN  Outcome: Met This Shift  12/29/2021 1036 by Stanley Landa RN  Outcome: Ongoing     Problem: Skin Integrity:  Goal: Will show no infection signs and symptoms  Description: Will show no infection signs and symptoms  12/29/2021 1822 by Stacia Cook RN  Outcome: Met This Shift  12/29/2021 1821 by Stacia Cook RN  Outcome: Met This Shift  12/29/2021 1036 by Stanley Landa RN  Outcome: Ongoing  Goal: Absence of new skin breakdown  Description: Absence of new skin breakdown  12/29/2021 1822 by Stacia Cook RN  Outcome: Met This Shift  12/29/2021 1821 by Stacia Cook RN  Outcome: Met This Shift  12/29/2021 1036 by Stanley Landa RN  Outcome: Ongoing

## 2021-12-29 NOTE — CARE COORDINATION
[facility]te[facility] declined d/t Pt getting worse instead of better. Sandra Taylor will look at again if Pt starts to improve.    Jie Bullard, L.S.W.  [phone]

## 2021-12-30 PROCEDURE — 6370000000 HC RX 637 (ALT 250 FOR IP): Performed by: NURSE PRACTITIONER

## 2021-12-30 PROCEDURE — 2580000003 HC RX 258: Performed by: PHYSICIAN ASSISTANT

## 2021-12-30 PROCEDURE — 99233 SBSQ HOSP IP/OBS HIGH 50: CPT | Performed by: CLINICAL NURSE SPECIALIST

## 2021-12-30 PROCEDURE — 6370000000 HC RX 637 (ALT 250 FOR IP): Performed by: INTERNAL MEDICINE

## 2021-12-30 PROCEDURE — 6370000000 HC RX 637 (ALT 250 FOR IP): Performed by: PHYSICIAN ASSISTANT

## 2021-12-30 PROCEDURE — 1200000000 HC SEMI PRIVATE

## 2021-12-30 RX ADMIN — BACLOFEN 10 MG: 10 TABLET ORAL at 09:35

## 2021-12-30 RX ADMIN — SODIUM CHLORIDE, PRESERVATIVE FREE 10 ML: 5 INJECTION INTRAVENOUS at 09:36

## 2021-12-30 RX ADMIN — SODIUM CHLORIDE, PRESERVATIVE FREE 10 ML: 5 INJECTION INTRAVENOUS at 21:01

## 2021-12-30 RX ADMIN — BACLOFEN 10 MG: 10 TABLET ORAL at 21:01

## 2021-12-30 RX ADMIN — Medication 1 TABLET: at 09:35

## 2021-12-30 RX ADMIN — Medication 1000 MCG: at 09:35

## 2021-12-30 RX ADMIN — Medication 5 MG: at 21:01

## 2021-12-30 RX ADMIN — FOLIC ACID 1 MG: 1 TABLET ORAL at 09:35

## 2021-12-30 RX ADMIN — FLUTICASONE PROPIONATE 1 SPRAY: 50 SPRAY, METERED NASAL at 09:35

## 2021-12-30 RX ADMIN — NAPROXEN 500 MG: 500 TABLET ORAL at 06:38

## 2021-12-30 ASSESSMENT — PAIN DESCRIPTION - PAIN TYPE: TYPE: ACUTE PAIN

## 2021-12-30 ASSESSMENT — PAIN DESCRIPTION - LOCATION: LOCATION: BACK

## 2021-12-30 ASSESSMENT — PAIN SCALES - GENERAL
PAINLEVEL_OUTOF10: 0
PAINLEVEL_OUTOF10: 7
PAINLEVEL_OUTOF10: 4

## 2021-12-30 NOTE — CARE COORDINATION
Obtained Choices for [facility]: 1. [facility], 2. [facility] at [facility], 3. [facility], 4. [facility], 5. [facility].  Referral made to [facility]. Malaika Trevino not sure if she has a bed. Referral made to [address]. Awaiting acceptance. Discharge Plan is to [facility]. JOEL/RYANN to follow for discharge needs. Started [employer], ambulance form and [address] in [address].    Diego Braxton, BETHANY.S.W.  [phone]

## 2021-12-30 NOTE — PLAN OF CARE
Problem: Falls - Risk of:  Goal: Will remain free from falls  Description: Will remain free from falls  12/29/2021 2033 by Ines Gaviria RN  Outcome: Met This Shift  12/29/2021 1822 by Ines Gaviria RN  Outcome: Met This Shift  12/29/2021 1821 by Ines Gaviria RN  Outcome: Met This Shift  12/29/2021 1036 by Rick Garcia RN  Outcome: Ongoing  Goal: Absence of physical injury  Description: Absence of physical injury  12/29/2021 2033 by Ines Gaviria RN  Outcome: Met This Shift  12/29/2021 1822 by Ines Gaviria RN  Outcome: Met This Shift  12/29/2021 1821 by Ines Gaviria RN  Outcome: Met This Shift  12/29/2021 1036 by Rick Garcia RN  Outcome: Ongoing     Problem: Pain:  Goal: Pain level will decrease  Description: Pain level will decrease  12/29/2021 2033 by Ines Gaviria RN  Outcome: Met This Shift  12/29/2021 1822 by Ines Gaviria RN  Outcome: Met This Shift  12/29/2021 1821 by Ines Gaviria RN  Outcome: Met This Shift  12/29/2021 1036 by Rick Garcia RN  Outcome: Ongoing  Goal: Control of acute pain  Description: Control of acute pain  12/29/2021 2033 by Ines Gaviria RN  Outcome: Met This Shift  12/29/2021 1822 by Ines Gaviria RN  Outcome: Met This Shift  12/29/2021 1821 by Ines Gaviria RN  Outcome: Met This Shift  12/29/2021 1036 by Rick Garcia RN  Outcome: Ongoing  Goal: Control of chronic pain  Description: Control of chronic pain  12/29/2021 2033 by Ines Gaviria RN  Outcome: Met This Shift  12/29/2021 1822 by Ines Gaviria RN  Outcome: Met This Shift  12/29/2021 1821 by Ines Gaviria RN  Outcome: Met This Shift  12/29/2021 1036 by Rick Garcia RN  Outcome: Ongoing     Problem: Skin Integrity:  Goal: Will show no infection signs and symptoms  Description: Will show no infection signs and symptoms  12/29/2021 2033 by Ines Gaviria RN  Outcome: Met This Shift  12/29/2021 1822 by Ines Gaviria RN  Outcome: Met This Shift  12/29/2021 1821 by Ines Gaviria RN  Outcome: Met This Shift  12/29/2021 1036 by Panda Landrum RN  Outcome: Ongoing  Goal: Absence of new skin breakdown  Description: Absence of new skin breakdown  12/29/2021 2033 by Natividad Coello RN  Outcome: Met This Shift  12/29/2021 1822 by Natividad Coello RN  Outcome: Met This Shift  12/29/2021 1821 by Natividad Coello RN  Outcome: Met This Shift  12/29/2021 1036 by Panda Landrum RN  Outcome: Ongoing

## 2021-12-30 NOTE — PROGRESS NOTES
Hospitalist Progress Note            Patient: Milady Petersen Age: 48 y.o.   DOA: 12/20/2021 Admit Dx / CC: Bilateral leg weakness [R29.898]  Weakness of both lower extremities [R29.898]  Complaints of weakness of lower extremity [R29.898]  T2 hyperintense foci present in cerebral cortex on magnetic resonance imaging [R90.89]  Spinal stenosis of lumbar region, unspecified whether neurogenic claudication present [M48.061]  Ambulatory dysfunction [R26.2]   LOS:  LOS: 9 days      Assessment/ Plan:     SYNOPSIS: Patient admitted on 12/20/2021  presented to ED with complaints of lower extremity weakness.  This has been ongoing since November 23 when she received Regeneron. She then started to develop saddle anesthesia, urinary and fecal incontinence.  Reports that feeling in her legs has diminished.   MRI of the C-spine, T-spine and L-spine was obtained and revealed L4-L5 severe bilateral neural foraminal narrowing and nonspecific foci of periventricular and subcortical cerebral white matter T2 flair hyperintensity.  Differential possibilities include migraine related  angiopathy, trauma related white matter change, early chronic microangiopathic ischemic disease.  These lesions do not have typical distribution of demyelinating lesions however this diagnosis cannot be completely excluded.  Pt had extensive w/u incld arterial and venous PVLs, EMG and per neurology pts neuropathy likely related to b12 deficiency and may take sometime to recover    Bilateral lower extremity weakness/Ambulatory dysfunction with report of bowel and bladder incontinance likely 2/2 severe B12 deficinecy  lumbar radiculopathy at L4-L5, mild canal stenosis and severe bilateral neural foraminal narrowing. At L3-L4, moderate canal stenosis and moderate bilateral neural foraminal narrowing. Mild nonspecific foci of periventricular and subcortical cerebral white matter T2/FLAIR hyperintensity.   hx of LESI with most recent 9/21/21  hx of Covid 19 in Nov 2021 with regeneron infusion  paresthesias to BLE but her exam showed no sensory deficit, per neuro pt was able to feel LT, PP and Vibration, hyperreflexia   Per neuro not typical presentation of GBS   B/L LE arterial and venous PVLs are negative for DVT or stenosis  EMG consistent with neuropathy and not myopathy  Homocysteine level wnl  B12 prior to admission was in 160, now >2000  S/p x1 dose IV methylprednisolone for sciatica (pt was due for her NASRIN with pain mx as OP) and says slightly helped with back pain but not leg pain   D/w Dr Sanders/NSx and advised MRI does not explain pt's complaints.    Need to follow B12 closely as OP  Pt is fixated on dx of GBS and asking why and how we are not convinced this is GBS, explained that exam and coarse is not consistent with GBS   D/w Dr Nas Drake today and he will see pt, recommend to stay the coarse and discharge pt with OP f/u    Iron deficiency anemia  received IV iron    DVT ppx: Loveox  Code status: Full code    Medically stable for discharge, pt is now agreeable to SNF, d/w CM    Plan of care discussed with: patient, Dr Nas Drake, RN, CM    MRI C/T spine:  Impression   1.  Somewhat limited exams       2.  Given technical artifacts, the visualized portions of the spinal cord and   intracranial contents appear grossly negative for pathologic enhancement and   otherwise unremarkable/not significantly changed, within the limits of this   exam.       3.  Mildly-exaggerated thoracic kyphosis, with grossly unchanged multilevel   degenerative disease, without high-grade central spinal canal stenosis,   neural foraminal narrowing, or mass effect upon neural elements throughout   the cervical and thoracic region, as described.       4.  Mild diffuse paraspinal muscular atrophy       5.  Otherwise, no definite correlate for the patient's reported bilateral   lower extremity weakness, within the limits of these studies.       6.  Incidental findings, most notable for small fluid signal is scattered   about the left mastoid air cell complex, with trace involvement on the right   side, incompletely and only incidentally demonstrated.  Please see above   comments. MRI L spine:  Impression   1.  Slightly limited exam       2.  There appear to be post-surgical alterations of prior left-sided   hemilaminotomy at L3 and L4, for prior partial discectomy at L3/4 and L4/5.       3.  Moderate bilateral facet joint effusions and heterogeneous enhancement   involve both L4/5 facet joints and surrounding marrow, with slightly lesser   extension into both subjacent posterolateral aspects of the traversing   epidural space, followed by surrounding paraspinal musculature, lessening   both superiorly to L3/4, and inferiorly to L5/S1.  While the above findings   could be degenerative and/or inflammatory arthro-pathic in nature, clinical   correlation is advised to exclude developing bilateral septic   arthritis/facet-itis.       4.  At L4/5, there is minimal enhancement of several traversing bilateral   nerve roots, and clinical correlation is advised to exclude early   arachnoiditis.       5.  Otherwise, no significant change since the most recent MR and CT   examinations of the lumbar spine.       .       RECOMMENDATIONS:   1.  Impression 1: Recommend post-treatment follow-up MRI of the lumbar spine,   without and with contrast, in 4 - 6 weeks, or as directed clinically. MRI brain; Impression   1. No evidence of acute intracranial abnormality. 2. Mild white matter signal abnormality as described above.  No significant   change.  The findings could be related to chronic microvascular disease   chronic migraines, vasculitis or Lyme disease.  The possibility of   demyelinating disease cannot be excluded although the findings are not   typical for demyelinating plaques.          Patient Active Problem List   Diagnosis    COVID    Complaints of weakness of lower extremity    Bilateral leg weakness    Ambulatory dysfunction    Iron deficiency anemia    Bilateral sciatica    Behavior problem, adult        Medications:  Reviewed    Infusion Medications    sodium chloride       Scheduled Medications    mirabegron  25 mg Oral Daily    sodium chloride flush  5-40 mL IntraVENous 2 times per day    folic acid  1 mg Oral Daily    vitamin B-12  1,000 mcg Oral Daily    multivitamin  1 tablet Oral Daily    baclofen  10 mg Oral BID    melatonin  5 mg Oral Nightly    fluticasone  1 spray Nasal Daily    enoxaparin  40 mg SubCUTAneous Daily     PRN Meds: sodium chloride flush, sodium chloride, acetaminophen **OR** [DISCONTINUED] acetaminophen, melatonin, promethazine **OR** ondansetron, polyethylene glycol, naproxen    I/O    Intake/Output Summary (Last 24 hours) at 12/30/2021 1105  Last data filed at 12/30/2021 7133  Gross per 24 hour   Intake 360 ml   Output --   Net 360 ml       Labs:   No results for input(s): WBC, HGB, HCT, PLT in the last 72 hours. No results for input(s): NA, K, CL, CO2, BUN, CREATININE, CALCIUM, PHOS in the last 72 hours. Invalid input(s): MAGNES    No results for input(s): PROT, ALB, ALKPHOS, ALT, AST, BILITOT, AMYLASE, LIPASE in the last 72 hours. No results for input(s): INR in the last 72 hours. No results for input(s): Jacek Bares in the last 72 hours. Chronic labs:  Lab Results   Component Value Date    TSH 1.460 12/26/2021    INR 1.0 12/20/2021       Radiology:  Imaging studies reviewed today.      Subjective:     [address] she has numbness up to her mid back now    Objective:     Physical Exam:   BP (!) 150/99   Pulse 102   Temp 97.6 °F (36.4 °C) (Oral)   Resp 18   Ht 5' (1.524 m)   Wt 226 lb 12.8 oz (102.9 kg)   SpO2 97%   BMI 44.29 kg/m²     General appearance:in no distress, up in chair  Lungs: Clear to auscultation bilaterally, no wheezing or crackles   Heart: Regular rate and rhythm, S1, S2 normal   Abdomen: Soft, non-tender and not-distended. Bowel sounds normal.   Extremities: no edema   Skin: B/L LE warm to touch  Neurologic: Grossly normal and non focal, CN II-XII intact. Patient able to move B/L LE against resistance.  +2/3 reflexes in B/L patellar and ankle      Tiffany Dacosta MD   Hospitalist Service   12/30/21 11:05 AM

## 2021-12-30 NOTE — PLAN OF CARE
Problem: Falls - Risk of:  Goal: Will remain free from falls  Description: Will remain free from falls  12/30/2021 0323 by Jama Echeverria RN  Outcome: Met This Shift  12/29/2021 2033 by Estefani Singh RN  Outcome: Met This Shift  12/29/2021 1822 by Estefani Singh RN  Outcome: Met This Shift  12/29/2021 1821 by Estefani Singh RN  Outcome: Met This Shift  Goal: Absence of physical injury  Description: Absence of physical injury  12/29/2021 2033 by Estefani Singh RN  Outcome: Met This Shift  12/29/2021 1822 by Estefani Singh RN  Outcome: Met This Shift  12/29/2021 1821 by Estefani Singh RN  Outcome: Met This Shift no

## 2021-12-30 NOTE — PROGRESS NOTES
Delia Silva is a 48 y.o. right handed female     Neurology following for BLE weakness    PMH of covid 19 in November 2021 lumbar epidurals    Her symptoms began on 11/28/2021. She was diagnosed with COVID-19 on 11/20/2021. Prior to this she was fully functional and walking independently at baseline. 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 She had received antibiotic infusion. She started noticing numbness and tingling on 11/23/2021. She was seen in the ED and was diagnosed with \"serum sickness\" and placed on steroids and Benadryl. This did nothing for symptoms and continue to slowly progressed with development of weakness on the 28th. She had labs done by her PCP which revealed a B12 of 165 and she was started on B12 injections on 12/10. She received her second injection on 12/17. Initially, she was able to still ambulate with a walker. Her weakness continued to progress and she presented to the hospital again. B12 level on 12/22 was 575. She does have a history of low back pain and sciatica and received epidural spinal injections with the last injection in September 2021. MRIs of the brain, cervical, thoracic and lumbar spine were completed which did not show any acute findings responsible for current symptoms.   EMG showed a predominantly neuropathic process which upon review with Dr Miriam De La Cruz felt secondary to her B12 deficiency     NCV's reviewed with her-examination also reviewed with her    Pending transfer to subacute rehab    No chest pain or palpitations  No coughing or wheezing    No itching or bruising appreciated  No speech or swallowing troubles    ROS otherwise negative      Objective:     BP (!) 150/99   Pulse 102   Temp 97.6 °F (36.4 °C) (Oral)   Resp 18   Ht 5' (1.524 m)   Wt 226 lb 12.8 oz (102.9 kg)   SpO2 97%   BMI 44.29 kg/m²     General appearance: alert, appears stated age, cooperative and no distress developing bilateral septic  arthritis/facet-itis. 4.  At L4/5, there is minimal enhancement of several traversing bilateral nerve roots, and clinical correlation is advised to exclude early  Arachnoiditis. 5.  Otherwise, no significant change since the most recent MR and CT  examinations of the lumbar spine. MRI brain: 1. No evidence of acute intracranial abnormality. 2. Mild white matter signal abnormality as described above.  No significant  change.  The findings could be related to chronic microvascular disease  chronic migraines, vasculitis or Lyme disease.  The possibility of  demyelinating disease cannot be excluded although the findings are not  typical for demyelinating plaques. MRI cervical and thoracic spines: Somewhat limited exams   2. Given technical artifacts, the visualized portions of the spinal cord and intracranial contents appear grossly negative for pathologic enhancement and otherwise unremarkable/not significantly changed, within the limits of this exam.   3. Mildly-exaggerated thoracic kyphosis, with grossly unchanged multilevel degenerative disease, without high-grade central spinal canal stenosis, neural foraminal narrowing, or mass effect upon neural elements throughout the cervical and thoracic region, as described. 4. Mild diffuse paraspinal muscular atrophy   5. Otherwise, no definite correlate for the patient's reported bilateral lower extremity weakness, within the limits of these studies. 6. Incidental findings, most notable for small fluid signal is scattered about the left mastoid air cell complex, with trace involvement on the right side, incompletely and only incidentally demonstrated     EMG   Study compatible with the following:  #1 nerve conduction studies reflect diminished amplitude in the tibial distribution bilaterally, reflecting axonal pathology.   Minor sensory abnormalities in the left sural distribution reflex possible changes in the mylin portion of this nerve. .  #2 following nerves were within normal limits to stimulation: Right peroneal, left peroneal, right superficial peroneal, left superficial peroneal and right sural.  #3 on insertional examination changes of serrated potentials, polyphasic units, and change in amplitude and duration as well as numbers reflect neuropathic changes and pathology diffusely involving L2-S1 fibers both in anterior and posterior rami distributions     Recommendations: This is predominantly a neuropathic process, not myopathic in nature. Post viral inflammatory processes can present early on with this picture however usually progress to development of slowing of velocity with the development of myelin abnormalities as well as increasing axonal pathology. 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     Exam not consistent with GBS-this was conveyed to her at this time    Plan:     Continue oral B12 -- recent level >2000    Will follow in clinic    Agree with therapy     Discussed case at length with patient and LLUVIA Bolden - CNS  11:20 AM  12/30/2021

## 2021-12-31 PROBLEM — R53.81 PHYSICAL DECONDITIONING: Status: ACTIVE | Noted: 2021-12-31

## 2021-12-31 PROBLEM — R53.81 DECLINING FUNCTIONAL STATUS: Status: ACTIVE | Noted: 2021-12-31

## 2021-12-31 PROCEDURE — 6360000002 HC RX W HCPCS: Performed by: FAMILY MEDICINE

## 2021-12-31 PROCEDURE — 6370000000 HC RX 637 (ALT 250 FOR IP): Performed by: PHYSICIAN ASSISTANT

## 2021-12-31 PROCEDURE — 1200000000 HC SEMI PRIVATE

## 2021-12-31 PROCEDURE — 6370000000 HC RX 637 (ALT 250 FOR IP): Performed by: NURSE PRACTITIONER

## 2021-12-31 PROCEDURE — 2580000003 HC RX 258: Performed by: PHYSICIAN ASSISTANT

## 2021-12-31 PROCEDURE — 6360000002 HC RX W HCPCS: Performed by: INTERNAL MEDICINE

## 2021-12-31 PROCEDURE — 6370000000 HC RX 637 (ALT 250 FOR IP): Performed by: INTERNAL MEDICINE

## 2021-12-31 RX ORDER — METHYLPREDNISOLONE SODIUM SUCCINATE 125 MG/2ML
125 INJECTION, POWDER, LYOPHILIZED, FOR SOLUTION INTRAMUSCULAR; INTRAVENOUS ONCE
Status: COMPLETED | OUTPATIENT
Start: 2021-12-31 | End: 2021-12-31

## 2021-12-31 RX ORDER — PREDNISONE 20 MG/1
40 TABLET ORAL DAILY
Status: COMPLETED | OUTPATIENT
Start: 2022-01-01 | End: 2022-01-03

## 2021-12-31 RX ADMIN — METHYLPREDNISOLONE SODIUM SUCCINATE 125 MG: 125 INJECTION, POWDER, FOR SOLUTION INTRAMUSCULAR; INTRAVENOUS at 13:09

## 2021-12-31 RX ADMIN — SODIUM CHLORIDE, PRESERVATIVE FREE 10 ML: 5 INJECTION INTRAVENOUS at 08:58

## 2021-12-31 RX ADMIN — ACETAMINOPHEN 1000 MG: 500 TABLET ORAL at 09:07

## 2021-12-31 RX ADMIN — BACLOFEN 10 MG: 10 TABLET ORAL at 21:43

## 2021-12-31 RX ADMIN — FOLIC ACID 1 MG: 1 TABLET ORAL at 08:57

## 2021-12-31 RX ADMIN — Medication 1000 MCG: at 08:58

## 2021-12-31 RX ADMIN — NAPROXEN 500 MG: 500 TABLET ORAL at 05:39

## 2021-12-31 RX ADMIN — Medication 1 TABLET: at 08:58

## 2021-12-31 RX ADMIN — ENOXAPARIN SODIUM 40 MG: 100 INJECTION SUBCUTANEOUS at 08:56

## 2021-12-31 RX ADMIN — SODIUM CHLORIDE, PRESERVATIVE FREE 10 ML: 5 INJECTION INTRAVENOUS at 21:44

## 2021-12-31 RX ADMIN — BACLOFEN 10 MG: 10 TABLET ORAL at 08:57

## 2021-12-31 RX ADMIN — FLUTICASONE PROPIONATE 1 SPRAY: 50 SPRAY, METERED NASAL at 08:58

## 2021-12-31 RX ADMIN — Medication 5 MG: at 21:44

## 2021-12-31 ASSESSMENT — PAIN SCALES - GENERAL
PAINLEVEL_OUTOF10: 0
PAINLEVEL_OUTOF10: 0
PAINLEVEL_OUTOF10: 6
PAINLEVEL_OUTOF10: 6
PAINLEVEL_OUTOF10: 0

## 2021-12-31 NOTE — PROGRESS NOTES
Hospitalist Progress Note            Patient: Jolene Merchant Age: 48 y.o.   DOA: 12/20/2021 Admit Dx / CC: Bilateral leg weakness [R29.898]  Weakness of both lower extremities [R29.898]  Complaints of weakness of lower extremity [R29.898]  T2 hyperintense foci present in cerebral cortex on magnetic resonance imaging [R90.89]  Spinal stenosis of lumbar region, unspecified whether neurogenic claudication present [M48.061]  Ambulatory dysfunction [R26.2]   LOS:  LOS: 10 days      Assessment/ Plan:     SYNOPSIS: Patient admitted on 12/20/2021  presented to ED with complaints of lower extremity weakness.  This has been ongoing since November 23 when she received Regeneron. She then started to develop saddle anesthesia, urinary and fecal incontinence.  Reports that feeling in her legs has diminished.   MRI of the C-spine, T-spine and L-spine was obtained and revealed L4-L5 severe bilateral neural foraminal narrowing and nonspecific foci of periventricular and subcortical cerebral white matter T2 flair hyperintensity.  Pt had extensive w/u incld arterial and venous PVLs, EMG and per neurology pts neuropathy likely related to b12 deficiency and may take sometime to recover and she has been explained that multiple times by Neurology and IM. She is currently awaiting placement. Bilateral lower extremity weakness/Ambulatory dysfunction with report of bowel and bladder incontinance likely 2/2 severe B12 deficinecy  MRI: lumbar radiculopathy at L4-L5, mild canal stenosis and severe bilateral neural foraminal narrowing. At L3-L4, moderate canal stenosis and moderate bilateral neural foraminal narrowing. Mild nonspecific foci of periventricular and subcortical cerebral white matter T2/FLAIR hyperintensity. Pt was evaluated by Dr Sanders/Sophia and advised MRI does not explain pt's complaints.    hx of LESI with most recent 9/21/21  hx of Covid 19 in Nov 2021 with regeneron infusion  paresthesias to BLE but her exam showed no sensory deficit, per neuro pt was able to feel LT, PP and Vibration, hyperreflexia   Per neuro not typical presentation of GBS   B/L LE arterial and venous PVLs are negative for DVT or stenosis  EMG consistent with neuropathy and not myopathy  Homocysteine level wnl  B12 prior to admission was in 160, now >2000  Repeat x1 dose IV methylprednisolone followed by short coarse of prednisone for sciatica (pt was due for her NASRIN with pain mx as OP) and says slightly helped with back pain but not leg pain   Need to follow B12 closely as OP    Physical deconditioning and functional decline related to prolonged hospitalization  Awaiting SNF placement    Iron deficiency anemia  received IV iron    DVT ppx: Loveox  Code status: Full code    Medically stable for discharge to SNF when bed available, likely 1/3/22    Plan of care discussed with: patient, RN, CM    MRI C/T spine:  Impression   1.  Somewhat limited exams       2.  Given technical artifacts, the visualized portions of the spinal cord and   intracranial contents appear grossly negative for pathologic enhancement and   otherwise unremarkable/not significantly changed, within the limits of this   exam.       3.  Mildly-exaggerated thoracic kyphosis, with grossly unchanged multilevel   degenerative disease, without high-grade central spinal canal stenosis,   neural foraminal narrowing, or mass effect upon neural elements throughout   the cervical and thoracic region, as described.       4.  Mild diffuse paraspinal muscular atrophy       5.  Otherwise, no definite correlate for the patient's reported bilateral   lower extremity weakness, within the limits of these studies.       6.  Incidental findings, most notable for small fluid signal is scattered   about the left mastoid air cell complex, with trace involvement on the right   side, incompletely and only incidentally demonstrated.  Please see above   comments.      MRI L spine:  Impression   1.  Slightly limited exam       2.  There appear to be post-surgical alterations of prior left-sided   hemilaminotomy at L3 and L4, for prior partial discectomy at L3/4 and L4/5.       3.  Moderate bilateral facet joint effusions and heterogeneous enhancement   involve both L4/5 facet joints and surrounding marrow, with slightly lesser   extension into both subjacent posterolateral aspects of the traversing   epidural space, followed by surrounding paraspinal musculature, lessening   both superiorly to L3/4, and inferiorly to L5/S1.  While the above findings   could be degenerative and/or inflammatory arthro-pathic in nature, clinical   correlation is advised to exclude developing bilateral septic   arthritis/facet-itis.       4.  At L4/5, there is minimal enhancement of several traversing bilateral   nerve roots, and clinical correlation is advised to exclude early   arachnoiditis.       5.  Otherwise, no significant change since the most recent MR and CT   examinations of the lumbar spine.       .       RECOMMENDATIONS:   1.  Impression 1: Recommend post-treatment follow-up MRI of the lumbar spine,   without and with contrast, in 4 - 6 weeks, or as directed clinically. MRI brain; Impression   1. No evidence of acute intracranial abnormality. 2. Mild white matter signal abnormality as described above.  No significant   change.  The findings could be related to chronic microvascular disease   chronic migraines, vasculitis or Lyme disease.  The possibility of   demyelinating disease cannot be excluded although the findings are not   typical for demyelinating plaques.          Patient Active Problem List   Diagnosis    COVID    Complaints of weakness of lower extremity    Weakness of both lower extremities    Ambulatory dysfunction    Iron deficiency anemia    Bilateral sciatica    Behavior problem, adult    Physical deconditioning    Declining functional status        Medications:  Reviewed    Infusion Medications  sodium chloride       Scheduled Medications    methylPREDNISolone  125 mg IntraVENous Once    [START ON 1/1/2022] predniSONE  40 mg Oral Daily    mirabegron  25 mg Oral Daily    sodium chloride flush  5-40 mL IntraVENous 2 times per day    folic acid  1 mg Oral Daily    vitamin B-12  1,000 mcg Oral Daily    multivitamin  1 tablet Oral Daily    baclofen  10 mg Oral BID    melatonin  5 mg Oral Nightly    fluticasone  1 spray Nasal Daily    enoxaparin  40 mg SubCUTAneous Daily     PRN Meds: sodium chloride flush, sodium chloride, acetaminophen **OR** [DISCONTINUED] acetaminophen, melatonin, promethazine **OR** ondansetron, polyethylene glycol, naproxen    I/O    Intake/Output Summary (Last 24 hours) at 12/31/2021 1253  Last data filed at 12/30/2021 2302  Gross per 24 hour   Intake 240 ml   Output --   Net 240 ml       Labs:   No results for input(s): WBC, HGB, HCT, PLT in the last 72 hours. No results for input(s): NA, K, CL, CO2, BUN, CREATININE, CALCIUM, PHOS in the last 72 hours. Invalid input(s): MAGNES    No results for input(s): PROT, ALB, ALKPHOS, ALT, AST, BILITOT, AMYLASE, LIPASE in the last 72 hours. No results for input(s): INR in the last 72 hours. No results for input(s): Chyrel Lions in the last 72 hours. Chronic labs:  Lab Results   Component Value Date    TSH 1.460 12/26/2021    INR 1.0 12/20/2021       Radiology:  Imaging studies reviewed today. Subjective:     Fabienne Eppersoneks feels weak and feels her sciatica is acting up. No SOB. Objective:     Physical Exam:   BP (!) 126/93   Pulse 86   Temp 98.1 °F (36.7 °C) (Oral)   Resp 18   Ht 5' (1.524 m)   Wt 226 lb 12.8 oz (102.9 kg)   SpO2 97%   BMI 44.29 kg/m²     General appearance:in no distress, up in chair  Lungs: Clear to auscultation bilaterally, no wheezing or crackles   Heart: Regular rate and rhythm, S1, S2 normal   Abdomen: Soft, non-tender and not-distended.  Bowel sounds normal.   Extremities: no

## 2021-12-31 NOTE — CARE COORDINATION
12/21: Update CM Note: Sent message to Dr. Radha Talamantes to see if we can cancel d/c, pending reply.  Electronically signed by Ba Guerra RN on 12/31/2021 at 12:12 PM

## 2021-12-31 NOTE — CARE COORDINATION
12/31: Update CM Note: RYANN spoke with [facility]. She doesn't think they will have a bed this weekend, possibly on Monday.  JOEL/RYANN will continue to follow Electronically signed by Hart Leyden, RN on 12/31/2021 at 7:57 AM

## 2021-12-31 NOTE — CARE COORDINATION
12/31: Update CM Note: RYANN spoke with [facility]. She doesn't think they will have a bed this weekend, possibly on Monday.  JOEL/RYANN will continue to follow Electronically signed by Rachel Catherine RN on 12/31/2021 at 12:06 PM

## 2022-01-01 LAB
COPPER: 108.8 UG/DL (ref 80–155)
METHYLMALONIC ACID: 0.17 UMOL/L (ref 0–0.4)

## 2022-01-01 PROCEDURE — 6370000000 HC RX 637 (ALT 250 FOR IP): Performed by: NURSE PRACTITIONER

## 2022-01-01 PROCEDURE — 6370000000 HC RX 637 (ALT 250 FOR IP): Performed by: PHYSICIAN ASSISTANT

## 2022-01-01 PROCEDURE — 6370000000 HC RX 637 (ALT 250 FOR IP): Performed by: INTERNAL MEDICINE

## 2022-01-01 PROCEDURE — 1200000000 HC SEMI PRIVATE

## 2022-01-01 PROCEDURE — 6360000002 HC RX W HCPCS: Performed by: FAMILY MEDICINE

## 2022-01-01 RX ORDER — SENNA AND DOCUSATE SODIUM 50; 8.6 MG/1; MG/1
2 TABLET, FILM COATED ORAL EVERY EVENING
Status: DISCONTINUED | OUTPATIENT
Start: 2022-01-01 | End: 2022-01-05 | Stop reason: HOSPADM

## 2022-01-01 RX ORDER — POLYETHYLENE GLYCOL 3350 17 G/17G
17 POWDER, FOR SOLUTION ORAL 2 TIMES DAILY
Status: DISCONTINUED | OUTPATIENT
Start: 2022-01-01 | End: 2022-01-05 | Stop reason: HOSPADM

## 2022-01-01 RX ORDER — SENNA AND DOCUSATE SODIUM 50; 8.6 MG/1; MG/1
2 TABLET, FILM COATED ORAL 2 TIMES DAILY PRN
Status: DISCONTINUED | OUTPATIENT
Start: 2022-01-01 | End: 2022-01-05 | Stop reason: HOSPADM

## 2022-01-01 RX ADMIN — ACETAMINOPHEN 1000 MG: 500 TABLET ORAL at 22:55

## 2022-01-01 RX ADMIN — ENOXAPARIN SODIUM 40 MG: 100 INJECTION SUBCUTANEOUS at 08:07

## 2022-01-01 RX ADMIN — DICLOFENAC SODIUM 4 G: 10 GEL TOPICAL at 14:36

## 2022-01-01 RX ADMIN — PREDNISONE 40 MG: 20 TABLET ORAL at 08:08

## 2022-01-01 RX ADMIN — FLUTICASONE PROPIONATE 1 SPRAY: 50 SPRAY, METERED NASAL at 08:07

## 2022-01-01 RX ADMIN — DOCUSATE SODIUM 50 MG AND SENNOSIDES 8.6 MG 2 TABLET: 8.6; 5 TABLET, FILM COATED ORAL at 17:10

## 2022-01-01 RX ADMIN — BACLOFEN 10 MG: 10 TABLET ORAL at 08:08

## 2022-01-01 RX ADMIN — Medication 5 MG: at 22:55

## 2022-01-01 RX ADMIN — Medication 1000 MCG: at 08:07

## 2022-01-01 RX ADMIN — FOLIC ACID 1 MG: 1 TABLET ORAL at 08:07

## 2022-01-01 RX ADMIN — POLYETHYLENE GLYCOL 3350 17 G: 17 POWDER, FOR SOLUTION ORAL at 22:55

## 2022-01-01 RX ADMIN — DICLOFENAC SODIUM 4 G: 10 GEL TOPICAL at 17:11

## 2022-01-01 RX ADMIN — BACLOFEN 10 MG: 10 TABLET ORAL at 23:05

## 2022-01-01 RX ADMIN — ACETAMINOPHEN 1000 MG: 500 TABLET ORAL at 17:13

## 2022-01-01 RX ADMIN — DICLOFENAC SODIUM 4 G: 10 GEL TOPICAL at 22:56

## 2022-01-01 RX ADMIN — Medication 1 TABLET: at 08:08

## 2022-01-01 RX ADMIN — POLYETHYLENE GLYCOL 3350 17 G: 17 POWDER, FOR SOLUTION ORAL at 14:36

## 2022-01-01 ASSESSMENT — PAIN SCALES - GENERAL
PAINLEVEL_OUTOF10: 5
PAINLEVEL_OUTOF10: 0
PAINLEVEL_OUTOF10: 5
PAINLEVEL_OUTOF10: 0

## 2022-01-01 NOTE — PROGRESS NOTES
Hospitalist Progress Note            Patient: Milady Petersen Age: 48 y.o.   DOA: 12/20/2021 Admit Dx / CC: Bilateral leg weakness [R29.898]  Weakness of both lower extremities [R29.898]  Complaints of weakness of lower extremity [R29.898]  T2 hyperintense foci present in cerebral cortex on magnetic resonance imaging [R90.89]  Spinal stenosis of lumbar region, unspecified whether neurogenic claudication present [M48.061]  Ambulatory dysfunction [R26.2]   LOS:  LOS: 11 days      Assessment/ Plan:     SYNOPSIS: Patient admitted on 12/20/2021  presented to ED with complaints of lower extremity weakness.  This has been ongoing since November 23 when she received Regeneron. She then started to develop saddle anesthesia, urinary and fecal incontinence.  Reports that feeling in her legs has diminished.   MRI of the C-spine, T-spine and L-spine was obtained and revealed L4-L5 severe bilateral neural foraminal narrowing and nonspecific foci of periventricular and subcortical cerebral white matter T2 flair hyperintensity.  Pt had extensive w/u incld arterial and venous PVLs, EMG and per neurology pts neuropathy likely related to b12 deficiency and may take sometime to recover and she has been explained that multiple times by Neurology and IM. She is currently awaiting placement. Bilateral lower extremity weakness/Ambulatory dysfunction with report of bowel and bladder incontinance likely 2/2 severe B12 deficinecy  MRI: lumbar radiculopathy at L4-L5, mild canal stenosis and severe bilateral neural foraminal narrowing. At L3-L4, moderate canal stenosis and moderate bilateral neural foraminal narrowing. Mild nonspecific foci of periventricular and subcortical cerebral white matter T2/FLAIR hyperintensity. Pt was evaluated by Dr Sanders/Sophia and advised MRI does not explain pt's complaints.    hx of LESI with most recent 9/21/21  hx of Covid 19 in Nov 2021 with regeneron infusion  paresthesias to BLE but her exam showed no sensory deficit, per neuro pt was able to feel LT, PP and Vibration, hyperreflexia   Per neuro not typical presentation of GBS   B/L LE arterial and venous PVLs are negative for DVT or stenosis  EMG consistent with neuropathy and not myopathy  Homocysteine level wnl  B12 prior to admission was in 160, now >2000  Copper WNL  Repeat x1 dose IV methylprednisolone followed by short coarse of prednisone for sciatica (pt was due for her NASRIN with pain mx as OP while hospitalized) and says slightly helped with back pain but not leg pain   Need to follow B12 closely as OP    Physical deconditioning and functional decline related to prolonged hospitalization  Awaiting SNF placement    Iron deficiency anemia  received IV iron    DVT ppx: Loveox  Code status: Full code    Medically stable for discharge to SNF when bed available, likely 1/3/22    Plan of care discussed with: patient, RN    MRI C/T spine:  Impression   1.  Somewhat limited exams       2.  Given technical artifacts, the visualized portions of the spinal cord and   intracranial contents appear grossly negative for pathologic enhancement and   otherwise unremarkable/not significantly changed, within the limits of this   exam.       3.  Mildly-exaggerated thoracic kyphosis, with grossly unchanged multilevel   degenerative disease, without high-grade central spinal canal stenosis,   neural foraminal narrowing, or mass effect upon neural elements throughout   the cervical and thoracic region, as described.       4.  Mild diffuse paraspinal muscular atrophy       5.  Otherwise, no definite correlate for the patient's reported bilateral   lower extremity weakness, within the limits of these studies.       6.  Incidental findings, most notable for small fluid signal is scattered   about the left mastoid air cell complex, with trace involvement on the right   side, incompletely and only incidentally demonstrated.  Please see above   comments.      MRI L spine:  Impression   1.  Slightly limited exam       2.  There appear to be post-surgical alterations of prior left-sided   hemilaminotomy at L3 and L4, for prior partial discectomy at L3/4 and L4/5.       3.  Moderate bilateral facet joint effusions and heterogeneous enhancement   involve both L4/5 facet joints and surrounding marrow, with slightly lesser   extension into both subjacent posterolateral aspects of the traversing   epidural space, followed by surrounding paraspinal musculature, lessening   both superiorly to L3/4, and inferiorly to L5/S1.  While the above findings   could be degenerative and/or inflammatory arthro-pathic in nature, clinical   correlation is advised to exclude developing bilateral septic   arthritis/facet-itis.       4.  At L4/5, there is minimal enhancement of several traversing bilateral   nerve roots, and clinical correlation is advised to exclude early   arachnoiditis.       5.  Otherwise, no significant change since the most recent MR and CT   examinations of the lumbar spine.       .       RECOMMENDATIONS:   1.  Impression 1: Recommend post-treatment follow-up MRI of the lumbar spine,   without and with contrast, in 4 - 6 weeks, or as directed clinically. MRI brain; Impression   1. No evidence of acute intracranial abnormality. 2. Mild white matter signal abnormality as described above.  No significant   change.  The findings could be related to chronic microvascular disease   chronic migraines, vasculitis or Lyme disease.  The possibility of   demyelinating disease cannot be excluded although the findings are not   typical for demyelinating plaques.          Patient Active Problem List   Diagnosis    COVID    Complaints of weakness of lower extremity    Weakness of both lower extremities    Ambulatory dysfunction    Iron deficiency anemia    Bilateral sciatica    Behavior problem, adult    Physical deconditioning    Declining functional status        Medications: Reviewed    Infusion Medications    sodium chloride       Scheduled Medications    diclofenac sodium  4 g Topical 4x Daily    predniSONE  40 mg Oral Daily    mirabegron  25 mg Oral Daily    sodium chloride flush  5-40 mL IntraVENous 2 times per day    folic acid  1 mg Oral Daily    vitamin B-12  1,000 mcg Oral Daily    multivitamin  1 tablet Oral Daily    baclofen  10 mg Oral BID    melatonin  5 mg Oral Nightly    fluticasone  1 spray Nasal Daily    enoxaparin  40 mg SubCUTAneous Daily     PRN Meds: sodium chloride flush, sodium chloride, acetaminophen **OR** [DISCONTINUED] acetaminophen, melatonin, promethazine **OR** ondansetron, polyethylene glycol, naproxen    I/O  No intake or output data in the 24 hours ending 01/01/22 1230    Labs:   No results for input(s): WBC, HGB, HCT, PLT in the last 72 hours. No results for input(s): NA, K, CL, CO2, BUN, CREATININE, CALCIUM, PHOS in the last 72 hours. Invalid input(s): MAGNES    No results for input(s): PROT, ALB, ALKPHOS, ALT, AST, BILITOT, AMYLASE, LIPASE in the last 72 hours. No results for input(s): INR in the last 72 hours. No results for input(s): Kevin Brooklyn in the last 72 hours. Chronic labs:  Lab Results   Component Value Date    TSH 1.460 12/26/2021    INR 1.0 12/20/2021       Radiology:  Imaging studies reviewed today. Subjective:     Loren Hogan says her legs feeling even weaker today, but she has been exercising as much as she can on her own. Her daughter will bring back massager to see if helps with severe low back pain she has    Objective:     Physical Exam:   /80   Pulse 95   Temp 97.3 °F (36.3 °C) (Temporal)   Resp 18   Ht 5' (1.524 m)   Wt 226 lb 12.8 oz (102.9 kg)   SpO2 95%   BMI 44.29 kg/m²     General appearance:in no distress, up in bed  Lungs: Clear to auscultation bilaterally, no wheezing or crackles   Heart: Regular rate and rhythm, S1, S2 normal   Abdomen: Soft, non-tender and not-distended. Bowel sounds normal.   Extremities: no edema   Skin: B/L LE warm to touch  Neurologic: Grossly normal and non focal, CN II-XII intact. Patient able to move B/L LE against resistance.  +2/3 reflexes in B/L patellar and ankle      Tiffany Dacosta MD   Hospitalist Service   01/01/22 12:30 PM

## 2022-01-02 PROCEDURE — 1200000000 HC SEMI PRIVATE

## 2022-01-02 PROCEDURE — 6370000000 HC RX 637 (ALT 250 FOR IP): Performed by: NURSE PRACTITIONER

## 2022-01-02 PROCEDURE — 6370000000 HC RX 637 (ALT 250 FOR IP): Performed by: INTERNAL MEDICINE

## 2022-01-02 PROCEDURE — 2580000003 HC RX 258: Performed by: PHYSICIAN ASSISTANT

## 2022-01-02 PROCEDURE — 6360000002 HC RX W HCPCS: Performed by: FAMILY MEDICINE

## 2022-01-02 PROCEDURE — 6370000000 HC RX 637 (ALT 250 FOR IP): Performed by: PHYSICIAN ASSISTANT

## 2022-01-02 RX ADMIN — BACLOFEN 10 MG: 10 TABLET ORAL at 09:04

## 2022-01-02 RX ADMIN — SODIUM CHLORIDE, PRESERVATIVE FREE 10 ML: 5 INJECTION INTRAVENOUS at 09:07

## 2022-01-02 RX ADMIN — Medication 1000 MCG: at 09:08

## 2022-01-02 RX ADMIN — PREDNISONE 40 MG: 20 TABLET ORAL at 09:04

## 2022-01-02 RX ADMIN — Medication 5 MG: at 21:47

## 2022-01-02 RX ADMIN — BACLOFEN 10 MG: 10 TABLET ORAL at 21:48

## 2022-01-02 RX ADMIN — DOCUSATE SODIUM 50 MG AND SENNOSIDES 8.6 MG 2 TABLET: 8.6; 5 TABLET, FILM COATED ORAL at 19:26

## 2022-01-02 RX ADMIN — DICLOFENAC SODIUM 4 G: 10 GEL TOPICAL at 21:48

## 2022-01-02 RX ADMIN — DICLOFENAC SODIUM 4 G: 10 GEL TOPICAL at 19:26

## 2022-01-02 RX ADMIN — FOLIC ACID 1 MG: 1 TABLET ORAL at 09:05

## 2022-01-02 RX ADMIN — Medication 1 TABLET: at 09:07

## 2022-01-02 RX ADMIN — ACETAMINOPHEN 1000 MG: 500 TABLET ORAL at 19:26

## 2022-01-02 RX ADMIN — FLUTICASONE PROPIONATE 1 SPRAY: 50 SPRAY, METERED NASAL at 09:06

## 2022-01-02 RX ADMIN — ENOXAPARIN SODIUM 40 MG: 100 INJECTION SUBCUTANEOUS at 09:04

## 2022-01-02 RX ADMIN — DICLOFENAC SODIUM 4 G: 10 GEL TOPICAL at 09:06

## 2022-01-02 RX ADMIN — NAPROXEN 500 MG: 500 TABLET ORAL at 09:05

## 2022-01-02 ASSESSMENT — PAIN DESCRIPTION - FREQUENCY: FREQUENCY: INTERMITTENT

## 2022-01-02 ASSESSMENT — PAIN DESCRIPTION - LOCATION: LOCATION: BACK

## 2022-01-02 ASSESSMENT — PAIN DESCRIPTION - DESCRIPTORS: DESCRIPTORS: ACHING;DISCOMFORT;DULL

## 2022-01-02 ASSESSMENT — PAIN DESCRIPTION - PAIN TYPE: TYPE: ACUTE PAIN

## 2022-01-02 ASSESSMENT — PAIN SCALES - GENERAL
PAINLEVEL_OUTOF10: 6
PAINLEVEL_OUTOF10: 6

## 2022-01-02 NOTE — PROGRESS NOTES
Hospitalist Progress Note            Patient: Fabienne Still Age: 48 y.o.   DOA: 12/20/2021 Admit Dx / CC: Bilateral leg weakness [R29.898]  Weakness of both lower extremities [R29.898]  Complaints of weakness of lower extremity [R29.898]  T2 hyperintense foci present in cerebral cortex on magnetic resonance imaging [R90.89]  Spinal stenosis of lumbar region, unspecified whether neurogenic claudication present [M48.061]  Ambulatory dysfunction [R26.2]   LOS:  LOS: 12 days      Assessment/ Plan:     SYNOPSIS: Patient admitted on 12/20/2021  presented to ED with complaints of lower extremity weakness.  This has been ongoing since November 23 when she received Regeneron. She then started to develop saddle anesthesia, urinary and fecal incontinence.  Reports that feeling in her legs has diminished.   MRI of the C-spine, T-spine and L-spine was obtained and revealed L4-L5 severe bilateral neural foraminal narrowing and nonspecific foci of periventricular and subcortical cerebral white matter T2 flair hyperintensity.  Pt had extensive w/u incld arterial and venous PVLs, EMG and per neurology pts neuropathy likely related to b12 deficiency and may take sometime to recover and she has been explained that multiple times by Neurology and IM. She is currently awaiting placement. Bilateral lower extremity weakness/Ambulatory dysfunction with report of bowel and bladder incontinance likely 2/2 severe B12 deficinecy  MRI: lumbar radiculopathy at L4-L5, mild canal stenosis and severe bilateral neural foraminal narrowing. At L3-L4, moderate canal stenosis and moderate bilateral neural foraminal narrowing. Mild nonspecific foci of periventricular and subcortical cerebral white matter T2/FLAIR hyperintensity. Pt was evaluated by Dr Sanders/NSx and advised MRI does not explain pt's complaints.    hx of LESI with most recent 9/21/21  hx of Covid 19 in Nov 2021 with regeneron infusion  paresthesias to BLE but her exam showed no sensory deficit, per neuro pt was able to feel LT, PP and Vibration, hyperreflexia   Per neuro not typical presentation of GBS   B/L LE arterial and venous PVLs are negative for DVT or stenosis  EMG consistent with neuropathy and not myopathy  Homocysteine level wnl  B12 prior to admission was in 160, now >2000  Copper WNL  Repeat x1 dose IV methylprednisolone followed by short coarse of prednisone for sciatica (pt was due for her NASRIN with pain mx as OP while hospitalized) and says slightly helped with back pain but not leg pain   Need to follow B12 closely as OP    Physical deconditioning and functional decline related to prolonged hospitalization  Awaiting SNF placement    Iron deficiency anemia  received IV iron    DVT ppx: Loveox  Code status: Full code    Medically stable for discharge to SNF when bed available, likely 1/3/22    Plan of care discussed with: patient, RN    MRI C/T spine:  Impression   1.  Somewhat limited exams       2.  Given technical artifacts, the visualized portions of the spinal cord and   intracranial contents appear grossly negative for pathologic enhancement and   otherwise unremarkable/not significantly changed, within the limits of this   exam.       3.  Mildly-exaggerated thoracic kyphosis, with grossly unchanged multilevel   degenerative disease, without high-grade central spinal canal stenosis,   neural foraminal narrowing, or mass effect upon neural elements throughout   the cervical and thoracic region, as described.       4.  Mild diffuse paraspinal muscular atrophy       5.  Otherwise, no definite correlate for the patient's reported bilateral   lower extremity weakness, within the limits of these studies.       6.  Incidental findings, most notable for small fluid signal is scattered   about the left mastoid air cell complex, with trace involvement on the right   side, incompletely and only incidentally demonstrated.  Please see above   comments.      MRI L spine:  Impression   1.  Slightly limited exam       2.  There appear to be post-surgical alterations of prior left-sided   hemilaminotomy at L3 and L4, for prior partial discectomy at L3/4 and L4/5.       3.  Moderate bilateral facet joint effusions and heterogeneous enhancement   involve both L4/5 facet joints and surrounding marrow, with slightly lesser   extension into both subjacent posterolateral aspects of the traversing   epidural space, followed by surrounding paraspinal musculature, lessening   both superiorly to L3/4, and inferiorly to L5/S1.  While the above findings   could be degenerative and/or inflammatory arthro-pathic in nature, clinical   correlation is advised to exclude developing bilateral septic   arthritis/facet-itis.       4.  At L4/5, there is minimal enhancement of several traversing bilateral   nerve roots, and clinical correlation is advised to exclude early   arachnoiditis.       5.  Otherwise, no significant change since the most recent MR and CT   examinations of the lumbar spine.       .       RECOMMENDATIONS:   1.  Impression 1: Recommend post-treatment follow-up MRI of the lumbar spine,   without and with contrast, in 4 - 6 weeks, or as directed clinically. MRI brain; Impression   1. No evidence of acute intracranial abnormality. 2. Mild white matter signal abnormality as described above.  No significant   change.  The findings could be related to chronic microvascular disease   chronic migraines, vasculitis or Lyme disease.  The possibility of   demyelinating disease cannot be excluded although the findings are not   typical for demyelinating plaques.          Patient Active Problem List   Diagnosis    COVID    Complaints of weakness of lower extremity    Weakness of both lower extremities    Ambulatory dysfunction    Iron deficiency anemia    Bilateral sciatica    Behavior problem, adult    Physical deconditioning    Declining functional status        Medications: Reviewed    Infusion Medications    sodium chloride       Scheduled Medications    diclofenac sodium  4 g Topical 4x Daily    polyethylene glycol  17 g Oral BID    sennosides-docusate sodium  2 tablet Oral QPM    predniSONE  40 mg Oral Daily    mirabegron  25 mg Oral Daily    sodium chloride flush  5-40 mL IntraVENous 2 times per day    folic acid  1 mg Oral Daily    vitamin B-12  1,000 mcg Oral Daily    multivitamin  1 tablet Oral Daily    baclofen  10 mg Oral BID    melatonin  5 mg Oral Nightly    fluticasone  1 spray Nasal Daily    enoxaparin  40 mg SubCUTAneous Daily     PRN Meds: sennosides-docusate sodium, sodium chloride flush, sodium chloride, acetaminophen **OR** [DISCONTINUED] acetaminophen, melatonin, promethazine **OR** ondansetron, polyethylene glycol, naproxen    I/O  No intake or output data in the 24 hours ending 01/02/22 0731    Labs:   No results for input(s): WBC, HGB, HCT, PLT in the last 72 hours. No results for input(s): NA, K, CL, CO2, BUN, CREATININE, CALCIUM, PHOS in the last 72 hours. Invalid input(s): MAGNES    No results for input(s): PROT, ALB, ALKPHOS, ALT, AST, BILITOT, AMYLASE, LIPASE in the last 72 hours. No results for input(s): INR in the last 72 hours. No results for input(s): Daisy Mickey in the last 72 hours. Chronic labs:  Lab Results   Component Value Date    TSH 1.460 12/26/2021    INR 1.0 12/20/2021       Radiology:  Imaging studies reviewed today.      Subjective:     Feeling better still weak  No CP or SOB  No fever or chills   No uncontrolled pain  No vomiting or diarrhea   No events reported overnight     Objective:     Physical Exam:   /84   Pulse 97   Temp 97.5 °F (36.4 °C) (Temporal)   Resp 16   Ht 5' (1.524 m)   Wt 226 lb 12.8 oz (102.9 kg)   SpO2 96%   BMI 44.29 kg/m²     General appearance:in no distress, up in bed  Lungs: Clear to auscultation bilaterally, no wheezing or crackles   Heart: Regular rate and rhythm, S1, S2 normal   Abdomen: Soft, non-tender and not-distended. Bowel sounds normal.   Extremities: no edema   Skin: B/L LE warm to touch  Neurologic: Grossly normal and non focal, CN II-XII intact. Patient able to move B/L LE against resistance.  +2/3 reflexes in B/L patellar and ankle      Rayo MD Isaac   Hospitalist Service   01/02/22 7:31 AM

## 2022-01-02 NOTE — PLAN OF CARE
Problem: Falls - Risk of:  Goal: Will remain free from falls  Description: Will remain free from falls  1/1/2022 1914 by Estefani Singh RN  Outcome: Met This Shift  1/1/2022 1011 by Patricio Keita RN  Outcome: Met This Shift  Goal: Absence of physical injury  Description: Absence of physical injury  1/1/2022 1914 by Estefani Singh RN  Outcome: Met This Shift  1/1/2022 1011 by Patricio Keita RN  Outcome: Met This Shift     Problem: Pain:  Goal: Pain level will decrease  Description: Pain level will decrease  1/1/2022 1914 by Estefani Singh RN  Outcome: Met This Shift  1/1/2022 1011 by Patricio Keita RN  Outcome: Met This Shift  Goal: Control of acute pain  Description: Control of acute pain  1/1/2022 1914 by Estefani Singh RN  Outcome: Met This Shift  1/1/2022 1011 by Patricio Keita RN  Outcome: Met This Shift  Goal: Control of chronic pain  Description: Control of chronic pain  1/1/2022 1914 by Estefani Singh RN  Outcome: Met This Shift  1/1/2022 1011 by Patricio Keita RN  Outcome: Met This Shift     Problem: Skin Integrity:  Goal: Will show no infection signs and symptoms  Description: Will show no infection signs and symptoms  1/1/2022 1914 by Estefani Singh RN  Outcome: Met This Shift  1/1/2022 1011 by Patricio Keita RN  Outcome: Met This Shift  Goal: Absence of new skin breakdown  Description: Absence of new skin breakdown  1/1/2022 1914 by Estefani Singh RN  Outcome: Met This Shift  1/1/2022 1011 by Patricio Keita RN  Outcome: Met This Shift

## 2022-01-03 PROCEDURE — 6370000000 HC RX 637 (ALT 250 FOR IP): Performed by: PHYSICIAN ASSISTANT

## 2022-01-03 PROCEDURE — 6370000000 HC RX 637 (ALT 250 FOR IP): Performed by: INTERNAL MEDICINE

## 2022-01-03 PROCEDURE — 1200000000 HC SEMI PRIVATE

## 2022-01-03 PROCEDURE — 97535 SELF CARE MNGMENT TRAINING: CPT

## 2022-01-03 PROCEDURE — 6370000000 HC RX 637 (ALT 250 FOR IP): Performed by: NURSE PRACTITIONER

## 2022-01-03 PROCEDURE — 6360000002 HC RX W HCPCS: Performed by: FAMILY MEDICINE

## 2022-01-03 PROCEDURE — 97530 THERAPEUTIC ACTIVITIES: CPT

## 2022-01-03 RX ADMIN — ACETAMINOPHEN 1000 MG: 500 TABLET ORAL at 21:39

## 2022-01-03 RX ADMIN — Medication 5 MG: at 21:39

## 2022-01-03 RX ADMIN — POLYETHYLENE GLYCOL 3350 17 G: 17 POWDER, FOR SOLUTION ORAL at 09:22

## 2022-01-03 RX ADMIN — Medication 1000 MCG: at 09:21

## 2022-01-03 RX ADMIN — PREDNISONE 40 MG: 20 TABLET ORAL at 09:21

## 2022-01-03 RX ADMIN — DICLOFENAC SODIUM 4 G: 10 GEL TOPICAL at 09:25

## 2022-01-03 RX ADMIN — DICLOFENAC SODIUM 4 G: 10 GEL TOPICAL at 19:38

## 2022-01-03 RX ADMIN — ENOXAPARIN SODIUM 40 MG: 100 INJECTION SUBCUTANEOUS at 09:21

## 2022-01-03 RX ADMIN — Medication 1 TABLET: at 09:22

## 2022-01-03 RX ADMIN — FLUTICASONE PROPIONATE 1 SPRAY: 50 SPRAY, METERED NASAL at 09:25

## 2022-01-03 RX ADMIN — FOLIC ACID 1 MG: 1 TABLET ORAL at 09:21

## 2022-01-03 RX ADMIN — BACLOFEN 10 MG: 10 TABLET ORAL at 09:20

## 2022-01-03 RX ADMIN — BACLOFEN 10 MG: 10 TABLET ORAL at 21:39

## 2022-01-03 RX ADMIN — NAPROXEN 500 MG: 500 TABLET ORAL at 05:01

## 2022-01-03 ASSESSMENT — PAIN DESCRIPTION - DESCRIPTORS: DESCRIPTORS: ACHING;DISCOMFORT

## 2022-01-03 ASSESSMENT — PAIN SCALES - GENERAL
PAINLEVEL_OUTOF10: 6
PAINLEVEL_OUTOF10: 5
PAINLEVEL_OUTOF10: 6
PAINLEVEL_OUTOF10: 0
PAINLEVEL_OUTOF10: 5

## 2022-01-03 ASSESSMENT — PAIN DESCRIPTION - PAIN TYPE: TYPE: ACUTE PAIN

## 2022-01-03 ASSESSMENT — PAIN DESCRIPTION - ORIENTATION: ORIENTATION: LEFT

## 2022-01-03 ASSESSMENT — PAIN DESCRIPTION - LOCATION: LOCATION: BACK

## 2022-01-03 ASSESSMENT — PAIN DESCRIPTION - FREQUENCY: FREQUENCY: INTERMITTENT

## 2022-01-03 NOTE — PROGRESS NOTES
Physical Therapy    Treatment Note    Name: Marbin Cabral  : 1975  MRN: [id number]      Date of Service: 1/3/2022    Evaluating PT:  Memo Easton Davon Vega, [id number]    Room #:  1773/6223-R  Diagnosis:  Bilateral leg weakness [R29.898]  Weakness of both lower extremities [R29.898]  Complaints of weakness of lower extremity [R29.898]  T2 hyperintense foci present in cerebral cortex on magnetic resonance imaging [R90.89]  Spinal stenosis of lumbar region, unspecified whether neurogenic claudication present [M48.061]  Ambulatory dysfunction [R26.2]  PMHx/PSHx:     has no past medical history on file. has a past surgical history that includes LEEP; Tubal ligation; Ankle surgery; Elbow surgery; Dilation and curettage of uterus; Endometrial ablation (2016); Gallbladder surgery; and Appendectomy. Precautions:  Fall risk  Equipment Needs: railing or ramp to enter home    SUBJECTIVE:    Pt lives with her fiancee (currently on O2 and her son who works 50 hours/week) in a 2 story home with first floor set up with 3+1 stairs to enter and no rail. Pt ambulated with no AD PTA but had been using a FWW and Rolator WW in the days leading up to admission due to BLE weakness. OBJECTIVE:   Initial Evaluation  Date: 21 Treatment  Date: 1/3/22 Short Term/ Long Term   Goals   AM-PAC 6 Clicks 89/93     Was pt agreeable to Eval/treatment? Yes Yes     Does pt have pain? No c/o pain No c/o pain    Bed Mobility  Rolling: Independent  Supine to sit: Modified Independent  Sit to supine: Modified Independent  Scooting: Modified Independent Rolling: SBA  Supine to sit: SBA  Sit to supine: mod A  Scooting: SBA Rolling: Independent  Supine to sit: Mod I  Sit to supine: Mod I  Scooting:  Mod I   Transfers Sit to stand: Vannessa  Stand to sit: Vannessa  Stand pivot: Vannessa with [address] Sit to stand: max Ax2  Stand to sit: max Ax2  Stand pivot: NT Modified Independent   Ambulation    20 feet with [address] with Vannessa Unable to advance BLE this date >100 feet with AAD with Modified [place]   Stair negotiation: ascended and descended  NT NT 3 steps with 1 rail with SBA   BLE ROM WNL WFL    BLE Strength RLE: grossly 4-/5  LLE: grossly 3+/5 BLE hip flex 1/5 knee ext 1/5, knee flex 0/5, ankle DF/PF 0/5    Balance Sitting: Independent  Standing: Vannessa with [employer] Sitting: SBA  Dynamic Standing: max Ax2 with ww Modified Independent     Pt is A & O x 4  Edema:  None noted in BLE    Therapeutic Exercises:    Bed mobility: supine<>sit, cued for EOB positioning  Transfers: Sit<>stand x3 with max Ax2  Balance: standing with ww, weight shifts laterally      Patient education  Pt educated on role of PT, continued importance of functional mobility during hospital stay, safety with bed mobility and transfers    Patient response to education:   Pt verbalized understanding Pt demonstrated skill Pt requires further education in this area   yes yes reinforce      ASSESSMENT:    Comments:    Pt supine in bed upon entering, pt agreeable to participate. Pt anxious and emotional throughout, assisted with calming g pt and providing reassurance as needed. Pt instructed to transfer to EOB, pt assisting BLE to bedside and utilizing BUE to position herself appropriately at EOB. Pt demonstrating little to no muscle contraction throughout BLE when directed to do so. Pt stating this has gradually gotten worse since being admitted. Pt assisted into standing multiple times, pt utilizing knee extension and bracing her BLE posteriorly against the bedside in standing. Pt standing for ~30\" per bout, reporting increased BUE fatigue afterward. Pt able to extended hips forward in standing when cued to do so, difficult to assess if lower back musculature was compensating for glutes at this time. Pt was assisted back to bed, supporting BLE. Pt positioned herself comfortably utilizing BUE on bed rails to achieve desired positioning.  Pt demonstrating PROM of BLE she has been performing on a daily basis, per her reports. Pt cued for technique and educated on other exercises to complete while in bed. Pt remained in bed with all needs met and OT at bedside upon exiting. Treatment:  Patient practiced and was instructed in the following treatment:     Bed mobility training - pt given verbal and tactile cues to facilitate proper sequencing and safety during rolling and supine>sit as well as provided with physical assistance to complete task    STS and pivot transfer training - pt educated on proper hand and foot placement, safety and sequencing, and use of verbal and tactile cues to safely complete sit<>stand and pivot transfers with hands on assistance to complete task safely     PLAN:    Patient is making fair progress towards established goals. Will continue with current POC.       Time in  1508  Time out  1548    Total Treatment Time  38 minutes     CPT codes:  [] Gait training 33175 -- minutes  [] Manual therapy [id number] -- minutes  [x] Therapeutic activities [id number] 38 minutes  [] Therapeutic exercises 03208 -- minutes  [] Neuromuscular reeducation 22320 -- minutes    Roseann Walsh PT, DPT  [id number]

## 2022-01-03 NOTE — CARE COORDINATION
Per Lilliana at [facility], they do not have a bed. I met with pateint in room to inform her and she would like to try [facility]. Message left for Manish Richards at [facility] to make referral. Await call back. Veda Hernandez RN, CM      Received call back from Mansoor Jose from [facility], referral given. Await acceptance. Received call from patient's sister in law Narciso Hernandez. She would also like referrals made to 1. [facility]  2. [facility] as well incase [facility] cannot accept. Per Markus Weathers at [facility], they do not have a bed. Referral given to Corrine Stephen at [facility]. Await acceptance from either them or [facility]. Veda Hernandez RN, CM        The Plan for Transition of Care is related to the following treatment goals: increase functional ability    The Patient and/or patient representative patient was provided with a choice of provider and agrees   with the discharge plan. [x] Yes [] No    Freedom of choice list was provided with basic dialogue that supports the patient's individualized plan of care/goals, treatment preferences and shares the quality data associated with the providers.  [x] Yes [] No

## 2022-01-03 NOTE — PLAN OF CARE
Problem: Falls - Risk of:  Goal: Will remain free from falls  Description: Will remain free from falls  1/3/2022 0027 by Srinivas Logan RN  Outcome: Met This Shift  1/2/2022 1052 by Sven Jolly RN  Outcome: Met This Shift  Goal: Absence of physical injury  Description: Absence of physical injury  1/3/2022 0027 by Srinivas Logan RN  Outcome: Met This Shift  1/2/2022 1052 by Sven Jolly RN  Outcome: Met This Shift     Problem: Pain:  Goal: Pain level will decrease  Description: Pain level will decrease  1/3/2022 0027 by Srinivas Logan RN  Outcome: Met This Shift  1/2/2022 1052 by Sven Jolly RN  Outcome: Met This Shift  Goal: Control of acute pain  Description: Control of acute pain  1/3/2022 0027 by Srinivas Logan RN  Outcome: Met This Shift  1/2/2022 1052 by Sven Jolly RN  Outcome: Met This Shift  Goal: Control of chronic pain  Description: Control of chronic pain  1/3/2022 0027 by Srinivas Logan RN  Outcome: Met This Shift  1/2/2022 1052 by Sven Jolly RN  Outcome: Met This Shift     Problem: Skin Integrity:  Goal: Will show no infection signs and symptoms  Description: Will show no infection signs and symptoms  1/3/2022 0027 by Srinivas Logan RN  Outcome: Met This Shift  1/2/2022 1052 by Sven Jolly RN  Outcome: Met This Shift  Goal: Absence of new skin breakdown  Description: Absence of new skin breakdown  1/3/2022 0027 by Srinivas Logan RN  Outcome: Met This Shift  1/2/2022 1052 by Sven Jolly RN  Outcome: Met This Shift

## 2022-01-03 NOTE — PROGRESS NOTES
Hospitalist Progress Note            Patient: Milady Petersen Age: 48 y.o.   DOA: 12/20/2021 Admit Dx / CC: Bilateral leg weakness [R29.898]  Weakness of both lower extremities [R29.898]  Complaints of weakness of lower extremity [R29.898]  T2 hyperintense foci present in cerebral cortex on magnetic resonance imaging [R90.89]  Spinal stenosis of lumbar region, unspecified whether neurogenic claudication present [M48.061]  Ambulatory dysfunction [R26.2]   LOS:  LOS: 13 days      Assessment/ Plan:     SYNOPSIS: Patient admitted on 12/20/2021  presented to ED with complaints of lower extremity weakness.  This has been ongoing since November 23 when she received Regeneron. She then started to develop saddle anesthesia, urinary and fecal incontinence.  Reports that feeling in her legs has diminished.   MRI of the C-spine, T-spine and L-spine was obtained and revealed L4-L5 severe bilateral neural foraminal narrowing and nonspecific foci of periventricular and subcortical cerebral white matter T2 flair hyperintensity.  Pt had extensive w/u incld arterial and venous PVLs, EMG and per neurology pts neuropathy likely related to b12 deficiency and may take sometime to recover and she has been explained that multiple times by Neurology and IM. She is currently awaiting placement. Bilateral lower extremity weakness/Ambulatory dysfunction with report of bowel and bladder incontinance likely 2/2 severe B12 deficinecy  MRI: lumbar radiculopathy at L4-L5, mild canal stenosis and severe bilateral neural foraminal narrowing. At L3-L4, moderate canal stenosis and moderate bilateral neural foraminal narrowing. Mild nonspecific foci of periventricular and subcortical cerebral white matter T2/FLAIR hyperintensity. Pt was evaluated by Dr Sanders/Sophia and advised MRI does not explain pt's complaints.    hx of LESI with most recent 9/21/21  hx of Covid 19 in Nov 2021 with regeneron infusion  paresthesias to BLE but her exam showed no sensory deficit, per neuro pt was able to feel LT, PP and Vibration, hyperreflexia   Per neuro not typical presentation of GBS   B/L LE arterial and venous PVLs are negative for DVT or stenosis  EMG consistent with neuropathy and not myopathy  Homocysteine level wnl  B12 prior to admission was in 160, now >2000  Copper WNL  Repeat x1 dose IV methylprednisolone followed by short coarse of prednisone for sciatica (pt was due for her NASRIN with pain mx as OP while hospitalized) and says slightly helped with back pain but not leg pain   Need to follow B12 closely as OP    Physical deconditioning and functional decline related to prolonged hospitalization  Awaiting SNF placement    Iron deficiency anemia  received IV iron    DVT ppx: Loveox  Code status: Full code    Medically stable for discharge to SNF when bed available, likely 1/3/22    Plan of care discussed with: patient, RN    MRI C/T spine:  Impression   1.  Somewhat limited exams       2.  Given technical artifacts, the visualized portions of the spinal cord and   intracranial contents appear grossly negative for pathologic enhancement and   otherwise unremarkable/not significantly changed, within the limits of this   exam.       3.  Mildly-exaggerated thoracic kyphosis, with grossly unchanged multilevel   degenerative disease, without high-grade central spinal canal stenosis,   neural foraminal narrowing, or mass effect upon neural elements throughout   the cervical and thoracic region, as described.       4.  Mild diffuse paraspinal muscular atrophy       5.  Otherwise, no definite correlate for the patient's reported bilateral   lower extremity weakness, within the limits of these studies.       6.  Incidental findings, most notable for small fluid signal is scattered   about the left mastoid air cell complex, with trace involvement on the right   side, incompletely and only incidentally demonstrated.  Please see above   comments.      MRI L spine:  Impression   1.  Slightly limited exam       2.  There appear to be post-surgical alterations of prior left-sided   hemilaminotomy at L3 and L4, for prior partial discectomy at L3/4 and L4/5.       3.  Moderate bilateral facet joint effusions and heterogeneous enhancement   involve both L4/5 facet joints and surrounding marrow, with slightly lesser   extension into both subjacent posterolateral aspects of the traversing   epidural space, followed by surrounding paraspinal musculature, lessening   both superiorly to L3/4, and inferiorly to L5/S1.  While the above findings   could be degenerative and/or inflammatory arthro-pathic in nature, clinical   correlation is advised to exclude developing bilateral septic   arthritis/facet-itis.       4.  At L4/5, there is minimal enhancement of several traversing bilateral   nerve roots, and clinical correlation is advised to exclude early   arachnoiditis.       5.  Otherwise, no significant change since the most recent MR and CT   examinations of the lumbar spine.       .       RECOMMENDATIONS:   1.  Impression 1: Recommend post-treatment follow-up MRI of the lumbar spine,   without and with contrast, in 4 - 6 weeks, or as directed clinically. MRI brain; Impression   1. No evidence of acute intracranial abnormality. 2. Mild white matter signal abnormality as described above.  No significant   change.  The findings could be related to chronic microvascular disease   chronic migraines, vasculitis or Lyme disease.  The possibility of   demyelinating disease cannot be excluded although the findings are not   typical for demyelinating plaques.          Patient Active Problem List   Diagnosis    COVID    Complaints of weakness of lower extremity    Weakness of both lower extremities    Ambulatory dysfunction    Iron deficiency anemia    Bilateral sciatica    Behavior problem, adult    Physical deconditioning    Declining functional status        Medications: Reviewed    Infusion Medications    sodium chloride       Scheduled Medications    diclofenac sodium  4 g Topical 4x Daily    polyethylene glycol  17 g Oral BID    sennosides-docusate sodium  2 tablet Oral QPM    mirabegron  25 mg Oral Daily    sodium chloride flush  5-40 mL IntraVENous 2 times per day    folic acid  1 mg Oral Daily    vitamin B-12  1,000 mcg Oral Daily    multivitamin  1 tablet Oral Daily    baclofen  10 mg Oral BID    melatonin  5 mg Oral Nightly    fluticasone  1 spray Nasal Daily    enoxaparin  40 mg SubCUTAneous Daily     PRN Meds: sennosides-docusate sodium, sodium chloride flush, sodium chloride, acetaminophen **OR** [DISCONTINUED] acetaminophen, melatonin, promethazine **OR** ondansetron, polyethylene glycol, naproxen    I/O    Intake/Output Summary (Last 24 hours) at 1/3/2022 0931  Last data filed at 1/3/2022 0553  Gross per 24 hour   Intake 975 ml   Output --   Net 975 ml       Labs:   No results for input(s): WBC, HGB, HCT, PLT in the last 72 hours. No results for input(s): NA, K, CL, CO2, BUN, CREATININE, CALCIUM, PHOS in the last 72 hours. Invalid input(s): MAGNES    No results for input(s): PROT, ALB, ALKPHOS, ALT, AST, BILITOT, AMYLASE, LIPASE in the last 72 hours. No results for input(s): INR in the last 72 hours. No results for input(s): Chyrel Lions in the last 72 hours. Chronic labs:  Lab Results   Component Value Date    TSH 1.460 12/26/2021    INR 1.0 12/20/2021       Radiology:  Imaging studies reviewed today.      Subjective:     Feeling better still weak wants to go  No CP or SOB  No fever or chills   No uncontrolled pain  No vomiting or diarrhea   No events reported overnight     Objective:     Physical Exam:   /88   Pulse 88   Temp 98.6 °F (37 °C) (Oral)   Resp 18   Ht 5' (1.524 m)   Wt 226 lb 12.8 oz (102.9 kg)   SpO2 100%   BMI 44.29 kg/m²     General appearance:in no distress, up in bed  Lungs: Clear to auscultation bilaterally, no wheezing or crackles   Heart: Regular rate and rhythm, S1, S2 normal   Abdomen: Soft, non-tender and not-distended. Bowel sounds normal.   Extremities: no edema   Skin: B/L LE warm to touch  Neurologic: Grossly normal and non focal, CN II-XII intact. Patient able to move B/L LE weak.  +2/3 reflexes in B/L patellar and ankle  Psych: Normal affect no HI or SI      Michael Avila MD   Hospitalist Service   01/03/22 9:31 AM

## 2022-01-03 NOTE — PROGRESS NOTES
Occupational Therapy  OT BEDSIDE TREATMENT NOTE      Date:1/3/2022  Patient Name: Rishabh Lopez  MRN: [id number]  : 1975  Room: [address] [place], [place] [address]     Evaluating OT: Michael Shahid OTR/L #[id number]     Referring Provider and Specific Provider Orders/Date:      21   OT eval and treat  Start:  21,   End:  21,   ONE TIME,   Standing Count:  1 Occurrences,   R         Angela Escobedo,       Diagnosis: Bilateral leg weakness [R29.898]  Weakness of both lower extremities [R29.898]  Complaints of weakness of lower extremity [R29.898]  T2 hyperintense foci present in cerebral cortex on magnetic resonance imaging [R90.89]  Spinal stenosis of lumbar region, unspecified whether neurogenic claudication present [M48.061]      Surgery: None       Pertinent Medical History:  has no past medical history on file. Precautions:  Fall Risk, B LE weakness and tingling/numbess      Assessment of current deficits   [x]? Functional mobility           [x]? ADLs           [x]? Strength                  []?Cognition   [x]? Functional transfers         [x]? IADLs         []? Safety Awareness   []? Endurance   []? Fine Coordination                         [x]? Balance      []? Vision/perception   [x]? Sensation     []? Gross Motor Coordination             []? ROM           []?  Delirium                   []? Motor Control      OT PLAN OF CARE   OT POC based on physician orders, patient diagnosis and results of clinical assessment     Frequency/Duration:  1-3 days/wk for 2 weeks PRN   Specific OT Treatment to include:   * Instruction/training on adapted ADL techniques and AE recommendations to increase functional independence within precautions       * Training on energy conservation strategies, correct breathing pattern and techniques to improve independence/tolerance for self-care routine  * Functional transfer/mobility training/DME recommendations for increased independence, safety, and fall prevention  * Patient/Family education to increase follow through with safety techniques and functional independence  * Recommendation of environmental modifications for increased safety with functional transfers/mobility and ADLs  * Therapeutic exercise to improve motor endurance, ROM, and functional strength for ADLs/functional transfers  * Therapeutic activities to facilitate/challenge dynamic balance, stand tolerance for increased safety and independence with ADLs     Recommended Adaptive Equipment: wheeled walker, sock aide/reacher, possible bedside commode and tub transfer bench (pt states her tub is too high for one)     Home Living: Lives with fiance and 22 y/o son, single family home, 2 story with 1st floor set-up, 3+1 step to enter without. Pt's workspace is located on 2nd level. Bathroom set-up: tub/shower          Equipment owned: pt borrowed a wheeled walker, rollator, and tub transfer bench from a family member - does not own any DME     Prior Level of Function: Independent with ADLs , Independent with IADLs; ambulated indep at baseline but has recently been using a wheeled walker d/t LE weakness.      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      Cognition: A&O: 4/4             Memory: good              Sequencing: good              Problem solving: good              Judgement/safety: good   Other Comments: Patient stating she is feeling paralyzed from waist down to toes.      Functional Assessment:  AM-PAC Daily Activity Raw Score: 14/24    Initial Eval Status  Date: 12/21/21 Treatment Status  Date: 1/3/22 STGs = LTGs  Time frame: 10-14 days   Feeding Independent      Independent    Pt seated upright in high nguyen with LB supported to complete meal.     Independent    Grooming Stand by Assist     Setup; To perform washing face and combing hair while sitting up in the bed. Moderate [place]    UB Dressing Stand by Assist      Setup  Pt donned/doffed hospital gown seated. Moderate [place]    LB Dressing Maximal Assist   From seated position to doff/don socks initially. Pt trialed AE to doff/don socks requiring Supervision/min cueing for technique. Dep;    Pt requires Dep A to perform ann/doff of socks of compression stockings and donning of shoes. Moderate [place]    Bathing Minimal Assist     Mod A;    Pt simulated bathing tasks with patient requiring assistance for back and feet while in supine. Moderate [place]    Toileting Minimal Assist     Dep; Pt requires use of bedpan due to patient unable to transfer this date. Moderate [place]    Bed Mobility  Supine to sit: Supervision   Sit to supine: Supervision  SBA; To transfer from supine to sitting position with cues to slow down pace and improve positioning. Supine to sit: Independent   Sit to supine: Independent    Functional Transfers Sit to stand: Minimal Assist  Stand to sit: Minimal Assist     Transfer training with verbal cues for hand placement to improve safety/prevent pulling on on wheeled walker. Sit to stand: Max A of 2;    Use of w/w and Max A of 2 to improve standing tolerance. Moderate [place] with use of walker    Functional Mobility Minimal Assist with walker to improve balance short distances at bedside, verbal cues for safety. N/A Moderate [place] with use of walker    Balance Sitting:     Static: Supervision     Dynamic: Supervision   Standing: CGA/MIN A with walker  Sitting:  SBA    Standing: Max A of 2 Sitting:     Static: indep    Dynamic: indep   Standing: indep with walker       Activity Tolerance fair  plus  Fair     Pt eager to participate in therapy. Increase standing tolerance >3 minutes for improved engagement with functional transfers and indep in ADLs   Visual/  Perceptual Glasses:  Yes    NA       Treatment: Upon arrival, pt was seated upright in high nguyen position, agreeable to therapy, nursing present okaying pt to be seen this session. Pt completed of transfers and ADL tasks this session. Pt having of poor ability to control LEs, poor standing/balance/endurance, becoming frustrated and tearful, therapists providing encouragement. Pt required 2 person assist for safe transfers due to complexity of medical condition and deconditioning. At end of session, pt seated upright in high nguyen position in bed, tray table in front of her, and call light within reach. Education: Pt was educated on role of OT, goals to be reached, safety and hand placement with transfers, safety/balance with standing, use of AE to assist with LB dressing/bathing tasks. · Pt has made fair progress towards set goals.        Treatment Charges: Mins Units   Ther Ex  [id number]     Manual Therapy Girish Montalvo [phone]   ADL/Home Mgt [id number]   Neuro Re-ed [id number]     Group Therapy      Orthotic manage/training  [id number]     Non-Billable Time       Time In: 3:06  Time Out: 4:06  Total Time: 60 mins     Tanvi Elise 46, [address]

## 2022-01-04 LAB — SARS-COV-2, NAAT: NOT DETECTED

## 2022-01-04 PROCEDURE — 1200000000 HC SEMI PRIVATE

## 2022-01-04 PROCEDURE — 87635 SARS-COV-2 COVID-19 AMP PRB: CPT

## 2022-01-04 PROCEDURE — 6370000000 HC RX 637 (ALT 250 FOR IP): Performed by: PHYSICIAN ASSISTANT

## 2022-01-04 PROCEDURE — 6370000000 HC RX 637 (ALT 250 FOR IP): Performed by: INTERNAL MEDICINE

## 2022-01-04 PROCEDURE — 6370000000 HC RX 637 (ALT 250 FOR IP): Performed by: NURSE PRACTITIONER

## 2022-01-04 PROCEDURE — 6360000002 HC RX W HCPCS: Performed by: FAMILY MEDICINE

## 2022-01-04 RX ORDER — GUAIFENESIN/DEXTROMETHORPHAN 100-10MG/5
5 SYRUP ORAL EVERY 4 HOURS PRN
Status: DISCONTINUED | OUTPATIENT
Start: 2022-01-04 | End: 2022-01-05 | Stop reason: HOSPADM

## 2022-01-04 RX ORDER — POLYETHYLENE GLYCOL 3350 17 G/17G
17 POWDER, FOR SOLUTION ORAL 2 TIMES DAILY
Qty: 527 G | Refills: 1 | Status: ON HOLD | DISCHARGE
Start: 2022-01-04 | End: 2022-02-04 | Stop reason: HOSPADM

## 2022-01-04 RX ORDER — SENNA AND DOCUSATE SODIUM 50; 8.6 MG/1; MG/1
2 TABLET, FILM COATED ORAL 2 TIMES DAILY PRN
Status: ON HOLD | DISCHARGE
Start: 2022-01-04 | End: 2022-02-04 | Stop reason: HOSPADM

## 2022-01-04 RX ADMIN — BACLOFEN 10 MG: 10 TABLET ORAL at 22:14

## 2022-01-04 RX ADMIN — BACLOFEN 10 MG: 10 TABLET ORAL at 09:42

## 2022-01-04 RX ADMIN — ACETAMINOPHEN 1000 MG: 500 TABLET ORAL at 22:15

## 2022-01-04 RX ADMIN — Medication 5 MG: at 22:14

## 2022-01-04 RX ADMIN — Medication 1000 MCG: at 09:42

## 2022-01-04 RX ADMIN — FLUTICASONE PROPIONATE 1 SPRAY: 50 SPRAY, METERED NASAL at 09:46

## 2022-01-04 RX ADMIN — DICLOFENAC SODIUM 4 G: 10 GEL TOPICAL at 09:46

## 2022-01-04 RX ADMIN — Medication 1 TABLET: at 09:41

## 2022-01-04 RX ADMIN — ENOXAPARIN SODIUM 40 MG: 100 INJECTION SUBCUTANEOUS at 09:41

## 2022-01-04 RX ADMIN — FOLIC ACID 1 MG: 1 TABLET ORAL at 09:42

## 2022-01-04 RX ADMIN — NAPROXEN 500 MG: 500 TABLET ORAL at 05:37

## 2022-01-04 RX ADMIN — GUAIFENESIN SYRUP AND DEXTROMETHORPHAN 5 ML: 100; 10 SYRUP ORAL at 23:36

## 2022-01-04 RX ADMIN — DICLOFENAC SODIUM 4 G: 10 GEL TOPICAL at 22:16

## 2022-01-04 ASSESSMENT — PAIN SCALES - GENERAL
PAINLEVEL_OUTOF10: 4
PAINLEVEL_OUTOF10: 6
PAINLEVEL_OUTOF10: 6

## 2022-01-04 NOTE — PLAN OF CARE
Problem: Falls - Risk of:  Goal: Will remain free from falls  Description: Will remain free from falls  1/3/2022 2350 by Tyshawn Bojorquez RN  Outcome: Met This Shift  1/3/2022 1518 by Ai Spence RN  Outcome: Met This Shift     Problem: Pain:  Goal: Pain level will decrease  Description: Pain level will decrease  1/3/2022 2350 by Tyshawn Bojorquez RN  Outcome: Met This Shift  1/3/2022 1518 by Ai Spence RN  Outcome: Met This Shift     Problem: Pain:  Goal: Control of acute pain  Description: Control of acute pain  1/3/2022 2350 by Tyshawn Bojorquez RN  Outcome: Met This Shift  1/3/2022 1518 by Ai Spence RN  Outcome: Met This Shift     Problem: Skin Integrity:  Goal: Will show no infection signs and symptoms  Description: Will show no infection signs and symptoms  1/3/2022 2350 by Tyshawn Bojorquez RN  Outcome: Met This Shift  1/3/2022 1518 by Ai Spence RN  Outcome: Met This Shift

## 2022-01-04 NOTE — CARE COORDINATION
Kaylin from [facility] is checking to see if they can accept patient. Await call back. Per Chrissy Forbes from [facility], they are able to accept patient as long as there is a bed available at time of discharge. Await acceptance from [facility]. Daria Arreola RN, CM        Per Kaylin at [facility], they do not have an appropriate bed for the patient. In informed the patient and she would like to go anywhere that can accept the patient in the local [facility] area. Referral made to Lauren Escalona at [facility]. Await acceptance to one of their facilities. Voicemail message left for [facility] to make referral. Await call back. I offered home with health care for the patient and she declined to go home. She said she has no one to stay with her all day. PT [facility] 12/24 & OT [facility] 14/24.   Daria Arreola RN, CM

## 2022-01-04 NOTE — PROGRESS NOTES
Hospitalist Progress Note            Patient: Dany Morataya Age: 48 y.o.   DOA: 12/20/2021 Admit Dx / CC: Bilateral leg weakness [R29.898]  Weakness of both lower extremities [R29.898]  Complaints of weakness of lower extremity [R29.898]  T2 hyperintense foci present in cerebral cortex on magnetic resonance imaging [R90.89]  Spinal stenosis of lumbar region, unspecified whether neurogenic claudication present [M48.061]  Ambulatory dysfunction [R26.2]   LOS:  LOS: 14 days      Assessment/ Plan:     SYNOPSIS: Patient admitted on 12/20/2021  presented to ED with complaints of lower extremity weakness.  This has been ongoing since November 23 when she received Regeneron. She then started to develop saddle anesthesia, urinary and fecal incontinence.  Reports that feeling in her legs has diminished.   MRI of the C-spine, T-spine and L-spine was obtained and revealed L4-L5 severe bilateral neural foraminal narrowing and nonspecific foci of periventricular and subcortical cerebral white matter T2 flair hyperintensity.  Pt had extensive w/u incld arterial and venous PVLs, EMG and per neurology pts neuropathy likely related to b12 deficiency and may take sometime to recover and she has been explained that multiple times by Neurology and IM. She is currently awaiting placement. Bilateral lower extremity weakness/Ambulatory dysfunction with report of bowel and bladder incontinance likely 2/2 severe B12 deficinecy  MRI: lumbar radiculopathy at L4-L5, mild canal stenosis and severe bilateral neural foraminal narrowing. At L3-L4, moderate canal stenosis and moderate bilateral neural foraminal narrowing. Mild nonspecific foci of periventricular and subcortical cerebral white matter T2/FLAIR hyperintensity. Pt was evaluated by Dr Sanders/NSx and advised MRI does not explain pt's complaints.    hx of LESI with most recent 9/21/21  hx of Covid 19 in Nov 2021 with regeneron infusion  paresthesias to BLE but her exam showed no sensory deficit, per neuro pt was able to feel LT, PP and Vibration, hyperreflexia   Per neuro not typical presentation of GBS   B/L LE arterial and venous PVLs are negative for DVT or stenosis  EMG consistent with neuropathy and not myopathy  Homocysteine level wnl  B12 prior to admission was in 160, now >2000  Copper WNL  Repeat x1 dose IV methylprednisolone followed by short coarse of prednisone for sciatica (pt was due for her NASRIN with pain mx as OP while hospitalized) and says slightly helped with back pain but not leg pain   Need to follow B12 closely as OP    Physical deconditioning and functional decline related to prolonged hospitalization  Awaiting SNF placement    Iron deficiency anemia  received IV iron    DVT ppx: Loveox  Code status: Full code    Medically stable for discharge to SNF when bed available    Plan of care discussed with: patient, RN    MRI C/T spine:  Impression   1.  Somewhat limited exams       2.  Given technical artifacts, the visualized portions of the spinal cord and   intracranial contents appear grossly negative for pathologic enhancement and   otherwise unremarkable/not significantly changed, within the limits of this   exam.       3.  Mildly-exaggerated thoracic kyphosis, with grossly unchanged multilevel   degenerative disease, without high-grade central spinal canal stenosis,   neural foraminal narrowing, or mass effect upon neural elements throughout   the cervical and thoracic region, as described.       4.  Mild diffuse paraspinal muscular atrophy       5.  Otherwise, no definite correlate for the patient's reported bilateral   lower extremity weakness, within the limits of these studies.       6.  Incidental findings, most notable for small fluid signal is scattered   about the left mastoid air cell complex, with trace involvement on the right   side, incompletely and only incidentally demonstrated.  Please see above   comments. MRI L spine:  Impression   1.  Slightly limited exam       2.  There appear to be post-surgical alterations of prior left-sided   hemilaminotomy at L3 and L4, for prior partial discectomy at L3/4 and L4/5.       3.  Moderate bilateral facet joint effusions and heterogeneous enhancement   involve both L4/5 facet joints and surrounding marrow, with slightly lesser   extension into both subjacent posterolateral aspects of the traversing   epidural space, followed by surrounding paraspinal musculature, lessening   both superiorly to L3/4, and inferiorly to L5/S1.  While the above findings   could be degenerative and/or inflammatory arthro-pathic in nature, clinical   correlation is advised to exclude developing bilateral septic   arthritis/facet-itis.       4.  At L4/5, there is minimal enhancement of several traversing bilateral   nerve roots, and clinical correlation is advised to exclude early   arachnoiditis.       5.  Otherwise, no significant change since the most recent MR and CT   examinations of the lumbar spine.       .       RECOMMENDATIONS:   1.  Impression 1: Recommend post-treatment follow-up MRI of the lumbar spine,   without and with contrast, in 4 - 6 weeks, or as directed clinically. MRI brain; Impression   1. No evidence of acute intracranial abnormality. 2. Mild white matter signal abnormality as described above.  No significant   change.  The findings could be related to chronic microvascular disease   chronic migraines, vasculitis or Lyme disease.  The possibility of   demyelinating disease cannot be excluded although the findings are not   typical for demyelinating plaques.          Patient Active Problem List   Diagnosis    COVID    Complaints of weakness of lower extremity    Weakness of both lower extremities    Ambulatory dysfunction    Iron deficiency anemia    Bilateral sciatica    Behavior problem, adult    Physical deconditioning    Declining functional status        Medications:  Reviewed    Infusion Medications    sodium chloride       Scheduled Medications    diclofenac sodium  4 g Topical 4x Daily    polyethylene glycol  17 g Oral BID    sennosides-docusate sodium  2 tablet Oral QPM    mirabegron  25 mg Oral Daily    sodium chloride flush  5-40 mL IntraVENous 2 times per day    folic acid  1 mg Oral Daily    vitamin B-12  1,000 mcg Oral Daily    multivitamin  1 tablet Oral Daily    baclofen  10 mg Oral BID    melatonin  5 mg Oral Nightly    fluticasone  1 spray Nasal Daily    enoxaparin  40 mg SubCUTAneous Daily     PRN Meds: sennosides-docusate sodium, sodium chloride flush, sodium chloride, acetaminophen **OR** [DISCONTINUED] acetaminophen, melatonin, promethazine **OR** ondansetron, polyethylene glycol, naproxen    I/O    Intake/Output Summary (Last 24 hours) at 1/4/2022 0734  Last data filed at 1/3/2022 2209  Gross per 24 hour   Intake --   Output 500 ml   Net -500 ml       Labs:   No results for input(s): WBC, HGB, HCT, PLT in the last 72 hours. No results for input(s): NA, K, CL, CO2, BUN, CREATININE, CALCIUM, PHOS in the last 72 hours. Invalid input(s): MAGNES    No results for input(s): PROT, ALB, ALKPHOS, ALT, AST, BILITOT, AMYLASE, LIPASE in the last 72 hours. No results for input(s): INR in the last 72 hours. No results for input(s): Lilian Anchors in the last 72 hours. Chronic labs:  Lab Results   Component Value Date    TSH 1.460 12/26/2021    INR 1.0 12/20/2021       Radiology:  Imaging studies reviewed today.      Subjective:     Feeling better still weak wants to go  No CP or SOB  No fever or chills   No uncontrolled pain  No vomiting or diarrhea   No events reported overnight     Objective:     Physical Exam:   BP (!) 143/84   Pulse 96   Temp 97.3 °F (36.3 °C) (Temporal)   Resp 18   Ht 5' (1.524 m)   Wt 226 lb 12.8 oz (102.9 kg)   SpO2 95%   BMI 44.29 kg/m²     General appearance:in no distress, up in bed  Lungs: Clear to auscultation bilaterally, no wheezing or crackles   Heart: Regular rate and rhythm, S1, S2 normal   Abdomen: Soft, non-tender and not-distended. Bowel sounds normal.   Extremities: no edema   Skin: B/L LE warm to touch  Neurologic: Grossly normal and non focal, CN II-XII intact. Patient able to move B/L LE weak.  +2/3 reflexes in B/L patellar and ankle  Psych: Normal affect no HI or SI      Nikolas Tolentino MD   Hospitalist Service   01/04/22 7:34 AM

## 2022-01-04 NOTE — PROGRESS NOTES
Nutrition Assessment     Type and Reason for Visit: Reassess    Nutrition Recommendations/Plan: Continue current diet     Nutrition Assessment:  Pt nutritionally stable, consuming >75% meals, awaiting SNF placement. Pt adm w/ BLE weakness 2/2 B12 deficiency. Will continue to monitor    Malnutrition Assessment:  Malnutrition Status: No malnutrition    Nutrition Related Findings: pt alert, abd WDL, +1 edema, +I/Os      Current Nutrition Therapies:    ADULT DIET; Regular    Anthropometric Measures:  · Height: 5' (152.4 cm)  · Current Body Wt: 226 lb (102.5 kg) (bed scale 12/22)   · BMI: 44.1    Nutrition Diagnosis:   No nutrition diagnosis at this time     Nutrition Interventions:   Food and/or Nutrient Delivery:  Continue Current Diet  Nutrition Education/Counseling:  Education not indicated   Coordination of Nutrition Care:  Continue to monitor while inpatient    Goals:  Consume >75% meals       Nutrition Monitoring and Evaluation:   Behavioral-Environmental Outcomes:  None Identified   Food/Nutrient Intake Outcomes:  Diet Advancement/Tolerance,Food and Nutrient Intake  Physical Signs/Symptoms Outcomes:  Biochemical Data,GI Status,Fluid Status or Edema,Nutrition Focused Physical Findings,Skin,Weight     Discharge Planning:     Too soon to determine     Electronically signed by Demian Martinez MS, RD, LD on 1/4/22 at 11:19 AM EST    Contact: 1218

## 2022-01-04 NOTE — DISCHARGE INSTR - COC
Continuity of Care Form    Patient Name: Alice Serra   :  1975  MRN:  [id number]    Admit date:  2021  Discharge date:  22    Code Status Order: Full Code   Advance Directives:      Admitting Physician:  Oliver Ivan DO  PCP: Vicki Stoddard DO    Discharging Nurse: Vernell Ortiz, [facility] Unit/Room#: 5587/8361-S  Discharging Unit Phone Number: [phone]    Emergency Contact:   Extended Emergency Contact Information  Primary Emergency Contact: Inga Wright  Address: [address] [place] [address] Phone: [phone]  Relation: Other  Secondary Emergency Contact: oscar celaya  Address: [address] [place] [address] Phone: [phone]  Relation: Spouse  Preferred language: English   needed? No    Past Surgical History:  Past Surgical History:   Procedure Laterality Date    ANKLE SURGERY      bilateral    APPENDECTOMY      DILATION AND CURETTAGE OF UTERUS      ELBOW SURGERY      right    ENDOMETRIAL ABLATION  2016    GALLBLADDER SURGERY      LEEP      cervical conization    TUBAL LIGATION         Immunization History: There is no immunization history on file for this patient.     Active Problems:  Patient Active Problem List   Diagnosis Code    COVID U07.1    Complaints of weakness of lower extremity R29.898    Weakness of both lower extremities R29.898    Ambulatory dysfunction R26.2    Iron deficiency anemia D50.9    Bilateral sciatica M54.31, M54.32    Behavior problem, adult F69    Physical deconditioning R53.81    Declining functional status R53.81       Isolation/Infection:   Isolation            No Isolation          Patient Infection Status       Infection Onset Added Last Indicated Last Indicated By Review Planned Expiration Resolved Resolved By    COVID-19 (Rule Out) 22 COVID-19 (Ordered) 01/10/22 01/17/22              Nurse Assessment:  Last Vital Signs: BP (!) 143/84   Pulse 96   Temp 97.3 °F (36.3 °C) (Temporal)   Resp 18   Ht 5' (1.524 m)   Wt 226 lb 12.8 oz (102.9 kg)   SpO2 95%   BMI 44.29 kg/m²     Last documented pain score (0-10 scale): Pain Level: 6  Last Weight:   Wt Readings from Last 1 Encounters:   12/22/21 226 lb 12.8 oz (102.9 kg)     Mental Status:  oriented and alert    IV Access:  - None    Nursing Mobility/ADLs:  Walking   Dependent  Transfer  Assisted  Bathing  Assisted  Dressing  Assisted  Toileting  Assisted  Feeding  Independent  Med Admin  Assisted  Med Delivery   whole    Wound Care Documentation and Therapy:        Elimination:  Continence: Bowel: Yes  Bladder: Yes  Urinary Catheter: None   Colostomy/Ileostomy/Ileal Conduit: No       Date of Last BM: 1/3/22    Intake/Output Summary (Last 24 hours) at 1/4/2022 0734  Last data filed at 1/3/2022 2209  Gross per 24 hour   Intake --   Output 500 ml   Net -500 ml     I/O last 3 completed shifts:  In: -   Out: 500 [Urine:500]    Safety Concerns:     None    Impairments/Disabilities:          Nutrition Therapy:  Current Nutrition Therapy:   - Oral Diet:  General    Routes of Feeding: Oral  Liquids: Thin Liquids  Daily Fluid Restriction: no  Last Modified Barium Swallow with Video (Video Swallowing Test): not done    Treatments at the Time of Hospital Discharge:   Respiratory Treatments: ***  Oxygen Therapy:  is not on home oxygen therapy.   Ventilator:    - No ventilator support    Rehab Therapies: Physical Therapy and Occupational Therapy  Weight Bearing Status/Restrictions: No weight bearing restirctions  Other Medical Equipment (for information only, NOT a DME order):  {EQUIPMENT:925683910}  Other Treatments: ***    Patient's personal belongings (please select all that are sent with patient):  Genia    RN SIGNATURE:  Electronically signed by Jaden Blandon RN on 1/5/22 at 2:12 PM EST    CASE MANAGEMENT/SOCIAL WORK SECTION    Inpatient Status Date: ***    Readmission Risk Assessment Score:  Readmission Risk              Risk of Unplanned Readmission:  11           Discharging to Facility/ Agency   Name:   Address:  Phone:  Fax:    Dialysis Facility (if applicable)   Name:  Address:  Dialysis Schedule:  Phone:  Fax:    / signature: {Esignature:[id number]}    PHYSICIAN SECTION    Prognosis: {Prognosis:8372880298}    Condition at Discharge: Catherine8 Rosmery Allen Patient Condition:487998643}    Rehab Potential (if transferring to Rehab): {Prognosis:0426286506}    Recommended Labs or Other Treatments After Discharge: ***    Physician Certification: I certify the above information and transfer of Fabienne Still  is necessary for the continuing treatment of the diagnosis listed and that she requires {Admit to Appropriate Level of Care:73617} for {GREATER/LESS:026557794} 30 days.      Update Admission H&P: {CHP DME Changes in BLXPQ:070563076}    PHYSICIAN SIGNATURE:  Electronically signed by Juan R Lake MD on 1/4/22 at 7:34 AM EST

## 2022-01-04 NOTE — PROGRESS NOTES
met with patient to evaluate again for rehab status. She was very tearful and expressing frustration over current status. Verbalized motivation for rehab and wanting to get back home again. Will review with Dr. Milena Gray this afternoon and advocate for patient's admission to ARU here.

## 2022-01-04 NOTE — PLAN OF CARE
Problem: Falls - Risk of:  Goal: Will remain free from falls  Description: Will remain free from falls  1/4/2022 1313 by Shanna Tian RN  Outcome: Met This Shift  1/3/2022 2350 by Chicho Jesus RN  Outcome: Met This Shift  Goal: Absence of physical injury  Description: Absence of physical injury  Outcome: Met This Shift     Problem: Pain:  Goal: Pain level will decrease  Description: Pain level will decrease  1/4/2022 1313 by Shanna Tian RN  Outcome: Met This Shift  1/3/2022 2350 by Chicho Jesus RN  Outcome: Met This Shift  Goal: Control of acute pain  Description: Control of acute pain  1/4/2022 1313 by Shanna Tian RN  Outcome: Met This Shift  1/3/2022 2350 by Chicho Jesus RN  Outcome: Met This Shift  Goal: Control of chronic pain  Description: Control of chronic pain  Outcome: Met This Shift     Problem: Skin Integrity:  Goal: Will show no infection signs and symptoms  Description: Will show no infection signs and symptoms  1/4/2022 1313 by Shanna Tian RN  Outcome: Met This Shift  1/3/2022 2350 by Chicho Jesus RN  Outcome: Met This Shift  Goal: Absence of new skin breakdown  Description: Absence of new skin breakdown  Outcome: Met This Shift

## 2022-01-05 ENCOUNTER — HOSPITAL ENCOUNTER (INPATIENT)
Age: 47
LOS: 30 days | Discharge: HOME | DRG: 421 | End: 2022-02-04
Attending: PHYSICAL MEDICINE & REHABILITATION | Admitting: PHYSICAL MEDICINE & REHABILITATION
Payer: COMMERCIAL

## 2022-01-05 VITALS
BODY MASS INDEX: 44.52 KG/M2 | SYSTOLIC BLOOD PRESSURE: 120 MMHG | HEIGHT: 60 IN | RESPIRATION RATE: 18 BRPM | TEMPERATURE: 96.6 F | DIASTOLIC BLOOD PRESSURE: 80 MMHG | OXYGEN SATURATION: 96 % | HEART RATE: 79 BPM | WEIGHT: 226.8 LBS

## 2022-01-05 PROBLEM — G62.9 POLYNEUROPATHY: Status: ACTIVE | Noted: 2022-01-05

## 2022-01-05 PROCEDURE — 6370000000 HC RX 637 (ALT 250 FOR IP): Performed by: NURSE PRACTITIONER

## 2022-01-05 PROCEDURE — 6370000000 HC RX 637 (ALT 250 FOR IP): Performed by: INTERNAL MEDICINE

## 2022-01-05 PROCEDURE — 6360000002 HC RX W HCPCS: Performed by: FAMILY MEDICINE

## 2022-01-05 PROCEDURE — 99223 1ST HOSP IP/OBS HIGH 75: CPT | Performed by: PHYSICAL MEDICINE & REHABILITATION

## 2022-01-05 PROCEDURE — 6370000000 HC RX 637 (ALT 250 FOR IP): Performed by: PHYSICIAN ASSISTANT

## 2022-01-05 PROCEDURE — 1280000000 HC REHAB R&B

## 2022-01-05 RX ORDER — SENNA AND DOCUSATE SODIUM 50; 8.6 MG/1; MG/1
2 TABLET, FILM COATED ORAL EVERY EVENING
Status: DISCONTINUED | OUTPATIENT
Start: 2022-01-05 | End: 2022-01-05

## 2022-01-05 RX ORDER — PROMETHAZINE HYDROCHLORIDE 25 MG/1
12.5 TABLET ORAL EVERY 6 HOURS PRN
Status: CANCELLED | OUTPATIENT
Start: 2022-01-05

## 2022-01-05 RX ORDER — POLYETHYLENE GLYCOL 3350 17 G/17G
17 POWDER, FOR SOLUTION ORAL DAILY PRN
Status: CANCELLED | OUTPATIENT
Start: 2022-01-05

## 2022-01-05 RX ORDER — SODIUM CHLORIDE 0.9 % (FLUSH) 0.9 %
5-40 SYRINGE (ML) INJECTION EVERY 12 HOURS SCHEDULED
Status: CANCELLED | OUTPATIENT
Start: 2022-01-05

## 2022-01-05 RX ORDER — MECOBALAMIN 5000 MCG
5 TABLET,DISINTEGRATING ORAL NIGHTLY
Status: CANCELLED | OUTPATIENT
Start: 2022-01-05

## 2022-01-05 RX ORDER — POLYETHYLENE GLYCOL 3350 17 G/17G
17 POWDER, FOR SOLUTION ORAL 2 TIMES DAILY
Status: DISCONTINUED | OUTPATIENT
Start: 2022-01-05 | End: 2022-02-04 | Stop reason: HOSPADM

## 2022-01-05 RX ORDER — ONDANSETRON 4 MG/1
4 TABLET, ORALLY DISINTEGRATING ORAL EVERY 6 HOURS PRN
Status: DISCONTINUED | OUTPATIENT
Start: 2022-01-05 | End: 2022-02-04 | Stop reason: HOSPADM

## 2022-01-05 RX ORDER — ONDANSETRON 2 MG/ML
4 INJECTION INTRAMUSCULAR; INTRAVENOUS EVERY 6 HOURS PRN
Status: CANCELLED | OUTPATIENT
Start: 2022-01-05

## 2022-01-05 RX ORDER — ONDANSETRON 2 MG/ML
4 INJECTION INTRAMUSCULAR; INTRAVENOUS EVERY 6 HOURS PRN
Status: DISCONTINUED | OUTPATIENT
Start: 2022-01-05 | End: 2022-01-05

## 2022-01-05 RX ORDER — TROSPIUM CHLORIDE 20 MG/1
20 TABLET, FILM COATED ORAL NIGHTLY
Status: CANCELLED | OUTPATIENT
Start: 2022-01-06

## 2022-01-05 RX ORDER — SODIUM CHLORIDE 9 MG/ML
25 INJECTION, SOLUTION INTRAVENOUS PRN
Status: DISCONTINUED | OUTPATIENT
Start: 2022-01-05 | End: 2022-01-07

## 2022-01-05 RX ORDER — NAPROXEN 500 MG/1
500 TABLET ORAL 2 TIMES DAILY PRN
Status: CANCELLED | OUTPATIENT
Start: 2022-01-05

## 2022-01-05 RX ORDER — SODIUM CHLORIDE 0.9 % (FLUSH) 0.9 %
5-40 SYRINGE (ML) INJECTION EVERY 12 HOURS SCHEDULED
Status: DISCONTINUED | OUTPATIENT
Start: 2022-01-05 | End: 2022-01-07

## 2022-01-05 RX ORDER — BACLOFEN 10 MG/1
10 TABLET ORAL 2 TIMES DAILY
Status: CANCELLED | OUTPATIENT
Start: 2022-01-05

## 2022-01-05 RX ORDER — FOLIC ACID 1 MG/1
1 TABLET ORAL DAILY
Status: DISCONTINUED | OUTPATIENT
Start: 2022-01-05 | End: 2022-02-04 | Stop reason: HOSPADM

## 2022-01-05 RX ORDER — POLYETHYLENE GLYCOL 3350 17 G/17G
17 POWDER, FOR SOLUTION ORAL DAILY PRN
Status: DISCONTINUED | OUTPATIENT
Start: 2022-01-05 | End: 2022-02-04 | Stop reason: HOSPADM

## 2022-01-05 RX ORDER — NAPROXEN 500 MG/1
500 TABLET ORAL 2 TIMES DAILY PRN
Status: DISCONTINUED | OUTPATIENT
Start: 2022-01-05 | End: 2022-02-04 | Stop reason: HOSPADM

## 2022-01-05 RX ORDER — MULTIVITAMIN WITH IRON
1 TABLET ORAL DAILY
Status: CANCELLED | OUTPATIENT
Start: 2022-01-05

## 2022-01-05 RX ORDER — MECOBALAMIN 5000 MCG
5 TABLET,DISINTEGRATING ORAL NIGHTLY
Status: DISCONTINUED | OUTPATIENT
Start: 2022-01-05 | End: 2022-02-04 | Stop reason: HOSPADM

## 2022-01-05 RX ORDER — MECOBALAMIN 5000 MCG
5 TABLET,DISINTEGRATING ORAL NIGHTLY PRN
Status: DISCONTINUED | OUTPATIENT
Start: 2022-01-05 | End: 2022-02-04 | Stop reason: HOSPADM

## 2022-01-05 RX ORDER — ACETAMINOPHEN 500 MG
1000 TABLET ORAL EVERY 6 HOURS PRN
Status: CANCELLED | OUTPATIENT
Start: 2022-01-05

## 2022-01-05 RX ORDER — FLUTICASONE PROPIONATE 50 MCG
1 SPRAY, SUSPENSION (ML) NASAL DAILY
Status: CANCELLED | OUTPATIENT
Start: 2022-01-05

## 2022-01-05 RX ORDER — SODIUM CHLORIDE 0.9 % (FLUSH) 0.9 %
5-40 SYRINGE (ML) INJECTION PRN
Status: CANCELLED | OUTPATIENT
Start: 2022-01-05

## 2022-01-05 RX ORDER — PROMETHAZINE HYDROCHLORIDE 25 MG/1
12.5 TABLET ORAL EVERY 6 HOURS PRN
Status: DISCONTINUED | OUTPATIENT
Start: 2022-01-05 | End: 2022-01-05

## 2022-01-05 RX ORDER — CHOLECALCIFEROL (VITAMIN D3) 50 MCG
2000 TABLET ORAL DAILY
Status: DISCONTINUED | OUTPATIENT
Start: 2022-01-06 | End: 2022-02-04 | Stop reason: HOSPADM

## 2022-01-05 RX ORDER — SENNA PLUS 8.6 MG/1
2 TABLET ORAL NIGHTLY
Status: DISCONTINUED | OUTPATIENT
Start: 2022-01-05 | End: 2022-02-04 | Stop reason: HOSPADM

## 2022-01-05 RX ORDER — SENNA AND DOCUSATE SODIUM 50; 8.6 MG/1; MG/1
2 TABLET, FILM COATED ORAL 2 TIMES DAILY PRN
Status: CANCELLED | OUTPATIENT
Start: 2022-01-05

## 2022-01-05 RX ORDER — DOCUSATE SODIUM 100 MG/1
100 CAPSULE, LIQUID FILLED ORAL 2 TIMES DAILY
Status: DISCONTINUED | OUTPATIENT
Start: 2022-01-05 | End: 2022-02-04 | Stop reason: HOSPADM

## 2022-01-05 RX ORDER — GUAIFENESIN/DEXTROMETHORPHAN 100-10MG/5
5 SYRUP ORAL EVERY 4 HOURS PRN
Status: DISCONTINUED | OUTPATIENT
Start: 2022-01-05 | End: 2022-02-04

## 2022-01-05 RX ORDER — POLYETHYLENE GLYCOL 3350 17 G/17G
17 POWDER, FOR SOLUTION ORAL 2 TIMES DAILY
Status: CANCELLED | OUTPATIENT
Start: 2022-01-05

## 2022-01-05 RX ORDER — LANOLIN ALCOHOL/MO/W.PET/CERES
1000 CREAM (GRAM) TOPICAL DAILY
Status: CANCELLED | OUTPATIENT
Start: 2022-01-05

## 2022-01-05 RX ORDER — SENNA AND DOCUSATE SODIUM 50; 8.6 MG/1; MG/1
2 TABLET, FILM COATED ORAL EVERY EVENING
Status: CANCELLED | OUTPATIENT
Start: 2022-01-05

## 2022-01-05 RX ORDER — ACETAMINOPHEN 325 MG/1
650 TABLET ORAL EVERY 4 HOURS PRN
Status: DISCONTINUED | OUTPATIENT
Start: 2022-01-05 | End: 2022-02-04 | Stop reason: HOSPADM

## 2022-01-05 RX ORDER — POLYETHYLENE GLYCOL 3350 17 G/17G
17 POWDER, FOR SOLUTION ORAL DAILY PRN
Status: DISCONTINUED | OUTPATIENT
Start: 2022-01-05 | End: 2022-01-05

## 2022-01-05 RX ORDER — BACLOFEN 10 MG/1
10 TABLET ORAL 2 TIMES DAILY
Status: DISCONTINUED | OUTPATIENT
Start: 2022-01-05 | End: 2022-01-07

## 2022-01-05 RX ORDER — ACETAMINOPHEN 500 MG
1000 TABLET ORAL EVERY 6 HOURS PRN
Status: DISCONTINUED | OUTPATIENT
Start: 2022-01-05 | End: 2022-01-05

## 2022-01-05 RX ORDER — LANOLIN ALCOHOL/MO/W.PET/CERES
1000 CREAM (GRAM) TOPICAL DAILY
Status: DISCONTINUED | OUTPATIENT
Start: 2022-01-05 | End: 2022-01-28

## 2022-01-05 RX ORDER — FLUTICASONE PROPIONATE 50 MCG
1 SPRAY, SUSPENSION (ML) NASAL DAILY
Status: DISCONTINUED | OUTPATIENT
Start: 2022-01-05 | End: 2022-02-04 | Stop reason: HOSPADM

## 2022-01-05 RX ORDER — MULTIVITAMIN WITH IRON
1 TABLET ORAL DAILY
Status: DISCONTINUED | OUTPATIENT
Start: 2022-01-05 | End: 2022-02-04 | Stop reason: HOSPADM

## 2022-01-05 RX ORDER — SODIUM CHLORIDE 9 MG/ML
25 INJECTION, SOLUTION INTRAVENOUS PRN
Status: CANCELLED | OUTPATIENT
Start: 2022-01-05

## 2022-01-05 RX ORDER — MECOBALAMIN 5000 MCG
5 TABLET,DISINTEGRATING ORAL NIGHTLY PRN
Status: CANCELLED | OUTPATIENT
Start: 2022-01-05

## 2022-01-05 RX ORDER — TROSPIUM CHLORIDE 20 MG/1
20 TABLET, FILM COATED ORAL NIGHTLY
Status: DISCONTINUED | OUTPATIENT
Start: 2022-01-06 | End: 2022-01-06 | Stop reason: SDUPTHER

## 2022-01-05 RX ORDER — SENNA AND DOCUSATE SODIUM 50; 8.6 MG/1; MG/1
2 TABLET, FILM COATED ORAL 2 TIMES DAILY PRN
Status: DISCONTINUED | OUTPATIENT
Start: 2022-01-05 | End: 2022-01-05

## 2022-01-05 RX ORDER — GUAIFENESIN/DEXTROMETHORPHAN 100-10MG/5
5 SYRUP ORAL EVERY 4 HOURS PRN
Status: CANCELLED | OUTPATIENT
Start: 2022-01-05

## 2022-01-05 RX ORDER — FOLIC ACID 1 MG/1
1 TABLET ORAL DAILY
Status: CANCELLED | OUTPATIENT
Start: 2022-01-05

## 2022-01-05 RX ORDER — SODIUM CHLORIDE 0.9 % (FLUSH) 0.9 %
5-40 SYRINGE (ML) INJECTION PRN
Status: DISCONTINUED | OUTPATIENT
Start: 2022-01-05 | End: 2022-01-07

## 2022-01-05 RX ADMIN — NAPROXEN 500 MG: 500 TABLET ORAL at 05:33

## 2022-01-05 RX ADMIN — ENOXAPARIN SODIUM 40 MG: 100 INJECTION SUBCUTANEOUS at 09:05

## 2022-01-05 RX ADMIN — BACLOFEN 10 MG: 10 TABLET ORAL at 09:04

## 2022-01-05 RX ADMIN — BACLOFEN 10 MG: 10 TABLET ORAL at 20:40

## 2022-01-05 RX ADMIN — DICLOFENAC SODIUM 4 G: 10 GEL TOPICAL at 20:39

## 2022-01-05 RX ADMIN — FOLIC ACID 1 MG: 1 TABLET ORAL at 09:04

## 2022-01-05 RX ADMIN — GUAIFENESIN SYRUP AND DEXTROMETHORPHAN 5 ML: 100; 10 SYRUP ORAL at 09:05

## 2022-01-05 RX ADMIN — Medication 1 TABLET: at 09:05

## 2022-01-05 RX ADMIN — Medication 5 MG: at 20:40

## 2022-01-05 RX ADMIN — DICLOFENAC SODIUM 4 G: 10 GEL TOPICAL at 09:06

## 2022-01-05 RX ADMIN — FLUTICASONE PROPIONATE 1 SPRAY: 50 SPRAY, METERED NASAL at 09:06

## 2022-01-05 RX ADMIN — DICLOFENAC SODIUM 4 G: 10 GEL TOPICAL at 13:34

## 2022-01-05 RX ADMIN — Medication 1000 MCG: at 09:05

## 2022-01-05 ASSESSMENT — PAIN SCALES - GENERAL
PAINLEVEL_OUTOF10: 0
PAINLEVEL_OUTOF10: 6
PAINLEVEL_OUTOF10: 0

## 2022-01-05 NOTE — LETTER
PORTABLE PATIENT PROFILE  Javid Hyman  [id number]    MEDICAL DIAGNOSIS/CONDITION:   Patient Active Problem List   Diagnosis    COVID    Complaints of weakness of lower extremity    Weakness of both lower extremities    Ambulatory dysfunction    Iron deficiency anemia    Bilateral sciatica    Behavior problem, adult    Physical deconditioning    Declining functional status    Polyneuropathy    B12 deficiency    Spasticity    Impaired mobility and ADLs       INSURANCE INFORMATION:  Payor: Fred Lassiter /  /  /     ADVANCED DIRECTIVES:      [unfilled]     EMERGENCY CONTACT:       RISK FACTORS:   Social History     Tobacco Use    Smoking status: Current Every Day Smoker     Packs/day: 1.00    Smokeless tobacco: Never Used   Substance Use Topics    Alcohol use: Yes     Comment: rarely per patient questionnaire       ALLERGIES:  No Known Allergies    IMMUNIZATIONS:  There is no immunization history for the selected administration types on file for this patient. SWALLOWING:   Difficulty Chewing or Swallowing Food: No    VISION AND HEARING:   Sensory Problems  Visual impairment: Glasses    PHYSICIANS INVOLVED WITH CARE:    Hermann Hassan MD  No ref.  provider found  Del Thomas MD

## 2022-01-05 NOTE — DISCHARGE SUMMARY
Physician Discharge Summary     Patient ID:  Janneth Stevenson  [id number]  48 y.o.  1975    Admit date: 12/20/2021    Discharge date and time:  1/5/2022    Discharge Diagnoses: Principal Problem:    Complaints of weakness of lower extremity  Active Problems:    Weakness of both lower extremities    Ambulatory dysfunction    Iron deficiency anemia    Bilateral sciatica    Behavior problem, adult    Physical deconditioning    Declining functional status    Polyneuropathy  Resolved Problems:    * No resolved hospital problems. *      Consults: IP CONSULT TO INTERNAL MEDICINE  IP CONSULT TO NEUROLOGY  IP CONSULT TO NEUROSURGERY  IP CONSULT TO HOME CARE NEEDS  IP CONSULT TO PHYSICAL MEDICINE REHAB    Procedures: See below    Hospital Course: Patient admitted on 12/20/2021  presented to ED with complaints of lower extremity weakness.  This has been ongoing since November 23 when she received Regeneron. She then started to develop saddle anesthesia, urinary and fecal incontinence.  Reports that feeling in her legs has diminished.   MRI of the C-spine, T-spine and L-spine was obtained and revealed L4-L5 severe bilateral neural foraminal narrowing and nonspecific foci of periventricular and subcortical cerebral white matter T2 flair hyperintensity.  Pt had extensive w/u incld arterial and venous PVLs, EMG and per neurology pts neuropathy likely related to b12 deficiency and may take sometime to recover and she has been explained that multiple times by Neurology and IM. She is currently awaiting placement. Bilateral lower extremity weakness/Ambulatory dysfunction with report of bowel and bladder incontinance  -- likely distal polyneuropathy 2/2 severe B12 deficinecy  MRI: lumbar radiculopathy at L4-L5, mild canal stenosis and severe bilateral neural foraminal narrowing. At L3-L4, moderate canal stenosis and moderate bilateral neural foraminal narrowing.  Mild nonspecific foci of periventricular and subcortical cerebral white matter T2/FLAIR hyperintensity. Pt was evaluated by Dr Sanders/Sophia and advised MRI does not explain pt's complaints.    hx of LESI with most recent 9/21/21  hx of Covid 19 in Nov 2021 with regeneron infusion  paresthesias to BLE but her exam showed no sensory deficit, per neuro pt was able to feel LT, PP and Vibration, hyperreflexia   Per neuro not typical presentation of GBS   B/L LE arterial and venous PVLs are negative for DVT or stenosis  EMG consistent with neuropathy and not myopathy  Homocysteine level wnl  B12 prior to admission was in 160, now >2000  Copper WNL  Repeat x1 dose IV methylprednisolone followed by short coarse of prednisone for sciatica (pt was due for her NASRIN with pain mx as OP while hospitalized) and says slightly helped with back pain but not leg pain   Need to follow B12 closely as OP    Physical deconditioning and functional decline related to prolonged hospitalization  ARU for DC    Iron deficiency anemia  received IV iron    DVT ppx: Loveox  Code status: Full code    Medically stable for discharge--PMR consult for possible ARU    Discharge Exam:  See progress note from today    Condition:  Stable    Disposition: ARU today    Patient Instructions:   Current Discharge Medication List      START taking these medications    Details   diclofenac sodium (VOLTAREN) 1 % GEL Apply 4 g topically 4 times daily      sennosides-docusate sodium (SENOKOT-S) 8.6-50 MG tablet Take 2 tablets by mouth 2 times daily as needed for Constipation      polyethylene glycol (GLYCOLAX) 17 g packet Take 17 g by mouth 2 times daily  Qty: 527 g, Refills: 1      folic acid (FOLVITE) 1 MG tablet Take 1 tablet by mouth daily  Qty: 30 tablet, Refills: 3      ferrous sulfate (IRON 325) 325 (65 Fe) MG tablet Take 1 tablet by mouth daily (with breakfast)  Qty: 180 tablet, Refills: 1      Multiple Vitamin (MULTIVITAMIN) TABS tablet Take 1 tablet by mouth daily  Qty: 1 tablet, Refills: 0      vitamin B-12 (CYANOCOBALAMIN) 100 MCG tablet Take 10 tablets by mouth daily  Qty: 30 tablet, Refills: 3         CONTINUE these medications which have NOT CHANGED    Details   vitamin D (ERGOCALCIFEROL) 1.25 MG (60581 UT) CAPS capsule Take 50,000 Units by mouth once a week      acetaminophen (TYLENOL) 500 MG tablet Take 500 mg by mouth every 6 hours as needed for Pain      diphenhydrAMINE (BENADRYL) 25 MG capsule Take 25 mg by mouth nightly as needed for Itching      loratadine (CLARITIN) 10 MG tablet Take 10 mg by mouth daily      mirabegron (MYRBETRIQ) 25 MG TB24 Take 25 mg by mouth daily      naproxen (NAPROSYN) 500 MG tablet Take 1 tablet by mouth 2 times daily as needed for Pain  Qty: 60 tablet, Refills: 2      fluticasone (FLONASE) 50 MCG/ACT nasal spray 1 spray by Nasal route daily         STOP taking these medications       Cyanocobalamin (B-12 COMPLIANCE INJECTION) 1000 MCG/ML KIT Comments:   Reason for Stopping:         guaiFENesin 400 MG tablet Comments:   Reason for Stopping:             Activity: activity as tolerated  Diet: regular diet    Follow-up with 1wk PCP, 1 mth neurology    Note that over 30 minutes was spent in preparing discharge papers, discussing discharge with patient, staff, consultants, medication review, arranging follow up, etc.    Signed:  María Flynn MD  1/5/2022  2:22 PM

## 2022-01-05 NOTE — PLAN OF CARE
Problem: Falls - Risk of:  Goal: Will remain free from falls  Description: Will remain free from falls  1/5/2022 1038 by Livia Reyes RN  Outcome: Met This Shift     Problem: Falls - Risk of:  Goal: Absence of physical injury  Description: Absence of physical injury  Outcome: Met This Shift     Problem: Skin Integrity:  Goal: Will show no infection signs and symptoms  Description: Will show no infection signs and symptoms  1/5/2022 1038 by Livia Reyes RN  Outcome: Met This Shift     Problem: Skin Integrity:  Goal: Absence of new skin breakdown  Description: Absence of new skin breakdown  Outcome: Met This Shift     Problem: Pain:  Goal: Pain level will decrease  Description: Pain level will decrease  1/5/2022 1038 by Livia Reyes RN  Outcome: Ongoing     Problem: Pain:  Goal: Control of acute pain  Description: Control of acute pain  1/5/2022 1038 by Livia Reyes RN  Outcome: Ongoing     Problem: Pain:  Goal: Control of chronic pain  Description: Control of chronic pain  Outcome: Ongoing

## 2022-01-05 NOTE — CARE COORDINATION
Waiting for Mine [address] Dr to see Pt for possible admission to ARU. ANKUR referral was made to [facility] and waiting for acceptance at a facility. JOEL/RYANN to follow for discharge needs.    Av Pierson, L.S.W.  [phone]

## 2022-01-05 NOTE — PROGRESS NOTES
Patient seen today per Dr. Chayito Bruce and is appropriate for acute rehab. Met with patient and gave her brochure with room number and visiting hours. Family member is bringing her in clothes. [facility] staff notified of ARU admission for today.

## 2022-01-05 NOTE — PROGRESS NOTES
Hospitalist Progress Note            Patient: Neo Sharma Age: 48 y.o.   DOA: 12/20/2021 Admit Dx / CC: Bilateral leg weakness [R29.898]  Weakness of both lower extremities [R29.898]  Complaints of weakness of lower extremity [R29.898]  T2 hyperintense foci present in cerebral cortex on magnetic resonance imaging [R90.89]  Spinal stenosis of lumbar region, unspecified whether neurogenic claudication present [M48.061]  Ambulatory dysfunction [R26.2]   LOS:  LOS: 15 days      Assessment/ Plan:     SYNOPSIS: Patient admitted on 12/20/2021  presented to ED with complaints of lower extremity weakness.  This has been ongoing since November 23 when she received Regeneron. She then started to develop saddle anesthesia, urinary and fecal incontinence.  Reports that feeling in her legs has diminished.   MRI of the C-spine, T-spine and L-spine was obtained and revealed L4-L5 severe bilateral neural foraminal narrowing and nonspecific foci of periventricular and subcortical cerebral white matter T2 flair hyperintensity.  Pt had extensive w/u incld arterial and venous PVLs, EMG and per neurology pts neuropathy likely related to b12 deficiency and may take sometime to recover and she has been explained that multiple times by Neurology and IM. She is currently awaiting placement. Bilateral lower extremity weakness/Ambulatory dysfunction with report of bowel and bladder incontinance likely 2/2 severe B12 deficinecy  MRI: lumbar radiculopathy at L4-L5, mild canal stenosis and severe bilateral neural foraminal narrowing. At L3-L4, moderate canal stenosis and moderate bilateral neural foraminal narrowing. Mild nonspecific foci of periventricular and subcortical cerebral white matter T2/FLAIR hyperintensity. Pt was evaluated by Dr Sanders/Sophia and advised MRI does not explain pt's complaints.    hx of LESI with most recent 9/21/21  hx of Covid 19 in Nov 2021 with regeneron infusion  paresthesias to BLE but her exam showed no sensory deficit, per neuro pt was able to feel LT, PP and Vibration, hyperreflexia   Per neuro not typical presentation of GBS   B/L LE arterial and venous PVLs are negative for DVT or stenosis  EMG consistent with neuropathy and not myopathy  Homocysteine level wnl  B12 prior to admission was in 160, now >2000  Copper WNL  Repeat x1 dose IV methylprednisolone followed by short coarse of prednisone for sciatica (pt was due for her NASRIN with pain mx as OP while hospitalized) and says slightly helped with back pain but not leg pain   Need to follow B12 closely as OP    Physical deconditioning and functional decline related to prolonged hospitalization  Awaiting SNF placement    Iron deficiency anemia  received IV iron    DVT ppx: Loveox  Code status: Full code    Medically stable for discharge--PMR consult for possible ARU    Plan of care discussed with: patient, RN, social work/case management    MRI C/T spine:  Impression   1.  Somewhat limited exams       2.  Given technical artifacts, the visualized portions of the spinal cord and   intracranial contents appear grossly negative for pathologic enhancement and   otherwise unremarkable/not significantly changed, within the limits of this   exam.       3.  Mildly-exaggerated thoracic kyphosis, with grossly unchanged multilevel   degenerative disease, without high-grade central spinal canal stenosis,   neural foraminal narrowing, or mass effect upon neural elements throughout   the cervical and thoracic region, as described.       4.  Mild diffuse paraspinal muscular atrophy       5.  Otherwise, no definite correlate for the patient's reported bilateral   lower extremity weakness, within the limits of these studies.       6.  Incidental findings, most notable for small fluid signal is scattered   about the left mastoid air cell complex, with trace involvement on the right   side, incompletely and only incidentally demonstrated.  Please see above   comments. MRI L spine:  Impression   1.  Slightly limited exam       2.  There appear to be post-surgical alterations of prior left-sided   hemilaminotomy at L3 and L4, for prior partial discectomy at L3/4 and L4/5.       3.  Moderate bilateral facet joint effusions and heterogeneous enhancement   involve both L4/5 facet joints and surrounding marrow, with slightly lesser   extension into both subjacent posterolateral aspects of the traversing   epidural space, followed by surrounding paraspinal musculature, lessening   both superiorly to L3/4, and inferiorly to L5/S1.  While the above findings   could be degenerative and/or inflammatory arthro-pathic in nature, clinical   correlation is advised to exclude developing bilateral septic   arthritis/facet-itis.       4.  At L4/5, there is minimal enhancement of several traversing bilateral   nerve roots, and clinical correlation is advised to exclude early   arachnoiditis.       5.  Otherwise, no significant change since the most recent MR and CT   examinations of the lumbar spine.       .       RECOMMENDATIONS:   1.  Impression 1: Recommend post-treatment follow-up MRI of the lumbar spine,   without and with contrast, in 4 - 6 weeks, or as directed clinically. MRI brain; Impression   1. No evidence of acute intracranial abnormality. 2. Mild white matter signal abnormality as described above.  No significant   change.  The findings could be related to chronic microvascular disease   chronic migraines, vasculitis or Lyme disease.  The possibility of   demyelinating disease cannot be excluded although the findings are not   typical for demyelinating plaques.          Patient Active Problem List   Diagnosis    COVID    Complaints of weakness of lower extremity    Weakness of both lower extremities    Ambulatory dysfunction    Iron deficiency anemia    Bilateral sciatica    Behavior problem, adult    Physical deconditioning    Declining functional status Medications:  Reviewed    Infusion Medications    sodium chloride       Scheduled Medications    diclofenac sodium  4 g Topical 4x Daily    polyethylene glycol  17 g Oral BID    sennosides-docusate sodium  2 tablet Oral QPM    mirabegron  25 mg Oral Daily    sodium chloride flush  5-40 mL IntraVENous 2 times per day    folic acid  1 mg Oral Daily    vitamin B-12  1,000 mcg Oral Daily    multivitamin  1 tablet Oral Daily    baclofen  10 mg Oral BID    melatonin  5 mg Oral Nightly    fluticasone  1 spray Nasal Daily    enoxaparin  40 mg SubCUTAneous Daily     PRN Meds: guaiFENesin-dextromethorphan, sennosides-docusate sodium, sodium chloride flush, sodium chloride, acetaminophen **OR** [DISCONTINUED] acetaminophen, melatonin, promethazine **OR** ondansetron, polyethylene glycol, naproxen    I/O    Intake/Output Summary (Last 24 hours) at 1/5/2022 0932  Last data filed at 1/5/2022 0541  Gross per 24 hour   Intake --   Output 400 ml   Net -400 ml       Labs:   No results for input(s): WBC, HGB, HCT, PLT in the last 72 hours. No results for input(s): NA, K, CL, CO2, BUN, CREATININE, CALCIUM, PHOS in the last 72 hours. Invalid input(s): MAGNES    No results for input(s): PROT, ALB, ALKPHOS, ALT, AST, BILITOT, AMYLASE, LIPASE in the last 72 hours. No results for input(s): INR in the last 72 hours. No results for input(s): Asha Yocha Dehe in the last 72 hours. Chronic labs:  Lab Results   Component Value Date    TSH 1.460 12/26/2021    INR 1.0 12/20/2021       Radiology:  Imaging studies reviewed today.      Subjective:     Feeling better still weak wants to go  No CP or SOB  No fever or chills   No uncontrolled pain  No vomiting or diarrhea   No events reported overnight     Objective:     Physical Exam:   /80   Pulse 79   Temp 96.6 °F (35.9 °C) (Temporal)   Resp 18   Ht 5' (1.524 m)   Wt 226 lb 12.8 oz (102.9 kg)   SpO2 96%   BMI 44.29 kg/m²     General appearance:in no distress, up in bed  Lungs: Clear to auscultation bilaterally, no wheezing or crackles   Heart: Regular rate and rhythm, S1, S2 normal   Abdomen: Soft, non-tender and not-distended. Bowel sounds normal.   Extremities: no edema   Skin: B/L LE warm to touch  Neurologic: Grossly normal and non focal, CN II-XII intact. Patient able to move B/L LE weak.  +2/3 reflexes in B/L patellar and ankle  Psych: Normal affect no HI or SI      Jewell Bergeron MD   Hospitalist Service   01/05/22 9:32 AM

## 2022-01-05 NOTE — PATIENT CARE CONFERENCE
Acute Rehab Pre-Admission Screen      Referral date: 12/23/21  Onset/Hospital Admit Date: 12/20/2021  1:09 PM    Current Location: [address] [place] [address]    Name: Raissa Welch: 1975  Age: 48 y.o. Admitting Diagnosis: Bilaeral leg weakness   Address: [address] [place] [address] Way  Home Phone: [phone] (home)  Sachin Egan #:     Sex: female  Race:   Marital Status:    Ethnic/Cultural/Scientologist Considerations: none noted    Advanced Directives: [x] Full Code  [] [address] [] Medications only       [] Living Will  [] DPOA      []Organ donor      [] No mechanical breathing or ventilation     [] no tube feeding, nutrition or hydration      [] Patient does not have advanced directives or living will     Copies in Chart: yes    COVERAGE INFORMATION   Primary Insurer: [facility] [employer]  Payor Contact: [phone]  Verified coverage: [] Patient  [] Family/caregiver    [x] financial department [] insurance carrier    COVID SCREEN DATE:   Results for Moise Garcia (MRN [id number])    Ref. Range 1/4/2022 13:25   SARS-CoV-2, NAAT Latest Ref Range: Not Detected  Not Detected       MEDICAL UPDATE:  History of present admission: Patient admitted to the hospital on 12/20/21 with progressively worsening leg weakness after receiving an antibody infusion for Covid diagnosed in November. Neurosurgery and neurology all saw patient on consult. Imaging has shown lumbar radiculopathy. EMG done on 12/27/21: Recommendations: This is predominantly a neuropathic process, not myopathic in nature. Post viral inflammatory processes can present early on with this picture however usually progress to development of slowing of velocity with the development of myelin abnormalities as well as increasing axonal pathology. B12 level low during admission and she has received several injections.     PHYSICIAN / REFERRAL INFORMATION  Referring Physician: Noe Parsons  Attending Physician: Ju Motta MD  Primary Care Physician: Valerie Branham DO  [facility]see full [facility] notes on chart[facility]  Neurosurgery: Dr Mt Chinchilla  Primary  Contact: Geovanna Derrek   Relationship: sister-in-law  Primary Phone: [phone]  Secondary Phone:   Secondary  Contact: Howard Otero  Relationship: [employer]  (at home on oxygen due to recent Covid 23)  Primary Phone: [phone]  Secondary Phone:     Previous Community Services: none  Caregiver available: [x] Yes [] No Hours per day available: intermittent  Patient previously employed:  [x] Yes [] Part Time [] Full Time [] No [] Retired  Occupation/Profession: Self-employed, \"[employer]\"  Prior living arrangements: [x] Home  [] Assisted living  [] SNF [] Other  Lived with:  [] Alone  [] Spouse  [] Family  [x] Other-fiance and 21year old son  Lived with: fiance and son  Home:  1 [place] home  3-4 entry steps  Rails:    Bedroom: [x] 1st floor  [] 2nd floor    Bathroom:  [x] 1st floor  [] 2nd floor    Prior Functional Level: Independent for: all with a wheeled walker  Assistance for: none  Dependent for: none  Dominant hand: [x] Right  [] Left    Previous Home Equipment:  [] Cane [] Grab bars [] Orthotic / prosthetic   [] Shower chair [] Tub bench  [] 3-in-1 Commode [] Long handle sponge   [] Oxygen [] Sock aide  [] Wheelchair  [] motorized wc/scooter  [] Wheelchair cushion   [] Crutches [] Long handle shoehorn  [] Reachers [] Toilet seat elevator [] Rollator  [x] Walker(wheeled)   [] Walker(standard) [] Mechanical lift    [] None of the above     Has patient had 2 or more falls in the past year or any fall with injury in the past year? [] yes   [x] no   []unknown    Has patient had major surgery during past 100 days prior to admission?    [] yes   [x] no Type/ Date:     Surgical History:  Past Surgical History:   Procedure Laterality Date    ANKLE SURGERY      bilateral    APPENDECTOMY      DILATION AND CURETTAGE OF UTERUS      ELBOW SURGERY      right    ENDOMETRIAL ABLATION  07/2016    GALLBLADDER SURGERY      LEEP      cervical conization    TUBAL LIGATION         Past Medical:  History reviewed. No pertinent past medical history.     Current Co-morbidities:  [] Alzheimer's   [] Dysphasia    [] Parkinsonism  [] Amputation   [] GERD   [] Peripheral artery disease   [] Anemia      [] Encephalopathy  [] Peripheral vascular disease  [] Anxiety   [] Gangrene   [] Pneumonia  [] Aphasia   [] Gout   [] Polyneuropathy  [] Asthma   [] Heart Failure (diastolic) [] Post-polio syndrome  [] Atrial fibrillation  [] Heart Failure (left-sided) [] Pseudomonas enteritis   [] Blind   [] Heart Failure (right-sided) [] Pulmonary embolism  [] Cellulitis     [] Heart Failure (systolic) [] Renal dialysis  [] Clostridium difficile  [] Hemiparesis  [] Renal failure  [] Congestive heart failure [] Hypertension  [] Rheumatoid arthritis  [] COPD   [] Hypotension  [] Seizure disorder   [] Coronary Artery Disease [] Hypothyroidism  [] Septicemia   [] Deaf   [] Hyperlipidemia  [] Sleep apnea  [] Depression   [x] Morbid obesity  [] Spinal cord injury  [] Diabetes   [] MRSA   [] Stroke  [] Diabetic nephropathy [] Myocardial infarction [] Tracheostomy  [] Diabetic neuropathy [] Osteoarthritis  [] Traumatic brain injury   [] Diabetic retinopathy [] Osteoporosis  [] Urinary tract infection  [] DVT   [] Pancytopenia  [] Vocal cord paralysis  []  Spinal stenosis  []  kidney disease  [] VRE  [] Post op    []     []        Medical/Functional Conditions requiring inpatient rehabilitation: Patient has functional deficits in ADLS, mobility,  impaired balance, and needs ongoing medical management   Requires multidisciplinary treatment including PM&R physician daily care, 24 hour rehabilitation nursing, physical therapy, occupational therapy, rehabilitation psychology, recreation therapy and rehabilitation social work, nutrition services due to new deficits . Will also benefit from psychology services due to adjustment issues with health over last few months as well as loss of a son 5 years ago due to suicide    Risk for Medical/Clinical Complications: Falls, injury, pain, skin breakdown, abnormal vitals, abnormal labs, DVT, PE, pneumonia, decreased mobility, neuro changes    CLINICAL DATA:     Height : 5'0     Weight:  226lbs   BMI: 44.29       Date: 1/5/22 Date:  Date:    temperature 96.6     pulse 79     respirations 18     Blood pressure 120/80     Pulse oximeter 96% room air        ALLERGIES: Patient has no known allergies. DIET : ADULT DIET; Regular    Current Lab and Diagnostic Tests:   Recent Results (from the past 24 hour(s))   COVID-19, Rapid    Collection Time: 01/04/22  1:25 PM    Specimen: Nasopharyngeal Swab   Result Value Ref Range    SARS-CoV-2, NAAT Not Detected Not Detected     MRI CERVICAL SPINE WO CONTRAST    Result Date: 12/20/2021    Cervical spine MRI: No cord signal abnormality. No high-grade canal or foraminal stenosis. Thoracic spine: No cord signal abnormality. No high-grade canal or foraminal stenosis. Lumbar spine MRI: No cord signal abnormality and no cauda equina nerve root compression. At L4-L5, mild canal stenosis and severe bilateral neural foraminal narrowing. At L3-L4, moderate canal stenosis and moderate bilateral neural foraminal narrowing. Brain MRI: No acute infarction, intracranial hemorrhage or mass lesion. Mild nonspecific foci of periventricular and subcortical cerebral white matter T2/FLAIR hyperintensity. .  Differential possibilities include migraine related angiopathy, trauma related white matter change, early chronic microangiopathic ischemic disease. The lesions do not have typical distribution of demyelinating lesions however this diagnosis cannot be completely excluded.  RECOMMENDATIONS: Unavailable     MRI THORACIC SPINE WO CONTRAST    Result Date: 12/20/2021    Cervical spine MRI: No cord signal abnormality. No high-grade canal or foraminal stenosis. Thoracic spine: No cord signal abnormality. No high-grade canal or foraminal stenosis. Lumbar spine MRI: No cord signal abnormality and no cauda equina nerve root compression. At L4-L5, mild canal stenosis and severe bilateral neural foraminal narrowing. At L3-L4, moderate canal stenosis and moderate bilateral neural foraminal narrowing. Brain MRI: No acute infarction, intracranial hemorrhage or mass lesion. Mild nonspecific foci of periventricular and subcortical cerebral white matter T2/FLAIR hyperintensity. .  Differential possibilities include migraine related angiopathy, trauma related white matter change, early chronic microangiopathic ischemic disease. The lesions do not have typical distribution of demyelinating lesions however this diagnosis cannot be completely excluded. RECOMMENDATIONS: Unavailable     MRI LUMBAR SPINE WO CONTRAST    Result Date: 12/20/2021    Cervical spine MRI: No cord signal abnormality. No high-grade canal or foraminal stenosis. Thoracic spine: No cord signal abnormality. No high-grade canal or foraminal stenosis. Lumbar spine MRI: No cord signal abnormality and no cauda equina nerve root compression. At L4-L5, mild canal stenosis and severe bilateral neural foraminal narrowing. At L3-L4, moderate canal stenosis and moderate bilateral neural foraminal narrowing. Brain MRI: No acute infarction, intracranial hemorrhage or mass lesion. Mild nonspecific foci of periventricular and subcortical cerebral white matter T2/FLAIR hyperintensity. .  Differential possibilities include migraine related angiopathy, trauma related white matter change, early chronic microangiopathic ischemic disease. The lesions do not have typical distribution of demyelinating lesions however this diagnosis cannot be completely excluded. RECOMMENDATIONS: Unavailable     XR CHEST PORTABLE    Result Date: 12/20/2021  No pneumonia or pleural effusion.      MRI BRAIN WO CONTRAST    Result Date: 12/20/2021    Cervical spine MRI: No cord signal abnormality. No high-grade canal or foraminal stenosis. Thoracic spine: No cord signal abnormality. No high-grade canal or foraminal stenosis. Lumbar spine MRI: No cord signal abnormality and no cauda equina nerve root compression. At L4-L5, mild canal stenosis and severe bilateral neural foraminal narrowing. At L3-L4, moderate canal stenosis and moderate bilateral neural foraminal narrowing. Brain MRI: No acute infarction, intracranial hemorrhage or mass lesion. Mild nonspecific foci of periventricular and subcortical cerebral white matter T2/FLAIR hyperintensity. .  Differential possibilities include migraine related angiopathy, trauma related white matter change, early chronic microangiopathic ischemic disease. The lesions do not have typical distribution of demyelinating lesions however this diagnosis cannot be completely excluded.  RECOMMENDATIONS: Unavailable       Additional labs or diagnostic studies needed before admission to rehabilitation unit:  none pending    Medications:   diclofenac sodium  4 g Topical 4x Daily    polyethylene glycol  17 g Oral BID    sennosides-docusate sodium  2 tablet Oral QPM    mirabegron  25 mg Oral Daily    sodium chloride flush  5-40 mL IntraVENous 2 times per day    folic acid  1 mg Oral Daily    vitamin B-12  1,000 mcg Oral Daily    multivitamin  1 tablet Oral Daily    baclofen  10 mg Oral BID    melatonin  5 mg Oral Nightly    fluticasone  1 spray Nasal Daily    enoxaparin  40 mg SubCUTAneous Daily      sodium chloride       guaiFENesin-dextromethorphan, sennosides-docusate sodium, sodium chloride flush, sodium chloride, acetaminophen **OR** [DISCONTINUED] acetaminophen, melatonin, promethazine **OR** ondansetron, polyethylene glycol, naproxen    SPECIAL PRECAUTIONS: [x] No current precautions  [] Cardiac  [] Renal [] Sternal [] Respiratory      [] Neurological           [] Hip  [] Spinal [] Seizure  [] Aspiration  [] Isolation precautions:    [] Contact   [] Respiratory   [] Protective     [] Droplet    [] Weight Bearing precautions:         [] Non Weight Bearing :         [] Toe Touch Weight Bearing :        [] Partial Weight Bearing :         [x] Weight Bearing as Tolerated :         [] Fall Risk:   [] Recent history of falls [] Falls risk level     [] Bed Alarm    [x] Do not leave alone in the bathroom    [] Chair Alarm    [] Cognitive impairment      [] One to One supervision  [] Sitter / Bernarda Parnell sitter   [] Safety enclosure bed  [x] Decreased balance     SPECIAL REHABILITATION NEEDS:   [] IV Therapy: [] PRN Adapter  [] Midline  [] PICC      [] Central Line    [] TPN       [] Oxygen: [] Trach [] Bi-PAP [] CPAP  [] Nasal cannula  [] Liters:      [] Wound Care:   [] Pressure ulcers(stage and location) -    [] Wound vac   [] Wound or incision care    [] Pain Management (level of pain, meds): occasional leg pain, Naprosyn, voltaren gel, baclofen ordered     [x] Incontinence Bladder occasional due to frequency [] Perera  Insertion date:    []Hemodialysis and  Frequency:   [] Incontinence Bowel    [x] Last bowel movement : 1/3/22    Substance use history: [] Yes  [x] No   [] Tobacco  [] Alcohol  [] Other     [] Ethnic  [] Cultural  [] Spiritual  [] Language [] Needs  [] Other than English  [] Hearing Impaired  [] Visually Impaired  [] Speaking Impaired  [] Blind  [] Special equipment:  [] Devices/Splints  [] Type   [] Brace   [] Type  [] Bariatric bed  [] Extra wide commode  [] Extra wide wheelchair [] Extra wide walker  [] Zacarias walker  [] Zacarias wheelchair  [] Transfer lift    [] Other equipment     FUNCTIONAL STATUS PT / [place] / Lorelei Bon:  FIM / EVAL Discipline Initial: 1/3/22 Follow Up:  Current:    Eating OT independent     Grooming OT supervision     Bathing OT mod assist     Dressing Upper Extremity OT supervision     Dressing Lower Extremity OT dependent-max assist     Toileting OT dependent     Toilet Transfers OT      Tub/Shower Transfers OT      Homemaking OT      Bed Mobility PT standby assist and mod assist for sit to supine     Bed/Wheelchair Transfers PT max assist of 2     Locomotion Walk / Wheelchair  Device:  Distance: PT unable     Endurance PT      Expression SP      Social Interaction SP      Problem Solving SP      Memory SP      Comprehension SP      Swallowing SP      Bowel Management NSG      Bladder Management NSG        Comments on Functional Status: Able to tolerate 3 hours of therapy per day split into four 45 minute sessions. Sit balance is standby assist, dynamic standing balance is max assist of 2 with a wheeled walker.     [x] Able to participate a minimum of 3 hours per day of therapy intervention    Required treatments/services: [x] Rehabilitation nursing [] Dietitian / nurtition                 [x] Case management  [] Respiratory Therapy      [x] Social work   [] Other     Required Therapy:  Therapy Hours per Day Days per Week Therapeutic Interventions Required   [x] Physical Therapy 1.5 5-7 Gait, transfers, Safety, strength, education, endurance   [x] Occupational Therapy 1.5 5-7 ADLs, IADLs, Safety, strength, education, endurance   [] Speech Pathology      [] Prosthetics / Zeny Rotunda    may need orthotics   []         Anticipated Discharge Plan:   Anticipated DME Needs:  [x] Home     [] Commode   [] Alone    [] Wheelchair   [] Supervised    [x] Walker   [x] Assist    [] Oxygen        [] Hospital Bed  [] Assisted Living    [x] Ramp        [] To Be Determined    Anticipated Home Health Services:  Anticipated Outpatient Services:  [] PT       [] PT  [] OT      [] OT  [] Speech     [] Speech  [] Nursing     [] Dialysis  [] Aide      [x] To Be Determined  [x] To Be Determined    Anticipated support group:  [] Amputation  [] Multiple Sclerosis  [] Stroke  [] Brain Injury  [] Spinal cord injury  [] Other     Barriers to discharge: steps into home, morbid admission, recommended level of care:  [] [address]  [] [address]  [] [place]   [] Home Care  [] Other      [] LTAC       Physician Assigned:  [x] Dr. Farshad Klein         [] DrAgnieszka Maravilla Mimes              [] DrAgnieszka Caal Echevaria [] DrAgnieszka Monroe Lank  [] Dr. Mckinnon Laming:    ____________________________________________________________________  ____________________________________________________________________  ____________________________________________________________________  ____________________________________________________________________  ____________________________________________________________________      Physician Signature:_____________________________________    Print Signature:_________________________________________    Date:   1/5/22 Time:    5371

## 2022-01-05 NOTE — PLAN OF CARE
Problem: Falls - Risk of:  Goal: Will remain free from falls  Description: Will remain free from falls  1/5/2022 0223 by Regina Sprague RN  Outcome: Met This Shift  1/4/2022 1313 by Harry Rios RN  Outcome: Met This Shift     Problem: Skin Integrity:  Goal: Will show no infection signs and symptoms  Description: Will show no infection signs and symptoms  1/5/2022 0223 by Regina Sprague RN  Outcome: Met This Shift  1/4/2022 1313 by Harry Rios RN  Outcome: Met This Shift

## 2022-01-06 LAB
ANION GAP SERPL CALCULATED.3IONS-SCNC: 16 MMOL/L (ref 7–16)
BASOPHILS ABSOLUTE: 0.05 E9/L (ref 0–0.2)
BASOPHILS RELATIVE PERCENT: 0.5 % (ref 0–2)
BUN BLDV-MCNC: 13 MG/DL (ref 6–20)
CALCIUM SERPL-MCNC: 9.2 MG/DL (ref 8.6–10.2)
CHLORIDE BLD-SCNC: 103 MMOL/L (ref 98–107)
CO2: 21 MMOL/L (ref 22–29)
CREAT SERPL-MCNC: 0.5 MG/DL (ref 0.5–1)
EOSINOPHILS ABSOLUTE: 0.39 E9/L (ref 0.05–0.5)
EOSINOPHILS RELATIVE PERCENT: 3.9 % (ref 0–6)
GFR AFRICAN AMERICAN: >60
GFR NON-AFRICAN AMERICAN: >60 ML/MIN/1.73
GLUCOSE BLD-MCNC: 103 MG/DL (ref 74–99)
HCT VFR BLD CALC: 38.5 % (ref 34–48)
HEMOGLOBIN: 12.5 G/DL (ref 11.5–15.5)
IMMATURE GRANULOCYTES #: 0.06 E9/L
IMMATURE GRANULOCYTES %: 0.6 % (ref 0–5)
LYMPHOCYTES ABSOLUTE: 2.51 E9/L (ref 1.5–4)
LYMPHOCYTES RELATIVE PERCENT: 25 % (ref 20–42)
MCH RBC QN AUTO: 29.2 PG (ref 26–35)
MCHC RBC AUTO-ENTMCNC: 32.5 % (ref 32–34.5)
MCV RBC AUTO: 90 FL (ref 80–99.9)
MONOCYTES ABSOLUTE: 0.55 E9/L (ref 0.1–0.95)
MONOCYTES RELATIVE PERCENT: 5.5 % (ref 2–12)
NEUTROPHILS ABSOLUTE: 6.49 E9/L (ref 1.8–7.3)
NEUTROPHILS RELATIVE PERCENT: 64.5 % (ref 43–80)
PDW BLD-RTO: 13.9 FL (ref 11.5–15)
PLATELET # BLD: 326 E9/L (ref 130–450)
PMV BLD AUTO: 10 FL (ref 7–12)
POTASSIUM REFLEX MAGNESIUM: 4.1 MMOL/L (ref 3.5–5)
RBC # BLD: 4.28 E12/L (ref 3.5–5.5)
SODIUM BLD-SCNC: 140 MMOL/L (ref 132–146)
WBC # BLD: 10.1 E9/L (ref 4.5–11.5)

## 2022-01-06 PROCEDURE — 6370000000 HC RX 637 (ALT 250 FOR IP): Performed by: INTERNAL MEDICINE

## 2022-01-06 PROCEDURE — 6370000000 HC RX 637 (ALT 250 FOR IP): Performed by: PHYSICAL MEDICINE & REHABILITATION

## 2022-01-06 PROCEDURE — 36415 COLL VENOUS BLD VENIPUNCTURE: CPT

## 2022-01-06 PROCEDURE — 97162 PT EVAL MOD COMPLEX 30 MIN: CPT

## 2022-01-06 PROCEDURE — 97110 THERAPEUTIC EXERCISES: CPT

## 2022-01-06 PROCEDURE — 80048 BASIC METABOLIC PNL TOTAL CA: CPT

## 2022-01-06 PROCEDURE — 97530 THERAPEUTIC ACTIVITIES: CPT

## 2022-01-06 PROCEDURE — 99232 SBSQ HOSP IP/OBS MODERATE 35: CPT | Performed by: PHYSICAL MEDICINE & REHABILITATION

## 2022-01-06 PROCEDURE — 1280000000 HC REHAB R&B

## 2022-01-06 PROCEDURE — 97167 OT EVAL HIGH COMPLEX 60 MIN: CPT

## 2022-01-06 PROCEDURE — 6360000002 HC RX W HCPCS: Performed by: INTERNAL MEDICINE

## 2022-01-06 PROCEDURE — 85025 COMPLETE CBC W/AUTO DIFF WBC: CPT

## 2022-01-06 PROCEDURE — 97535 SELF CARE MNGMENT TRAINING: CPT

## 2022-01-06 RX ADMIN — DICLOFENAC SODIUM 4 G: 10 GEL TOPICAL at 16:30

## 2022-01-06 RX ADMIN — FLUTICASONE PROPIONATE 1 SPRAY: 50 SPRAY, METERED NASAL at 08:44

## 2022-01-06 RX ADMIN — DICLOFENAC SODIUM 4 G: 10 GEL TOPICAL at 13:12

## 2022-01-06 RX ADMIN — ACETAMINOPHEN 650 MG: 325 TABLET ORAL at 21:35

## 2022-01-06 RX ADMIN — FOLIC ACID 1 MG: 1 TABLET ORAL at 08:47

## 2022-01-06 RX ADMIN — BACLOFEN 10 MG: 10 TABLET ORAL at 21:29

## 2022-01-06 RX ADMIN — Medication 5 MG: at 21:29

## 2022-01-06 RX ADMIN — Medication 1000 MCG: at 08:46

## 2022-01-06 RX ADMIN — DOCUSATE SODIUM 100 MG: 100 CAPSULE, LIQUID FILLED ORAL at 08:46

## 2022-01-06 RX ADMIN — BACLOFEN 10 MG: 10 TABLET ORAL at 08:46

## 2022-01-06 RX ADMIN — Medication 2000 UNITS: at 08:47

## 2022-01-06 RX ADMIN — Medication 1 TABLET: at 08:46

## 2022-01-06 RX ADMIN — ENOXAPARIN SODIUM 40 MG: 100 INJECTION SUBCUTANEOUS at 08:46

## 2022-01-06 ASSESSMENT — PAIN DESCRIPTION - PAIN TYPE
TYPE: CHRONIC PAIN
TYPE: CHRONIC PAIN

## 2022-01-06 ASSESSMENT — PAIN DESCRIPTION - PROGRESSION
CLINICAL_PROGRESSION: NOT CHANGED
CLINICAL_PROGRESSION: NOT CHANGED

## 2022-01-06 ASSESSMENT — PAIN DESCRIPTION - FREQUENCY
FREQUENCY: CONTINUOUS
FREQUENCY: CONTINUOUS

## 2022-01-06 ASSESSMENT — PAIN DESCRIPTION - LOCATION
LOCATION: GENERALIZED
LOCATION: GENERALIZED

## 2022-01-06 ASSESSMENT — PAIN DESCRIPTION - DESCRIPTORS
DESCRIPTORS: ACHING;CONSTANT;DISCOMFORT
DESCRIPTORS: ACHING;CONSTANT;DISCOMFORT

## 2022-01-06 ASSESSMENT — PAIN DESCRIPTION - ONSET
ONSET: ON-GOING
ONSET: ON-GOING

## 2022-01-06 ASSESSMENT — PAIN SCALES - GENERAL
PAINLEVEL_OUTOF10: 0
PAINLEVEL_OUTOF10: 0
PAINLEVEL_OUTOF10: 8
PAINLEVEL_OUTOF10: 2
PAINLEVEL_OUTOF10: 5

## 2022-01-06 ASSESSMENT — PAIN DESCRIPTION - ORIENTATION: ORIENTATION: OTHER (COMMENT)

## 2022-01-06 NOTE — PROGRESS NOTES
Occupational Therapy   Occupational Therapy Initial Assessment  Date: 2022   Patient Name: Jolene Merchant  MRN: [id number]     : 1975  Room: 5510A    Date of Service: 2022    Discharge Recommendations:  Home with assist PRN,Outpatient OT     Restrictions: Fall Risk, slide board transfers    Subjective   Chart Reviewed:  Yes  Additional Pertinent Hx: B 12 deficient & hx B ankle fx  Family / Caregiver Present: No  Referring Practitioner: Kenzie Escalante MD  Diagnosis: Polyneuropathy- B12 deficitent & lumbar radiculopathy & spinal stenosis  Subjective: Pt presents supine in bed & was agreeable to OT intervention  Pain Comments: Pt c/o burning & numbess BUE & BLE during OT session    Social/Functional History  Lives With: Significant other & Son- 21years old  Type of Home: House  Home Layout: Two level,Laundry in basement (2 steps, 2 steps & 7 steps 1HR to basement level)  Home Access: Stairs to enter without rails  Entrance Stairs - Number of Steps: 3 steps +1 PONCE no HR  Bathroom Shower/Tub: Tub/Shower unit,Curtain (Pt report tub is high)  Bathroom Toilet: Standard  ADL Assistance: Independent  Homemaking Assistance: Independent  Homemaking Responsibilities: Yes  Meal Prep Responsibility: Primary  Laundry Responsibility: Primary  Cleaning Responsibility: Primary  Bill Paying/Finance Responsibility: Primary  Shopping Responsibility: Primary  Ambulation Assistance: Independent  Transfer Assistance: Independent  Active : Yes  Mode of Transportation: Car  Occupation: Part time employment  Type of occupation: Small business owner  Leisure & Hobbies: 5 dogs  IADL Comments: PLOF pt independent in all areas     Objective   Vision: Within Functional Limits  Hearing: Within functional limits      Orientation  Overall Orientation Status: Within Functional Limits     Observation/Palpation  Posture: Fair- Pt posture fluctuates & is worse wehn she moves impulsively     Balance  Sitting Balance: Stand by assistance  Standing Balance: Dependent/Total (Pt u/a to come to a full stand & left ankle rolled outward & pt sat back down in her wc)  Functional Mobility  Functional - Mobility Device: Wheelchair  Activity: To/From therapy gym  Assist Level: Minimal assistance  Functional Mobility Comments: vc's for how to properly propel wc in her room & around the unit  Toilet Transfers  Toilet Transfer: Maximum assistance  Toilet Transfers Comments: Pt used a bed pan this am. Plan to order & use drop arm commode & slide board  Wheelchair Bed Transfers  Wheelchair/Bed - Technique:  (slide board)  Equipment Used: Wheelchair (slide board)  Level of Asssistance: Moderate assistance  Wheelchair Transfers Comments: vc's for sequencing & safety performing a slide board transfer     ADL  Feeding: Setup  Grooming: Supervision;Setup (seated)  UE Bathing: Stand by assistance;Setup; Increased time to complete  LE Bathing: Moderate assistance; Increased time to complete;Setup (sponge bathing @ bed level)  UE Dressing: Supervision;Setup; Increased time to complete;Verbal cueing  LE Dressing: Dependent/Total;Setup;Verbal cueing; Increased time to complete  Toileting: Dependent/Total  Additional Comments: vc's to ensure pt safety as pt rolled in bed & performed bed level bathe to ensure pt safety. Pt does a good job using momentum to move her BLE however lacs insight into fall risk with suddne movements. Pt responded well to education & education to be ongoing     Tone RUE  RUE Tone: Normotonic  Tone LUE  LUE Tone: Normotonic  Coordination  Movements Are Fluid And Coordinated: Yes     Bed mobility  Rolling to Left: Stand by assistance  Rolling to Right: Stand by assistance  Supine to Sit: Minimal assistance  Scooting: Minimal assistance  Comment: Pt require vc's to ensure pt safety as she rolled & performed bed mobility. Pt demo impulsive tendency & educated to slow her movements down & to ensure staff is near to ensure pt safety.  Pt verbalize understanding educaiton & will continue to monitor carryover during treatment  Transfers  Slide Board: Moderate assistance  Stand to sit: Dependent/Total  Transfer Comments: pt require mod vc's for safety, sequencing of slide board transfer     Vision - Basic Assessment  Prior Vision: No visual deficits  Visual History: No significant visual history  Patient Visual Report: No visual complaint reported. Cognition  Overall Cognitive Status: Exceptions  Problem Solving: Assistance required to generate solutions;Assistance required to correct errors made  Cognition Comment: Impulisivity noted throughout OT session. Pt also verbalize impulsivity but did not stop her behavior & will continue to educate     Sensation  Overall Sensation Status: Impaired  Additional Comments: Pt report altered sensation BUE & BLE & feels like burning      BUE AROM: BUE AROM in all planes WFL  Left & Right Hand AROM: B hand grasp & rellease WFL. Pt able to oppose her thumb to each digit  BUE Strength  B Hand General: 4/5  BUE Strength Comment: BUE strength 4/5 in all planes     Hand Dominance: Right     Plan   Times per week: OT twice a day for 4 weeks to address above problem areas  Times per day: Twice a day  Current Treatment Recommendations: Strengthening,Gait Training,Patient/Caregiver Education & Training,Home Management Training,Equipment Evaluation, Education, & procurement,Balance Training,Functional Mobility Training,Positioning,Endurance Training,Wheelchair Mobility Training,Safety Education & Training,Self-Care / ADL    Assessment   Performance deficits / Impairments: Decreased functional mobility ; Decreased endurance;Decreased ADL status; Decreased sensation;Decreased posture;Decreased balance;Decreased strength;Decreased safe awareness;Decreased high-level IADLs  Prognosis: Good  Decision Making: Medium Complexity  OT Education: OT Role;Plan of Care;Equipment;Precautions; ADL Adaptive Strategies;Transfer Training;Energy Conservation  Patient Education: Pt educated with regards to OT process. Pt particiapted in OT goal planning. Pt educated with regards to OT goals & POC established. Pt in agreement with POC & goals. Pt pleasant & cooperative thorughout the OT session  REQUIRES OT FOLLOW UP: Yes  Activity Tolerance  Activity Tolerance: Patient Tolerated treatment well  Safety Devices  Safety Devices in place: Yes  Type of devices: Call light within reach         Goals  Long term goals  Time Frame for Long term goals : 4 weeks to address above problem areas  Long term goal 1: Pt demo Mod I to eat all meals  Long term goal 2: Pt demo Mod I grooming seated @ sink level  Long term goal 3: Pt demo Sup to bathe @ shower level both seated & use LHS  Long term goal 4: Pt demo Mod I UE dress to don a shirt & Mod I to don underwear, pants, socks & shoes using AE as needed  Long term goal 5: Pt demo Mod I commode trf using a slide board & drop arm commode & demo Mod I toileting & demo G safety  Long term goals 6: Pt demo SBA tub trf using appropriate DME- bench & slide board to ensure pt safety  Long term goal 7: Pt demo Mod I light homemaking activity @ wc level & demo G- safety & insight  Long term goal 8: Pt demo G- endurance for a 20-30 minute functional activity  Long term goal 9: Pt demo Mod I wc propulsion thorughout a living environment & around obstacles  Patient Goals   Patient goals : Marca Aase & take care of myself. \"     Therapy Time   Individual Concurrent Group Co-treatment   Time In 740_OT eval  750-OT rx         Time Out 750-OT eval  830-OT rx         Minutes 50 Min OT total  10 Min Ot eval  40 MIn OT rx          Ofelia Roman OTR/L [id number]

## 2022-01-06 NOTE — H&P
PM&R Admission History & Physical Examination    Patient: Jovana Estevez  Age/sex: 48 y.o. female  Medical Record #: [id number]    Admission to Acute Rehab Unit: Date: 1/5/2022   Diagnosis: Polyneuropathy [G62.9]  Admitting Physician: Randy Packer MD  Primary care provider: Christopher Khanna DO      Chief complaint:   Impairments and acitivities limitations in ADLs and mobility secondary to polyneuropathy     HPI:   Jovana Estevez is a 48 y.o. female with history of recent Covid-19 infection in November, who presented to [facility] on 12/20/2021 due to progressively worsening weakness of her lower extremities. She reports that she had Covid-19 in November with mild symptoms and received monoclonal antibody infusion (Regeneron) on 11/23/2021 due to risk factors. She states that after receiving the antibody infusion she began having progressive weakness in her legs, sensory changes and paresthesias in the lower extremities up to mid torso, and increasing difficulty walking--started on 11/28/2021. She was seen in the ED, diagnosed with serum sickness after otherwise negative workup, and discharged on Benadryl and prednisone. She was seen again in the ED the next day and discharged and has since continued to follow up with her PCP. Her symptoms continued to progress and she developed bowel/bladder incontinence and began having to use a walker to ambulate. She was sent to the ED by her PCP on 12/20 to rule out cauda equina. She has had extensive work up. MRI of brain, cervical, thoracic, and lumbar spine were completed. She was noted to have severe bilateral NF narrowing at L4-L5 and chronic degenerative changes, that were not felt to explain patient symptoms per NSGY. EMG showed diffuse abnormalities consistent with a neuropathic process, not consistent with GBS. She was found to have severe B12 deficiency and Neurology felt symptoms likely due to neuropathy related to the B12 deficiency.  She was started on      Spouse name: Not on file    Number of children: Not on file    Years of education: Not on file    Highest education level: Not on file   Occupational History    Not on file   Tobacco Use    Smoking status: Current Every Day Smoker     Packs/day: 1.00    Smokeless tobacco: Never Used   Vaping Use    Vaping Use: Never used   Substance and Sexual Activity    Alcohol use: Yes     Comment: rarely per patient questionnaire    Drug use: No    Sexual activity: Not on file   Other Topics Concern    Not on file   Social History Narrative    Not on file     Social Determinants of Health     Financial Resource Strain:     Difficulty of Paying Living Expenses: Not on file   Food Insecurity:     Worried About Running Out of Food in the Last Year: Not on file    Shaina of Food in the Last Year: Not on file   Transportation Needs:     Lack of Transportation (Medical): Not on file    Lack of Transportation (Non-Medical):  Not on file   Physical Activity:     Days of Exercise per Week: Not on file    Minutes of Exercise per Session: Not on file   Stress:     Feeling of Stress : Not on file   Social Connections:     Frequency of Communication with Friends and Family: Not on file    Frequency of Social Gatherings with Friends and Family: Not on file    Attends Zoroastrian Services: Not on file    Active Member of [address] [place] [address] [employer] or Organizations: Not on file    Attends Club or Organization Meetings: Not on file    Marital Status: Not on file   Intimate Partner Violence:     Fear of Current or Ex-Partner: Not on file    Emotionally Abused: Not on file    Physically Abused: Not on file    Sexually Abused: Not on file   Housing Stability:     Unable to Pay for Housing in the Last Year: Not on file    Number of [place] in the Last Year: Not on file    Unstable Housing in the Last Year: Not on file       Home situation: Lives with her fiance in a 2 level home with first floor setup and 3+1 steps to enter, no rails      Functional History:  Premorbid ADL's: independent  Premorbid Mobility: independent      Current Level of Function:     Physical Therapy:  Bed mobility: SBA - Mod A  Transfers: Max A x2  Ambulation: Unable    Occupational Therapy:  Feeding: Independent  Grooming: Setup  UB dressing: Setup  LB dressing: Dependent       Review Of Systems:   Constitutional: No Fever, chills  Skin: No rash, ulcers, or other lesions  HEENT: No HA, blurry vision  Respiratory: No Cough, SOB  Cardiovascular: No CP  Gastrointestinal: No nausea, vomiting, diarrhea, constipation, abdominal discomfort  Genitourinary: No Dysuria  Musculoskeletal: per HPI  Neurologic: per HPI  Psychiatric: No depression, No anxiety    Physical Examination:  Vitals:   Vitals:    01/05/22 1600   BP: 132/68   Pulse: 74   Resp: 17   Temp: 98.2 °F (36.8 °C)   TempSrc: Oral   Weight: 213 lb (96.6 kg)   Height: 5' (1.524 m)       GEN: NAD, conversational   HEENT: NC/AT, PERRLA, EOMI   RESP: CTAB, no R/R/W   CV: +S1 S2, RRR  ABD: soft, NT, ND, normal BS   : no hurt   EXT: Normal PROM, No clubbing/cyanosis, 2+ pulses   SKIN: No erythema, incision, lacerations   PSYCH: Tearful, cooperative     Neuro Exam:  AAOx4  Speech clear, content appropriate  Follows multi step commands    Cranial Nerves:  I: olfactory not tested  II: Full to confrontation  III, IV, VI: extra occular muscles intact  Pupils (II, III): ERRL  V: Facial Sensation/Jaw clench:  intact  VII: Facial Motor:  intact  VIII.  Hearing:  intact  XI/X: Palate:  intact  XI: Shoulder shrug/SCM:  intact  XII: Tongue:  intact      Neglect testing: No evidence of tactile or visual neglect     Coordination:  F - N:  Intact bilaterall    Sensory:    LT and PP sensation intact through T6 and altered (1/2) at T7 and below         Motor:    Strength is 5/5 throughout bilateral upper extremities    Lower extremity strength is 1/5 bilateral HF; 2/5 bilateral KE and PF; 0/5 bilateral DF and EHL    Bilateral lower extremity spasticity noted, at least MAS 1+. Concern for developing bilateral ankle flexion contractures       DTRs:  No lower extremity reflexes appreciated  2+ bilateral biceps, triceps, BG    No Babinski  No Montoya's      Laboratory:  Lab Results   Component Value Date    WBC 7.0 12/26/2021    HGB 12.2 12/26/2021    HCT 36.6 12/26/2021    MCV 88.6 12/26/2021     12/26/2021     Lab Results   Component Value Date     12/26/2021    K 4.5 12/26/2021    K 3.7 12/21/2021     12/26/2021    CO2 25 12/26/2021    BUN 10 12/26/2021    CREATININE 0.6 12/26/2021    GLUCOSE 96 12/26/2021    CALCIUM 9.2 12/26/2021      Lab Results   Component Value Date    ALT 12 12/26/2021    AST 13 12/26/2021    ALKPHOS 52 12/26/2021    BILITOT 0.3 12/26/2021     Lab Results   Component Value Date    COLORU Yellow 12/20/2021    NITRU Negative 12/20/2021    GLUCOSEU Negative 12/20/2021    KETUA Negative 12/20/2021    UROBILINOGEN 0.2 12/20/2021    BILIRUBINUR Negative 12/20/2021       Pertinent Imaging   Available imaging reviewed      DIAGNOSIS: Polyneuropathy    IMPRESSION/INDIVIDUAL PLAN OF CARE:  April Rojas is a 48 y.o. with impairments and acitivities limitations in ADLs and mobility secondary to polyneuropathy     Patient has impairments in:  Demonstrating impaired strength, impaired ROM, impaired balance, impaired safety awareness, decreased endurance. Patient has activities limitations in self care and mobility and is admitted to [facility] for acute comprehensive rehabilitation. I. Rehabilitation Necessity/Diagnosis:   Medical impact on function as a result of impaired functional mobility, ambulation, bed mobility, transfers, self-care/ADL's, strength, endurance, range of motion/flexibility, coordination and impaired gait/balance. Treatment plan will include a comprehensive rehabilitation program with intense therapies for 3 hours/day, 5 days/week.     1. Physical therapy to address bed mobidility, car and mat transfer, progressive ambulation with use of least restrictive assistive device, optimization of gait biomechanics, truncal strengthening, lower extremity strengthening, coordination, range of motion/flexibility, wheelchair and cushion evaluation, durable medical equipment evaluation, patient and family instruction and endurance training. 2. Occupational therapy to address feeding, grooming, upper and lower body dressing and bathing, toileting, tub/toilet/bed transfers, durable medical equipment evaluation, upper extremity strengthening, range of motion/flexibility, dexterity, coordination and patient and family instruction. 3. Rehabilitation nursing 24 hours per day to monitor bowel and bladder function, work on bowel routine, voiding trials/hurt catheter management as per bladder management protocol, assess falls risk upon admission and periodically thereafter, implement and revise falls prevention strategies, maintain skin integrity through initial and daily pressure sore risk assessment (Tobias scale), implement and revise pressure sore prevention strategies, educate patient and family members regarding medication administration, ADL's, transfers, and mobility and continue therapy carryover with ADL's, transfers, and mobility  4. Social work and case management consults for discharge planning/disposition issues, as well as coordination and communication of patient progress between family and providers. 5. Rehab psychology - as needed for adjustment, coping  6. Recreational therapy for community reintegration activities. This patient is sufficiently stable at this time of admission to [facility] to be able to participate in an intensive rehabilitation program described above and requires physician supervision by a rehabilitation physician as outlined below in Medical Management section. II.  Medical Management:  -Polyneuropathy: progressive since late November after she had Covid-19 and monoclonal antibody infusion. Found to have B12 deficiency and per Neurology felt to be cause of neuropathy. Monitor Neuro exam. Begin Acute rehab evaluations. -B12 deficiency: Continue oral B12, B12 now within normal limits. Monitor  -Borderline low vitamin D: will begin vitamin D supplement   -Lower extremity spasticity: Continue Baclofen. Contracture prevention.   -Pain control: tylenol PRN, naproxen prn, voltaren gel    -Overactive bladder: continue home Myrbetriq   -DVT prophylaxis: lovenox     # Disposition/social - anticipate discharge home, will discuss in team rounds. # Code status: Full Code     III. Estimated length of stay: 4-5 weeks    IV. Goals - CGA - Mod I    V. Anticipated discharge setting: Home    VI. Prognosis:  Rehab Prognosis: good  Medical Prognosis: good    CMS Required Post-Admission Physician Evaluation Elements  History and Physical, including medical history, functional history and active comorbidities as in above text. Post -Admission Physician Evaluation comparison with pre-admission screen:  I concur with the preadmission screen except as documented above     Medication Reconciliation CMS Requirements:  Drug Regimen Review:  Upon admission, a complete drug regimen review to identify potential clinically significant medication issues requiring immediate attention by a physician was completed. Electronically signed by Giovana Merchant MD on 1/5/2022 at 8:30 PM       Please note that the above documentation was prepared using voice recognition software. Every attempt was made to ensure accuracy but there may be spelling, grammatical, and contextual errors.

## 2022-01-06 NOTE — CARE COORDINATION
Social Work Assessment Summary    PCP: Dr. Shae Cano    Patient Resides: Nery Zapata is  and now lives with her aggie Amato. They have been together for 6+ years. Her son- Radha Maynard (99) also lives in the home. They have (5) dogs- (3) small and (2) large. Home Architecture : They live in a [address] with (3 + 1) entry steps (0) rails. Pt. Uses a tub/shower combo with curtain. There are (2 + 2 + 8) steps (1) rail to basement. Laundry in basement. Will you return to your own home? Yes        Primary Caregiver: Carmella Amato (46) works for [employer]. He has been off work since 11/15/21 COVID diagnosis but will return soon. Pt. Has (2) children- [place] (21) works at [employer]- does not drive. Sofía (25) works at [employer]. Pt's son- Zainab Hernandez committed suicide by hanging in 2016 at the age of 16. The pt. Found him in the basement. Level of Function PTA : Pt. Reports she was completely independent prior to 11/20/21 COVID diagnosis. Graduated with a BA - Healthcare Admin. 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     DME Pta  : [employer]    [employer] PTA : Referral to [facility]  12/2021    Do they have a medical profile: No    Family Teaching: to be scheduled    Strength: \"Motivation\"    Preference: \"to get my legs back and become independent\"      NAME RELATION HOME # WORK # CELL #   Miguelina marcial   [phone]   Radha Maynard son   [phone]   Avril Mourning Sister in law   [phone]     Height: 5'0  Weight: 213    Per Patient:  Diagnosed with COVID 11/20/21  Antibody infusion 11/23/21  LE weakness 11/28/21  B12 depletion- shot  12/9/21  B12 shot 12/10/21    KING Mondragon  1/6/2022

## 2022-01-06 NOTE — PROGRESS NOTES
Occupational Therapy  OCCUPATIONAL THERAPY DAILY NOTE    Date:2022  Patient Name: Ledy Silva  MRN: [id number]  : 1975  Room: [address] [place], [place] [address]     Diagnosis: Polyneuropathy   Precautions: falls ,slide board transfers     Functional Assessment:   Date Status AE  Comments   Feeding 22 Set up       Grooming 22 Supervision            Oral Care 22 Supervision      Bathing 22 Moderate Assist      UB Dressing 22 Supervision      LB Dressing 22 Dependent      Footwear 22 Dependent      Toileting 22 Dependent      Homemaking 22 TBA       Functional Transfers / Balance:   Date Status DME  Comments   Sit Balance 22 Stand by Assist      Stand Balance 22 Dependent      [] Tub  [] Shower   Transfer 22 TBA     Commode   Transfer 22 Maximal Assist  Drop arm 3 in 1 commode, slide board    Functional   Mobility 22 Minimal Assist  W/c       Other:         Functional Exercises / Activity:   BUE strength exercises : 3 # dowel molina 3 sets 10 reps in all planes    B hand strength : 5 # digi flex 3 sets 15 reps                                Graded clothespins activity     Sensory / Neuromuscular Re-Education:      Cognitive Skills:   Status Comments   Problem   Solving good     Memory good     Sequencing good     Safety fair       Visual Perception:    Education:      [] Family teach completed on:    Pain Level: 5/10 BLE's     Additional Notes:       Patient has made fair  progress during treatment sessions toward set goals. Therapy emphasis to obtain goals:Current Treatment Recommendations: Strengthening,Gait Training,Patient/Caregiver Education & Training,Home Management Training,Equipment Evaluation, Education, & procurement,Balance Training,Functional Mobility Training,Positioning,Endurance Training,Wheelchair Mobility Training,Safety Education & Training,Self-Care / ADL    [x] Continue with current OT Plan of care.   [] Prepare for Discharge    Goals  Long term goals  Time Frame for Long term goals : 4 weeks to address above problem areas  Long term goal 1: Pt demo Mod I to eat all meals  Long term goal 2: Pt demo Mod I grooming seated @ sink level  Long term goal 3: Pt demo Sup to bathe @ shower level both seated & use LHS  Long term goal 4: Pt demo Mod I UE dress to don a shirt & Mod I to don underwear, pants, socks & shoes using AE as needed  Long term goal 5: Pt demo Mod I commode trf using a slide board & drop arm commode & demo Mod I toileting & demo G safety  Long term goals 6: Pt demo SBA tub trf using appropriate DME- bench & slide board to ensure pt safety  Long term goal 7: Pt demo Mod I light homemaking activity @ wc level & demo G- safety & insight  Long term goal 8: Pt demo G- endurance for a 20-30 minute functional activity  Long term goal 9: Pt demo Mod I wc propulsion thorughout a living environment & around obstacles    DISCHARGE RECOMMENDATIONS  Recommended DME:    Post Discharge Care:   []Home Independently  []Home with 24hr Care / Supervision []Home with Partial Supervision []Home with Home Health OT []Home with Out Pt OT []Other: ___   Comments:         Time in Time out Tx Time Breakdown  Variance:   First Session  eval   [] Individual Tx-   [] Concurrent Tx -  [] Co-Tx -   [] Group Tx -   [] Time Missed -     Second Session [id number] [x] Individual Tx- 45  [] Concurrent Tx -  [] Co-Tx -   [] Group Tx -   [] Time Missed -     Third Session    [] Individual Tx-   [] Concurrent Tx -  [] Co-Tx -   [] Group Tx -   [] Time Missed -         Total Tx Time:45       Alejandrina Loyola, OTR/L [id number]

## 2022-01-06 NOTE — PROGRESS NOTES
Physical Therapy    Facility/Department: [address] [facility]  Initial Assessment    NAME: Reid Kern  : 1975  MRN: [id number]    Date of Service: 2022    Evaluating Therapist: Kt Medrano, PT, DPT [id number]      ROOM: [address] [place] [address]  DIAGNOSIS: Polyneuropathy  PRECAUTIONS: Falls, Regular diet   HPI: Patient admitted to the hospital on 21 with progressively worsening leg weakness after receiving an antibody infusion for Covid diagnosed in November. Neurosurgery and neurology all saw patient on consult. Imaging has shown lumbar radiculopathy. EMG done on 21: Recommendations: This is predominantly a neuropathic process, not myopathic in nature.  Post viral inflammatory processes can present early on with this picture however usually progress to development of slowing of velocity with the development of myelin abnormalities as well as increasing axonal pathology. B12 level low during admission and she has received several injections. Social:  Pt lives with fiance and son in [address] [facility] style home, 3 PONCE with no railing + 1 step to enter. May have [address] Prior to admission: Pt independent with all functional mobility     Initial Evaluation  Date:  AM     PM    Short Term Goals Long Term Goals    Was pt agreeable to Eval/treatment? 22       Does pt have pain?  Reports allodynia in BLE       Bed Mobility  Rolling: SBA  Supine to sit: Francesco  Sit to supine: modA  Scooting: Francesco   sup Mod i   Transfers Sit to stand: maxA + SBA form 2nd helper  Stand to sit: maxA + SBA from 2nd helper  Stand pivot: NT  Slideboard tx- maxA    5xSTS: NT   Francesco   sup     Ambulation    NT   10', LRAD, maxA     50', LRAD, Francesco       Walking 10 feet on uneven surface NT   10', LRAD, maxA 10', LRAD, Francesco   Wheel Chair Mobility NT   100', BUE propulsion, sup > 200', BUE propulson, mod i   Car Transfers NT   maxA Francesco   Stair negotiation: ascended and descended  NT   1 step, maxA, NADEGE HR 4 steps, Nadege HR, Francesco   Curb Step: ascended and descended NT   2\" curb, LRAD, maxA 2\" curb, LRAD, Francesco   Picking up object off the floor NT   MaxA with assistive device Francesco with LRAD   BLE ROM WFL       BLE Strength NADEGE hip flex- 1/5  NADEGE hip ext- 1/5  NADEGE knee ext 2+/5  NADEGE knee flex 2-/5  NADEGE PF 2+/5  NADEGE DF 0/5       Balance  Sitting EOB- SBA  Standing- maxA                 Date Family Teach Completed        Is additional Family Teaching Needed? Y or N        Hindering Progress Weakness, impulsivity, pain, endurance, balance, body habitus       PT recommended ELOS 4-5 weeks       Team's Discharge Plan        Therapist at Team Meeting          Pt is alert and Oriented x 4  Sensation: intact to light touch, pt reports sig burning sensation to light touch  Edema: unremarkable  Endurance: fair     ASSESSMENT  Pt displays functional ability as noted in the objective portion of this evaluation. Comments:  Pt presents with significant deficits in balance, endurance, strength sensation/pain control, proprioception, and safety awareness compared to baseline. Pt requiring significant heavy assist to complete tx activity. Upon standing, pt locking NADEGE knees into extension and resting on posterior soft tissue structures to support, very poor lateral stability in NADEGE ankles, L > R, rec NADEGE air casts for future standing trials to prevent MSK injury. Pt highly motivated to participate, impulsive at times, requiring cueing to await assist and instruction from PT for safety. Pt would benefit from continued therapy services at [facility] setting to address deficits listed above, working towards goals outlined. Recommend pt to be alonso tx with NSG staff at this time due to heavy assist requirements and safety concerns. Patient education  Pt educated on Role of PT, tx, balance, safety awareness, current status and POC, d/c planning.      Patient response to education:   Pt verbalized understanding Pt demonstrated skill Pt requires further education in this area yes partial All areas     Rehab potential is Good for reaching above PT goals. Pts/ family goals   1. Ambulate     Patient and or family understand(s) diagnosis, prognosis, and plan of care. yes  PLAN  PT care will be provided in accordance with the objectives noted above. Whenever appropriate, clear delegation orders will be provided for nursing staff. Exercises and functional mobility practice will be used as well as appropriate assistive devices or modalities to obtain goals. Patient and family education will also be administered as needed. Frequency of treatments will be 2x/day M-F and 1x/day Sat or Sun x 4-5 weeks.     Time in: 0915  Time out: Rosina Florentino 272. PT, DPT  [id number]

## 2022-01-06 NOTE — PROGRESS NOTES
Physical Therapy    Facility/Department: [address] REHAB  Treatment note    NAME: Delia Silva  : 1975  MRN: [id number]    Date of Service: 2022    Evaluating Therapist: Bren Ogden PT, CALEB [id number]      ROOM: [address] [place] [address]  DIAGNOSIS: Polyneuropathy  PRECAUTIONS: Falls, Regular diet   HPI: Patient admitted to the hospital on 21 with progressively worsening leg weakness after receiving an antibody infusion for Covid diagnosed in November. Neurosurgery and neurology all saw patient on consult. Imaging has shown lumbar radiculopathy. EMG done on 21: Recommendations: This is predominantly a neuropathic process, not myopathic in nature.  Post viral inflammatory processes can present early on with this picture however usually progress to development of slowing of velocity with the development of myelin abnormalities as well as increasing axonal pathology. B12 level low during admission and she has received several injections. Social:  Pt lives with fiance and son in [address] [facility] [place], [address] with no railing + 1 step to enter. May have [address] Prior to admission: Pt independent with all functional mobility     Initial Evaluation  Date:  AM     PM    Short Term Goals Long Term Goals    Was pt agreeable to Eval/treatment? 22 IE yes     Does pt have pain?  Reports allodynia in BLE  No sig c/o pain     Bed Mobility  Rolling: SBA  Supine to sit: Francesco  Sit to supine: modA  Scooting: Francesco  Rolling: SBA  Supine to sit: Francesco  Sit to supine: Francesco  Scooting: Francesco sup Mod i   Transfers Sit to stand: maxA + SBA form 2nd helper  Stand to sit: maxA + SBA from 2nd helper  Stand pivot: NT  Slideboard tx- maxA    5xSTS: NT  Sit to stand: maxA   Stand to sit: maxA   Stand pivot: unable to complete Francesco   sup     Ambulation    NT   10', LRAD, maxA     50', LRAD, Francesco       Walking 10 feet on uneven surface NT   10', LRAD, maxA 10', LRAD, Francesco   Wheel Chair Mobility NT   100', BUE propulsion, sup > 200', BUE propulson, mod i   Car Transfers NT   maxA Francesco   Stair negotiation: ascended and descended  NT   1 step, maxA, KUSUM HR 4 steps, Kusum HR, Francesco   Curb Step:   ascended and descended NT   2\" curb, LRAD, maxA 2\" curb, LRAD, Francesco   Picking up object off the floor NT   MaxA with assistive device Francesco with LRAD   BLE ROM WFL       BLE Strength KUSUM hip flex- 1/5  KUSUM hip ext- 1/5  KUSUM knee ext 2+/5  KUSUM knee flex 2-/5  KUSUM PF 2+/5  KUSUM DF 0/5       Balance  Sitting EOB- SBA  Standing- maxA                 Date Family Teach Completed        Is additional Family Teaching Needed? Y or N        Hindering Progress Weakness, impulsivity, pain, endurance, balance, body habitus       PT recommended ELOS 4-5 weeks       Team's Discharge Plan        Therapist at Team Meeting          Therapeutic Exercise:     AM: IE    PM:   1. Manual leg press- AAROM for concentric hip flexion, completing resisted concentric hip ext against moderate resistance, 2x10 reps  2. PROM BLE- focus on KUSUM hamstrings, plantarflexors, and hip IR/ER  3. sidelying clam shell- 2x10 each, AAROM, sig hip abduction weakness  4. STS with static stand- 2x30 sec, maxA, L air cast donned  5. Mini squats- x5 reps, maxA at EOB    Patient education    Pt educated on tx, balance, TE, current status and POC, d/c planning    Patient response to education:   Pt verbalized understanding Pt demonstrated skill Pt requires further education in this area   yes partial All areas     Additional Comments:   PM: Focus of session on strengthening BLE. Pt with significantly weak hip abductors and flexors. Pt with improved ability to complete bed mobility this session, less assist required. Pt donning L air cast for STS trials at EOB for lateral stability support. Pt completing standing at maxA, sig difficulty completing mini squats due to KUSUM weakness and proprioceptive deficits. Pt remains motivated and providing excellent effort with tasks.  Will continue to practice in future sessions. PM  Time in: 1430   Time out: 1515    Pt is making good progress toward established Physical Therapy goals. Continue with physical therapy current plan of care.     Audrey Dial., PT, DPT  [id number]

## 2022-01-06 NOTE — PROGRESS NOTES
[facility] Physical Medicine and Rehabilitation  Comprehensive Progress Note    Subjective:      Nikole Sandy is a 48 y.o. female admitted to inpatient rehabilitation for impairments and acitivities limitations in ADLs and mobility secondary to polyneuropathy. No acute events overnight. No cp, sob, n/v. Denies pain, does endorse some paresthesias in lower extremities. Tolerating therapy evaluations. No other complaints. The patient's medical records have been reviewed. Scheduled Meds:enoxaparin, 40 mg, Daily  sodium chloride flush, 5-40 mL, 2 times per day  baclofen, 10 mg, BID  diclofenac sodium, 4 g, 4x Daily  fluticasone, 1 spray, Daily  folic acid, 1 mg, Daily  melatonin, 5 mg, Nightly  multivitamin, 1 tablet, Daily  polyethylene glycol, 17 g, BID  trospium, 20 mg, Nightly  vitamin B-12, 1,000 mcg, Daily  influenza virus vaccine, 0.5 mL, Prior to discharge  docusate sodium, 100 mg, BID  senna, 2 tablet, Nightly  vitamin D, 2,000 Units, Daily      Continuous Infusions:   sodium chloride       PRN Meds:sodium chloride, 25 mL, PRN  sodium chloride flush, 5-40 mL, PRN  guaiFENesin-dextromethorphan, 5 mL, Q4H PRN  melatonin, 5 mg, Nightly PRN  naproxen, 500 mg, BID PRN  acetaminophen, 650 mg, Q4H PRN  ondansetron, 4 mg, Q6H PRN  polyethylene glycol, 17 g, Daily PRN         Objective:      Vitals:    01/05/22 1600 01/06/22 0130 01/06/22 1030   BP: 132/68 120/87 (!) 143/81   Pulse: 74 80 96   Resp: 17 18 18   Temp: 98.2 °F (36.8 °C) 97 °F (36.1 °C) 97.1 °F (36.2 °C)   TempSrc: Oral Temporal Tympanic   SpO2:   96%   Weight: 213 lb (96.6 kg)     Height: 5' (1.524 m)       General appearance: alert, appears stated age and cooperative, NAD  Head: Normocephalic, without obvious abnormality, atraumatic  Eyes: conjunctivae/corneas clear. PERRL, EOM's intact.    Lungs: clear to auscultation bilaterally  Heart: regular rate and rhythm, S1, S2   Abdomen: soft, non-tender, normal bowel sounds   Extremities: extremities atraumatic, no cyanosis or edema  Neurologic: AAOx4. LT and PP sensation intact through T6 and altered (1/2) at T7 and below. Strength is 5/5 throughout bilateral upper extremities. Lower extremity strength is 1/5 bilateral HF; 2/5 bilateral KE and PF; 0/5 bilateral DF and EHL. Bilateral lower extremity spasticity, at least MAS 1+. Laboratory:    Lab Results   Component Value Date    WBC 10.1 01/06/2022    HGB 12.5 01/06/2022    HCT 38.5 01/06/2022    MCV 90.0 01/06/2022     01/06/2022     Lab Results   Component Value Date     01/06/2022    K 4.1 01/06/2022     01/06/2022    CO2 21 01/06/2022    BUN 13 01/06/2022    CREATININE 0.5 01/06/2022    GLUCOSE 103 01/06/2022    CALCIUM 9.2 01/06/2022      Lab Results   Component Value Date    ALT 12 12/26/2021    AST 13 12/26/2021    ALKPHOS 52 12/26/2021    BILITOT 0.3 12/26/2021       Lab Results   Component Value Date    COLORU Yellow 12/20/2021    NITRU Negative 12/20/2021    GLUCOSEU Negative 12/20/2021    KETUA Negative 12/20/2021    UROBILINOGEN 0.2 12/20/2021    BILIRUBINUR Negative 12/20/2021       Functional Status:   Physical Therapy:  Bed mobility: SBA - Mod A  Transfers: Max A x2  Ambulation: Unable     Occupational Therapy:  Feeding: Independent  Grooming: Setup  UB dressing: Setup  LB dressing: Dependent        Assessment/Plan:       48 y.o. female admitted to inpatient rehabilitation for impairments and acitivities limitations in ADLs and mobility secondary to polyneuropathy.    -Polyneuropathy: progressive since late November after she had Covid-19 and monoclonal antibody infusion. Found to have B12 deficiency and per Neurology felt to be cause of neuropathy. Monitor Neuro exam. Continue Acute rehab program.  -B12 deficiency: Continue oral B12, B12 now within normal limits. Monitor  -Borderline low vitamin D: will begin vitamin D supplement   -Lower extremity spasticity: Continue Baclofen. Contracture prevention. -Pain control: tylenol PRN, naproxen prn, voltaren gel    -Overactive bladder: continue home Myrbetriq   -DVT prophylaxis: lovenox     Labs stable  Continue rehab evaluations       Electronically signed by Lisa Oliveira MD on 1/6/2022 at 12:16 PM

## 2022-01-07 PROCEDURE — 6360000002 HC RX W HCPCS: Performed by: INTERNAL MEDICINE

## 2022-01-07 PROCEDURE — 97112 NEUROMUSCULAR REEDUCATION: CPT

## 2022-01-07 PROCEDURE — 1280000000 HC REHAB R&B

## 2022-01-07 PROCEDURE — 97530 THERAPEUTIC ACTIVITIES: CPT

## 2022-01-07 PROCEDURE — 6370000000 HC RX 637 (ALT 250 FOR IP): Performed by: PHYSICAL MEDICINE & REHABILITATION

## 2022-01-07 PROCEDURE — 99232 SBSQ HOSP IP/OBS MODERATE 35: CPT | Performed by: PHYSICAL MEDICINE & REHABILITATION

## 2022-01-07 PROCEDURE — 97110 THERAPEUTIC EXERCISES: CPT

## 2022-01-07 PROCEDURE — 6370000000 HC RX 637 (ALT 250 FOR IP): Performed by: INTERNAL MEDICINE

## 2022-01-07 PROCEDURE — 97542 WHEELCHAIR MNGMENT TRAINING: CPT

## 2022-01-07 RX ORDER — BACLOFEN 10 MG/1
10 TABLET ORAL 3 TIMES DAILY
Status: DISCONTINUED | OUTPATIENT
Start: 2022-01-07 | End: 2022-01-11

## 2022-01-07 RX ADMIN — BACLOFEN 10 MG: 10 TABLET ORAL at 08:45

## 2022-01-07 RX ADMIN — FLUTICASONE PROPIONATE 1 SPRAY: 50 SPRAY, METERED NASAL at 08:45

## 2022-01-07 RX ADMIN — DICLOFENAC SODIUM 4 G: 10 GEL TOPICAL at 08:44

## 2022-01-07 RX ADMIN — Medication 1 TABLET: at 08:44

## 2022-01-07 RX ADMIN — Medication 1000 MCG: at 08:45

## 2022-01-07 RX ADMIN — Medication 2000 UNITS: at 08:44

## 2022-01-07 RX ADMIN — Medication 5 MG: at 20:56

## 2022-01-07 RX ADMIN — ENOXAPARIN SODIUM 40 MG: 100 INJECTION SUBCUTANEOUS at 08:44

## 2022-01-07 RX ADMIN — NAPROXEN 500 MG: 500 TABLET ORAL at 07:15

## 2022-01-07 RX ADMIN — BACLOFEN 10 MG: 10 TABLET ORAL at 14:48

## 2022-01-07 RX ADMIN — ACETAMINOPHEN 650 MG: 325 TABLET ORAL at 20:57

## 2022-01-07 RX ADMIN — FOLIC ACID 1 MG: 1 TABLET ORAL at 08:44

## 2022-01-07 RX ADMIN — GUAIFENESIN SYRUP AND DEXTROMETHORPHAN 5 ML: 100; 10 SYRUP ORAL at 20:56

## 2022-01-07 RX ADMIN — GUAIFENESIN SYRUP AND DEXTROMETHORPHAN 5 ML: 100; 10 SYRUP ORAL at 07:15

## 2022-01-07 RX ADMIN — BACLOFEN 10 MG: 10 TABLET ORAL at 20:56

## 2022-01-07 ASSESSMENT — PAIN DESCRIPTION - LOCATION
LOCATION: GENERALIZED;HIP;LEG
LOCATION: LEG;GENERALIZED

## 2022-01-07 ASSESSMENT — PAIN DESCRIPTION - FREQUENCY
FREQUENCY: CONTINUOUS
FREQUENCY: CONTINUOUS

## 2022-01-07 ASSESSMENT — PAIN SCALES - GENERAL
PAINLEVEL_OUTOF10: 4
PAINLEVEL_OUTOF10: 0
PAINLEVEL_OUTOF10: 6
PAINLEVEL_OUTOF10: 6
PAINLEVEL_OUTOF10: 4
PAINLEVEL_OUTOF10: 0

## 2022-01-07 ASSESSMENT — PAIN DESCRIPTION - PROGRESSION
CLINICAL_PROGRESSION: NOT CHANGED
CLINICAL_PROGRESSION: NOT CHANGED

## 2022-01-07 ASSESSMENT — PAIN - FUNCTIONAL ASSESSMENT: PAIN_FUNCTIONAL_ASSESSMENT: ACTIVITIES ARE NOT PREVENTED

## 2022-01-07 ASSESSMENT — PAIN DESCRIPTION - ONSET
ONSET: ON-GOING
ONSET: ON-GOING

## 2022-01-07 ASSESSMENT — PAIN DESCRIPTION - PAIN TYPE
TYPE: CHRONIC PAIN
TYPE: NEUROPATHIC PAIN;CHRONIC PAIN

## 2022-01-07 ASSESSMENT — PAIN DESCRIPTION - ORIENTATION
ORIENTATION: RIGHT;LEFT
ORIENTATION: RIGHT;LEFT

## 2022-01-07 NOTE — PROGRESS NOTES
[facility] Physical Medicine and [facility]  Comprehensive Progress Note    Subjective:      Emma Dowling is a 48 y.o. female admitted to inpatient rehabilitation for impairments and acitivities limitations in ADLs and mobility secondary to polyneuropathy. No acute events overnight. No cp, sob, n/v. Tolerating therapy. Feels like she is beginning to notice some slight improvements in strength. No other complaints. The patient's medical records have been reviewed. Scheduled Meds:mirabegron, 25 mg, Daily  enoxaparin, 40 mg, Daily  baclofen, 10 mg, BID  diclofenac sodium, 4 g, 4x Daily  fluticasone, 1 spray, Daily  folic acid, 1 mg, Daily  melatonin, 5 mg, Nightly  multivitamin, 1 tablet, Daily  polyethylene glycol, 17 g, BID  vitamin B-12, 1,000 mcg, Daily  influenza virus vaccine, 0.5 mL, Prior to discharge  docusate sodium, 100 mg, BID  senna, 2 tablet, Nightly  vitamin D, 2,000 Units, Daily      Continuous Infusions:  PRN Meds:guaiFENesin-dextromethorphan, 5 mL, Q4H PRN  melatonin, 5 mg, Nightly PRN  naproxen, 500 mg, BID PRN  acetaminophen, 650 mg, Q4H PRN  ondansetron, 4 mg, Q6H PRN  polyethylene glycol, 17 g, Daily PRN         Objective:      Vitals:    01/06/22 1030 01/06/22 2255 01/07/22 0046 01/07/22 0831   BP: (!) 143/81   133/81   Pulse: 96   94   Resp: 18   18   Temp: 97.1 °F (36.2 °C)   98.8 °F (37.1 °C)   TempSrc: Tympanic      SpO2: 96% 96% 96%    Weight:       Height:         General appearance: alert,  NAD  Eyes: conjunctivae/corneas clear. PERRL, EOM's intact. Lungs: clear to auscultation bilaterally  Heart: regular rate and rhythm, S1, S2   Abdomen: soft, non-tender, normal bowel sounds   Extremities: extremities atraumatic, no cyanosis or edema  Neurologic: AAOx4. LT and PP sensation intact through T6 and altered (1/2) at T7 and below. Strength is 5/5 throughout bilateral upper extremities.  Lower extremity strength is 1/5 bilateral HF; 2/5 bilateral KE and PF; 0/5 bilateral DF and EHL. Bilateral lower extremity spasticity, at least MAS 1+. Laboratory:    Lab Results   Component Value Date    WBC 10.1 01/06/2022    HGB 12.5 01/06/2022    HCT 38.5 01/06/2022    MCV 90.0 01/06/2022     01/06/2022     Lab Results   Component Value Date     01/06/2022    K 4.1 01/06/2022     01/06/2022    CO2 21 01/06/2022    BUN 13 01/06/2022    CREATININE 0.5 01/06/2022    GLUCOSE 103 01/06/2022    CALCIUM 9.2 01/06/2022      Lab Results   Component Value Date    ALT 12 12/26/2021    AST 13 12/26/2021    ALKPHOS 52 12/26/2021    BILITOT 0.3 12/26/2021       Lab Results   Component Value Date    COLORU Yellow 12/20/2021    NITRU Negative 12/20/2021    GLUCOSEU Negative 12/20/2021    KETUA Negative 12/20/2021    UROBILINOGEN 0.2 12/20/2021    BILIRUBINUR Negative 12/20/2021       Functional Status:   Physical Therapy:  Bed mobility: SBA - Min A  Transfers: Max A for sit to stand. Stand pivot NT. Ambulation: Unable     Occupational Therapy:  Feeding: Setup   Grooming: Supervision   UB dressing: Supervision   LB dressing: Dependent          Assessment/Plan:       48 y.o. female admitted to inpatient rehabilitation for impairments and acitivities limitations in ADLs and mobility secondary to polyneuropathy.     -Polyneuropathy: progressive since late November after she had Covid-19 and monoclonal antibody infusion. Found to have B12 deficiency and per Neurology felt to be cause of neuropathy. Monitor Neuro exam. Continue Acute rehab program.  -B12 deficiency: Continue oral B12, B12 now within normal limits. Monitor  -Borderline low vitamin D: will begin vitamin D supplement   -Lower extremity spasticity: Continue Baclofen.  Contracture prevention.   -Pain control: tylenol PRN, naproxen prn, voltaren gel    -Overactive bladder: continue home Myrbetriq   -DVT prophylaxis: lovenox     Tolerating therapy, continue rehab program  Psychology consult for adjustment, coping, patient agreeable  Increase baclofen to 10mg TID for spasticity       Electronically signed by Lynn Ryan MD on 1/7/2022 at 11:58 AM

## 2022-01-07 NOTE — PROGRESS NOTES
Occupational Therapy  OCCUPATIONAL THERAPY DAILY NOTE    Date:2022  Patient Name: Emma Dowling  MRN: [id number]  : 1975  Room: [address] [place], [place] [address]     Diagnosis: Polyneuropathy   Precautions: falls ,slide board transfers     Functional Assessment:   Date Status AE  Comments   Feeding 22 Set up       Grooming 22 Supervision            Oral Care 22 Supervision      Bathing 22 Moderate Assist      UB Dressing 22 Supervision      LB Dressing 22 Dependent      Footwear 22 Dependent      Toileting 22 Dependent      Homemaking 22 TBA       Functional Transfers / Balance:   Date Status DME  Comments   Sit Balance 22 Stand by Assist      Stand Balance 22 Dependent      [] Tub  [] Shower   Transfer 22 TBA     Commode   Transfer 22  Dependent  Drop arm 3 in 1 commode, slide board Assist of 2 staff needed for safety. Pt needed verbal cues for hand placement on slide board and for safe technique during transfer   Functional   Mobility 22 Minimal Assist  W/c  Pt able to propel w/c at SBA in open areas. Assist to maneuver in small spaces      Other: bed to w/c with alonso lift  22  Dependent        Functional Exercises / Activity:  BUE strength exercises: arm bike 5 mins for 3 reps                                           Green can do bar 3 sets 15 reps   Core strengthening /sitting balance exercises at w/c level with 2 # weighted ball 3 sets 10 reps in all planes of pt's ability   Pt used reacher to  items off of floor while seated with focus on sitting balance/endurance and UB strength/coordination.  Pt completed activity at SBA level   Sensory / Neuromuscular Re-Education:      Cognitive Skills:   Status Comments   Problem   Solving good     Memory good     Sequencing good     Safety fair       Visual Perception:    Education:  Pt was educated on safe transfers on/off commode using transfer board     [] Family teach completed on:    Pain Level: 5/10 BLE's Additional Notes:       Patient has made fair  progress during treatment sessions toward set goals. Therapy emphasis to obtain goals:Current Treatment Recommendations: Strengthening,Gait Training,Patient/Caregiver Education & Training,Home Management Training,Equipment Evaluation, Education, & procurement,Balance Training,Functional Mobility Training,Positioning,Endurance Training,Wheelchair Mobility Training,Safety Education & Training,Self-Care / ADL    [x] Continue with current OT Plan of care.   [] Prepare for Discharge    Goals  Long term goals  Time Frame for Long term goals : 4 weeks to address above problem areas  Long term goal 1: Pt demo Mod I to eat all meals  Long term goal 2: Pt demo Mod I grooming seated @ sink level  Long term goal 3: Pt demo Sup to bathe @ shower level both seated & use LHS  Long term goal 4: Pt demo Mod I UE dress to don a shirt & Mod I to don underwear, pants, socks & shoes using AE as needed  Long term goal 5: Pt demo Mod I commode trf using a slide board & drop arm commode & demo Mod I toileting & demo G safety  Long term goals 6: Pt demo SBA tub trf using appropriate DME- bench & slide board to ensure pt safety  Long term goal 7: Pt demo Mod I light homemaking activity @ wc level & demo G- safety & insight  Long term goal 8: Pt demo G- endurance for a 20-30 minute functional activity  Long term goal 9: Pt demo Mod I wc propulsion thorughout a living environment & around obstacles    DISCHARGE RECOMMENDATIONS  Recommended DME:    Post Discharge Care:   []Home Independently  []Home with 24hr Care / Supervision []Home with Partial Supervision []Home with Home Health OT []Home with Out Pt OT []Other: ___   Comments:         Time in Time out Tx Time Breakdown  Variance:   First Session  1045 1130  [x] Individual Tx-   [] Concurrent Tx -  [] Co-Tx -   [] Group Tx -   [] Time Missed -     Second Session 1345 1430  [x] Individual Tx-45   [] Concurrent Tx -  [] Co-Tx -   [] Group Tx - [] Time Missed -     Third Session    [] Individual Tx-   [] Concurrent Tx -  [] Co-Tx -   [] Group Tx -   [] Time Missed -         Total Tx Time:90 mins        Ting Sneed, OTR/L [id number]

## 2022-01-07 NOTE — PLAN OF CARE
Problem: Skin Integrity:  Goal: Will show no infection signs and symptoms  Description: Will show no infection signs and symptoms  1/6/2022 1050 by Pratima Gutierrez RN  Outcome: Ongoing  Goal: Absence of new skin breakdown  Description: Absence of new skin breakdown  1/6/2022 1050 by Pratima Gutierrez RN  Outcome: Ongoing     Problem: Falls - Risk of:  Goal: Will remain free from falls  Description: Will remain free from falls  1/6/2022 1050 by Pratima Gutierrez RN  Outcome: Ongoing  Goal: Absence of physical injury  Description: Absence of physical injury  1/6/2022 1050 by Pratima Gutierrez RN  Outcome: Ongoing

## 2022-01-07 NOTE — PROGRESS NOTES
Physical Therapy    Facility/Department: [address] REHAB  Treatment note    NAME: Reid Kern  : 1975  MRN: [id number]    Date of Service: 2022    Evaluating Therapist: Kt Medrano, PT, DPT [id number]      ROOM: [address] [place] [address]  DIAGNOSIS: Polyneuropathy  PRECAUTIONS: Falls, Regular diet, **PRAFOS when not in therapy**  HPI: Patient admitted to the hospital on 21 with progressively worsening leg weakness after receiving an antibody infusion for Covid diagnosed in November. Neurosurgery and neurology all saw patient on consult. Imaging has shown lumbar radiculopathy. EMG done on 21: Recommendations: This is predominantly a neuropathic process, not myopathic in nature.  Post viral inflammatory processes can present early on with this picture however usually progress to development of slowing of velocity with the development of myelin abnormalities as well as increasing axonal pathology. B12 level low during admission and she has received several injections. Social:  Pt lives with fiance and son in [address] [facility] style home, 3 [address] with no railing + 1 step to enter. May have [address] Prior to admission: Pt independent with all functional mobility     Initial Evaluation  Date:  AM     PM    Short Term Goals Long Term Goals    Was pt agreeable to Eval/treatment? 22 Yes yes     Does pt have pain?  Reports allodynia in BLE No sig c/o pain No sig c/o pain     Bed Mobility  Rolling: SBA  Supine to sit: Francesco  Sit to supine: modA  Scooting: Francesco Rolling: SBA  Supine to sit: Francesco  Sit to supine: Francesco  Scooting: Francesco NT sup Mod i   Transfers Sit to stand: maxA + SBA form 2nd helper  Stand to sit: maxA + SBA from 2nd helper  Stand pivot: NT  Slideboard tx- maxA    5xSTS: NT Sit to stand: maxA   Stand to sit: maxA   Stand pivot: NT  Slideboard tx- maxA Sit to stand: maxA   Stand to sit: maxA   Stand pivot: NT  Slideboard tx- NT Francesco   sup     Ambulation    NT See TE list for details  10', LRAD, maxA     50', control, current status and POC, d/c planning    Patient response to education:   Pt verbalized understanding Pt demonstrated skill Pt requires further education in this area   yes partial All areas     Additional Comments:  AM: Focus of session on standing balance and pre-gait activities to help promote BLE strengthening, static and dynamic motor control, and improve balance. Pt immediately snapping into NADEGE hyperextension in knees for support, also with heavy BUE support on // bars. As sets progress, pt began to demo improved ability to initiate knee flexion to begin mini squat movement, however motor control and proprioceptive deficits severe at this time. Trialed amb 1 bout in // bars, pt requiring maxA to engage into knee flexion to help with clearing foot, max-total A to advance each LE, but pt completing weight shifting at min-modA. Will continue to progress. PM: Pt completing w/c propulsion this session, showing good technique with propulsion, however distance limited by fatigue and endurance. Worked on standing balance again at // bars. Pt continues to require heavy assist to initiate knee flexion, as well as weight shifting anteriorly for anterior tibial translation to allow for knee flexion motion. Pt with PROM restrictions into ankle DF which appear to be contributing to pt's ability to allow tibias to anteriorly translate and promote knee flexion. Pt set up in w/c with soft foot drop boots provided form NSG donned at end of session. Provided with PRAFO boots at end of day, requesting pt to ann anytime nto in therapies. AM  Time in: 0915   Time out: 1000    PM  Time in: 1430  Time out: 1515    Pt is making good progress toward established Physical Therapy goals. Continue with physical therapy current plan of care.     Amos Amaya., PT, DPT  [id number]

## 2022-01-07 NOTE — PLAN OF CARE
Problem: Skin Integrity:  Goal: Will show no infection signs and symptoms  Description: Will show no infection signs and symptoms  1/6/2022 1050 by Rayo Braun RN  Outcome: Ongoing  Goal: Absence of new skin breakdown  Description: Absence of new skin breakdown  1/6/2022 1050 by Rayo Braun RN  Outcome: Ongoing     Problem: Falls - Risk of:  Goal: Will remain free from falls  Description: Will remain free from falls  1/6/2022 1050 by Rayo Braun RN  Outcome: Ongoing  Goal: Absence of physical injury  Description: Absence of physical injury  1/6/2022 1050 by Rayo Braun RN  Outcome: Ongoing

## 2022-01-08 PROCEDURE — 97530 THERAPEUTIC ACTIVITIES: CPT

## 2022-01-08 PROCEDURE — 1280000000 HC REHAB R&B

## 2022-01-08 PROCEDURE — 6370000000 HC RX 637 (ALT 250 FOR IP): Performed by: PHYSICAL MEDICINE & REHABILITATION

## 2022-01-08 PROCEDURE — 6370000000 HC RX 637 (ALT 250 FOR IP): Performed by: INTERNAL MEDICINE

## 2022-01-08 PROCEDURE — 6360000002 HC RX W HCPCS: Performed by: INTERNAL MEDICINE

## 2022-01-08 PROCEDURE — 97110 THERAPEUTIC EXERCISES: CPT

## 2022-01-08 RX ADMIN — Medication 5 MG: at 23:38

## 2022-01-08 RX ADMIN — Medication 2000 UNITS: at 08:31

## 2022-01-08 RX ADMIN — FOLIC ACID 1 MG: 1 TABLET ORAL at 08:32

## 2022-01-08 RX ADMIN — BACLOFEN 10 MG: 10 TABLET ORAL at 08:31

## 2022-01-08 RX ADMIN — ENOXAPARIN SODIUM 40 MG: 100 INJECTION SUBCUTANEOUS at 08:32

## 2022-01-08 RX ADMIN — DICLOFENAC SODIUM 4 G: 10 GEL TOPICAL at 13:03

## 2022-01-08 RX ADMIN — GUAIFENESIN SYRUP AND DEXTROMETHORPHAN 5 ML: 100; 10 SYRUP ORAL at 07:31

## 2022-01-08 RX ADMIN — BACLOFEN 10 MG: 10 TABLET ORAL at 23:38

## 2022-01-08 RX ADMIN — DOCUSATE SODIUM 100 MG: 100 CAPSULE, LIQUID FILLED ORAL at 08:31

## 2022-01-08 RX ADMIN — Medication 1 TABLET: at 08:31

## 2022-01-08 RX ADMIN — BACLOFEN 10 MG: 10 TABLET ORAL at 13:03

## 2022-01-08 RX ADMIN — NAPROXEN 500 MG: 500 TABLET ORAL at 07:31

## 2022-01-08 RX ADMIN — Medication 1000 MCG: at 08:31

## 2022-01-08 RX ADMIN — DICLOFENAC SODIUM 4 G: 10 GEL TOPICAL at 08:35

## 2022-01-08 RX ADMIN — FLUTICASONE PROPIONATE 1 SPRAY: 50 SPRAY, METERED NASAL at 08:32

## 2022-01-08 ASSESSMENT — PAIN SCALES - GENERAL
PAINLEVEL_OUTOF10: 0
PAINLEVEL_OUTOF10: 5

## 2022-01-08 NOTE — PROGRESS NOTES
Occupational Therapy  OCCUPATIONAL THERAPY DAILY NOTE    Date:2022  Patient Name: Neo Sharma  MRN: [id number]  : 1975  Room: [address] [place], [place] [address]     Diagnosis: Polyneuropathy   Precautions: falls ,slide board transfers     Functional Assessment:   Date Status AE  Comments   Feeding 22 Set up       Grooming 22 Supervision            Oral Care 22 Supervision      Bathing 22 Moderate Assist      UB Dressing 22 Supervision      LB Dressing 22 Dependent      Footwear 22 Dependent      Toileting 22 Dependent      Homemaking 22 TBA       Functional Transfers / Balance:   Date Status DME  Comments   Sit Balance 22 SBA/CGA W/c  Pt completed trunk control ex's using dowel molina & reacher picking up bean bags  while wearing 1# wts on BUE's to increase core strength with wooden box placed underneath B feet to increase weight bearing thru BLE's. Trunk control ex's unsupported in w/c needed CGA due to sitting balance and fear of leaning forwards at times. Pt tolerated 8-10 reps of 5 ea   Stand Balance 22 Dependent      [] Tub  [] Shower   Transfer 22 TBA     Commode   Transfer 22  Dependent  Drop arm 3 in 1 commode, slide board    Functional   Mobility 22 SBA/CGA W/c propel Pt propelled w/c from room to gym using BUE's to increase overall strength & endurance for functional tasks. Other: bed to w/c with alonso lift  22  Dependent        Functional Exercises / Activity:  BUE strength exercises wearing 1# wts during trunk control ex's up in w/c to increase core strength for ADL;s, sliding board transfers and mobility skills. Pt used reacher to  quach  Bags off of floor while seated up in w/c to improve  sitting balance/endurance and UB strength/coordination.  Pt completed activity at SBA level   Sensory / Neuromuscular Re-Education:      Cognitive Skills:   Status Comments   Problem   Solving good     Memory good     Sequencing good     Safety fair Visual Perception:    Education:  Pt educated with trunk control ex's in bed and up in w/c to increase core strengthening for transfers & ADL's    [] Family teach completed on:    Pain Level: 4-5/10 BLE's     Additional Notes:       Patient has made fair  progress during treatment sessions toward set goals. Therapy emphasis to obtain goals:Current Treatment Recommendations: Strengthening,Gait Training,Patient/Caregiver Education & Training,Home Management Training,Equipment Evaluation, Education, & procurement,Balance Training,Functional Mobility Training,Positioning,Endurance Training,Wheelchair Mobility Training,Safety Education & Training,Self-Care / ADL    [x] Continue with current OT Plan of care.   [] Prepare for Discharge    Goals  Long term goals  Time Frame for Long term goals : 4 weeks to address above problem areas  Long term goal 1: Pt demo Mod I to eat all meals  Long term goal 2: Pt demo Mod I grooming seated @ sink level  Long term goal 3: Pt demo Sup to bathe @ shower level both seated & use LHS  Long term goal 4: Pt demo Mod I UE dress to don a shirt & Mod I to don underwear, pants, socks & shoes using AE as needed  Long term goal 5: Pt demo Mod I commode trf using a slide board & drop arm commode & demo Mod I toileting & demo G safety  Long term goals 6: Pt demo SBA tub trf using appropriate DME- bench & slide board to ensure pt safety  Long term goal 7: Pt demo Mod I light homemaking activity @ wc level & demo G- safety & insight  Long term goal 8: Pt demo G- endurance for a 20-30 minute functional activity  Long term goal 9: Pt demo Mod I wc propulsion thorughout a living environment & around obstacles    DISCHARGE RECOMMENDATIONS  Recommended DME:    Post Discharge Care:   []Home Independently  []Home with 24hr Care / Supervision []Home with Partial Supervision []Home with Home Health OT []Home with Out Pt OT []Other: ___   Comments:         Time in Time out Tx Time Breakdown  Variance: First Session  10:05am  11:00am [x] Individual Tx-55mins  [] Concurrent Tx -  [] Co-Tx -   [] Group Tx -   [] Time Missed -     Second Session   [] Individual Tx-   [] Concurrent Tx -  [] Co-Tx -   [] Group Tx -   [] Time Missed -     Third Session    [] Individual Tx-   [] Concurrent Tx -  [] Co-Tx -   [] Group Tx -   [] Time Missed -         Total Tx Long [place]um DURAN/L [id number]

## 2022-01-08 NOTE — PROGRESS NOTES
Progress Note - covering Dr. Susi Burnett    Subjective/   48y.o. year old female on the rehab unit for polyneuropathy. She is complaining of dry nasal mucosa with some mild epistaxis. She is requesting a saline nasal spray. No other complaints. She is tolerating therapy. She is feeling stronger. Objective/   VITALS:  /75   Pulse 97   Temp 97.2 °F (36.2 °C) (Temporal)   Resp 18   Ht 5' (1.524 m)   Wt 213 lb (96.6 kg)   SpO2 96%   BMI 41.60 kg/m²   24HR INTAKE/OUTPUT:    Intake/Output Summary (Last 24 hours) at 1/8/2022 1431  Last data filed at 1/8/2022 0730  Gross per 24 hour   Intake 300 ml   Output 900 ml   Net -600 ml     Constitutional:  Alert, awake, no apparent distress   Cardiovascular:  S1, S2 without m/r/g   Respiratory:  CTA B without w/r/r   Abdomen: Positive bowel sounds, no tenderness  Ext: 1+ bilateral LE edema, she has PRAFOs bilaterally  Neuro: Awake and alert and oriented x3. No tremor. Significant weakness both lower extremities. Functional Level        Date   Status   AE    Comments     Feeding   1/6/22   Set up               Grooming   1/6/22   Supervision                    Oral Care   1/6/22   Supervision              Bathing   1/6/22   Moderate Assist              UB Dressing   1/6/22   Supervision              LB Dressing   1/6/22   Dependent              Footwear   1/6/22   Dependent              Toileting   1/6/22   Dependent              Homemaking   1/6/22   TBA                  Functional Transfers / Balance:      Date Status DME  Comments   Sit Balance 1/8/22 SBA/CGA W/c  Pt completed trunk control ex's using dowel molina & reacher picking up bean bags  while wearing 1# wts on BUE's to increase core strength with wooden box placed underneath B feet to increase weight bearing thru BLE's. Trunk control ex's unsupported in w/c needed CGA due to sitting balance and fear of leaning forwards at times.   Pt tolerated 8-10 reps of 5 ea   Stand Balance 1/6/22 Dependent        []? Tub  []? Shower   Transfer 1/6/22 TBA       Commode   Transfer 1/7/22  Dependent  Drop arm 3 in 1 commode, slide board     Functional   Mobility 1/8/22 SBA/CGA W/c propel Pt propelled w/c from room to gym using BUE's to increase overall strength & endurance for functional tasks. Other: bed to w/c with alonso lift  1/6/22  Dependent           Functional Exercises / Activity:  BUE strength exercises wearing 1# wts during trunk control ex's up in w/c to increase core strength for ADL;s, sliding board transfers and mobility skills. Pt used reacher to  quach  Bags off of floor while seated up in w/c to improve  sitting balance/endurance and UB strength/coordination. Pt completed activity at SBA level   Sensory / Neuromuscular Re-Education:        Cognitive Skills:    Status Comments   Problem   Solving good      Memory good      Sequencing good      Safety fair             Scheduled Meds:   baclofen  10 mg Oral TID    mirabegron  25 mg Oral Daily    enoxaparin  40 mg SubCUTAneous Daily    diclofenac sodium  4 g Topical 4x Daily    fluticasone  1 spray Nasal Daily    folic acid  1 mg Oral Daily    melatonin  5 mg Oral Nightly    multivitamin  1 tablet Oral Daily    polyethylene glycol  17 g Oral BID    vitamin B-12  1,000 mcg Oral Daily    influenza virus vaccine  0.5 mL IntraMUSCular Prior to discharge    docusate sodium  100 mg Oral BID    senna  2 tablet Oral Nightly    vitamin D  2,000 Units Oral Daily     Continuous Infusions:  PRN Meds:guaiFENesin-dextromethorphan, melatonin, naproxen, acetaminophen, ondansetron, polyethylene glycol  I/O last 3 completed shifts:   In: 330 [P.O.:330]  Out: 1400 [Urine:1400]  I/O this shift:  In: 180 [P.O.:180]  Out: -     Labs reviewed  CBC:   Recent Labs     01/06/22  0513   WBC 10.1   HGB 12.5        BMP:    Recent Labs     01/06/22  0513      K 4.1      CO2 21*   BUN 13   CREATININE 0.5   GLUCOSE 103*     Hepatic: No results for input(s): AST, ALT, ALB, BILITOT, ALKPHOS in the last 72 hours. BNP: No results for input(s): BNP in the last 72 hours. Lipids: No results for input(s): CHOL, HDL in the last 72 hours. Invalid input(s): LDLCALCU  INR: No results for input(s): INR in the last 72 hours. Assessment/  Patient Active Problem List:     COVID     Complaints of weakness of lower extremity     Weakness of both lower extremities     Ambulatory dysfunction     Iron deficiency anemia     Bilateral sciatica     Behavior problem, adult     Physical deconditioning     Declining functional status     Polyneuropathy      Plan/  1. Continue rehab program with focus on increased strength and mobility  2. Continue skin care  3. We will order saline nasal spray at the bedside  4. Continue Lovenox for DVT prophylaxis  5. Continue baclofen for spasm  6.  Continue Myrbetriq for overactive bladder          Kellee Mix MD

## 2022-01-09 PROBLEM — E53.8 B12 DEFICIENCY: Status: ACTIVE | Noted: 2022-01-09

## 2022-01-09 PROBLEM — Z78.9 IMPAIRED MOBILITY AND ADLS: Status: ACTIVE | Noted: 2022-01-09

## 2022-01-09 PROBLEM — R25.2 SPASTICITY: Status: ACTIVE | Noted: 2022-01-09

## 2022-01-09 PROBLEM — Z74.09 IMPAIRED MOBILITY AND ADLS: Status: ACTIVE | Noted: 2022-01-09

## 2022-01-09 PROCEDURE — 1280000000 HC REHAB R&B

## 2022-01-09 PROCEDURE — 6370000000 HC RX 637 (ALT 250 FOR IP): Performed by: PHYSICAL MEDICINE & REHABILITATION

## 2022-01-09 PROCEDURE — 6370000000 HC RX 637 (ALT 250 FOR IP): Performed by: INTERNAL MEDICINE

## 2022-01-09 PROCEDURE — 6360000002 HC RX W HCPCS: Performed by: INTERNAL MEDICINE

## 2022-01-09 PROCEDURE — 97535 SELF CARE MNGMENT TRAINING: CPT

## 2022-01-09 PROCEDURE — 97530 THERAPEUTIC ACTIVITIES: CPT

## 2022-01-09 RX ADMIN — Medication 5 MG: at 21:13

## 2022-01-09 RX ADMIN — BACLOFEN 10 MG: 10 TABLET ORAL at 08:32

## 2022-01-09 RX ADMIN — ACETAMINOPHEN 650 MG: 325 TABLET ORAL at 11:31

## 2022-01-09 RX ADMIN — ENOXAPARIN SODIUM 40 MG: 100 INJECTION SUBCUTANEOUS at 08:33

## 2022-01-09 RX ADMIN — FOLIC ACID 1 MG: 1 TABLET ORAL at 08:32

## 2022-01-09 RX ADMIN — BACLOFEN 10 MG: 10 TABLET ORAL at 14:10

## 2022-01-09 RX ADMIN — ACETAMINOPHEN 650 MG: 325 TABLET ORAL at 21:14

## 2022-01-09 RX ADMIN — FLUTICASONE PROPIONATE 1 SPRAY: 50 SPRAY, METERED NASAL at 08:35

## 2022-01-09 RX ADMIN — Medication 2000 UNITS: at 08:32

## 2022-01-09 RX ADMIN — Medication 1 TABLET: at 08:32

## 2022-01-09 RX ADMIN — GUAIFENESIN SYRUP AND DEXTROMETHORPHAN 5 ML: 100; 10 SYRUP ORAL at 21:13

## 2022-01-09 RX ADMIN — BACLOFEN 10 MG: 10 TABLET ORAL at 21:13

## 2022-01-09 RX ADMIN — DICLOFENAC SODIUM 4 G: 10 GEL TOPICAL at 17:00

## 2022-01-09 RX ADMIN — Medication 1000 MCG: at 08:32

## 2022-01-09 RX ADMIN — DICLOFENAC SODIUM 4 G: 10 GEL TOPICAL at 13:10

## 2022-01-09 RX ADMIN — GUAIFENESIN SYRUP AND DEXTROMETHORPHAN 5 ML: 100; 10 SYRUP ORAL at 08:31

## 2022-01-09 RX ADMIN — DICLOFENAC SODIUM 4 G: 10 GEL TOPICAL at 08:34

## 2022-01-09 ASSESSMENT — PAIN SCALES - GENERAL
PAINLEVEL_OUTOF10: 0
PAINLEVEL_OUTOF10: 3
PAINLEVEL_OUTOF10: 5

## 2022-01-09 ASSESSMENT — PAIN DESCRIPTION - PROGRESSION: CLINICAL_PROGRESSION: NOT CHANGED

## 2022-01-09 ASSESSMENT — PAIN DESCRIPTION - DESCRIPTORS: DESCRIPTORS: BURNING;ACHING

## 2022-01-09 ASSESSMENT — PAIN DESCRIPTION - PAIN TYPE: TYPE: CHRONIC PAIN

## 2022-01-09 ASSESSMENT — PAIN DESCRIPTION - FREQUENCY: FREQUENCY: CONTINUOUS

## 2022-01-09 ASSESSMENT — PAIN DESCRIPTION - ONSET: ONSET: ON-GOING

## 2022-01-09 ASSESSMENT — PAIN DESCRIPTION - ORIENTATION: ORIENTATION: LEFT

## 2022-01-09 NOTE — PROGRESS NOTES
Physical Therapy     Facility/Department: [address] REHAB  Treatment note     NAME: Noe Nath  : 1975  MRN: [id number]     Date of Service: 2022     Evaluating Therapist: Gifty Smith., PT, DPT [id number]        ROOM: [address] [place] [address]  DIAGNOSIS: Polyneuropathy  PRECAUTIONS: Falls, Regular diet, **PRAFOS when not in therapy**  HPI: Patient admitted to the hospital on 21 with progressively worsening leg weakness after receiving an antibody infusion for Covid diagnosed in November. Neurosurgery and neurology all saw patient on consult. Dana Bryant has shown lumbar radiculopathy. EMG done on 21: Recommendations: This is predominantly a neuropathic process, not myopathic in nature.  Post viral inflammatory processes can present early on with this picture however usually progress to development of slowing of velocity with the development of myelin abnormalities as well as increasing axonal pathology. B12 level low during admission and she has received several injections.     Social:  Pt lives with fiance and son in [facility] style home, 3 [address] with no railing + 1 step to enter. May have [address] Prior to admission: Pt independent with all functional mobility                    Initial Evaluation  Date:  AM     PM    Short Term Goals Long Term Goals    Was pt agreeable to Eval/treatment? 22 Yes         Does pt have pain?  Reports allodynia in BLE No sig c/o pain         Bed Mobility  Rolling: SBA  Supine to sit: Francesco  Sit to supine: modA  Scooting: Francesco Rolling: NT  Supine to sit: NT  Sit to supine: NT  Scooting: NT   sup Mod i   Transfers Sit to stand: maxA + SBA form 2nd helper  Stand to sit: maxA + SBA from 2nd helper  Stand pivot: NT  Slideboard tx- maxA     5xSTS: NT Sit to stand: modA   Stand to sit: modA   Stand pivot: NT  Slideboard tx- NT   Francesco    sup      Ambulation    NT See TE list for details   10', LRAD, maxA       50', LRAD, Francesco         Walking 10 feet on uneven surface NT NT   10', [place] 10', LRAD, Francesco   Wheel Chair Mobility ', BUE propulsion, Sup   100', BUE propulsion, sup > 200', BUE propulson, mod i   Car Transfers NT NT   maxA Francesco   Stair negotiation: ascended and descended  NT NT   1 step, maxA, KUSUM HR 4 steps, [place] HR, Francesco   Curb Step:   ascended and descended NT NT   2\" curb, LRAD, maxA 2\" curb, LRAD, Francesco   Picking up object off the floor NT NT   MaxA with assistive device Francesco with LRAD   BLE ROM WFL           BLE Strength KUSUM hip flex- 1/5  KUSUM hip ext- 1/5  KUSUM knee ext 2+/5  KUSUM knee flex 2-/5  KUSUM PF 2+/5  KUSUM DF 0/5           Balance  Sitting EOB- SBA  Standing- maxA                          Date Family Teach Completed             Is additional Family Teaching Needed? Y or N             Hindering Progress Weakness, impulsivity, pain, endurance, balance, body habitus           PT recommended ELOS 4-5 weeks           Team's Discharge Plan             Therapist at Team Meeting                Therapeutic Exercise:      AM:   1. Static standing- 2x30 sec in // bars, Francesco -modA to steady, modA to assist with manually unlocking knees as pt snapping into hyperextension for support  2. Standing weight shifts with hip flexion in // bars- 1x10 reps, modA, heavy UE support at HR, very weak hip flexion  3.  Mini squats in // bars- 2x10 reps, heavy UE support at // bars, min-modA to assist manually unlocking knees for short squat  4. amb in // bars- maxA + chair follow from 2nd helper, maxA to assist advancing BLE, engaging into knee flexion, and verbal cues to sequnce        Patient education     Pt educated on tx, gait, balance, TE, motor control, current status and POC, d/c planning     Patient response to education:   Pt verbalized understanding Pt demonstrated skill Pt requires further education in this area   yes partial All areas      Additional Comments:  AM:  Trialed amb 1 bout in // bars, pt requiring maxA to engage into knee flexion to help with clearing foot, pt showing improved hip flexor strength, able to initiate leg advancement, continues to require assist to complete full step though. L hip strength improving > R.  Pt continues to have difficulty initiating knee flexion during mini squat activity, resting in hyperextension in BLE for supporting in standing. Pt continues to have tightness in NADEGE plantarflexors, especially L side. Pt with sustained clonus with stretch in R ankle, 2 beats of clonus on L ankle. RN notified. Added towel build up on pt's foot rests for improved positioning and allowing pt's knees to flex more when sitting, allowing for improved DF range with BLE in [facility].             AM  Time in: 1000  Time out: 1030        Pt is making good progress toward established Physical Therapy goals.   Continue with physical therapy current plan of care.     Farshad Valentino., PT, DPT  [id number]

## 2022-01-09 NOTE — PROGRESS NOTES
Occupational Therapy  OCCUPATIONAL THERAPY DAILY NOTE    Date:2022  Patient Name: Rishabh Lopez  MRN: [id number]  : 1975  Room: [address] [place], [place] [address]     Diagnosis: Polyneuropathy   Precautions: falls ,slide board transfers     Functional Assessment:   Date Status AE  Comments   Feeding 22 Set up       Grooming 22 set up  To wash face, hand then apply deodorant while in bed. Oral Care 22 Supervision      Bathing 22 UB- SBA  LB-Min/Mod Assist  Bed level Pt washed UB skills then LB bending legs towards trunk (bathrobe belt to repositioning  legs in order to reach lower legs and feet. UB Dressing 22 Set up     LB Dressing 22 Max A   reacher To ann briefs and shorts using reacher bending legs towards self    Footwear 22 Mod/max  A Sock aid To ann socks and shoes along with Left ankle brace. Toileting 22 Max A  For toilet hygiene and bed pan use. Homemaking 22 TBA       Functional Transfers / Balance:   Date Status DME  Comments   Sit Balance 22 SBA/CGA W/c   Bed rails Sitting balance performed during ADL;s pulling self forwards using bed rail with BUE's    Stand Balance 22 Dependent      [] Tub  [] Shower   Transfer 22 TBD     Commode   Transfer 22  Dependent  Drop arm 3 in 1 commode, slide board    Functional   Mobility 22 SBA/CGA W/c propel     Other: bed to w/c with ash lift     Bed repositioning  22 Dependent         SBA         Bed rails  Ash lift utilized with bed to w/c transfers for patient safety. Pt used BUE's on bed rails to pull self up towards pillow along with rolling R/L sides during ADL;s to increase overall endurance and strength. Functional Exercises / Activity:  Pt engaged in lengthy ADL session using A.E for LB dressing to increase overall grooming, bathing and UB/LB dressing skills. See above for details.     Sensory / Neuromuscular Re-Education:      Cognitive Skills:   Status Comments Problem   Solving good     Memory good     Sequencing good     Safety fair       Visual Perception:    Education:  Pt educated with trunk control ex's in bed during ADL;s to increase core strengthening for transfers & ADL's  Pt educated using reacher and sock aid to increase LB dressing skills. [] Family teach completed on:    Pain Level: 4-10 BLE's     Additional Notes:   01/9/22 Pt very is motivated to get moving and better. Patient has made good progress during treatment sessions toward set goals. Therapy emphasis to obtain goals:Current Treatment Recommendations: Strengthening,Gait Training,Patient/Caregiver Education & Training,Home Management Training,Equipment Evaluation, Education, & procurement,Balance Training,Functional Mobility Training,Positioning,Endurance Training,Wheelchair Mobility Training,Safety Education & Training,Self-Care / ADL    [x] Continue with current OT Plan of care.   [] Prepare for Discharge    Goals  Long term goals  Time Frame for Long term goals : 4 weeks to address above problem areas  Long term goal 1: Pt demo Mod I to eat all meals  Long term goal 2: Pt demo Mod I grooming seated @ sink level  Long term goal 3: Pt demo Sup to bathe @ shower level both seated & use LHS  Long term goal 4: Pt demo Mod I UE dress to don a shirt & Mod I to don underwear, pants, socks & shoes using AE as needed  Long term goal 5: Pt demo Mod I commode trf using a slide board & drop arm commode & demo Mod I toileting & demo G safety  Long term goals 6: Pt demo SBA tub trf using appropriate DME- bench & slide board to ensure pt safety  Long term goal 7: Pt demo Mod I light homemaking activity @ wc level & demo G- safety & insight  Long term goal 8: Pt demo G- endurance for a 20-30 minute functional activity  Long term goal 9: Pt demo Mod I wc propulsion thorughout a living environment & around obstacles    DISCHARGE RECOMMENDATIONS  Recommended DME:    Post Discharge Care:   []Home Independently []Home with 24hr Care / Supervision []Home with Partial Supervision []Home with Home Health OT []Home with Out Pt OT []Other: ___   Comments:         Time in Time out Tx Time Breakdown  Variance:   First Session  8:15am  9:45am [x] Individual Tx-90mins  [] Concurrent Tx -  [] Co-Tx -   [] Group Tx -   [] Time Missed -     Second Session   [] Individual Tx-   [] Concurrent Tx -  [] Co-Tx -   [] Group Tx -   [] Time Missed -     Third Session    [] Individual Tx-   [] Concurrent Tx -  [] Co-Tx -   [] Group Tx -   [] Time Missed -         Total Tx Time Jimmy Escalante DURAN/L [id number]    I have read & agree with the above status  [facility] [employer] OTRL [id number]

## 2022-01-10 PROCEDURE — 97116 GAIT TRAINING THERAPY: CPT

## 2022-01-10 PROCEDURE — 6360000002 HC RX W HCPCS: Performed by: INTERNAL MEDICINE

## 2022-01-10 PROCEDURE — 97530 THERAPEUTIC ACTIVITIES: CPT

## 2022-01-10 PROCEDURE — 97110 THERAPEUTIC EXERCISES: CPT

## 2022-01-10 PROCEDURE — 6370000000 HC RX 637 (ALT 250 FOR IP): Performed by: INTERNAL MEDICINE

## 2022-01-10 PROCEDURE — 6370000000 HC RX 637 (ALT 250 FOR IP): Performed by: PHYSICAL MEDICINE & REHABILITATION

## 2022-01-10 PROCEDURE — 99232 SBSQ HOSP IP/OBS MODERATE 35: CPT | Performed by: PHYSICAL MEDICINE & REHABILITATION

## 2022-01-10 PROCEDURE — 97535 SELF CARE MNGMENT TRAINING: CPT

## 2022-01-10 PROCEDURE — 90791 PSYCH DIAGNOSTIC EVALUATION: CPT | Performed by: PSYCHOLOGIST

## 2022-01-10 PROCEDURE — 1280000000 HC REHAB R&B

## 2022-01-10 PROCEDURE — 97112 NEUROMUSCULAR REEDUCATION: CPT

## 2022-01-10 RX ADMIN — ACETAMINOPHEN 650 MG: 325 TABLET ORAL at 20:49

## 2022-01-10 RX ADMIN — DICLOFENAC SODIUM 4 G: 10 GEL TOPICAL at 08:21

## 2022-01-10 RX ADMIN — BACLOFEN 10 MG: 10 TABLET ORAL at 08:18

## 2022-01-10 RX ADMIN — NAPROXEN 500 MG: 500 TABLET ORAL at 06:21

## 2022-01-10 RX ADMIN — Medication 1 TABLET: at 08:18

## 2022-01-10 RX ADMIN — BACLOFEN 10 MG: 10 TABLET ORAL at 13:05

## 2022-01-10 RX ADMIN — DICLOFENAC SODIUM 4 G: 10 GEL TOPICAL at 20:52

## 2022-01-10 RX ADMIN — Medication 5 MG: at 20:49

## 2022-01-10 RX ADMIN — GUAIFENESIN SYRUP AND DEXTROMETHORPHAN 5 ML: 100; 10 SYRUP ORAL at 06:21

## 2022-01-10 RX ADMIN — GUAIFENESIN SYRUP AND DEXTROMETHORPHAN 5 ML: 100; 10 SYRUP ORAL at 20:49

## 2022-01-10 RX ADMIN — Medication 2000 UNITS: at 08:18

## 2022-01-10 RX ADMIN — Medication 1000 MCG: at 08:18

## 2022-01-10 RX ADMIN — BACLOFEN 10 MG: 10 TABLET ORAL at 20:49

## 2022-01-10 RX ADMIN — ENOXAPARIN SODIUM 40 MG: 100 INJECTION SUBCUTANEOUS at 08:17

## 2022-01-10 RX ADMIN — DICLOFENAC SODIUM 4 G: 10 GEL TOPICAL at 13:05

## 2022-01-10 RX ADMIN — FLUTICASONE PROPIONATE 1 SPRAY: 50 SPRAY, METERED NASAL at 08:22

## 2022-01-10 RX ADMIN — FOLIC ACID 1 MG: 1 TABLET ORAL at 08:18

## 2022-01-10 ASSESSMENT — PAIN DESCRIPTION - LOCATION
LOCATION: HIP

## 2022-01-10 ASSESSMENT — PAIN DESCRIPTION - ONSET
ONSET: ON-GOING
ONSET: ON-GOING

## 2022-01-10 ASSESSMENT — PAIN SCALES - GENERAL
PAINLEVEL_OUTOF10: 5
PAINLEVEL_OUTOF10: 0
PAINLEVEL_OUTOF10: 3
PAINLEVEL_OUTOF10: 6
PAINLEVEL_OUTOF10: 0
PAINLEVEL_OUTOF10: 0

## 2022-01-10 ASSESSMENT — PAIN DESCRIPTION - PROGRESSION
CLINICAL_PROGRESSION: NOT CHANGED
CLINICAL_PROGRESSION: NOT CHANGED

## 2022-01-10 ASSESSMENT — PAIN DESCRIPTION - ORIENTATION
ORIENTATION: LEFT

## 2022-01-10 ASSESSMENT — PAIN DESCRIPTION - PAIN TYPE
TYPE: CHRONIC PAIN

## 2022-01-10 ASSESSMENT — PAIN DESCRIPTION - DESCRIPTORS
DESCRIPTORS: ACHING;CONSTANT;DISCOMFORT
DESCRIPTORS: ACHING;BURNING

## 2022-01-10 ASSESSMENT — PAIN DESCRIPTION - FREQUENCY
FREQUENCY: CONTINUOUS
FREQUENCY: CONTINUOUS

## 2022-01-10 NOTE — PROGRESS NOTES
Occupational Therapy  OCCUPATIONAL THERAPY DAILY NOTE    Date:1/10/2022  Patient Name: Ledy Silva  MRN: [id number]  : 1975  [facility]     Diagnosis: Polyneuropathy   Precautions: falls ,slide board transfers     Functional Assessment:   Date Status AE  Comments   Feeding 22 Set up       Grooming 22 set up           Oral Care 22 Supervision      Bathing 22 UB- SBA  LB-Min/Mod Assist  Bed level    UB Dressing 22 Set up     LB Dressing 22 Max A   reacher    Footwear 1/10/22  Mod/max  A  long shoe horn  Sock aid Pt donned socks with sock aide seated in chair after set up. Pt needed Max A to don shoes with long shoe horn and tie shoe laces    Toileting 22 Max A     Homemaking 22 TBA       Functional Transfers / Balance:   Date Status DME  Comments   Sit Balance 1/10/22  SBA/CGA W/c   Bed rails    Stand Balance 22 Dependent      [] Tub  [] Shower   Transfer 22 TBD     Commode   Transfer 22  Dependent  Drop arm 3 in 1 commode, slide board    Functional   Mobility 1/10/22  SBA/CGA W/c propel  pt propelled w/c throughout OT clinic    Other: bed to w/c with alonso lift       Bed repositioning  22 Dependent         SBA         Bed rails       Functional Exercises / Activity:  BUE strength exercises:rancho box with 6 # wt 3 sets 10 reps in all planes on table top surface                                           3 # dowel molina 3 sets 10 reps in all planes                                            ROM shoulder arc with 1 # wrist weights 3 sets 1 reps   Dynamic trunk control challenges at w/c level at Min A for balance when reaching beyond arms length       Sensory / Neuromuscular Re-Education:      Cognitive Skills:   Status Comments   Problem   Solving good     Memory good     Sequencing good     Safety fair       Visual Perception:    Education:  Pt educated on AE for B dressing     [] Family teach completed on:    Pain Level: 4-10 BLE's Additional Notes:       Patient has made good progress during treatment sessions toward set goals. Therapy emphasis to obtain goals:Current Treatment Recommendations: Strengthening,Gait Training,Patient/Caregiver Education & Training,Home Management Training,Equipment Evaluation, Education, & procurement,Balance Training,Functional Mobility Training,Positioning,Endurance Training,Wheelchair Mobility Training,Safety Education & Training,Self-Care / ADL    [x] Continue with current OT Plan of care.   [] Prepare for Discharge    Goals  Long term goals  Time Frame for Long term goals : 4 weeks to address above problem areas  Long term goal 1: Pt demo Mod I to eat all meals  Long term goal 2: Pt demo Mod I grooming seated @ sink level  Long term goal 3: Pt demo Sup to bathe @ shower level both seated & use LHS  Long term goal 4: Pt demo Mod I UE dress to don a shirt & Mod I to don underwear, pants, socks & shoes using AE as needed  Long term goal 5: Pt demo Mod I commode trf using a slide board & drop arm commode & demo Mod I toileting & demo G safety  Long term goals 6: Pt demo SBA tub trf using appropriate DME- bench & slide board to ensure pt safety  Long term goal 7: Pt demo Mod I light homemaking activity @ wc level & demo G- safety & insight  Long term goal 8: Pt demo G- endurance for a 20-30 minute functional activity  Long term goal 9: Pt demo Mod I wc propulsion thorughout a living environment & around obstacles    DISCHARGE RECOMMENDATIONS  Recommended DME:    Post Discharge Care:   []Home Independently  []Home with 24hr Care / Supervision []Home with Partial Supervision []Home with Home Health OT []Home with Out Pt OT []Other: ___   Comments:         Time in Time out Tx Time Breakdown  Variance:   First Session  1045  1130  [x] Individual Tx-45  [] Concurrent Tx -  [] Co-Tx -   [] Group Tx -   [] Time Missed -     Second Session 1345 1430  [x] Individual Tx-45   [] Concurrent Tx -  [] Co-Tx -   [] Group Tx -   [] Time Missed -     Third Session    [] Individual Tx-   [] Concurrent Tx -  [] Co-Tx -   [] Group Tx -   [] Time Missed -         Total Tx Time [address] [facility] OTR/L [id number]

## 2022-01-10 NOTE — PROGRESS NOTES
[facility]  Comprehensive Progress Note    Subjective:      Wallace Caraballo is a 48 y.o. female admitted to inpatient rehabilitation for impairments and acitivities limitations in ADLs and mobility secondary to polyneuropathy. No acute events overnight. No cp, sob, n/v. Tolerating therapy. No complaints. The patient's medical records have been reviewed. Scheduled Meds:baclofen, 10 mg, TID  mirabegron, 25 mg, Daily  enoxaparin, 40 mg, Daily  diclofenac sodium, 4 g, 4x Daily  fluticasone, 1 spray, Daily  folic acid, 1 mg, Daily  melatonin, 5 mg, Nightly  multivitamin, 1 tablet, Daily  polyethylene glycol, 17 g, BID  vitamin B-12, 1,000 mcg, Daily  influenza virus vaccine, 0.5 mL, Prior to discharge  docusate sodium, 100 mg, BID  senna, 2 tablet, Nightly  vitamin D, 2,000 Units, Daily      Continuous Infusions:  PRN Meds:sodium chloride, 1 spray, PRN  guaiFENesin-dextromethorphan, 5 mL, Q4H PRN  melatonin, 5 mg, Nightly PRN  naproxen, 500 mg, BID PRN  acetaminophen, 650 mg, Q4H PRN  ondansetron, 4 mg, Q6H PRN  polyethylene glycol, 17 g, Daily PRN         Objective:      Vitals:    01/08/22 0110 01/08/22 0830 01/09/22 0830 01/10/22 0808   BP: (!) 140/82 121/75 (!) 140/81 114/67   Pulse: 91 97 101 94   Resp: 18 18 18    Temp: 96 °F (35.6 °C) 97.2 °F (36.2 °C)  98.2 °F (36.8 °C)   TempSrc: Temporal Temporal     SpO2:    98%   Weight:       Height:         General appearance: alert,  NAD  Eyes: conjunctivae/corneas clear. PERRL, EOM's intact. Lungs: clear to auscultation bilaterally  Heart: regular rate and rhythm, S1, S2   Abdomen: soft, non-tender, normal bowel sounds   Extremities: extremities atraumatic, no cyanosis or edema  Neurologic: AAOx4. LT and PP sensation intact through T6 and altered (1/2) at T7 and below. Strength is 5/5 throughout bilateral upper extremities. Lower extremity strength is 1/5 bilateral HF; 2/5 bilateral KE and PF; 0/5 bilateral DF and EHL. Bilateral lower extremity spasticity, at least MAS 1+ - 2. Laboratory:    Lab Results   Component Value Date    WBC 10.1 01/06/2022    HGB 12.5 01/06/2022    HCT 38.5 01/06/2022    MCV 90.0 01/06/2022     01/06/2022     Lab Results   Component Value Date     01/06/2022    K 4.1 01/06/2022     01/06/2022    CO2 21 01/06/2022    BUN 13 01/06/2022    CREATININE 0.5 01/06/2022    GLUCOSE 103 01/06/2022    CALCIUM 9.2 01/06/2022      Lab Results   Component Value Date    ALT 12 12/26/2021    AST 13 12/26/2021    ALKPHOS 52 12/26/2021    BILITOT 0.3 12/26/2021       Lab Results   Component Value Date    COLORU Yellow 12/20/2021    NITRU Negative 12/20/2021    GLUCOSEU Negative 12/20/2021    KETUA Negative 12/20/2021    UROBILINOGEN 0.2 12/20/2021    BILIRUBINUR Negative 12/20/2021       Functional Status:   Physical Therapy:  Bed mobility: SBA - Min A  Transfers: Mod A  Ambulation: Unable     Occupational Therapy:  Feeding: Setup   Grooming: Setup  UB dressing: Setup  LB dressing: Max A         Assessment/Plan:       48 y.o. female admitted to inpatient rehabilitation for impairments and acitivities limitations in ADLs and mobility secondary to polyneuropathy.     -Polyneuropathy: progressive since late November after she had Covid-19 and monoclonal antibody infusion. Found to have B12 deficiency and per Neurology felt to be cause of neuropathy. Monitor Neuro exam. Continue Acute rehab program.  -B12 deficiency: Continue oral B12, B12 now within normal limits. Monitor  -Borderline low vitamin D: will begin vitamin D supplement   -Lower extremity spasticity: Continue Baclofen.  Contracture prevention.   -Pain control: tylenol PRN, naproxen prn, voltaren gel    -Overactive bladder: continue home Myrbetriq   -DVT prophylaxis: lovenox     Tolerating therapy, making functional progress  Continue rehab program      Electronically signed by Barrett Tomlinson MD on 1/10/2022 at 1:46 PM

## 2022-01-11 PROCEDURE — 97530 THERAPEUTIC ACTIVITIES: CPT

## 2022-01-11 PROCEDURE — 1280000000 HC REHAB R&B

## 2022-01-11 PROCEDURE — 6370000000 HC RX 637 (ALT 250 FOR IP): Performed by: INTERNAL MEDICINE

## 2022-01-11 PROCEDURE — 97116 GAIT TRAINING THERAPY: CPT

## 2022-01-11 PROCEDURE — 99232 SBSQ HOSP IP/OBS MODERATE 35: CPT | Performed by: PHYSICAL MEDICINE & REHABILITATION

## 2022-01-11 PROCEDURE — 97535 SELF CARE MNGMENT TRAINING: CPT

## 2022-01-11 PROCEDURE — 6360000002 HC RX W HCPCS: Performed by: INTERNAL MEDICINE

## 2022-01-11 PROCEDURE — 6370000000 HC RX 637 (ALT 250 FOR IP): Performed by: PHYSICAL MEDICINE & REHABILITATION

## 2022-01-11 RX ORDER — BACLOFEN 10 MG/1
10 TABLET ORAL 2 TIMES DAILY
Status: DISCONTINUED | OUTPATIENT
Start: 2022-01-11 | End: 2022-02-04 | Stop reason: HOSPADM

## 2022-01-11 RX ORDER — BACLOFEN 10 MG/1
20 TABLET ORAL NIGHTLY
Status: DISCONTINUED | OUTPATIENT
Start: 2022-01-11 | End: 2022-02-04 | Stop reason: HOSPADM

## 2022-01-11 RX ADMIN — Medication 2000 UNITS: at 07:55

## 2022-01-11 RX ADMIN — FOLIC ACID 1 MG: 1 TABLET ORAL at 07:55

## 2022-01-11 RX ADMIN — Medication 5 MG: at 22:00

## 2022-01-11 RX ADMIN — DICLOFENAC SODIUM 4 G: 10 GEL TOPICAL at 07:56

## 2022-01-11 RX ADMIN — DICLOFENAC SODIUM 4 G: 10 GEL TOPICAL at 16:02

## 2022-01-11 RX ADMIN — BACLOFEN 20 MG: 10 TABLET ORAL at 22:00

## 2022-01-11 RX ADMIN — GUAIFENESIN SYRUP AND DEXTROMETHORPHAN 5 ML: 100; 10 SYRUP ORAL at 08:18

## 2022-01-11 RX ADMIN — BACLOFEN 10 MG: 10 TABLET ORAL at 13:18

## 2022-01-11 RX ADMIN — Medication 1000 MCG: at 07:55

## 2022-01-11 RX ADMIN — NAPROXEN 500 MG: 500 TABLET ORAL at 06:20

## 2022-01-11 RX ADMIN — FLUTICASONE PROPIONATE 1 SPRAY: 50 SPRAY, METERED NASAL at 07:56

## 2022-01-11 RX ADMIN — Medication 1 TABLET: at 07:55

## 2022-01-11 RX ADMIN — BACLOFEN 10 MG: 10 TABLET ORAL at 07:55

## 2022-01-11 RX ADMIN — ACETAMINOPHEN 650 MG: 325 TABLET ORAL at 22:00

## 2022-01-11 RX ADMIN — GUAIFENESIN SYRUP AND DEXTROMETHORPHAN 5 ML: 100; 10 SYRUP ORAL at 22:00

## 2022-01-11 RX ADMIN — ENOXAPARIN SODIUM 40 MG: 100 INJECTION SUBCUTANEOUS at 07:56

## 2022-01-11 ASSESSMENT — PAIN DESCRIPTION - DESCRIPTORS
DESCRIPTORS: ACHING;CONSTANT;DISCOMFORT
DESCRIPTORS: ACHING;CONSTANT;DISCOMFORT

## 2022-01-11 ASSESSMENT — PAIN SCALES - GENERAL
PAINLEVEL_OUTOF10: 0
PAINLEVEL_OUTOF10: 6
PAINLEVEL_OUTOF10: 4
PAINLEVEL_OUTOF10: 0
PAINLEVEL_OUTOF10: 7

## 2022-01-11 ASSESSMENT — PAIN DESCRIPTION - PROGRESSION: CLINICAL_PROGRESSION: NOT CHANGED

## 2022-01-11 ASSESSMENT — PAIN DESCRIPTION - LOCATION
LOCATION: HIP;LEG
LOCATION: HIP

## 2022-01-11 ASSESSMENT — PAIN DESCRIPTION - ORIENTATION
ORIENTATION: LEFT
ORIENTATION: LEFT

## 2022-01-11 ASSESSMENT — PAIN DESCRIPTION - PAIN TYPE: TYPE: CHRONIC PAIN

## 2022-01-11 ASSESSMENT — PAIN DESCRIPTION - FREQUENCY
FREQUENCY: CONTINUOUS
FREQUENCY: CONTINUOUS

## 2022-01-11 ASSESSMENT — PAIN DESCRIPTION - ONSET: ONSET: ON-GOING

## 2022-01-11 NOTE — PROGRESS NOTES
Physical Therapy    Facility/Department: [facility]  Weekly Note    NAME: Nikole Sandy  : 1975  MRN: [id number]    Date of Service: 2022    Evaluating Therapist: Noreen Celis, PT, DPT [id number]      ROOM: [address] [place] [address]  DIAGNOSIS: Polyneuropathy  PRECAUTIONS: Falls, Regular diet, **PRAFOS when not in therapy**  HPI: Patient admitted to the hospital on 21 with progressively worsening leg weakness after receiving an antibody infusion for Covid diagnosed in November. Neurosurgery and neurology all saw patient on consult. Imaging has shown lumbar radiculopathy. EMG done on 21: Recommendations: This is predominantly a neuropathic process, not myopathic in nature.  Post viral inflammatory processes can present early on with this picture however usually progress to development of slowing of velocity with the development of myelin abnormalities as well as increasing axonal pathology. B12 level low during admission and she has received several injections. Social:  Pt lives with fiance and son in [address] [facility] style home, 3 [address]ONCE with no railing + 1 step to enter. May have [address] Prior to admission: Pt independent with all functional mobility     Initial Evaluation  Date:  22  Comments Short Term Goals Long Term Goals    Was pt agreeable to Eval/treatment? 22 Yes      Does pt have pain?  Reports allodynia in BLE No sig c/o pain      Bed Mobility  Rolling: SBA  Supine to sit: Francesco  Sit to supine: modA  Scooting: Francesco Rolling: sup  Supine to sit: sup  Sit to supine: Francesco  Scooting: Sup  sup Mod i   Transfers Sit to stand: maxA + SBA form 2nd helper  Stand to sit: maxA + SBA from 2nd helper  Stand pivot: NT  Slideboard tx- maxA    5xSTS: NT Sit to stand: modA   Stand to sit: modA   Stand pivot: NT  Slideboard tx- mod-maxA L air cast donned, BUE supported to stand, snaps back into hyperextension    Impulsive with slideboard tx, poor carryover for cueing for head/hips relationship to aide with balance with scooting Francesco   sup     Ambulation    NT 20' in pedro lift, KUSUM KI and PLS donned, MaxA mod-maxA to assist advancing BLE with PT asissting on stool behind pt, 2nd helper driving machine, 3rd helper with chair follow 10', LRAD, maxA     50', LRAD, Francesco       Walking 10 feet on uneven surface NT NT  10', LRAD, maxA 10', LRAD, Francesco   Wheel Chair Mobility ', BUE propulsion, Sup  100', BUE propulsion, sup > 200', BUE propulson, mod i   Car Transfers NT NT  maxA Francesco   Stair negotiation: ascended and descended  NT NT  1 step, maxA, KUSUM HR 4 steps, Kusum HR, Francesco   Curb Step:   ascended and descended NT NT  2\" curb, LRAD, maxA 2\" curb, LRAD, Francesco   Picking up object off the floor NT NT  MaxA with assistive device Francesco with LRAD   BLE ROM WFL       BLE Strength KUSUM hip flex- 1/5  KUSUM hip ext- 1/5  KUSUM knee ext 2+/5  KUSUM knee flex 2-/5  KUSUM PF 2+/5  KUSUM DF 0/5 KUSUM hip flex- 2-/5  KUSUM hip ext- 2-/5  KUSUM knee ext 2+/5  KUSUM knee flex 2-/5  KUSUM PF 2+/5  KUSUM DF 0/5      Balance  Sitting EOB- SBA  Standing- maxA     Sitting EOB- SBA  Standing- maxA            Date Family Teach Completed  None to date      Is additional Family Teaching Needed? Y or N  Y      Hindering Progress Weakness, impulsivity, pain, endurance, balance, body habitus Weakness, impulsivity, pain, endurance, balance, body habitus, proprioceptive deficits, tone, motor control deficits      PT recommended ELOS 4-5 weeks 4-5 weeks      Team's Discharge Plan  4-5 weeks      Therapist at [facility], [place], [place], DPT [id number]          Date:  1/11/22  Supporting factors: Motivated, showing early progress  Barriers to discharge:  Stairs to enter home, Weakness, impulsivity, pain, endurance, balance, body habitus, proprioceptive deficits, tone, motor control deficits  Additional comments:  Pt has been showing improvement on standing and gait trials, but requires sig heavy skilled assistance and skilled set up.  Will need to reach out to orthotist to have NADEGE bracing to help get her stabilized so she may progress training. at this point she needs NADEGE KAFOs,she has very weak hips and very poor dynamic knee stability as pt snaps both knees back into hypertension aggressively, having difficulty initiating knee flexion. L ankle is more tight into plantarflexion. Due to adductor tone and NADEGE hip abductor weakness, pt has difficulty with foot placement prior to STS positioning self in chair.    DME:  TBD  After Care: TBD

## 2022-01-11 NOTE — PROGRESS NOTES
Occupational Therapy  OCCUPATIONAL THERAPY DAILY NOTE    Date:2022  Patient Name: Sheryl Keys  MRN: [id number]  : 1975  Room: [address] [place] [address]A     Diagnosis: Polyneuropathy   Precautions: falls ,slide board transfers     Functional Assessment:   Date Status AE  Comments   Feeding 22 Independent   Pt able to open packages on lunch tray    Grooming 22  set up  Pt washed face and brushed hair seated at sink . Pt needed Max A to wash hair leaning into sink at w/c level. Pt philippe to brush and style hair after set up         Oral Care 22  Set up   Pt brushed teeth after set up of supplies    Bathing 22  UB- set up   LB-Min/Mod Assist  Bed level Bathing completed at bed level. Pt needed assist to wash buttocks. Pt able to cross legs over and wash legs and feet    UB Dressing 22  Set up     LB Dressing 22  Mod A   Pt donned depends and pants long sitting in bed using strap to bend legs up toward pt's trunk.  Assist to pull pants up as pt rolls side to side    Footwear 22  Mod/max  A  long shoe horn  Sock aid Pt donned socks long sitting in bed and using strap to pull feet up towards trunk    Toileting 22  Max A  Assist to place bed pan and to complete hygiene and clothing management    Homemaking 22 TBA       Functional Transfers / Balance:   Date Status DME  Comments   Sit Balance 1/10/22  SBA/CGA W/c   Bed rails    Stand Balance 22 Dependent      [] Tub  [] Shower   Transfer 22 TBD     Commode   Transfer 22  Dependent  Drop arm 3 in 1 commode, slide board    Functional   Mobility 1/10/22  SBA/CGA W/c propel      Other: bed to w/c with alonso lift       Bed repositioning     Bed to w/c with transfer board     Bed mobility   Supine to sit  22  Dependent         SBA    Dep( assist of staff)       Min A          Bed rails       Functional Exercises / Activity:        Sensory / Neuromuscular Re-Education:      Cognitive Skills:   Status Comments   Problem   Solving good     Memory good     Sequencing good     Safety fair       Visual Perception:    Education:  Pt educated on safe transfers from bed to w/c with transfer board     [] Family teach completed on:    Pain Level: 4-10 BLE's     Additional Notes:       Patient has made good progress during treatment sessions toward set goals. Therapy emphasis to obtain goals:Current Treatment Recommendations: Strengthening,Gait Training,Patient/Caregiver Education & Training,Home Management Training,Equipment Evaluation, Education, & procurement,Balance Training,Functional Mobility Training,Positioning,Endurance Training,Wheelchair Mobility Training,Safety Education & Training,Self-Care / ADL    [x] Continue with current OT Plan of care.   [] Prepare for Discharge    Goals  Long term goals  Time Frame for Long term goals : 4 weeks to address above problem areas  Long term goal 1: Pt demo Mod I to eat all meals  Long term goal 2: Pt demo Mod I grooming seated @ sink level  Long term goal 3: Pt demo Sup to bathe @ shower level both seated & use LHS  Long term goal 4: Pt demo Mod I UE dress to don a shirt & Mod I to don underwear, pants, socks & shoes using AE as needed  Long term goal 5: Pt demo Mod I commode trf using a slide board & drop arm commode & demo Mod I toileting & demo G safety  Long term goals 6: Pt demo SBA tub trf using appropriate DME- bench & slide board to ensure pt safety  Long term goal 7: Pt demo Mod I light homemaking activity @ wc level & demo G- safety & insight  Long term goal 8: Pt demo G- endurance for a 20-30 minute functional activity  Long term goal 9: Pt demo Mod I wc propulsion thorughout a living environment & around obstacles    DISCHARGE RECOMMENDATIONS  Recommended DME:    Post Discharge Care:   []Home Independently  []Home with 24hr Care / Supervision []Home with Partial Supervision []Home with Home Health OT []Home with Out Pt OT []Other: ___   Comments: Time in Time out Tx Time Breakdown  Variance:   First Session  0815   0900  [x] Individual Tx-45  [] Concurrent Tx -  [] Co-Tx -   [] Group Tx -   [] Time Missed -     Second Session 1832  3335  [x] Individual Tx- 35  [] Concurrent Tx -  [] Co-Tx -   [] Group Tx -   [x] Time Missed -10           Pt with bathroom needs    Third Session    [] Individual Tx-   [] Concurrent Tx -  [] Co-Tx -   [] Group Tx -   [] Time Missed -         Total Tx Time:80  mins      [facility] OTR/L [id number]

## 2022-01-11 NOTE — CONSULTS
Mikey CARLISLE Psychologist  1/10/2022  7:24 PM    Time Start: 6:41 p.m. Time End:  7:21 p.m. Date of Service:  1/10/2022    Reason for Consult:  Adjustment   Referring Provider: Liborio Dallas MD    Interval History: Patient reported inconsistent symptoms of depression and anxiety since her hospitalization. She reported a sudden loss of functioning and lack of independence. As a result, she reports anxiety related to her future and frustration at times related to her current medical problems. Further, she reports not knowing what caused her medical difficulties, reinforcing her current symptoms. Mental Status:  Appearance: age appropriate   Affect:  consistent with expectations based upon mood   Attitude: Cooperative and Friendly   Mood:   Euthymic   Thought Process:  goal directed   Delusions: no evidence of delusions   Perceptions: No perceptual disturbance   Behavior:   open, friendly, cooperative and tearful   Psychomotor: Within normal limits   Speech: Within normal limits   Eye Contact: good   Orientation:  oriented to person, place, time, and general circumstances   Judgment & Insight:  normal insight and judgment     Risk Assessment:  Current Suicide Risk:  no suicidal ideation  Current Homicide Risk:  no homicidal ideation        Diagnosis:  Adjustment Disorder with Mixed Depression and Anxious Mood    Prognosis:  Good    Treatment Response:  no notable change    Session Content:  Patient reported inconsistent symptoms of depression and anxiety related to her current medical struggles. She denied any previous mental health diagnoses or treatments. She reported having \"good and bad days\" but was feeling positive today due to her noticeable improvements in therapy. She and psychologist explored her social support, self-image, and coping skills. Psychologist provided empathic listening and normalized current symptom presentation.  She and psychologist engaged in examining the evidence strategies focusing on her progress in treatment. Patient Response to Plan/Recommendations:  Patient is shown to have positive self-awareness and motivation. She reported having positive support system and realistic expectations related to discharge. She is shown to have emotional frustrations related to her limitations and anxiety related to her future. However, her strengths in motivation, support, and self-awareness appear to give her a positive prognosis. Treatment Plan/Recommendations:  Patient will benefit from CBT, person-centered techniques, coping skills training, problem solving skills. She will be seen on the unit until discharge. Contact Aurora Ley PSYD as needed. Electronically signed by Aurora Ley PSYD, Psy.D.   Licensed Psychologist [id number]  Director of [address] [place] [address]

## 2022-01-11 NOTE — PROGRESS NOTES
Physical Therapy    Facility/Department: [address] REHAB  Treatment note    NAME: Lisy Jernigan  : 1975  MRN: [id number]    Date of Service: 2022    Evaluating Therapist: Triny Hunt., PT, DPT [id number]      ROOM: [address] [place] [address]  DIAGNOSIS: Polyneuropathy  PRECAUTIONS: Falls, Regular diet, **PRAFOS when not in therapy**  HPI: Patient admitted to the hospital on 21 with progressively worsening leg weakness after receiving an antibody infusion for Covid diagnosed in November. Neurosurgery and neurology all saw patient on consult. Imaging has shown lumbar radiculopathy. EMG done on 21: Recommendations: This is predominantly a neuropathic process, not myopathic in nature.  Post viral inflammatory processes can present early on with this picture however usually progress to development of slowing of velocity with the development of myelin abnormalities as well as increasing axonal pathology. B12 level low during admission and she has received several injections. Social:  Pt lives with fiance and son in [address] [facility] style home, 3 PON[address] with no railing + 1 step to enter. May have [address] Prior to admission: Pt independent with all functional mobility     Initial Evaluation  Date:  AM     PM    Short Term Goals Long Term Goals    Was pt agreeable to Eval/treatment? 22 Yes yes     Does pt have pain?  Reports allodynia in BLE No sig c/o pain No sig pain     Bed Mobility  Rolling: SBA  Supine to sit: Francesco  Sit to supine: modA  Scooting: Francesco Rolling: sup  Supine to sit: sup  Sit to supine: NT  Scooting: NT NT sup Mod i   Transfers Sit to stand: maxA + SBA form 2nd helper  Stand to sit: maxA + SBA from 2nd helper  Stand pivot: NT  Slideboard tx- maxA    5xSTS: NT Sit to stand: modA   Stand to sit: modA   Stand pivot: NT  Slideboard tx- maxA from hospital bed to w/c Sit to stand: modA   Stand to sit: modA   Stand pivot: NT Francesco   sup     Ambulation    NT See TE list for details See TE list for details 10', LRAD, maxA     48', LRAD, Francesco       Walking 10 feet on uneven surface NT NT  10', LRAD, maxA 10', LRAD, Francesco   Wheel Chair Mobility NT NT  100', BUE propulsion, sup > 200', BUE propulson, mod i   Car Transfers NT NT  maxA Francesco   Stair negotiation: ascended and descended  NT NT  1 step, maxA, KUSUM HR 4 steps, Kusum HR, Francesco   Curb Step:   ascended and descended NT NT  2\" curb, LRAD, maxA 2\" curb, LRAD, Francesco   Picking up object off the floor NT NT  MaxA with assistive device Francesco with LRAD   BLE ROM WFL       BLE Strength KUSUM hip flex- 1/5  KUSUM hip ext- 1/5  KUSUM knee ext 2+/5  KUSUM knee flex 2-/5  KUSUM PF 2+/5  KUSUM DF 0/5       Balance  Sitting EOB- SBA  Standing- maxA                 Date Family Teach Completed        Is additional Family Teaching Needed? Y or N        Hindering Progress Weakness, impulsivity, pain, endurance, balance, body habitus       PT recommended ELOS 4-5 weeks       Team's Discharge Plan        Therapist at Team Meeting          Therapeutic Exercise:     AM:   1. amb at HR- x20 feet,  KUSUM PLS and KI donned, LUE over PT's shoulder, inside out sock on toes of KUSUM shoes to reduce friction, maxA to help steady, min-maxA to advance RLE, mod-maxA to advance LLE. , chair follow from 2nd helper     2. amb awith BUE over helpers shoulders- x29 feet,  KUSUM PLS and KI donned, BUE over PT's shoulder, inside out sock on toes of KUSUM shoes to reduce friction, maxA to help steady, min-maxA to advance RLE, mod-maxA to advance LLE, chair follow from 2nd helper       PM:   1. AMB in [address] [place],[address]- 1x20', 1x15', KUSUM PLS and KI's donned, with inside out sock around toes of each foot to aide in limb advancement. First bout maxAx2 to weight shift and help advance BLE, 2nd bout, progressing to min-modA to advance BLE and weight shift as PT assisting on stool behind pt while 2nd helper driving lift machine, 3rd helper with chair follow.        Patient education    Pt tx, balance, gait, current status and POC, d/c planning Patient response to education:   Pt verbalized understanding Pt demonstrated skill Pt requires further education in this area   yes partial All areas     Additional Comments:   AM: Pt 10 min late for session, having toileting needs prior to session. Trailed ab with NADEGE PLS and KI donned to help provide dynamic stability at knee and ankle so pt could focus solely on limb advancement. Pt continuing to require up to maxA to help advance BLE due to sig hip weakness and motor control deficits. Pt tends to lean fwd with flexion at hips, despite cueing for upright posture, however, no sig knee buckling or snapping into hyperextension. Will trial with BWS in future sessions. PM:  Trialed amb with pt in Mikey Jasmina lift to help provide postural stability support so pt can focus on limb advancement. Pt showing good progressing form first bout to 2nd bout, improving her ability to initiate weight shift to help advance BLE, progressing from maxAx2 to mod-maxAx1. Pt rating activity 17/20 on RPE exertion scale, visibly fatigued at end of session. Increased skilled time and effort for setup and take down. .       Chair/bed alarm:     AM  Time in: 0925  Time out: 1000    PM  Time in: 1430  Time out: 1515    Pt is making good progress toward established Physical Therapy goals. Continue with physical therapy current plan of care.     Soledad Dallas., PT, DPT  [id number]

## 2022-01-11 NOTE — PROGRESS NOTES
Physical Therapy    Facility/Department: [address] RE[facility]  Treatment note    NAME: Sheryl Keys  : 1975  MRN: [id number]    Date of Service: 2022    Evaluating Therapist: Facundo Velazquez., PT, DPT [id number]      ROOM: [address] [place] [address]  DIAGNOSIS: Polyneuropathy  PRECAUTIONS: Falls, Regular diet, **PRAFOS when not in therapy**  HPI: Patient admitted to the hospital on 21 with progressively worsening leg weakness after receiving an antibody infusion for Covid diagnosed in November. Neurosurgery and neurology all saw patient on consult. Imaging has shown lumbar radiculopathy. EMG done on 21: Recommendations: This is predominantly a neuropathic process, not myopathic in nature.  Post viral inflammatory processes can present early on with this picture however usually progress to development of slowing of velocity with the development of myelin abnormalities as well as increasing axonal pathology. B12 level low during admission and she has received several injections. Social:  Pt lives with fiance and son in [address] [facility] style home, 3 [address] with no railing + 1 step to enter. May have [address] Prior to admission: Pt independent with all functional mobility     Initial Evaluation  Date:  AM     PM    Short Term Goals Long Term Goals    Was pt agreeable to Eval/treatment? 22 Yes yes     Does pt have pain?  Reports allodynia in BLE No sig c/o pain No sig c/o pain     Bed Mobility  Rolling: SBA  Supine to sit: Francesco  Sit to supine: modA  Scooting: Francesco Rolling: NT  Supine to sit: NT  Sit to supine: NT  Scooting: NT Rolling: NT  Supine to sit: NT  Sit to supine: NT  Scooting: NT sup Mod i   Transfers Sit to stand: maxA + SBA form 2nd helper  Stand to sit: maxA + SBA from 2nd helper  Stand pivot: NT  Slideboard tx- maxA    5xSTS: NT Sit to stand: modA   Stand to sit: modA   Stand pivot: NT  Slideboard tx- NT Sit to stand: modA   Stand to sit: modA   Stand pivot: NT  Slideboard tx- mod A Francesco   sup     Ambulation NT See TE list for details See TE list for details 10', LRAD, maxA     50', LRAD, Francesco       Walking 10 feet on uneven surface NT NT NT 10', LRAD, maxA 10', LRAD, Francesco   Wheel Chair Mobility ', BUE propulsion, Sup 180', BUE propulsion, Sup 100', BUE propulsion, sup > 200', BUE propulson, mod i   Car Transfers NT NT NT maxA Francesco   Stair negotiation: ascended and descended  NT NT NT 1 step, maxA, KUSUM HR 4 steps, Kusum HR, Francesco   Curb Step:   ascended and descended NT NT NT 2\" curb, LRAD, maxA 2\" curb, LRAD, Francesco   Picking up object off the floor NT NT NT MaxA with assistive device Francesco with LRAD   BLE ROM WFL       BLE Strength KUSUM hip flex- 1/5  KUSUM hip ext- 1/5  KUSUM knee ext 2+/5  KUSUM knee flex 2-/5  KUSUM PF 2+/5  KUSUM DF 0/5       Balance  Sitting EOB- SBA  Standing- maxA                 Date Family Teach Completed        Is additional Family Teaching Needed? Y or N        Hindering Progress Weakness, impulsivity, pain, endurance, balance, body habitus       PT recommended ELOS 4-5 weeks       Team's Discharge Plan        Therapist at Team Meeting          Therapeutic Exercise:      AM:   1. Static standing- 1x60 sec in // bars, Francesco -modA to steady, modA to assist with manually unlocking knees as pt snapping into hyperextension for support  2. Standing weight shifts in // bars- 1x15 reps, modA, heavy UE support at HR  3. Mini squats - 2x10 reps,x1 round at // bars, x1 round front facing HR in hallway,  heavy UE support, min-modA to assist manuall unlocking knees for short squat  4. amb in // bars- 3x8', maxA + chair follow from 2nd helper, KUSUM ankles ace wrapped into DF, modA to assist advancing BLE, difficulty engaging into knee flexion, and verbal cues to sequence     PM:    1. amb in // bars-1x8', mod-maxA + chair follow from 2nd helper, KUSUM ankles ace wrapped into DF and KUSUM swedish knee cages donned, modA to assist advancing BLE, difficulty engaging into knee flexion, and verbal cues to sequence    2. amb with KUSUM arm over shoulders vs BUE on PT's shpoulders-2x2', maxA + chair follow from 2nd helper, KUSUM ankles ace wrapped into DF and KUSUM swedish knee cages donned, maxA to assist advancing BLE, pt unable to engage into knee flexion for swing through, stance leg locked into ext and sliding into abduction, triale terminated due to safety concerns, and verbal cues to sequence    3. Slide board tx- x2 reps, sig cueing for head/hips relationship, pt with poor carryover, however improved steadiness with sliding, modA to assist        Patient education    Pt educated on tx, gait, motor control, current status and POC, d/c planning    Patient response to education:   Pt verbalized understanding Pt demonstrated skill Pt requires further education in this area   yes partial All areas     Additional Comments:   AM: Pt showing slight improvement in hip flexor strength, able to better initiate advancement of BLE to step fwd during amb trials in parallel bars. Ace wrapped Kusum ankles into DF for better toe clearance and easing limb advancement. Pt continues to have difficulty time flexing knee during all phase of gait, maxA to aide in manually flexing knee for pt. Pt was able to demo improved ability to flex KUSUM knees when sitting during STS, but unable to carryover every repetition. Attempted to trial amb with Zamzam Ferron walker, however due to pt's poor proprioception and motor control, accompanied with difficulty assisting BLE with device in the way, bout terminated due to safety concerns. Plan to continue to practice standing and amb activity, may trial bracing to help stabilize knee joints and prevent hyperextension. PM: Trialed amb trials with swedish knee cages donned KUSUM. In parallel bars, pt with sig improvement in BLE advancement, able to complete amb trial at mod-maxA with chair follow form 2nd helper.  Attempted to transition pt off parallel bars with pt placing BUE on PT\"s shoulders for 1 trial, however pt too anxious and feeling not well enough supported to attempt to step, maxA to advance LE. Trialed BUE over helper's shoulders with maxAx2 to assist, again pt unable to advance LE, pt with BLE locked into extension and pushing into abduction, BLE beginning to slide out into abduction bringing pt's YANDEL very wide, further trials deferred due to safety concerns. May trial BWS in ceiling harness vs Rakan Leader lift to help offload ANA CRISTINA, and allow pt to practice limb advancement and stance control. Pt with poor carryover for verbal and tactile cueing for head/hips relationship during slide board tx, will conitnue to practice. AM  Time in: 0915  Time out: 1000    PM  Time in: 1430  Time out: 1515    Pt is making good progress toward established Physical Therapy goals. Continue with physical therapy current plan of care.     Glenny Araya., PT, DPT  [id number]

## 2022-01-12 PROCEDURE — 97110 THERAPEUTIC EXERCISES: CPT

## 2022-01-12 PROCEDURE — 6360000002 HC RX W HCPCS: Performed by: INTERNAL MEDICINE

## 2022-01-12 PROCEDURE — 97112 NEUROMUSCULAR REEDUCATION: CPT

## 2022-01-12 PROCEDURE — 1280000000 HC REHAB R&B

## 2022-01-12 PROCEDURE — 97530 THERAPEUTIC ACTIVITIES: CPT | Performed by: PHYSICAL THERAPIST

## 2022-01-12 PROCEDURE — 99232 SBSQ HOSP IP/OBS MODERATE 35: CPT | Performed by: PHYSICAL MEDICINE & REHABILITATION

## 2022-01-12 PROCEDURE — 6370000000 HC RX 637 (ALT 250 FOR IP): Performed by: PHYSICAL MEDICINE & REHABILITATION

## 2022-01-12 PROCEDURE — 97535 SELF CARE MNGMENT TRAINING: CPT

## 2022-01-12 PROCEDURE — 97530 THERAPEUTIC ACTIVITIES: CPT

## 2022-01-12 PROCEDURE — 6370000000 HC RX 637 (ALT 250 FOR IP): Performed by: INTERNAL MEDICINE

## 2022-01-12 RX ADMIN — Medication 5 MG: at 21:47

## 2022-01-12 RX ADMIN — FOLIC ACID 1 MG: 1 TABLET ORAL at 07:37

## 2022-01-12 RX ADMIN — BACLOFEN 10 MG: 10 TABLET ORAL at 07:38

## 2022-01-12 RX ADMIN — BACLOFEN 10 MG: 10 TABLET ORAL at 14:00

## 2022-01-12 RX ADMIN — Medication 1 TABLET: at 07:37

## 2022-01-12 RX ADMIN — FLUTICASONE PROPIONATE 1 SPRAY: 50 SPRAY, METERED NASAL at 07:38

## 2022-01-12 RX ADMIN — Medication 2000 UNITS: at 07:38

## 2022-01-12 RX ADMIN — BACLOFEN 20 MG: 10 TABLET ORAL at 21:46

## 2022-01-12 RX ADMIN — ENOXAPARIN SODIUM 40 MG: 100 INJECTION SUBCUTANEOUS at 07:36

## 2022-01-12 RX ADMIN — NAPROXEN 500 MG: 500 TABLET ORAL at 05:31

## 2022-01-12 RX ADMIN — GUAIFENESIN SYRUP AND DEXTROMETHORPHAN 5 ML: 100; 10 SYRUP ORAL at 05:31

## 2022-01-12 RX ADMIN — ACETAMINOPHEN 650 MG: 325 TABLET ORAL at 21:47

## 2022-01-12 RX ADMIN — Medication 1000 MCG: at 07:38

## 2022-01-12 ASSESSMENT — PAIN DESCRIPTION - FREQUENCY
FREQUENCY: CONTINUOUS
FREQUENCY: CONTINUOUS

## 2022-01-12 ASSESSMENT — PAIN SCALES - GENERAL
PAINLEVEL_OUTOF10: 6
PAINLEVEL_OUTOF10: 6
PAINLEVEL_OUTOF10: 3
PAINLEVEL_OUTOF10: 0
PAINLEVEL_OUTOF10: 6

## 2022-01-12 ASSESSMENT — PAIN - FUNCTIONAL ASSESSMENT: PAIN_FUNCTIONAL_ASSESSMENT: ACTIVITIES ARE NOT PREVENTED

## 2022-01-12 ASSESSMENT — PAIN DESCRIPTION - ONSET
ONSET: ON-GOING
ONSET: ON-GOING

## 2022-01-12 ASSESSMENT — PAIN DESCRIPTION - DESCRIPTORS
DESCRIPTORS: ACHING;CONSTANT;DISCOMFORT
DESCRIPTORS: ACHING;CONSTANT;DISCOMFORT;SPASM
DESCRIPTORS: ACHING;DISCOMFORT

## 2022-01-12 ASSESSMENT — PAIN DESCRIPTION - ORIENTATION
ORIENTATION: RIGHT;LEFT
ORIENTATION: RIGHT;LEFT
ORIENTATION: LEFT

## 2022-01-12 ASSESSMENT — PAIN DESCRIPTION - PROGRESSION
CLINICAL_PROGRESSION: NOT CHANGED
CLINICAL_PROGRESSION: NOT CHANGED

## 2022-01-12 ASSESSMENT — PAIN DESCRIPTION - LOCATION
LOCATION: LEG
LOCATION: LEG
LOCATION: HIP;LEG

## 2022-01-12 ASSESSMENT — PAIN DESCRIPTION - PAIN TYPE
TYPE: CHRONIC PAIN

## 2022-01-12 NOTE — PROGRESS NOTES
Comprehensive Nutrition Assessment    Type and Reason for Visit:  Initial    Nutrition Recommendations/Plan: Continue current diet    Nutrition Assessment:  Pt admit to ARU w/ polyneuropathy 2/2 noted B12 deficiency. PO intakes noted good since admit. Will continue to monitor. Malnutrition Assessment:  Malnutrition Status:  No malnutrition    Context:  Acute Illness     Findings of the 6 clinical characteristics of malnutrition:  Energy Intake:  No significant decrease in energy intake  Weight Loss:  Unable to assess     Body Fat Loss:  No significant body fat loss     Muscle Mass Loss:  No significant muscle mass loss    Fluid Accumulation:  No significant fluid accumulation     Strength:  Not Performed    Estimated Daily Nutrient Needs:  Energy (kcal):  MSj 1518 x 1.1 SF = 4291-2118; Weight Used for Energy Requirements:  Current     Protein (g):  70-80; Weight Used for Protein Requirements:  Ideal (1.5-1.8)        Fluid (ml/day):  1191-9456; Method Used for Fluid Requirements:  1 ml/kcal      Nutrition Related Findings:  pt alert, abd WDL, no edema, fluids WNL      Wounds:  None       Current Nutrition Therapies:    ADULT DIET;  Regular    Anthropometric Measures:  · Height: 5' (152.4 cm)  · Current Body Weight: 213 lb (96.6 kg) (1/5 no method)   · Usual Body Weight: 226 lb (102.5 kg) (12/2022 bed scale, per EMR)     · Ideal Body Weight: 100 lbs; % Ideal Body Weight 213 %   · BMI: 41.6   · BMI Categories: Obese Class 3 (BMI 40.0 or greater)       Nutrition Diagnosis:   No nutrition diagnosis at this time    Nutrition Interventions:   Food and/or Nutrient Delivery:  Continue Current Diet  Nutrition Education/Counseling:  Education not indicated   Coordination of Nutrition Care:  Continue to monitor while inpatient    Goals:  pt to consume >75% meals       Nutrition Monitoring and Evaluation:   Food/Nutrient Intake Outcomes:  Food and Nutrient Intake  Physical Signs/Symptoms Outcomes:  Biochemical Data,GI Status,Fluid Status or Edema,Nutrition Focused Physical Findings,Skin,Weight     Discharge Planning:     Too soon to determine     Electronically signed by Clarence Edwards, MS, RD, LD on 1/12/22 at 11:01 AM EST    Contact: [phone]

## 2022-01-12 NOTE — PROGRESS NOTES
Physical Therapy    Facility/Department: [address] RE[facility]AB  Treatment note     NAME: Ledy Silva  : 1975  MRN: [id number]     Date of Service: 2022     Evaluating Therapist: Bernadette Tomlin., PT, DPT [id number]        ROOM: [address] [place] [address]  DIAGNOSIS: Polyneuropathy  PRECAUTIONS: Falls, Regular diet, **PRAFOS when not in therapy**  HPI: Patient admitted to the hospital on 21 with progressively worsening leg weakness after receiving an antibody infusion for Covid diagnosed in November. Neurosurgery and neurology all saw patient on consult. Linda Anne-Mariepedro has shown lumbar radiculopathy. EMG done on 21: Recommendations: This is predominantly a neuropathic process, not myopathic in nature.  Post viral inflammatory processes can present early on with this picture however usually progress to development of slowing of velocity with the development of myelin abnormalities as well as increasing axonal pathology. B12 level low during admission and she has received several injections.     Social:  Pt lives with fiance and son in [facility] style home, [address] with no railing + 1 step to enter. May have [address] Prior to admission: Pt independent with all functional mobility                    Initial Evaluation  Date:  AM     PM    Short Term Goals Long Term Goals    Was pt agreeable to Eval/treatment? 22 Yes yes       Does pt have pain?  Reports allodynia in BLE No sig c/o pain Mild L hip pain       Bed Mobility  Rolling: SBA  Supine to sit: Francesco  Sit to supine: modA  Scooting: Francesco Rolling: Sup  Supine to sit: Sup  Sit to supine: Sup  Scooting: Sup Rolling: Sup  Supine to sit: Francesco  Sit to supine: Francesco  Scooting: Sup  (on mat table) sup Mod i   Transfers Sit to stand: maxA + SBA form 2nd helper  Stand to sit: maxA + SBA from 2nd helper  Stand pivot: NT  Slideboard tx- maxA     5xSTS: NT Sit to stand: NT, completed in [address] for AM   Stand to sit: NT, completed in [address] for AM   Stand pivot: NT, completed in Alejandro Mo lift for AM  Slideboard tx- maxA from hospital bed to w/c Sit to stand: NT  Stand to sit: NT   Stand pivot: NT  Slideboard tx- ModA from w/c to mat table (to R side), MaxA from mat to w/c (to L side) Francesco    sup      Ambulation    NT See TE list for details NT 10', LRAD, maxA       50', LRAD, Francesco         Walking 10 feet on uneven surface NT NT NT 10', LRAD, maxA 10', LRAD, Francesco   Wheel Chair Mobility NT NT  ', BUE propulsion, sup > 200', BUE propulson, mod i   Car Transfers NT NT  NT maxA Francesoc   Stair negotiation: ascended and descended  NT NT  NT 1 step, maxA, NADEGE HR 4 steps, Nadege HR, Francesco   Curb Step:   ascended and descended NT NT NT  2\" curb, LRAD, maxA 2\" curb, LRAD, Francesco   Picking up object off the floor NT NT  NT MaxA with assistive device Francesco with LRAD   BLE ROM WFL           BLE Strength NADEGE hip flex- 1/5  NADEGE hip ext- 1/5  NADEGE knee ext 2+/5  NADEGE knee flex 2-/5  NADEGE PF 2+/5  NADEGE DF 0/5           Balance  Sitting EOB- SBA  Standing- maxA                          Date Family Teach Completed             Is additional Family Teaching Needed? Y or N             Hindering Progress Weakness, impulsivity, pain, endurance, balance, body habitus Weakness, impulsivity, pain, endurance, balance, body habitus  Weakness, impulsivity, pain, endurance, balance, body habitus         PT recommended ELOS 4-5 weeks           Team's Discharge Plan             Therapist at Team Meeting                Therapeutic Exercise:   AM:  Slide board transfer x1 towards L per pt preference  Gait training in pedro lift with B KI, B PLS, and inside out socks on toes for decreased friction. 12ft and 15ft with ModA to advance R LE and MaxA to advance L LE, manual assist for weight shift. Therapists postioning at [facility] 25 with w/c follow. Distance to max tolerance d/t fatigue.     PM:  Slide board transfers w/c<>mat table  Supine bridges with manual assist for B feet stabilization, ball between knees to promote slight adductor engagement 10x2  Supine hip abduction/adduction on smooth surface with pillow case 10x2 ea LE with AAROM for full range and to decreased hip external rotation  Supine hip adduction with ball between knees 10x2  Seated balance at edge of mat, reaching for therapist hand with single and B UE, to variable heights/distances and outside YANDEL including trunk rotation  Seated lateral stretch of trunk with single UE to mat, contralateral UE reaching up/over x5 ea side    Patient education  Pt educated on improved core engagement for balance, technique with repositioning in w/c    Patient response to education:   Pt verbalized understanding Pt demonstrated skill Pt requires further education in this area   yes With assist yes     Additional Comments:   AM: Pt assisted with w/c transfer with difficulty maintaining forward trunk posture and subsequent anterior sliding of hips on board requiring increased assist for progression of hips posteriorly and laterally for safe transition into chair. Pt educated in improved trunk position in relation to hips for scooting and repositioning in w/c. Gait training completed in [facility] with max cues required for engagement of glute and alignment of trunk over hips to improve balance. Pt required substantial assist for progression of ea LE with contralateral posterior rotation of trunk during max effort for hip flexion attempts during amb. PM:  Pt completed TE's on mat table for improved core and B LE strengthening to improve sitting balance and transfers. Pt demonstrated increased difficulty with slide board transfer towards L side due to increased R extension tone of LE during effort and leaning posteriorly with attempt to scoot towards L side. Pt tolerated sitting balance with improved core stability and weight shifting. AM  Time in: 0915   Time out: 1000    PM  Time in: 1430  Time out: 1515    Pt is making good progress toward established Physical Therapy goals.   Continue with physical therapy current plan of care.     Fady Whitney, PT, DPT  [id number]

## 2022-01-12 NOTE — PROGRESS NOTES
[facility] Physical Medicine and Rehabilitation  Comprehensive Progress Note    Subjective:      Lisy Jernigan is a 48 y.o. female admitted to inpatient rehabilitation for impairments and acitivities limitations in ADLs and mobility secondary to polyneuropathy. No acute events overnight. No cp, sob, n/v. Having spasms in her legs at night that wake her from sleep. She reports spasms have not been bad during the day. Tolerating therapy. No other complaints. The patient's medical records have been reviewed. Scheduled Meds:baclofen, 10 mg, BID- 8&2   And  baclofen, 20 mg, Nightly  mirabegron, 25 mg, Daily  enoxaparin, 40 mg, Daily  diclofenac sodium, 4 g, 4x Daily  fluticasone, 1 spray, Daily  folic acid, 1 mg, Daily  melatonin, 5 mg, Nightly  multivitamin, 1 tablet, Daily  polyethylene glycol, 17 g, BID  vitamin B-12, 1,000 mcg, Daily  influenza virus vaccine, 0.5 mL, Prior to discharge  docusate sodium, 100 mg, BID  senna, 2 tablet, Nightly  vitamin D, 2,000 Units, Daily      Continuous Infusions:  PRN Meds:sodium chloride, 1 spray, PRN  guaiFENesin-dextromethorphan, 5 mL, Q4H PRN  melatonin, 5 mg, Nightly PRN  naproxen, 500 mg, BID PRN  acetaminophen, 650 mg, Q4H PRN  ondansetron, 4 mg, Q6H PRN  polyethylene glycol, 17 g, Daily PRN         Objective:      Vitals:    01/08/22 0830 01/09/22 0830 01/10/22 0808 01/11/22 0830   BP: 121/75 (!) 140/81 114/67 (!) 146/92   Pulse: 97 101 94 96   Resp: 18 18  20   Temp: 97.2 °F (36.2 °C)  98.2 °F (36.8 °C) 97.7 °F (36.5 °C)   TempSrc: Temporal   Tympanic   SpO2:   98% 96%   Weight:       Height:         General appearance: alert,  NAD  Eyes: conjunctivae/corneas clear. PERRL, EOM's intact. Lungs: clear to auscultation bilaterally  Heart: regular rate and rhythm, S1, S2   Abdomen: soft, non-tender, normal bowel sounds   Extremities: extremities atraumatic, no cyanosis or edema  Neurologic: AAOx4.  LT and PP sensation intact through T6 and altered (1/2) at T7 and below. Strength is 5/5 throughout bilateral upper extremities. Lower extremity strength is 1/5 bilateral HF; 2/5 bilateral KE and PF; 0/5 bilateral DF and EHL. Bilateral lower extremity spasticity, at least MAS 1+ - 2. Laboratory:    Lab Results   Component Value Date    WBC 10.1 01/06/2022    HGB 12.5 01/06/2022    HCT 38.5 01/06/2022    MCV 90.0 01/06/2022     01/06/2022     Lab Results   Component Value Date     01/06/2022    K 4.1 01/06/2022     01/06/2022    CO2 21 01/06/2022    BUN 13 01/06/2022    CREATININE 0.5 01/06/2022    GLUCOSE 103 01/06/2022    CALCIUM 9.2 01/06/2022      Lab Results   Component Value Date    ALT 12 12/26/2021    AST 13 12/26/2021    ALKPHOS 52 12/26/2021    BILITOT 0.3 12/26/2021       Lab Results   Component Value Date    COLORU Yellow 12/20/2021    NITRU Negative 12/20/2021    GLUCOSEU Negative 12/20/2021    KETUA Negative 12/20/2021    UROBILINOGEN 0.2 12/20/2021    BILIRUBINUR Negative 12/20/2021       Functional Status:   Physical Therapy:  Bed mobility: SBA - Min A  Transfers: Mod A  Ambulation: Unable     Occupational Therapy:  Feeding: Setup   Grooming: Setup  UB dressing: Setup  LB dressing: Max A         Assessment/Plan:       48 y.o. female admitted to inpatient rehabilitation for impairments and acitivities limitations in ADLs and mobility secondary to polyneuropathy.     -Polyneuropathy: progressive since late November after she had Covid-19 and monoclonal antibody infusion. Found to have B12 deficiency and per Neurology felt to be cause of neuropathy. Monitor Neuro exam. Continue Acute rehab program.  -B12 deficiency: Continue oral B12, B12 now within normal limits. Monitor  -Borderline low vitamin D: will begin vitamin D supplement   -Lower extremity spasticity: Continue Baclofen, dose adjusted.  Contracture prevention.   -Pain control: tylenol PRN, naproxen prn, voltaren gel    -Overactive bladder: continue home Myrbetriq   -DVT prophylaxis: lovenox     Increase bedtime dose of baclofen to 20mg  Team conference tomorrow       Yen Castellanos MD  Physical Medicine and Rehabilitation

## 2022-01-12 NOTE — PROGRESS NOTES
Occupational Therapy  OCCUPATIONAL THERAPY DAILY NOTE    Date:2022  Patient Name: Ana Leary  MRN: [id number]  : 1975  Room: [address] [place], [place] [address]     Diagnosis: Polyneuropathy   Precautions: falls ,slide board transfers     Functional Assessment:   Date Status AE  Comments   Feeding 22 Independent      Grooming 22  set up           Oral Care 22  Set up      Bathing 22  UB- set up   LB-Min/Mod Assist  Bed level    UB Dressing 22  Set up     LB Dressing 22  Mod A      Footwear 22  Mod/max  A  long shoe horn  Sock aid    Toileting 22  Max A     Homemaking 22 TBA       Functional Transfers / Balance:   Date Status DME  Comments   Sit Balance 1/10/22  SBA/CGA W/c   Bed rails    Stand Balance 22 Dependent      [] Tub  [] Shower   Transfer 22 TBD     Commode   Transfer 22 Dependent ( assist x2 staff) 3 in 1 commode  Sit to stand at grab rail in bathroom at Mod A x 2. Bedside commode placed under patient    Functional   Mobility 22  SBA W/c propel  pt propelled w/c throughout OT clinic    Other: bed to w/c with alonso lift       Bed repositioning     Bed to w/c with transfer board     Bed mobility   Supine to sit  22  Dependent         SBA    Dep( assist of staff)       Min A          Bed rails       Functional Exercises / Activity:  BUE strength exercises: shoulder ladder with 3 # dowel molina 15 reps                                           2 # weighted ball 3 sets 15 reps in all planes         Sensory / Neuromuscular Re-Education:  Trunk control challenges with pt completing bean bag toss at into target on floor while seated in w/c.  Pt at SBA/CGA for balance     Cognitive Skills:   Status Comments   Problem   Solving good     Memory good     Sequencing good     Safety fair       Visual Perception:    Education:  Pt was educated on sit to stand transfer from w/c to [facility]     [] Family teach completed on:    Pain Level: 4-10 BLE's     Additional Notes:       Patient has made good progress during treatment sessions toward set goals. Therapy emphasis to obtain goals:Current Treatment Recommendations: Strengthening,Gait Training,Patient/Caregiver Education & Training,Home Management Training,Equipment Evaluation, Education, & procurement,Balance Training,Functional Mobility Training,Positioning,Endurance Training,Wheelchair Mobility Training,Safety Education & Training,Self-Care / ADL    [x] Continue with current OT Plan of care. [] Prepare for Discharge    Goals  Long term goals  Time Frame for Long term goals : 4 weeks to address above problem areas  Long term goal 1: Pt demo Mod I to eat all meals  Long term goal 2: Pt demo Mod I grooming seated @ sink level  Long term goal 3: Pt demo Sup to bathe @ shower level both seated & use LHS  Long term goal 4: Pt demo Mod I UE dress to don a shirt & Mod I to don underwear, pants, socks & shoes using AE as needed  Long term goal 5: Pt demo Mod I commode trf using a slide board & drop arm commode & demo Mod I toileting & demo G safety  Long term goals 6: Pt demo SBA tub trf using appropriate DME- bench & slide board to ensure pt safety  Long term goal 7: Pt demo Mod I light homemaking activity @ wc level & demo G- safety & insight  Long term goal 8: Pt demo G- endurance for a 20-30 minute functional activity  Long term goal 9: Pt demo Mod I wc propulsion thorughout a living environment & around obstacles    1/12/22  OT plan of care updated on this date.  Tentative length of stay 4-5 weeks    Tami Crawford OTR/L [id number]    DISCHARGE RECOMMENDATIONS  Recommended DME:    Post Discharge Care:   []Home Independently  []Home with 24hr Care / Supervision []Home with Partial Supervision []Home with Home Health OT []Home with Out Pt OT []Other: ___   Comments:         Time in Time out Tx Time Breakdown  Variance:   First Session  [id number] [x] Individual Tx-45  [] Concurrent Tx -  [] Co-Tx -   [] Group Tx -   [] Time Missed -     Second Session  [phone]  [x] Individual Tx-45   [] Concurrent Tx -  [] Co-Tx -   [] Group Tx -   [] Time Missed -              Third Session    [] Individual Tx-   [] Concurrent Tx -  [] Co-Tx -   [] Group Tx -   [] Time Missed -         Total Tx Time:90 mins      Charlotte Hopson OTR/L [id number]

## 2022-01-12 NOTE — CARE COORDINATION
Per team:  Plan re eval (4-5 wk ELOS). Updated the pt. And she will update her fiance'. LTG: Min A / Mod I/Independent. Pt. Is very motivated despite her continues LE weakness.     KING Rodriguez  1/12/2022

## 2022-01-12 NOTE — PROGRESS NOTES
Occupational Therapy  OCCUPATIONAL THERAPY DAILY NOTE    Date:2022  Patient Name: Nathaly Roman  MRN: [id number]  : 1975  Room: [address] [place], [place] [address]     Diagnosis: Polyneuropathy   Precautions: falls ,slide board transfers     Functional Assessment:   Date Status AE  Comments   Feeding 22 Independent      Grooming 22  set up           Oral Care 22  Set up      Bathing 22  UB- set up   LB-Min/Mod Assist  Bed level    UB Dressing 22  Set up     LB Dressing 22  Mod A      Footwear 22  Mod/max  A  long shoe horn  Sock aid    Toileting 22  Max A     Homemaking 22 TBA       Functional Transfers / Balance:   Date Status DME  Comments   Sit Balance 1/10/22  SBA/CGA W/c   Bed rails    Stand Balance 22 Dependent      [] Tub  [] Shower   Transfer 22 TBD     Commode   Transfer 22  Dependent  Drop arm 3 in 1 commode, slide board    Functional   Mobility 1/10/22  SBA/CGA W/c propel      Other: bed to w/c with alonso lift       Bed repositioning     Bed to w/c with transfer board     Bed mobility   Supine to sit  22  Dependent         SBA    Dep( assist of staff)       Min A          Bed rails       Functional Exercises / Activity:        Sensory / Neuromuscular Re-Education:      Cognitive Skills:   Status Comments   Problem   Solving good     Memory good     Sequencing good     Safety fair       Visual Perception:    Education:      [] Family teach completed on:    Pain Level: 4-10 BLE's     Additional Notes:       Patient has made good progress during treatment sessions toward set goals.  Therapy emphasis to obtain goals:Current Treatment Recommendations: Strengthening,Gait Training,Patient/Caregiver Education & Training,Home Management Training,Equipment Evaluation, Education, & procurement,Balance Training,Functional Mobility Training,Positioning,Endurance Training,Wheelchair Mobility Training,Safety Education & Training,Self-Care / ADL    [x] Continue with current OT Plan of care. [] Prepare for Discharge    Goals  Long term goals  Time Frame for Long term goals : 4 weeks to address above problem areas  Long term goal 1: Pt demo Mod I to eat all meals  Long term goal 2: Pt demo Mod I grooming seated @ sink level  Long term goal 3: Pt demo Sup to bathe @ shower level both seated & use LHS  Long term goal 4: Pt demo Mod I UE dress to don a shirt & Mod I to don underwear, pants, socks & shoes using AE as needed  Long term goal 5: Pt demo Mod I commode trf using a slide board & drop arm commode & demo Mod I toileting & demo G safety  Long term goals 6: Pt demo SBA tub trf using appropriate DME- bench & slide board to ensure pt safety  Long term goal 7: Pt demo Mod I light homemaking activity @ wc level & demo G- safety & insight  Long term goal 8: Pt demo G- endurance for a 20-30 minute functional activity  Long term goal 9: Pt demo Mod I wc propulsion thorughout a living environment & around obstacles    1/12/22  OT plan of care updated on this date.  Tentative length of stay 4-5 weeks    Colby Drake OTR/L [id number]    DISCHARGE RECOMMENDATIONS  Recommended DME:    Post Discharge Care:   []Home Independently  []Home with 24hr Care / Supervision []Home with Partial Supervision []Home with Home Health OT []Home with Out Pt OT []Other: ___   Comments:         Time in Time out Tx Time Breakdown  Variance:   First Session     [] Individual Tx-  [] Concurrent Tx -  [] Co-Tx -   [] Group Tx -   [] Time Missed -     Second Session     [] Individual Tx-   [] Concurrent Tx -  [] Co-Tx -   [] Group Tx -   [] Time Missed -              Third Session    [] Individual Tx-   [] Concurrent Tx -  [] Co-Tx -   [] Group Tx -   [] Time Missed -         Total Tx TimeCeola Person OTR/L [id number]

## 2022-01-12 NOTE — PATIENT CARE CONFERENCE
[facility] NOTE/PATIENT PLAN OF CARE    The physician was present and led this team conference    Date: 2022  Admission date: 2022  Patient Name: Loren Hogan        MRN: [id number]    : 1975  (48 y.o.)  Gender: female   Rehab diagnosis/surgery with date:  Polyneuropathy  Impairment Group Code:  3.3      MEDICAL/FUNCTIONAL HISTORY/STATUS:    patient with recent COVID infection in November and received monoclonal antibody infusion. Post infusion she had progressively worsening weakness with bowel and bladder issues.   Found to have a B12 deficiency which has now resolved, continues with functional deficits     Consultations/Labs/X-rays: none recent      MEDICATION UPDATE:  Baclofen ordered for leg spasms      NURSING :    Bowel:   Always Continent  [x]   Occasionally incontinent  []   Frequently incontinent  []   Always incontinent  []   Not occurred  []     Bladder:   Always Continent  []    Incontinent less than daily[x]   Incontinent  daily []   Always incontinent  []   No urine output    []   Indwelling catheter []       Toilet Hygiene:   Current level : max assist  Short term Toilet hygiene goal: supervision  Long term toilet hygiene goal:  supervision    Skin integrity: intact  Pain: hip pain, tylenol    NUTRITION    Diet  regular  Liquid consistency   thin    SOCIAL INFORMATION:  Lives with: duke and son   Prior community services:  none  Home Architecture:  [facility], 3+1 entry, 12 down to basement, duke is home on oxygen due to recent Covid infection  Prior Level of function:  independent  DME:  wheelchair, wheeled walker    FAMILY / PATIENT EDUCATION:  safety and self care are ongoing with patient    PHYSICAL THERAPY    Bed mobility:   Current level: supervision-sit to supine is min assist  Short term bed mobility goal: supervision  Long term bed mobility goal: Modified independent    Chair/bed transfers:  Current level: sit to stands mod assist, knees are weak, doing transfer board transfers  at [facility] assist-max assist, can be impulsive, poor carryover at times  Short term Chair/bed transfers goal: min assist  Long term Chair/bed transfers goal: supervision      Ambulation:   Current level: therapeutic with ROSALIA lift , 20 ft at max assist and bilateral knee immobilizers, trial  braces on feet  Short term ambulation goal: 10 ft max assist  Long term ambulation goal: 50 ft at min assist    Wheelchair Mobility:  Current level: 180 ft at supervision using bilateral uppers  Short term wheelchair goal: met  Long term wheelchair goal: 200 ft at Modified independent      Car transfers:   Current level: to be assessed    Stairs:   Current level : to be assessed    Lower Extremity Strength Issues:    NADEGE hip flex- 2-/5  NADEGE hip ext- 2-/5  NADEGE knee ext 2+/5  NADEGE knee flex 2-/5  NADEGE PF 2+/5  NADEGE DF 0/5    Other comments: working on trunk strength and sit balance      OCCUPATIONAL THERAPY:      Tub/shower:   Current level: NA      Feeding:  Current level: independent  Short term feeding goal: independent  Long term feeding goal: independent    Grooming:   Current level: supervision  Short term grooming goal: Modified independent  Long term grooming goal: Modified independent    Bathing:  Current level: mod assist  Short term bathing goal: min assist  Long term bathing goal: supervision    Homemaking:   Current level: to be assessed    Upper body dressing:  Current level: supervision  Short term upper body dressing goal: Modified independent  Long term upper body dressing goal: independent    Lower body dressing:                                                                          Current level: mod assist with adaptive equipment            Short term lower body dressing goal: min assist          Long term lower body dressing goal: Modified independent            Footwear  Current level: max assist  Short term goal: min assist  Long term goal:Modified independent      Toilet transfer:   Current level: to drop arm commode with a transfer board,dependent of 2   Short term toilet transfer goal: mod assist  Long term toilet transfer goal: Modified independent    Upper body strength issues: good    Safety awareness: fair    Comments: therapy recommends that nursing staff use a Ash lift for now    Patient/family's personal goals: get better, walk and go home  Factors supporting goal achievement:  motivated  Factors hindering goal achievement:  weakness      Discharge Plan   Estimated Length of Stay: 4-5 weeks            Destination: home  Services at Discharge: to be assessed  Equipment at Discharge: to be assessed      INTERDISCIPLINARY TEAM/PHYSICIAN [address]:  continue working towards goals, monitor spasticity and response to baclofen      I approve the established interdisciplinary plan of care as documented within the medical record of Harleen Vaughn Electronically signed by Carry Hammans, RN on 1/12/2022 at 9:14 AM  The following interdisciplinary team members were present:  Merlin Sayers, RN Victorio Bloom, SAVANNAHW  Brock June, CALEB Salmon, OTR

## 2022-01-12 NOTE — PROGRESS NOTES
[id number] [facility] Physical Medicine and Rehabilitation  Comprehensive Progress Note    Subjective:      Fabienne Still is a 48 y.o. female admitted to inpatient rehabilitation for impairments and acitivities limitations in ADLs and mobility secondary to polyneuropathy. No acute events overnight. No cp, sob, n/v. Tolerating therapy, denies complaints. +BM. Nighttime muscle spasms improved with higher dose of baclofen at bedtime. The patient's medical records have been reviewed. Scheduled Meds:baclofen, 10 mg, BID- 8&2   And  baclofen, 20 mg, Nightly  mirabegron, 25 mg, Daily  enoxaparin, 40 mg, Daily  diclofenac sodium, 4 g, 4x Daily  fluticasone, 1 spray, Daily  folic acid, 1 mg, Daily  melatonin, 5 mg, Nightly  multivitamin, 1 tablet, Daily  polyethylene glycol, 17 g, BID  vitamin B-12, 1,000 mcg, Daily  influenza virus vaccine, 0.5 mL, Prior to discharge  docusate sodium, 100 mg, BID  senna, 2 tablet, Nightly  vitamin D, 2,000 Units, Daily      Continuous Infusions:  PRN Meds:sodium chloride, 1 spray, PRN  guaiFENesin-dextromethorphan, 5 mL, Q4H PRN  melatonin, 5 mg, Nightly PRN  naproxen, 500 mg, BID PRN  acetaminophen, 650 mg, Q4H PRN  ondansetron, 4 mg, Q6H PRN  polyethylene glycol, 17 g, Daily PRN         Objective:      Vitals:    01/10/22 0808 01/11/22 0830 01/12/22 0730 01/12/22 1056   BP: 114/67 (!) 146/92 (!) 144/79    Pulse: 94 96 82    Resp:  20 20    Temp: 98.2 °F (36.8 °C) 97.7 °F (36.5 °C) 97 °F (36.1 °C)    TempSrc:  Tympanic Temporal    SpO2: 98% 96% 95%    Weight:       Height:    5' (1.524 m)     General appearance: alert,  NAD  Eyes: conjunctivae/corneas clear. PERRL, EOM's intact. Lungs: clear to auscultation bilaterally  Heart: regular rate and rhythm, S1, S2   Abdomen: soft, non-tender, normal bowel sounds   Extremities: extremities atraumatic, no cyanosis or edema  Neurologic: AAOx4. LT and PP sensation intact through T6 and altered (1/2) at T7 and below.  Strength is 5/5 throughout bilateral upper extremities. Lower extremity strength is 1/5 bilateral HF; 2/5 bilateral KE and PF; 0/5 bilateral DF and EHL. Bilateral lower extremity spasticity, at least MAS 1+ - 2. Laboratory:    Lab Results   Component Value Date    WBC 10.1 01/06/2022    HGB 12.5 01/06/2022    HCT 38.5 01/06/2022    MCV 90.0 01/06/2022     01/06/2022     Lab Results   Component Value Date     01/06/2022    K 4.1 01/06/2022     01/06/2022    CO2 21 01/06/2022    BUN 13 01/06/2022    CREATININE 0.5 01/06/2022    GLUCOSE 103 01/06/2022    CALCIUM 9.2 01/06/2022      Lab Results   Component Value Date    ALT 12 12/26/2021    AST 13 12/26/2021    ALKPHOS 52 12/26/2021    BILITOT 0.3 12/26/2021       Lab Results   Component Value Date    COLORU Yellow 12/20/2021    NITRU Negative 12/20/2021    GLUCOSEU Negative 12/20/2021    KETUA Negative 12/20/2021    UROBILINOGEN 0.2 12/20/2021    BILIRUBINUR Negative 12/20/2021       Functional Status:   Physical Therapy:  Bed mobility: SBA - Min A  Transfers: Mod A  Ambulation: Unable     Occupational Therapy:  Feeding: Setup   Grooming: Setup  UB dressing: Setup  LB dressing: Max A         Assessment/Plan:       48 y.o. female admitted to inpatient rehabilitation for impairments and acitivities limitations in ADLs and mobility secondary to polyneuropathy.     -Polyneuropathy: progressive since late November after she had Covid-19 and monoclonal antibody infusion. Found to have B12 deficiency and per Neurology felt to be cause of neuropathy. Monitor Neuro exam. Continue Acute rehab program.  -B12 deficiency: Continue oral B12, B12 now within normal limits. Monitor  -Borderline low vitamin D: will begin vitamin D supplement   -Lower extremity spasticity: Continue Baclofen, dose adjusted.  Contracture prevention.   -Pain control: tylenol PRN, naproxen prn, voltaren gel    -Overactive bladder: continue home Myrbetriq   -DVT prophylaxis: lovenox     Less spasms at night on higher dose of bedtime baclofen   Team conference today     Electronically signed by Julia Matute MD on 1/12/2022 at 4:04 PM

## 2022-01-13 PROCEDURE — 97116 GAIT TRAINING THERAPY: CPT

## 2022-01-13 PROCEDURE — 99232 SBSQ HOSP IP/OBS MODERATE 35: CPT | Performed by: PHYSICAL MEDICINE & REHABILITATION

## 2022-01-13 PROCEDURE — 97530 THERAPEUTIC ACTIVITIES: CPT

## 2022-01-13 PROCEDURE — 6370000000 HC RX 637 (ALT 250 FOR IP): Performed by: PHYSICAL MEDICINE & REHABILITATION

## 2022-01-13 PROCEDURE — 97110 THERAPEUTIC EXERCISES: CPT

## 2022-01-13 PROCEDURE — 97535 SELF CARE MNGMENT TRAINING: CPT

## 2022-01-13 PROCEDURE — 90832 PSYTX W PT 30 MINUTES: CPT | Performed by: PSYCHOLOGIST

## 2022-01-13 PROCEDURE — 6370000000 HC RX 637 (ALT 250 FOR IP): Performed by: INTERNAL MEDICINE

## 2022-01-13 PROCEDURE — 6360000002 HC RX W HCPCS: Performed by: INTERNAL MEDICINE

## 2022-01-13 PROCEDURE — 1280000000 HC REHAB R&B

## 2022-01-13 RX ADMIN — GUAIFENESIN SYRUP AND DEXTROMETHORPHAN 5 ML: 100; 10 SYRUP ORAL at 20:47

## 2022-01-13 RX ADMIN — FLUTICASONE PROPIONATE 1 SPRAY: 50 SPRAY, METERED NASAL at 07:41

## 2022-01-13 RX ADMIN — NAPROXEN 500 MG: 500 TABLET ORAL at 05:28

## 2022-01-13 RX ADMIN — GUAIFENESIN SYRUP AND DEXTROMETHORPHAN 5 ML: 100; 10 SYRUP ORAL at 05:28

## 2022-01-13 RX ADMIN — ACETAMINOPHEN 650 MG: 325 TABLET ORAL at 13:18

## 2022-01-13 RX ADMIN — ACETAMINOPHEN 650 MG: 325 TABLET ORAL at 20:47

## 2022-01-13 RX ADMIN — ENOXAPARIN SODIUM 40 MG: 100 INJECTION SUBCUTANEOUS at 07:41

## 2022-01-13 RX ADMIN — BACLOFEN 10 MG: 10 TABLET ORAL at 07:41

## 2022-01-13 RX ADMIN — BACLOFEN 20 MG: 10 TABLET ORAL at 20:40

## 2022-01-13 RX ADMIN — ACETAMINOPHEN 650 MG: 325 TABLET ORAL at 07:41

## 2022-01-13 RX ADMIN — Medication 1 TABLET: at 07:40

## 2022-01-13 RX ADMIN — DICLOFENAC SODIUM 4 G: 10 GEL TOPICAL at 13:12

## 2022-01-13 RX ADMIN — BACLOFEN 10 MG: 10 TABLET ORAL at 13:17

## 2022-01-13 RX ADMIN — Medication 1000 MCG: at 07:42

## 2022-01-13 RX ADMIN — Medication 2000 UNITS: at 07:46

## 2022-01-13 RX ADMIN — Medication 5 MG: at 20:41

## 2022-01-13 RX ADMIN — DICLOFENAC SODIUM 4 G: 10 GEL TOPICAL at 07:45

## 2022-01-13 RX ADMIN — DOCUSATE SODIUM 100 MG: 100 CAPSULE, LIQUID FILLED ORAL at 20:40

## 2022-01-13 RX ADMIN — FOLIC ACID 1 MG: 1 TABLET ORAL at 07:41

## 2022-01-13 ASSESSMENT — PAIN DESCRIPTION - PROGRESSION
CLINICAL_PROGRESSION: GRADUALLY IMPROVING
CLINICAL_PROGRESSION: NOT CHANGED

## 2022-01-13 ASSESSMENT — PAIN SCALES - GENERAL
PAINLEVEL_OUTOF10: 3
PAINLEVEL_OUTOF10: 0
PAINLEVEL_OUTOF10: 5
PAINLEVEL_OUTOF10: 6
PAINLEVEL_OUTOF10: 6
PAINLEVEL_OUTOF10: 5
PAINLEVEL_OUTOF10: 3

## 2022-01-13 ASSESSMENT — PAIN DESCRIPTION - PAIN TYPE
TYPE: CHRONIC PAIN

## 2022-01-13 ASSESSMENT — PAIN DESCRIPTION - FREQUENCY
FREQUENCY: CONTINUOUS

## 2022-01-13 ASSESSMENT — PAIN DESCRIPTION - DESCRIPTORS
DESCRIPTORS: ACHING;DISCOMFORT
DESCRIPTORS: ACHING
DESCRIPTORS: ACHING;CONSTANT;DISCOMFORT
DESCRIPTORS: ACHING;CONSTANT;DISCOMFORT

## 2022-01-13 ASSESSMENT — PAIN DESCRIPTION - LOCATION
LOCATION: LEG
LOCATION: HIP

## 2022-01-13 ASSESSMENT — PAIN DESCRIPTION - ONSET
ONSET: ON-GOING

## 2022-01-13 ASSESSMENT — PAIN DESCRIPTION - ORIENTATION
ORIENTATION: RIGHT;LEFT
ORIENTATION: LEFT
ORIENTATION: RIGHT;LEFT
ORIENTATION: RIGHT;LEFT

## 2022-01-13 ASSESSMENT — PAIN - FUNCTIONAL ASSESSMENT
PAIN_FUNCTIONAL_ASSESSMENT: ACTIVITIES ARE NOT PREVENTED
PAIN_FUNCTIONAL_ASSESSMENT: ACTIVITIES ARE NOT PREVENTED

## 2022-01-13 NOTE — PLAN OF CARE
Problem: Skin Integrity:  Goal: Will show no infection signs and symptoms  Description: Will show no infection signs and symptoms  1/13/2022 0950 by Luanne Bains RN  Outcome: Met This Shift  1/12/2022 2231 by Nigel Robles RN  Outcome: Ongoing  Goal: Absence of new skin breakdown  Description: Absence of new skin breakdown  1/13/2022 0950 by Luanne Bains RN  Outcome: Met This Shift  1/12/2022 2231 by Nigel Robles RN  Outcome: Ongoing     Problem: Falls - Risk of:  Goal: Will remain free from falls  Description: Will remain free from falls  1/13/2022 0950 by Luanne Bains RN  Outcome: Met This Shift  1/12/2022 2231 by Nigel Robles RN  Outcome: Ongoing  Goal: Absence of physical injury  Description: Absence of physical injury  1/13/2022 0950 by Luanne Bains RN  Outcome: Met This Shift  1/12/2022 2231 by Nigel Robles RN  Outcome: Ongoing     Problem: SAFETY  Goal: Free from accidental physical injury  1/13/2022 0950 by Luanne Bains RN  Outcome: Met This Shift  1/12/2022 2231 by Nigel Robles RN  Outcome: Ongoing  Goal: Free from intentional harm  1/13/2022 0950 by Luanne Bains RN  Outcome: Met This Shift  1/12/2022 2231 by Nigel Robles RN  Outcome: Ongoing     Problem: DAILY CARE  Goal: Daily care needs are met  1/13/2022 0950 by Luanne Bains RN  Outcome: Met This Shift  1/12/2022 2231 by Nigel Robles RN  Outcome: Ongoing     Problem: PAIN  Goal: Patient's pain/discomfort is manageable  1/13/2022 0950 by Luanne Bains RN  Outcome: Met This Shift  1/12/2022 2231 by Nigel Robles RN  Outcome: Ongoing     Problem: SKIN INTEGRITY  Goal: Skin integrity is maintained or improved  1/13/2022 0950 by Luanne Bains RN  Outcome: Met This Shift  1/12/2022 2231 by Nigel Rboles RN  Outcome: Ongoing     Problem: KNOWLEDGE DEFICIT  Goal: Patient/S.O. demonstrates understanding of disease process, treatment plan, medications, and discharge instructions.   1/13/2022 0950 by Luanne Bains RN  Outcome: Met This Shift  1/12/2022 2231 by Delaney Thomas RN  Outcome: Ongoing     Problem: DISCHARGE BARRIERS  Goal: Patient's continuum of care needs are met  1/13/2022 0950 by Lloyd Walton RN  Outcome: Met This Shift  1/12/2022 2231 by Delaney Thomas RN  Outcome: Ongoing     Problem: Mobility - Impaired:  Goal: Mobility will improve  Description: Mobility will improve  1/13/2022 0950 by Lloyd Walton RN  Outcome: Met This Shift  1/12/2022 2231 by Delaney Thomas RN  Outcome: Ongoing     Problem: Pain:  Goal: Pain level will decrease  Description: Pain level will decrease  1/13/2022 0950 by Lloyd Walton RN  Outcome: Met This Shift  1/12/2022 2231 by Delaney Thomas RN  Outcome: Ongoing  Goal: Control of acute pain  Description: Control of acute pain  1/13/2022 0950 by Lloyd Walton RN  Outcome: Met This Shift  1/12/2022 2231 by Delaney Thomas RN  Outcome: Ongoing  Goal: Control of chronic pain  Description: Control of chronic pain  1/13/2022 0950 by Lloyd Walton RN  Outcome: Met This Shift  1/12/2022 2231 by Delaney Thomas RN  Outcome: Ongoing

## 2022-01-13 NOTE — PROGRESS NOTES
Physical Therapy    Facility/Department: [address] [facility]  Treatment note     NAME: Loren Hogan  : 1975  MRN: [id number]     Date of Service: 2022     Evaluating Therapist: Samson Yo., PT, DPT [id number]        ROOM: [address] [place] [address]  DIAGNOSIS: Polyneuropathy  PRECAUTIONS: Falls, Regular diet, **PRAFOS when not in therapy**  HPI: Patient admitted to the hospital on 21 with progressively worsening leg weakness after receiving an antibody infusion for Covid diagnosed in November. Neurosurgery and neurology all saw patient on consult. Carlos Peng has shown lumbar radiculopathy. EMG done on 21: Recommendations: This is predominantly a neuropathic process, not myopathic in nature.  Post viral inflammatory processes can present early on with this picture however usually progress to development of slowing of velocity with the development of myelin abnormalities as well as increasing axonal pathology. B12 level low during admission and she has received several injections.     Social:  Pt lives with fiance and son in [facility] style home, 3 PONCE with no railing + 1 step to enter. May have [address] Prior to admission: Pt independent with all functional mobility                    Initial Evaluation  Date:  AM     PM    Short Term Goals Long Term Goals    Was pt agreeable to Eval/treatment? 22 Yes yes       Does pt have pain?  Reports allodynia in BLE No sig c/o pain Mild L hip pain       Bed Mobility  Rolling: SBA  Supine to sit: Francesco  Sit to supine: modA  Scooting: Francesco NT NT sup Mod i   Transfers Sit to stand: maxA + SBA form 2nd helper  Stand to sit: maxA + SBA from 2nd helper  Stand pivot: NT  Slideboard tx- maxA     5xSTS: NT Sit to stand: NT, completed in [address] for AM   Stand to sit: NT, completed in [address] for AM   Stand pivot: NT, completed in [address] for AM  Slideboard tx- NT Sit to stand: Francesco to Tesaris. ww  Stand to sit: Francesco to Tesaris. ww   Stand pivot: mod-maxA, assist to weight shift and advance BLE, KUSUM PLS donned with sock inside out on toe  Slideboard tx-NT Francesco    sup      Ambulation    NT See TE list for details See Comments for details 10', LRAD, maxA       50', LRAD, Francesco         Walking 10 feet on uneven surface NT NT NT 10', LRAD, maxA 10', LRAD, Francesco   Wheel Chair Mobility NT NT  ', BUE propulsion, sup > 200', BUE propulson, mod i   Car Transfers NT NT  NT maxA Francesco   Stair negotiation: ascended and descended  NT NT  NT 1 step, maxA, KUSUM HR 4 steps, Kusum HR, Francesco   Curb Step:   ascended and descended NT NT NT  2\" curb, LRAD, maxA 2\" curb, LRAD, Francesco   Picking up object off the floor NT NT  NT MaxA with assistive device Francesco with LRAD   BLE ROM WFL           BLE Strength KUSUM hip flex- 1/5  KUSUM hip ext- 1/5  KUSUM knee ext 2+/5  KUSUM knee flex 2-/5  KUSUM PF 2+/5  KSUUM DF 0/5           Balance  Sitting EOB- SBA  Standing- maxA                          Date Family Teach Completed             Is additional Family Teaching Needed? Y or N             Hindering Progress Weakness, impulsivity, pain, endurance, balance, body habitus Weakness, impulsivity, pain, endurance, balance, body habitus  Weakness, impulsivity, pain, endurance, balance, body habitus         PT recommended ELOS 4-5 weeks           Team's Discharge Plan             Therapist at Team Meeting                Therapeutic Exercise:     AM:   1. amb in pedro lift- 22'x2, KUSUM KI and PLS donned, modA to assist with weight shift and advancement of BLE,     PM:   1. STS with static stand- 3x60 sec  2. Standing weight shifts- 3x10-15 each, focus on weight shifting to clear contralateral foot, min-modA with BUE support at [employer], KUSUM PLS donned   3. Pre-gait- 3x5 each fwd step fwd/step back, KUSUM PLS donned, sock inside out on toe, BUE support at [facility], focusing on weight shifty and limb advancement to clear foot  4.  Gait- amb with KUSUM PLS, KUSUM sock inside out on toes, BUE support at [facility], chair follow for safety, 15', modA    Patient education    Pt educated on gait, balance, current status and POC, d/c planning    Patient response to education:   Pt verbalized understanding Pt demonstrated skill Pt requires further education in this area   yes partial All areas     Additional Comments:   AM: Focus of session on gait in pedro lift. Pt able to progress distance slightly, improved initiation of limb advancement NADEGE on 2nd trial, however pt continues to require mod-maxA to assist with weight shift and advancement. Discussed pt's presentation and current functional impairments with orthotist, planning to hold off on bracing and continue to strengthen, will re-assess in upcoming weeks. Pt with small skin tear at L elbow from weight bearing heavily on pedro lift platform. RN aware and assessing at end of session. PM:  Worked on pre-gait and weight shifting activities with NIRMAL supported at [employer], pt with improved ability to stand this session, completing from EOM to [employer]. ww at [employer]. Pt progressing to amb trials with Charm Capuchin, showing improved weight shift and foot clearance for limb advancement NADEGE, min-modA on RLE, min-maxA on LLE,  increased assist as pt fatigues. Trialed stand pivot with  ww, pt requiring sig verbal cueing to sequence for weight shifting and stepping pattern, pt requiring heavy assist due to impulsivity and BLE sliding out in abduction. Will continue to practice. AM  Time in: 0915  Time out: 1000    PM  Time in: 1430  Time out: 1515    Pt is making good progress toward established Physical Therapy goals. Continue with physical therapy current plan of care.     Kt Trivedi., PT, DPT  [id number]

## 2022-01-13 NOTE — PROGRESS NOTES
Occupational Therapy  OCCUPATIONAL THERAPY DAILY NOTE    Date:2022  Patient Name: Nila Snow  MRN: [id number]  : 1975  Room: [facility]     Diagnosis: Polyneuropathy   Precautions: falls ,slide board transfers     Functional Assessment:   Date Status AE  Comments   Feeding 22 Independent      Grooming 22  set up           Oral Care 22  Set up      Bathing 22  UB- set up   LB-Min/Mod Assist  Bed level    UB Dressing 22  Set up     LB Dressing 22  Mod A      Footwear 22  Mod/max  A  long shoe horn  Sock aid    Toileting 22  Mod A x2  Grab bar  3:1 commode  PM: Pt stood at bathroom grab bar with x2 assist for standing balance and  assist with clothing mgmt. Homemaking 22 SBA/CGA W/c  Reacher  PM:  Pt propelled w/c in kitchen using BUE's to make toast, wipe off counter. Open/close fridge, pantry, drawers along with transporting coffee packets over to  area. Functional Transfers / Balance:   Date Status DME  Comments   Sit Balance 22  SBA/CGA W/c   Bed rails AM: Demo;d up in w/c using reacher to move plastic rings on arc back & forth L/R x 3-4 reps and removing velcro black & white cubes from board on floor onto table top & back while wearing 1# wts on BUE for strength and endurance. Above tasks facilitating core strengthening/trunk control,  dyn/static sitting balance for functional tasks. PM: demo;d on 3:1 commode and up in w/c during kitchen mobility/hmkg tasks. Stand Balance 22 Mod A x2   PM: demo'd during clothing mgmt for toilet needs. [] Tub  [] Shower   Transfer 22 TBD     Commode   Transfer 22 Mod A ( assist x2 staff)- graded task  3 in 1 commode  Sit to stand at grab rail in bathroom at Mod A x 2. Bedside commode placed under patient    Functional   Mobility 22  SBA W/c propel demo'd in OT kitchen and in gym using BUE's to increase strength & endurance.     Other: bed to w/c with alonso lift Bed repositioning     Bed to w/c with transfer board     Bed mobility   Supine to sit  1/9/22 1/9/22 1/11/22 1/11/22  Dependent         SBA    Dep( assist of staff)       Min A          Bed rails       Functional Exercises / Activity:  BUE strength exercises while wearing 1# wts during reacher activity and trunk control ex's to increase overall endurance/strength for ADL;s, mobility and transfers. Sensory / Neuromuscular Re-Education:     Cognitive Skills:   Status Comments   Problem   Solving good     Memory good     Sequencing good     Safety fair       Visual Perception:    Education:  Pt educated safety when using 3:1 commode for bathroom needs to increase awareness. Pt educated with kitchen mobility for hmkg skills at w/c level to increase skills. [] Family teach completed on:    Pain Level: 4-10 BLE's     Additional Notes:   1/13/22 Scrape located on Left elbow area and nurse already informed per consult with patient. Patient has made good progress during treatment sessions toward set goals. Therapy emphasis to obtain goals:Current Treatment Recommendations: Strengthening,Gait Training,Patient/Caregiver Education & Training,Home Management Training,Equipment Evaluation, Education, & procurement,Balance Training,Functional Mobility Training,Positioning,Endurance Training,Wheelchair Mobility Training,Safety Education & Training,Self-Care / ADL    [x] Continue with current OT Plan of care.   [] Prepare for Discharge    Goals  Long term goals  Time Frame for Long term goals : 4 weeks to address above problem areas  Long term goal 1: Pt demo Mod I to eat all meals  Long term goal 2: Pt demo Mod I grooming seated @ sink level  Long term goal 3: Pt demo Sup to bathe @ shower level both seated & use LHS  Long term goal 4: Pt demo Mod I UE dress to don a shirt & Mod I to don underwear, pants, socks & shoes using AE as needed  Long term goal 5: Pt demo Mod I commode trf using a slide board & drop arm commode & demo Mod I toileting & demo G safety  Long term goals 6: Pt demo SBA tub trf using appropriate DME- bench & slide board to ensure pt safety  Long term goal 7: Pt demo Mod I light homemaking activity @ wc level & demo G- safety & insight  Long term goal 8: Pt demo G- endurance for a 20-30 minute functional activity  Long term goal 9: Pt demo Mod I wc propulsion thorughout a living environment & around obstacles    1/12/22  OT plan of care updated on this date. Tentative length of stay 4-5 weeks    Margy Jackson OTR/L [id number]    DISCHARGE RECOMMENDATIONS  Recommended DME:    Post Discharge Care:   []Home Independently  []Home with 24hr Care / Supervision []Home with Partial Supervision []Home with Home Health OT []Home with Out Pt OT []Other: ___   Comments:         Time in Time out Tx Time Breakdown  Variance:   First Session  10:50am  1135am [x] Individual Tx-45  [] Concurrent Tx -  [] Co-Tx -   [] Group Tx -   [] Time Missed -     Second Session  1345pm 1430pm  [x] Individual Tx-45   [] Concurrent Tx -  [] Co-Tx -   [] Group Tx -   [] Time Missed -              Third Session    [] Individual Tx-   [] Concurrent Tx -  [] Co-Tx -   [] Group Tx -   [] Time Missed -         Total Tx Time:90 mins    Abdiaziz Hamm [facility]  I have read the above note and agree with the documentation.   Margy Jackson OTR/L [id number]

## 2022-01-14 PROCEDURE — 6360000002 HC RX W HCPCS: Performed by: INTERNAL MEDICINE

## 2022-01-14 PROCEDURE — 1280000000 HC REHAB R&B

## 2022-01-14 PROCEDURE — 6370000000 HC RX 637 (ALT 250 FOR IP): Performed by: INTERNAL MEDICINE

## 2022-01-14 PROCEDURE — 97530 THERAPEUTIC ACTIVITIES: CPT

## 2022-01-14 PROCEDURE — 97110 THERAPEUTIC EXERCISES: CPT

## 2022-01-14 PROCEDURE — 97116 GAIT TRAINING THERAPY: CPT

## 2022-01-14 PROCEDURE — 97542 WHEELCHAIR MNGMENT TRAINING: CPT

## 2022-01-14 PROCEDURE — 6370000000 HC RX 637 (ALT 250 FOR IP): Performed by: PHYSICAL MEDICINE & REHABILITATION

## 2022-01-14 PROCEDURE — 99232 SBSQ HOSP IP/OBS MODERATE 35: CPT | Performed by: PHYSICAL MEDICINE & REHABILITATION

## 2022-01-14 PROCEDURE — 97112 NEUROMUSCULAR REEDUCATION: CPT

## 2022-01-14 RX ADMIN — ENOXAPARIN SODIUM 40 MG: 100 INJECTION SUBCUTANEOUS at 07:48

## 2022-01-14 RX ADMIN — FOLIC ACID 1 MG: 1 TABLET ORAL at 07:48

## 2022-01-14 RX ADMIN — NAPROXEN 500 MG: 500 TABLET ORAL at 06:23

## 2022-01-14 RX ADMIN — DOCUSATE SODIUM 100 MG: 100 CAPSULE, LIQUID FILLED ORAL at 21:12

## 2022-01-14 RX ADMIN — ACETAMINOPHEN 650 MG: 325 TABLET ORAL at 07:49

## 2022-01-14 RX ADMIN — ACETAMINOPHEN 650 MG: 325 TABLET ORAL at 21:11

## 2022-01-14 RX ADMIN — Medication 1 TABLET: at 07:48

## 2022-01-14 RX ADMIN — DICLOFENAC SODIUM 4 G: 10 GEL TOPICAL at 07:50

## 2022-01-14 RX ADMIN — Medication 2000 UNITS: at 07:49

## 2022-01-14 RX ADMIN — DICLOFENAC SODIUM 4 G: 10 GEL TOPICAL at 13:43

## 2022-01-14 RX ADMIN — FLUTICASONE PROPIONATE 1 SPRAY: 50 SPRAY, METERED NASAL at 07:49

## 2022-01-14 RX ADMIN — BACLOFEN 10 MG: 10 TABLET ORAL at 07:49

## 2022-01-14 RX ADMIN — BACLOFEN 10 MG: 10 TABLET ORAL at 13:43

## 2022-01-14 RX ADMIN — GUAIFENESIN SYRUP AND DEXTROMETHORPHAN 5 ML: 100; 10 SYRUP ORAL at 21:11

## 2022-01-14 RX ADMIN — Medication 1000 MCG: at 07:53

## 2022-01-14 RX ADMIN — BACLOFEN 20 MG: 10 TABLET ORAL at 21:12

## 2022-01-14 RX ADMIN — GUAIFENESIN SYRUP AND DEXTROMETHORPHAN 5 ML: 100; 10 SYRUP ORAL at 06:23

## 2022-01-14 RX ADMIN — DOCUSATE SODIUM 100 MG: 100 CAPSULE, LIQUID FILLED ORAL at 07:48

## 2022-01-14 RX ADMIN — ACETAMINOPHEN 650 MG: 325 TABLET ORAL at 13:45

## 2022-01-14 RX ADMIN — Medication 5 MG: at 21:12

## 2022-01-14 RX ADMIN — DICLOFENAC SODIUM 4 G: 10 GEL TOPICAL at 21:13

## 2022-01-14 ASSESSMENT — PAIN DESCRIPTION - ONSET
ONSET: ON-GOING

## 2022-01-14 ASSESSMENT — PAIN DESCRIPTION - DESCRIPTORS
DESCRIPTORS: ACHING;DISCOMFORT
DESCRIPTORS: ACHING
DESCRIPTORS: ACHING;DISCOMFORT

## 2022-01-14 ASSESSMENT — PAIN DESCRIPTION - PAIN TYPE
TYPE: CHRONIC PAIN

## 2022-01-14 ASSESSMENT — PAIN DESCRIPTION - PROGRESSION
CLINICAL_PROGRESSION: GRADUALLY IMPROVING

## 2022-01-14 ASSESSMENT — PAIN DESCRIPTION - LOCATION
LOCATION: HIP;BACK
LOCATION: HIP;BACK
LOCATION: HIP

## 2022-01-14 ASSESSMENT — PAIN DESCRIPTION - FREQUENCY
FREQUENCY: CONTINUOUS

## 2022-01-14 ASSESSMENT — PAIN - FUNCTIONAL ASSESSMENT
PAIN_FUNCTIONAL_ASSESSMENT: ACTIVITIES ARE NOT PREVENTED

## 2022-01-14 ASSESSMENT — PAIN SCALES - GENERAL
PAINLEVEL_OUTOF10: 5
PAINLEVEL_OUTOF10: 3
PAINLEVEL_OUTOF10: 0
PAINLEVEL_OUTOF10: 5

## 2022-01-14 ASSESSMENT — PAIN DESCRIPTION - ORIENTATION
ORIENTATION: LEFT;LOWER
ORIENTATION: LEFT
ORIENTATION: LEFT;LOWER

## 2022-01-14 NOTE — PROGRESS NOTES
[facility]  Comprehensive Progress Note    Subjective:      Milady Petersen is a 48 y.o. female admitted to inpatient rehabilitation for impairments and acitivities limitations in ADLs and mobility secondary to polyneuropathy. No acute events overnight. No cp, sob, n/v. Tolerating therapy. Reports \"muscle soreness\" which she relates to working in PT, asking for heating pad. No other complaints. The patient's medical records have been reviewed. Scheduled Meds:baclofen, 10 mg, BID- 8&2   And  baclofen, 20 mg, Nightly  mirabegron, 25 mg, Daily  enoxaparin, 40 mg, Daily  diclofenac sodium, 4 g, 4x Daily  fluticasone, 1 spray, Daily  folic acid, 1 mg, Daily  melatonin, 5 mg, Nightly  multivitamin, 1 tablet, Daily  polyethylene glycol, 17 g, BID  vitamin B-12, 1,000 mcg, Daily  influenza virus vaccine, 0.5 mL, Prior to discharge  docusate sodium, 100 mg, BID  senna, 2 tablet, Nightly  vitamin D, 2,000 Units, Daily      Continuous Infusions:  PRN Meds:sodium chloride, 1 spray, PRN  guaiFENesin-dextromethorphan, 5 mL, Q4H PRN  melatonin, 5 mg, Nightly PRN  naproxen, 500 mg, BID PRN  acetaminophen, 650 mg, Q4H PRN  ondansetron, 4 mg, Q6H PRN  polyethylene glycol, 17 g, Daily PRN         Objective:       01/12/22 0730 01/12/22 1056 01/13/22 0745   BP: (!) 144/79  133/83   Pulse: 82  89   Resp: 20  18   Temp: 97 °F (36.1 °C)  97 °F (36.1 °C)   TempSrc: Temporal  Temporal   SpO2: 95%     Weight:      Height:  5' (1.524 m)      General appearance: alert,  NAD  Eyes: conjunctivae/corneas clear. PERRL, EOM's intact. Lungs: clear to auscultation bilaterally  Heart: regular rate and rhythm, S1, S2   Abdomen: soft, non-tender, normal bowel sounds   Extremities: extremities atraumatic, no cyanosis or edema  Neurologic: AAOx4. LT and PP sensation intact through T6 and altered (1/2) at T7 and below. Strength is 5/5 throughout bilateral upper extremities.  Lower extremity strength is 1/5 bilateral HF; 2/5 bilateral KE and PF; 0/5 bilateral DF and EHL. Bilateral lower extremity spasticity, at least MAS 1+ - 2. Laboratory:    Lab Results   Component Value Date    WBC 10.1 01/06/2022    HGB 12.5 01/06/2022    HCT 38.5 01/06/2022    MCV 90.0 01/06/2022     01/06/2022     Lab Results   Component Value Date     01/06/2022    K 4.1 01/06/2022     01/06/2022    CO2 21 01/06/2022    BUN 13 01/06/2022    CREATININE 0.5 01/06/2022    GLUCOSE 103 01/06/2022    CALCIUM 9.2 01/06/2022      Lab Results   Component Value Date    ALT 12 12/26/2021    AST 13 12/26/2021    ALKPHOS 52 12/26/2021    BILITOT 0.3 12/26/2021       Lab Results   Component Value Date    COLORU Yellow 12/20/2021    NITRU Negative 12/20/2021    GLUCOSEU Negative 12/20/2021    KETUA Negative 12/20/2021    UROBILINOGEN 0.2 12/20/2021    BILIRUBINUR Negative 12/20/2021       Functional Status:   Physical Therapy:  Bed mobility: SBA - Min A  Transfers: Mod A  Ambulation: Unable     Occupational Therapy:  Feeding: Setup   Grooming: Setup  UB dressing: Setup  LB dressing: Max A         Assessment/Plan:       48 y.o. female admitted to inpatient rehabilitation for impairments and acitivities limitations in ADLs and mobility secondary to polyneuropathy.     -Polyneuropathy: progressive since late November after she had Covid-19 and monoclonal antibody infusion. Found to have B12 deficiency and per Neurology felt to be cause of neuropathy. Monitor Neuro exam. Continue Acute rehab program.  -B12 deficiency: Continue oral B12, B12 now within normal limits. Monitor  -Borderline low vitamin D: will begin vitamin D supplement   -Lower extremity spasticity: Continue Baclofen, dose adjusted.  Contracture prevention.   -Pain control: tylenol PRN, naproxen prn, voltaren gel    -Overactive bladder: continue home Myrbetriq   -DVT prophylaxis: lovenox     Heating pad for muscular pain  Continue rehab program

## 2022-01-14 NOTE — PLAN OF CARE
Problem: Skin Integrity:  Goal: Will show no infection signs and symptoms  Description: Will show no infection signs and symptoms  1/14/2022 1027 by Beata Welch RN  Outcome: Met This Shift  1/14/2022 0017 by Scot Barrios RN  Outcome: Ongoing  Goal: Absence of new skin breakdown  Description: Absence of new skin breakdown  1/14/2022 1027 by Beata Welch RN  Outcome: Met This Shift  1/14/2022 0017 by Scot Barrios RN  Outcome: Ongoing     Problem: Falls - Risk of:  Goal: Will remain free from falls  Description: Will remain free from falls  1/14/2022 1027 by Beata Welch RN  Outcome: Met This Shift  1/14/2022 0017 by Scot Barrios RN  Outcome: Ongoing  Goal: Absence of physical injury  Description: Absence of physical injury  1/14/2022 1027 by Beata Welch RN  Outcome: Met This Shift  1/14/2022 0017 by Scot Barrios RN  Outcome: Ongoing     Problem: SAFETY  Goal: Free from accidental physical injury  1/14/2022 1027 by Beata Welch RN  Outcome: Met This Shift  1/14/2022 0017 by Scot Barrios RN  Outcome: Ongoing  Goal: Free from intentional harm  1/14/2022 1027 by Beata Welch RN  Outcome: Met This Shift  1/14/2022 0017 by Scot Barrios RN  Outcome: Ongoing     Problem: DAILY CARE  Goal: Daily care needs are met  1/14/2022 1027 by Beata Welch RN  Outcome: Met This Shift  1/14/2022 0017 by Scot Barrios RN  Outcome: Ongoing     Problem: PAIN  Goal: Patient's pain/discomfort is manageable  1/14/2022 1027 by Beata Welch RN  Outcome: Met This Shift  1/14/2022 0017 by Scot Barrios RN  Outcome: Ongoing     Problem: SKIN INTEGRITY  Goal: Skin integrity is maintained or improved  1/14/2022 1027 by Beata Welch RN  Outcome: Met This Shift  1/14/2022 0017 by Scot Barrios RN  Outcome: Ongoing     Problem: KNOWLEDGE DEFICIT  Goal: Patient/S.O. demonstrates understanding of disease process, treatment plan, medications, and discharge instructions.   1/14/2022 1027 by Beata Welch RN  Outcome: Met This Shift  1/14/2022 0017 by Neelima Shahid LUISANA Garcia  Outcome: Ongoing     Problem: DISCHARGE BARRIERS  Goal: Patient's continuum of care needs are met  1/14/2022 1027 by Kiara Mcdaniel RN  Outcome: Met This Shift  1/14/2022 0017 by Nabor Gama RN  Outcome: Ongoing     Problem: Mobility - Impaired:  Goal: Mobility will improve  Description: Mobility will improve  1/14/2022 1027 by Kiara Mcdaniel RN  Outcome: Met This Shift  1/14/2022 0017 by Nabor Gama RN  Outcome: Ongoing     Problem: Pain:  Goal: Pain level will decrease  Description: Pain level will decrease  1/14/2022 1027 by Kiara Mcdaniel RN  Outcome: Met This Shift  1/14/2022 0017 by Nabor Gama RN  Outcome: Ongoing  Goal: Control of acute pain  Description: Control of acute pain  1/14/2022 1027 by Kiara Mcdaniel RN  Outcome: Met This Shift  1/14/2022 0017 by Nabor Gama RN  Outcome: Ongoing  Goal: Control of chronic pain  Description: Control of chronic pain  1/14/2022 1027 by Kiara Mcdaniel RN  Outcome: Met This Shift  1/14/2022 0017 by Nabor Gama RN  Outcome: Ongoing

## 2022-01-14 NOTE — PROGRESS NOTES
therapy. Beginning to see slow improvements in proximal lower extremity strength.  Continue rehab program.        Electronically signed by Barrett Tomlinson MD on 1/14/2022 at 2:28 PM

## 2022-01-14 NOTE — PROGRESS NOTES
Physical Therapy    Facility/Department: [address] REHAB  Treatment note     NAME: Nila Snow  : 1975  MRN: [id number]     Date of Service: 2022     Evaluating Therapist: Farshad Valentino., PT, DPT [id number]        ROOM: [address] [place] [address]  DIAGNOSIS: Polyneuropathy  PRECAUTIONS: Falls, Regular diet, **PRAFOS when not in therapy**  HPI: Patient admitted to the hospital on 21 with progressively worsening leg weakness after receiving an antibody infusion for Covid diagnosed in November. Neurosurgery and neurology all saw patient on consult. [address] has shown lumbar radiculopathy. EMG done on 21: Recommendations: This is predominantly a neuropathic process, not myopathic in nature.  Post viral inflammatory processes can present early on with this picture however usually progress to development of slowing of velocity with the development of myelin abnormalities as well as increasing axonal pathology. B12 level low during admission and she has received several injections.     Social:  Pt lives with fiance and son in [facility] style home, 3 PONCE with no railing + 1 step to enter. May have [address] Prior to admission: Pt independent with all functional mobility                    Initial Evaluation  Date:  AM     PM    Short Term Goals Long Term Goals    Was pt agreeable to Eval/treatment? 22 Yes yes       Does pt have pain? Reports allodynia in BLE No sig c/o pain Mild L hip pain       Bed Mobility  Rolling: SBA  Supine to sit: Francesco  Sit to supine: modA  Scooting: Francesco NT NT sup Mod i   Transfers Sit to stand: maxA + SBA form 2nd helper  Stand to sit: maxA + SBA from 2nd helper  Stand pivot: NT  Slideboard tx- maxA     5xSTS: NT Sit to stand: Francesco  Stand to sit: Francesco   Stand pivot: NT  Slideboard tx- NT Sit to stand: Francesco to jr. roman  Stand to sit: Francesco to jr. roman   Stand pivot: min-modA,  NADEGE jr. Panchito Arellano, verbal cueing to sequence  Slideboard tx-NT Francesco    sup      Ambulation    NT See TE list for initially Francesco to aide in advancement for a step through pattern, progresses to modA as pt fatigues. This session, pt beginning to show slight knee flexion at pre swing phase of gait on LLE, but continues to have limited to no knee flexion in RLE during any phase of gait. Pt's HR elevated to 130's during amb, seated rest breaks provided between bouts to recover. PM: This session focused on stand pivot tx from w/c <> mat table, first 2 reps pt requiring mod A with verbal cueing to sequence stepping and assist to weight shift and advance LLE. Pt progressing to Francesco on last 4 reps, continues to require skilled verbal cueing to sequence AD management and steps, but better able to weight shift and advance LE to step, especially stepping bwd. Chair/bed alarm:     AM  Time in: 0915  Time out: 1000    PM  Time in: 1430  Time out: 1515    Pt is making good progress toward established Physical Therapy goals. Continue with physical therapy current plan of care.     Bernadette Tomlin., PT, DPT  [id number]

## 2022-01-14 NOTE — PROGRESS NOTES
Occupational Therapy  OCCUPATIONAL THERAPY DAILY NOTE    Date:2022  Patient Name: Nila Snow  MRN: [id number]  : 1975  Room: [address] [place] [address]-A     Diagnosis: Polyneuropathy   Precautions: falls ,slide board transfers     Functional Assessment:   Date Status AE  Comments   Feeding 22 Independent      Grooming 22  set up           Oral Care 22  Set up      Bathing 22  UB- set up   LB-Min/Mod Assist  Bed level    UB Dressing 22  Set up     LB Dressing 22  Mod A      Footwear 22  Mod/max  A  long shoe horn  Sock aid    Toileting 22  Mod A x2  Grab bar  3:1 commode     Homemaking 22 SBA/CGA W/c  Reacher       Functional Transfers / Balance:   Date Status DME  Comments   Sit Balance 22  SBA/CGA W/c   Bed rails    Stand Balance 22 Mod A x2      [] Tub  [] Shower   Transfer 22 TBD     Commode   Transfer 22 Mod A ( assist x2 staff)- graded task  3 in 1 commode     Functional   Mobility 22  SBA W/c propel    Other: bed to w/c with alonso lift       Bed repositioning     Bed to w/c with transfer board     Bed mobility   Supine to sit  22  Dependent         SBA    Dep( assist of staff)       Min A          Bed rails       Functional Exercises / Activity:  BUE strength exercises: 5 # weighted ball 3 sets 10 reps in all planes                                            [employer] with 6 # wt up incline wedge 3 sets 10 reps in all planes                                           Arm bike 5 mins for 3 reps      B hand strength with 10 # power gripper 3 sets 15 reps      Trunk control and UB ROM/strength exercises with pt completing ROM shoulder arc with 1 # cuff weights at Mod I level         Sensory / Neuromuscular Re-Education:     Cognitive Skills:   Status Comments   Problem   Solving good     Memory good     Sequencing good     Safety fair       Visual Perception:    Education:  Pt was educated on pacing and energy conservation techniques to utilize during functional activities     [] Family teach completed on:    Pain Level: 4-10 BLE's     Additional Notes:       Patient has made good progress during treatment sessions toward set goals. Therapy emphasis to obtain goals:Current Treatment Recommendations: Strengthening,Gait Training,Patient/Caregiver Education & Training,Home Management Training,Equipment Evaluation, Education, & procurement,Balance Training,Functional Mobility Training,Positioning,Endurance Training,Wheelchair Mobility Training,Safety Education & Training,Self-Care / ADL    [x] Continue with current OT Plan of care. [] Prepare for Discharge    Goals  Long term goals  Time Frame for Long term goals : 4 weeks to address above problem areas  Long term goal 1: Pt demo Mod I to eat all meals  Long term goal 2: Pt demo Mod I grooming seated @ sink level  Long term goal 3: Pt demo Sup to bathe @ shower level both seated & use LHS  Long term goal 4: Pt demo Mod I UE dress to don a shirt & Mod I to don underwear, pants, socks & shoes using AE as needed  Long term goal 5: Pt demo Mod I commode trf using a slide board & drop arm commode & demo Mod I toileting & demo G safety  Long term goals 6: Pt demo SBA tub trf using appropriate DME- bench & slide board to ensure pt safety  Long term goal 7: Pt demo Mod I light homemaking activity @ wc level & demo G- safety & insight  Long term goal 8: Pt demo G- endurance for a 20-30 minute functional activity  Long term goal 9: Pt demo Mod I wc propulsion thorughout a living environment & around obstacles    1/12/22  OT plan of care updated on this date.  Tentative length of stay 4-5 weeks    Colby Drake OTR/L [id number]    DISCHARGE RECOMMENDATIONS  Recommended DME:    Post Discharge Care:   []Home Independently  []Home with 24hr Care / Supervision []Home with Partial Supervision []Home with Home Health OT []Home with Out Pt OT []Other: ___   Comments:         Time in Time out Tx Time Breakdown  Variance:   First Session  [id number]   [] Individual Tx-  [x] Concurrent Tx -45  [] Co-Tx -   [] Group Tx -   [] Time Missed -     Second Session [id number]  [x] Individual Tx-45  [] Concurrent Tx -  [] Co-Tx -   [] Group Tx -   [] Time Missed -              Third Session    [] Individual Tx-   [] Concurrent Tx -  [] Co-Tx -   [] Group Tx -   [] Time Missed -         Total Tx Time: 90 mins      Jordan Braun OTR/L [id number]

## 2022-01-14 NOTE — PROGRESS NOTES
Pratima CARLISLE Psychologist  1/13/2022  7:28 PM    Time Start: 6:45 p.m. Time End:  7:15 p.m. Date of Service:  1/13/2022    Reason for Consult:  Adjustment   Referring Provider: Jossy Mari MD    Interval History: Patient reported making positive progress in session and feeling motivated. However, she also reported some difficulties with emotional regulation when seeing family members. Mental Status:  Appearance: age appropriate   Affect:  consistent with expectations based upon mood   Attitude: Cooperative and Friendly   Mood:   Euthymic   Thought Process:  goal directed   Delusions: no evidence of delusions   Perceptions: No perceptual disturbance   Behavior:   open, friendly, cooperative and tearful   Psychomotor: Within normal limits   Speech: Within normal limits   Eye Contact: good   Orientation:  oriented to person, place, time, and general circumstances   Judgment & Insight:  normal insight and judgment     Risk Assessment:  Current Suicide Risk:  no suicidal ideation  Current Homicide Risk:  no homicidal ideation        Diagnosis:  Adjustment Disorder with Mixed Depression and Anxious Mood    Prognosis:  Good    Treatment Response:  no notable change    Session Content:  Patient and psychologist focused on accomplishments and changes while in PT and OT. She and psychologist engaged in positive self-talk and explored cognitions related to self and future. Psychologist provided empathic listening and normalized tearfulness while in session. She and psychologist focused on sense of self and engaged in examining the evidence to assist with rational thinking related to resiliency. Patient and psychologist focused on expectations and cognitions related to her future. Patient Response to Plan/Recommendations:  Patient is shown to have positive self-awareness and motivation. She reported having positive support system and realistic expectations related to discharge.  She is shown to have emotional frustrations related to her limitations and anxiety related to her future. However, her strengths in motivation, support, and self-awareness appear to give her a positive prognosis. Treatment Plan/Recommendations:  Patient will benefit from CBT, person-centered techniques, coping skills training, problem solving skills. She will be seen on the unit until discharge. Contact Gabriela Acevedo PSYD as needed. Electronically signed by Gabriela Acevedo PSYD, EdyIZABEL.   Licensed Psychologist [id number]  Director of [address] [place] [address]

## 2022-01-15 PROCEDURE — 6360000002 HC RX W HCPCS: Performed by: INTERNAL MEDICINE

## 2022-01-15 PROCEDURE — 6370000000 HC RX 637 (ALT 250 FOR IP): Performed by: PHYSICAL MEDICINE & REHABILITATION

## 2022-01-15 PROCEDURE — 99233 SBSQ HOSP IP/OBS HIGH 50: CPT | Performed by: PHYSICAL MEDICINE & REHABILITATION

## 2022-01-15 PROCEDURE — 97530 THERAPEUTIC ACTIVITIES: CPT

## 2022-01-15 PROCEDURE — 97116 GAIT TRAINING THERAPY: CPT

## 2022-01-15 PROCEDURE — 6370000000 HC RX 637 (ALT 250 FOR IP): Performed by: INTERNAL MEDICINE

## 2022-01-15 PROCEDURE — 1280000000 HC REHAB R&B

## 2022-01-15 RX ADMIN — BACLOFEN 20 MG: 10 TABLET ORAL at 21:15

## 2022-01-15 RX ADMIN — GUAIFENESIN SYRUP AND DEXTROMETHORPHAN 5 ML: 100; 10 SYRUP ORAL at 06:51

## 2022-01-15 RX ADMIN — Medication 1 TABLET: at 08:31

## 2022-01-15 RX ADMIN — FOLIC ACID 1 MG: 1 TABLET ORAL at 08:29

## 2022-01-15 RX ADMIN — DICLOFENAC SODIUM 4 G: 10 GEL TOPICAL at 08:28

## 2022-01-15 RX ADMIN — FLUTICASONE PROPIONATE 1 SPRAY: 50 SPRAY, METERED NASAL at 08:29

## 2022-01-15 RX ADMIN — Medication 5 MG: at 21:15

## 2022-01-15 RX ADMIN — Medication 1000 MCG: at 08:31

## 2022-01-15 RX ADMIN — ACETAMINOPHEN 650 MG: 325 TABLET ORAL at 21:15

## 2022-01-15 RX ADMIN — DICLOFENAC SODIUM 4 G: 10 GEL TOPICAL at 13:54

## 2022-01-15 RX ADMIN — GUAIFENESIN SYRUP AND DEXTROMETHORPHAN 5 ML: 100; 10 SYRUP ORAL at 21:15

## 2022-01-15 RX ADMIN — DICLOFENAC SODIUM 4 G: 10 GEL TOPICAL at 21:16

## 2022-01-15 RX ADMIN — BACLOFEN 10 MG: 10 TABLET ORAL at 13:53

## 2022-01-15 RX ADMIN — NAPROXEN 500 MG: 500 TABLET ORAL at 06:51

## 2022-01-15 RX ADMIN — BACLOFEN 10 MG: 10 TABLET ORAL at 08:27

## 2022-01-15 RX ADMIN — ENOXAPARIN SODIUM 40 MG: 100 INJECTION SUBCUTANEOUS at 08:28

## 2022-01-15 RX ADMIN — Medication 2000 UNITS: at 08:32

## 2022-01-15 RX ADMIN — DOCUSATE SODIUM 100 MG: 100 CAPSULE, LIQUID FILLED ORAL at 08:28

## 2022-01-15 RX ADMIN — DICLOFENAC SODIUM 4 G: 10 GEL TOPICAL at 17:06

## 2022-01-15 ASSESSMENT — PAIN DESCRIPTION - FREQUENCY
FREQUENCY: CONTINUOUS
FREQUENCY: CONTINUOUS
FREQUENCY: INTERMITTENT

## 2022-01-15 ASSESSMENT — PAIN DESCRIPTION - LOCATION
LOCATION: HIP;LEG
LOCATION: HIP
LOCATION: HIP;LEG
LOCATION: HIP;LEG

## 2022-01-15 ASSESSMENT — PAIN SCALES - GENERAL
PAINLEVEL_OUTOF10: 5
PAINLEVEL_OUTOF10: 5
PAINLEVEL_OUTOF10: 3
PAINLEVEL_OUTOF10: 0
PAINLEVEL_OUTOF10: 0
PAINLEVEL_OUTOF10: 5

## 2022-01-15 ASSESSMENT — PAIN DESCRIPTION - PAIN TYPE
TYPE: SURGICAL PAIN
TYPE: CHRONIC PAIN
TYPE: CHRONIC PAIN

## 2022-01-15 ASSESSMENT — PAIN DESCRIPTION - PROGRESSION
CLINICAL_PROGRESSION: GRADUALLY IMPROVING

## 2022-01-15 ASSESSMENT — PAIN DESCRIPTION - ONSET
ONSET: ON-GOING
ONSET: GRADUAL
ONSET: ON-GOING

## 2022-01-15 ASSESSMENT — PAIN - FUNCTIONAL ASSESSMENT: PAIN_FUNCTIONAL_ASSESSMENT: ACTIVITIES ARE NOT PREVENTED

## 2022-01-15 ASSESSMENT — PAIN DESCRIPTION - ORIENTATION
ORIENTATION: RIGHT;LEFT
ORIENTATION: LEFT;RIGHT;LOWER
ORIENTATION: LEFT;LOWER
ORIENTATION: RIGHT;LEFT;LOWER

## 2022-01-15 ASSESSMENT — PAIN DESCRIPTION - DESCRIPTORS
DESCRIPTORS: ACHING;DISCOMFORT
DESCRIPTORS: ACHING;CONSTANT;DISCOMFORT
DESCRIPTORS: ACHING;CONSTANT;DISCOMFORT

## 2022-01-15 NOTE — PROGRESS NOTES
Physical Therapy    Facility/Department: [address] REHAB  Treatment note     NAME: Maximiliano Jerome  : 1975  MRN: [id number]     Date of Service: 2022     Evaluating Therapist: Jared Morel., PT, DPT [id number]        ROOM: [address] [place] [address]  DIAGNOSIS: Polyneuropathy  PRECAUTIONS: Falls, Regular diet, **PRAFOS when not in therapy**  HPI: Patient admitted to the hospital on 21 with progressively worsening leg weakness after receiving an antibody infusion for Covid diagnosed in November. Neurosurgery and neurology all saw patient on consult. Jade Duncan has shown lumbar radiculopathy. EMG done on 21: Recommendations: This is predominantly a neuropathic process, not myopathic in nature.  Post viral inflammatory processes can present early on with this picture however usually progress to development of slowing of velocity with the development of myelin abnormalities as well as increasing axonal pathology. B12 level low during admission and she has received several injections.     Social:  Pt lives with fiance and son in [facility] style home, 3 [address]ONCE with no railing + 1 step to enter. May have [address] Prior to admission: Pt independent with all functional mobility                    Initial Evaluation  Date:  AM     PM    Short Term Goals Long Term Goals    Was pt agreeable to Eval/treatment? 22 Yes        Does pt have pain? Reports allodynia in BLE No sig c/o pain        Bed Mobility  Rolling: SBA  Supine to sit: Francesco  Sit to supine: modA  Scooting: Francesco NT  sup Mod i   Transfers Sit to stand: maxA + SBA form 2nd helper  Stand to sit: maxA + SBA from 2nd helper  Stand pivot: NT  Slideboard tx- maxA     5xSTS: NT Sit to stand: Francesco  Stand to sit: Francesco   Stand pivot: min-modA,  NADEGE JENNIFER gonzalez jr.  Ww, verbal cueing to sequence  Slideboard tx- NT  Francesco    sup      Ambulation    NT See TE list for details  10', LRAD, maxA       50', LRAD, Francesco         Walking 10 feet on uneven surface NT NT  10', LRAD, maxA 10', LRAD, Francesco   Wheel Chair Mobility NT NT  100', BUE propulsion, sup > 200', BUE propulson, mod i   Car Transfers NT NT  maxA Francesco   Stair negotiation: ascended and descended  NT NT  1 step, maxA, KUSUM HR 4 steps, Kusum HR, Francesco   Curb Step:   ascended and descended NT NT  2\" curb, LRAD, maxA 2\" curb, LRAD, Francesco   Picking up object off the floor NT NT  MaxA with assistive device Francesco with LRAD   BLE ROM WFL           BLE Strength KUSUM hip flex- 1/5  KUSUM hip ext- 1/5  KUSUM knee ext 2+/5  KUSUM knee flex 2-/5  KUSUM PF 2+/5  KUSUM DF 0/5           Balance  Sitting EOB- SBA  Standing- maxA                          Date Family Teach Completed             Is additional Family Teaching Needed? Y or N             Hindering Progress Weakness, impulsivity, pain, endurance, balance, body habitus Weakness, impulsivity, pain, endurance, balance, body habitus  Weakness, impulsivity, pain, endurance, balance, body habitus         PT recommended ELOS 4-5 weeks           Team's Discharge Plan             Therapist at Team Meeting                Therapeutic Exercise:     AM:   1. amb with jr. ww- 17'x2, KUSUM PLS donned, turn to chair, modA+ SBA of 2nd helper first bout, modAx1 2nd bout;  for safety, Francesco- modA to assist with advancing BLE, PT assisting by sitting on stool in front of pt      2. Stand pivot tx- min-modA with jr. [employer], from w/c <> hospital bed, x2 reps, focusing on sequencing steps and managing AD. PT assisting tx first rep, NSG aide assisting 2nd rep        Patient education    Pt educated on tx, gait, balance, current status and POC, d/c planning. Patient response to education:   Pt verbalized understanding Pt demonstrated skill Pt requires further education in this area   yes partial All areas     Additional Comments:   AM: This session pt completed amb with turn to chair, requiring min-modA to assist with advancing BLE and verbal cueing to sequence steps and AD management when turning to chair.  @nd helper for SBA

## 2022-01-15 NOTE — PROGRESS NOTES
PM&R Weekend Progress Note  Patient: Jolene Merchant  Age/sex: 48 y.o. female  Medical Record #: [id number]  Date: 1/15/2022    Covering for: Dr. Chika Jones: Patient seen and examined in her room this morning. No issues overnight. No complaints this morning. Denies abdominal pain, chest pain, shortness of breath. All pertinent labs reviewed. No past medical history on file. Past Surgical History:   Procedure Laterality Date    ANKLE SURGERY      bilateral    APPENDECTOMY      DILATION AND CURETTAGE OF UTERUS      ELBOW SURGERY      right    ENDOMETRIAL ABLATION  07/2016    GALLBLADDER SURGERY      LEEP      cervical conization    TUBAL LIGATION         Family History   Problem Relation Age of Onset    Heart Failure Mother        Objective:  Vitals:    01/13/22 0745 01/14/22 0730 01/15/22 0105 01/15/22 0800   BP: 133/83 124/81  110/80   Pulse: 89 88  92   Resp: 18 16  16   Temp: 97 °F (36.1 °C) 97.6 °F (36.4 °C)  97.6 °F (36.4 °C)   TempSrc: Temporal Temporal  Oral   SpO2:  98% 98% 98%   Weight:       Height:         01/13 1901 - 01/15 0700  In: 80 [P.O.:780]  Out: 1025 [Urine:1025]    GEN: NAD, conversational   HEENT: NC/AT, PERRLA, EOMI  RESP: CTAB, no R/R/W   CV: +S1 S2, RR   ABD: soft, NT, ND, normal BS   : no hurt   EXT: Abnormal aROM, No clubbing/cyanosis, 2+ pulses   SKIN: No erythema, warm   NEURO: Alert, no new focal deficits     Labs reviewed  CBC: No results for input(s): WBC, HGB, PLT in the last 72 hours. BMP:  No results for input(s): NA, K, CL, CO2, BUN, CREATININE, GLUCOSE in the last 72 hours. Hepatic: No results for input(s): AST, ALT, ALB, BILITOT, ALKPHOS in the last 72 hours. BNP: No results for input(s): BNP in the last 72 hours. Lipids: No results for input(s): CHOL, HDL in the last 72 hours. Invalid input(s): LDLCALCU  INR: No results for input(s): INR in the last 72 hours.     A/P  This is 48 y.o. female with peripheral polyneuropathy    Rehab: Continue extensive rehabilitation. Continue physical therapy, occupational therapy. No change in medications. Continue current management. Jonathan Donovan DO, [facility]  Physical Medicine and Rehabilitation     Please note that the above documentation was prepared using voice recognition software. Every attempt was made to ensure accuracy but there may be spelling, grammatical, and contextual errors.

## 2022-01-16 PROCEDURE — 97110 THERAPEUTIC EXERCISES: CPT

## 2022-01-16 PROCEDURE — 6360000002 HC RX W HCPCS: Performed by: INTERNAL MEDICINE

## 2022-01-16 PROCEDURE — 1280000000 HC REHAB R&B

## 2022-01-16 PROCEDURE — 99232 SBSQ HOSP IP/OBS MODERATE 35: CPT | Performed by: PHYSICAL MEDICINE & REHABILITATION

## 2022-01-16 PROCEDURE — 6370000000 HC RX 637 (ALT 250 FOR IP): Performed by: INTERNAL MEDICINE

## 2022-01-16 PROCEDURE — 6370000000 HC RX 637 (ALT 250 FOR IP): Performed by: PHYSICAL MEDICINE & REHABILITATION

## 2022-01-16 PROCEDURE — 97530 THERAPEUTIC ACTIVITIES: CPT

## 2022-01-16 RX ORDER — AMMONIUM LACTATE 12 G/100G
LOTION TOPICAL DAILY
Status: DISCONTINUED | OUTPATIENT
Start: 2022-01-16 | End: 2022-02-04 | Stop reason: HOSPADM

## 2022-01-16 RX ADMIN — DICLOFENAC SODIUM 4 G: 10 GEL TOPICAL at 09:31

## 2022-01-16 RX ADMIN — FLUTICASONE PROPIONATE 1 SPRAY: 50 SPRAY, METERED NASAL at 09:32

## 2022-01-16 RX ADMIN — GUAIFENESIN SYRUP AND DEXTROMETHORPHAN 5 ML: 100; 10 SYRUP ORAL at 20:16

## 2022-01-16 RX ADMIN — DICLOFENAC SODIUM 4 G: 10 GEL TOPICAL at 13:22

## 2022-01-16 RX ADMIN — Medication 1 TABLET: at 09:32

## 2022-01-16 RX ADMIN — BACLOFEN 10 MG: 10 TABLET ORAL at 13:22

## 2022-01-16 RX ADMIN — DICLOFENAC SODIUM 4 G: 10 GEL TOPICAL at 17:09

## 2022-01-16 RX ADMIN — DOCUSATE SODIUM 100 MG: 100 CAPSULE, LIQUID FILLED ORAL at 09:33

## 2022-01-16 RX ADMIN — NAPROXEN 500 MG: 500 TABLET ORAL at 06:44

## 2022-01-16 RX ADMIN — Medication 1000 MCG: at 09:34

## 2022-01-16 RX ADMIN — Medication: at 17:09

## 2022-01-16 RX ADMIN — FOLIC ACID 1 MG: 1 TABLET ORAL at 09:32

## 2022-01-16 RX ADMIN — Medication 5 MG: at 20:17

## 2022-01-16 RX ADMIN — GUAIFENESIN SYRUP AND DEXTROMETHORPHAN 5 ML: 100; 10 SYRUP ORAL at 06:44

## 2022-01-16 RX ADMIN — ENOXAPARIN SODIUM 40 MG: 100 INJECTION SUBCUTANEOUS at 09:33

## 2022-01-16 RX ADMIN — DOCUSATE SODIUM 100 MG: 100 CAPSULE, LIQUID FILLED ORAL at 20:15

## 2022-01-16 RX ADMIN — BACLOFEN 10 MG: 10 TABLET ORAL at 09:34

## 2022-01-16 RX ADMIN — Medication 2000 UNITS: at 09:33

## 2022-01-16 RX ADMIN — ACETAMINOPHEN 650 MG: 325 TABLET ORAL at 20:16

## 2022-01-16 RX ADMIN — DICLOFENAC SODIUM 4 G: 10 GEL TOPICAL at 20:19

## 2022-01-16 RX ADMIN — BACLOFEN 20 MG: 10 TABLET ORAL at 20:15

## 2022-01-16 ASSESSMENT — PAIN DESCRIPTION - ORIENTATION
ORIENTATION: RIGHT;LEFT

## 2022-01-16 ASSESSMENT — PAIN DESCRIPTION - PAIN TYPE
TYPE: ACUTE PAIN

## 2022-01-16 ASSESSMENT — PAIN DESCRIPTION - LOCATION
LOCATION: HIP;LEG

## 2022-01-16 ASSESSMENT — PAIN SCALES - GENERAL
PAINLEVEL_OUTOF10: 3
PAINLEVEL_OUTOF10: 6
PAINLEVEL_OUTOF10: 3
PAINLEVEL_OUTOF10: 2
PAINLEVEL_OUTOF10: 0
PAINLEVEL_OUTOF10: 5

## 2022-01-16 ASSESSMENT — PAIN DESCRIPTION - PROGRESSION
CLINICAL_PROGRESSION: GRADUALLY IMPROVING
CLINICAL_PROGRESSION: GRADUALLY IMPROVING

## 2022-01-16 ASSESSMENT — PAIN DESCRIPTION - DESCRIPTORS
DESCRIPTORS: ACHING;CONSTANT;DISCOMFORT
DESCRIPTORS: ACHING;CONSTANT
DESCRIPTORS: ACHING;CONSTANT;DISCOMFORT

## 2022-01-16 ASSESSMENT — PAIN DESCRIPTION - FREQUENCY: FREQUENCY: INTERMITTENT

## 2022-01-16 ASSESSMENT — PAIN DESCRIPTION - ONSET: ONSET: ON-GOING

## 2022-01-16 NOTE — PLAN OF CARE
Problem: Skin Integrity:  Goal: Will show no infection signs and symptoms  Description: Will show no infection signs and symptoms  1/15/2022 1124 by Maddy Lake RN  Outcome: Ongoing  Goal: Absence of new skin breakdown  Description: Absence of new skin breakdown  1/15/2022 1124 by Maddy Lake RN  Outcome: Ongoing     Problem: Falls - Risk of:  Goal: Will remain free from falls  Description: Will remain free from falls  1/15/2022 1124 by Maddy Lake RN  Outcome: Ongoing  Goal: Absence of physical injury  Description: Absence of physical injury  1/15/2022 1124 by Maddy Lake RN  Outcome: Ongoing     Problem: SAFETY  Goal: Free from accidental physical injury  1/15/2022 1124 by Maddy Lake RN  Outcome: Ongoing  Goal: Free from intentional harm  1/15/2022 1124 by Maddy Lake RN  Outcome: Ongoing     Problem: DAILY CARE  Goal: Daily care needs are met  1/15/2022 1124 by Maddy Lake RN  Outcome: Ongoing     Problem: PAIN  Goal: Patient's pain/discomfort is manageable  1/15/2022 1124 by Maddy Lake RN  Outcome: Ongoing     Problem: SKIN INTEGRITY  Goal: Skin integrity is maintained or improved  1/15/2022 1124 by Maddy Lake RN  Outcome: Ongoing     Problem: KNOWLEDGE DEFICIT  Goal: Patient/S.O. demonstrates understanding of disease process, treatment plan, medications, and discharge instructions.   1/15/2022 1124 by Maddy Lake RN  Outcome: Ongoing     Problem: DISCHARGE BARRIERS  Goal: Patient's continuum of care needs are met  1/15/2022 1124 by Maddy Lake RN  Outcome: Ongoing     Problem: Mobility - Impaired:  Goal: Mobility will improve  Description: Mobility will improve  1/15/2022 1124 by Maddy Lake RN  Outcome: Ongoing     Problem: Pain:  Goal: Pain level will decrease  Description: Pain level will decrease  1/15/2022 1124 by Maddy Lake RN  Outcome: Ongoing  Goal: Control of acute pain  Description: Control of acute pain  1/15/2022 1124 by Maddy Lake RN  Outcome: Ongoing  Goal: Control of chronic pain  Description: Control of chronic pain  1/15/2022 1124 by Jewel Isabel RN  Outcome: Ongoing

## 2022-01-16 NOTE — PROGRESS NOTES
PM&R Weekend Progress Note  Patient: Harleen Sanchez  Age/sex: 48 y.o. female  Medical Record #: [id number]  Date: 1/16/2022    Covering for: Dr. Hilario Amador: Patient seen and examined in her room this morning. No issues overnight. No complaints this morning. Denies abdominal pain, chest pain, shortness of breath. All pertinent labs reviewed. Objective:  Vitals:    01/14/22 0730 01/15/22 0105 01/15/22 0800 01/16/22 0915   BP: 124/81  110/80 118/70   Pulse: 88  92 93   Resp: 16 16 16   Temp: 97.6 °F (36.4 °C)  97.6 °F (36.4 °C) 97.7 °F (36.5 °C)   TempSrc: Temporal  Oral Temporal   SpO2: 98% 98% 98% 97%   Weight:       Height:         01/14 1901 - 01/16 0700  In: 1520 [P.O.:1520]  Out: 225 [Urine:225]    GEN: NAD, conversational  AFFECT: Pleasant  EYES: EOMI  CV: no distress, RRR  RESP: breathing comfortably, symmetrical chest rise   ABD: Soft, non-tender, non-distended  NEURO: Alert, no new focal deficits     Labs reviewed  CBC: No results for input(s): WBC, HGB, PLT in the last 72 hours. BMP:  No results for input(s): NA, K, CL, CO2, BUN, CREATININE, GLUCOSE in the last 72 hours. Hepatic: No results for input(s): AST, ALT, ALB, BILITOT, ALKPHOS in the last 72 hours. BNP: No results for input(s): BNP in the last 72 hours. Lipids: No results for input(s): CHOL, HDL in the last 72 hours. Invalid input(s): LDLCALCU  INR: No results for input(s): INR in the last 72 hours. A/P  This is 48 y.o. female with peripheral polyneuropathy. Rehab: Continue extensive rehabilitation. Continue physical therapy, occupational therapy. No change in medications. Continue current management. Jonathan Donovan DO, [facility]  Physical Medicine and Rehabilitation     Please note that the above documentation was prepared using voice recognition software. Every attempt was made to ensure accuracy but there may be spelling, grammatical, and contextual errors.

## 2022-01-16 NOTE — PROGRESS NOTES
Occupational Therapy  OCCUPATIONAL THERAPY DAILY NOTE    Date:2022  Patient Name: Evangelina Quintanilla  MRN: [id number]  : 1975  Room: [facility]     Diagnosis: Polyneuropathy   Precautions: falls ,slide board transfers     Functional Assessment:   Date Status AE  Comments   Feeding 22 Independent      Grooming 22  set up           Oral Care 22  Set up      Bathing 22  UB- set up   LB-Min/Mod Assist  Bed level    UB Dressing 22  Set up     LB Dressing 22  Mod A      Footwear 22  Mod/max  A  long shoe horn  Sock aid    Toileting 22  Mod A x2  Grab bar  3:1 commode     Homemaking 22 SBA/CGA W/c  Reacher       Functional Transfers / Balance:   Date Status DME  Comments   Sit Balance 22  SBA W/c   Bed rails Sitting balance up in w/c during w/c propelling using BUE's    Stand Balance 22  Min/mod A High/low table  Pt stood x 3 reps at high/table to increase standing tolerance, strength & endurance. Pt stood for approx. 2 mins x2 reps and 2.5 mins last one. Pt wearing BLE devices to help maintain balance. [] Tub  [] Shower   Transfer 22 TBD     Commode   Transfer 22 Mod A ( assist x2 staff)- graded task  3 in 1 commode     Functional   Mobility 22  Mod I/Sup W/c propel Pt used BUE's to propel w/c from room over to subacute gym to increase overall endurance & strength for functional tasks.     Other: bed to w/c with alonso lift       Bed repositioning     Bed to w/c with transfer board     Bed mobility   Supine to sit  22  Dependent         SBA    Dep( assist of staff)       Min A          Bed rails       Functional Exercises / Activity:      Sensory / Neuromuscular Re-Education:     Cognitive Skills:   Status Comments   Problem   Solving good     Memory good     Sequencing good     Safety fair       Visual Perception:    Education:  Pt educated with safety during sit to stand from w/c<>high/low table to increase skills and awareness. [] Family teach completed on:    Pain Level: 4-10 BLE's     Additional Notes:       Patient has made good progress during treatment sessions toward set goals. Therapy emphasis to obtain goals:Current Treatment Recommendations: Strengthening,Gait Training,Patient/Caregiver Education & Training,Home Management Training,Equipment Evaluation, Education, & procurement,Balance Training,Functional Mobility Training,Positioning,Endurance Training,Wheelchair Mobility Training,Safety Education & Training,Self-Care / ADL    [x] Continue with current OT Plan of care. [] Prepare for Discharge    Goals  Long term goals  Time Frame for Long term goals : 4 weeks to address above problem areas  Long term goal 1: Pt demo Mod I to eat all meals  Long term goal 2: Pt demo Mod I grooming seated @ sink level  Long term goal 3: Pt demo Sup to bathe @ shower level both seated & use LHS  Long term goal 4: Pt demo Mod I UE dress to don a shirt & Mod I to don underwear, pants, socks & shoes using AE as needed  Long term goal 5: Pt demo Mod I commode trf using a slide board & drop arm commode & demo Mod I toileting & demo G safety  Long term goals 6: Pt demo SBA tub trf using appropriate DME- bench & slide board to ensure pt safety  Long term goal 7: Pt demo Mod I light homemaking activity @ wc level & demo G- safety & insight  Long term goal 8: Pt demo G- endurance for a 20-30 minute functional activity  Long term goal 9: Pt demo Mod I wc propulsion thorughout a living environment & around obstacles    1/12/22  OT plan of care updated on this date.  Tentative length of stay 4-5 weeks    Vicki Smith OTR/L [id number]    DISCHARGE RECOMMENDATIONS  Recommended DME:    Post Discharge Care:   []Home Independently  []Home with 24hr Care / Supervision []Home with Partial Supervision []Home with Home Health OT []Home with Out Pt OT []Other: ___   Comments:         Time in Time out Tx Time Breakdown  Variance: First Session  9:40am   10:25am [x] Individual Tx-45  [] Concurrent Tx -  [] Co-Tx -   [] Group Tx -   [] Time Missed -     Second Session    [] Individual Tx-  [] Concurrent Tx -  [] Co-Tx -   [] Group Tx -   [] Time Missed -              Third Session    [] Individual Tx-   [] Concurrent Tx -  [] Co-Tx -   [] Group Tx -   [] Time Missed -         Total Tx Time: 45 mins      Marcela Hartley [facility]

## 2022-01-16 NOTE — PLAN OF CARE
Problem: Skin Integrity:  Goal: Will show no infection signs and symptoms  Description: Will show no infection signs and symptoms  Outcome: Ongoing  Goal: Absence of new skin breakdown  Description: Absence of new skin breakdown  Outcome: Ongoing     Problem: Falls - Risk of:  Goal: Will remain free from falls  Description: Will remain free from falls  Outcome: Ongoing  Goal: Absence of physical injury  Description: Absence of physical injury  Outcome: Ongoing     Problem: SAFETY  Goal: Free from accidental physical injury  Outcome: Ongoing  Goal: Free from intentional harm  Outcome: Ongoing     Problem: DAILY CARE  Goal: Daily care needs are met  Outcome: Ongoing     Problem: PAIN  Goal: Patient's pain/discomfort is manageable  Outcome: Ongoing     Problem: SKIN INTEGRITY  Goal: Skin integrity is maintained or improved  Outcome: Ongoing     Problem: KNOWLEDGE DEFICIT  Goal: Patient/S.O. demonstrates understanding of disease process, treatment plan, medications, and discharge instructions.   Outcome: Ongoing     Problem: DISCHARGE BARRIERS  Goal: Patient's continuum of care needs are met  Outcome: Ongoing     Problem: Mobility - Impaired:  Goal: Mobility will improve  Description: Mobility will improve  Outcome: Ongoing     Problem: Pain:  Goal: Pain level will decrease  Description: Pain level will decrease  Outcome: Ongoing  Goal: Control of acute pain  Description: Control of acute pain  Outcome: Ongoing  Goal: Control of chronic pain  Description: Control of chronic pain  Outcome: Ongoing

## 2022-01-17 PROCEDURE — 1280000000 HC REHAB R&B

## 2022-01-17 PROCEDURE — 6370000000 HC RX 637 (ALT 250 FOR IP): Performed by: PHYSICAL MEDICINE & REHABILITATION

## 2022-01-17 PROCEDURE — 6370000000 HC RX 637 (ALT 250 FOR IP): Performed by: INTERNAL MEDICINE

## 2022-01-17 PROCEDURE — 97110 THERAPEUTIC EXERCISES: CPT

## 2022-01-17 PROCEDURE — 97535 SELF CARE MNGMENT TRAINING: CPT

## 2022-01-17 PROCEDURE — 97530 THERAPEUTIC ACTIVITIES: CPT

## 2022-01-17 PROCEDURE — 97112 NEUROMUSCULAR REEDUCATION: CPT

## 2022-01-17 PROCEDURE — 6360000002 HC RX W HCPCS: Performed by: INTERNAL MEDICINE

## 2022-01-17 PROCEDURE — 97116 GAIT TRAINING THERAPY: CPT

## 2022-01-17 RX ADMIN — ACETAMINOPHEN 650 MG: 325 TABLET ORAL at 08:05

## 2022-01-17 RX ADMIN — Medication 2000 UNITS: at 08:05

## 2022-01-17 RX ADMIN — NAPROXEN 500 MG: 500 TABLET ORAL at 05:44

## 2022-01-17 RX ADMIN — DICLOFENAC SODIUM 4 G: 10 GEL TOPICAL at 08:09

## 2022-01-17 RX ADMIN — BACLOFEN 10 MG: 10 TABLET ORAL at 08:06

## 2022-01-17 RX ADMIN — ACETAMINOPHEN 650 MG: 325 TABLET ORAL at 20:31

## 2022-01-17 RX ADMIN — FOLIC ACID 1 MG: 1 TABLET ORAL at 08:05

## 2022-01-17 RX ADMIN — DICLOFENAC SODIUM 4 G: 10 GEL TOPICAL at 13:45

## 2022-01-17 RX ADMIN — BACLOFEN 10 MG: 10 TABLET ORAL at 13:49

## 2022-01-17 RX ADMIN — DICLOFENAC SODIUM 4 G: 10 GEL TOPICAL at 16:48

## 2022-01-17 RX ADMIN — GUAIFENESIN SYRUP AND DEXTROMETHORPHAN 5 ML: 100; 10 SYRUP ORAL at 20:28

## 2022-01-17 RX ADMIN — ACETAMINOPHEN 650 MG: 325 TABLET ORAL at 13:49

## 2022-01-17 RX ADMIN — DOCUSATE SODIUM 100 MG: 100 CAPSULE, LIQUID FILLED ORAL at 08:04

## 2022-01-17 RX ADMIN — DOCUSATE SODIUM 100 MG: 100 CAPSULE, LIQUID FILLED ORAL at 20:27

## 2022-01-17 RX ADMIN — ENOXAPARIN SODIUM 40 MG: 100 INJECTION SUBCUTANEOUS at 08:05

## 2022-01-17 RX ADMIN — Medication 5 MG: at 20:27

## 2022-01-17 RX ADMIN — FLUTICASONE PROPIONATE 1 SPRAY: 50 SPRAY, METERED NASAL at 08:06

## 2022-01-17 RX ADMIN — Medication: at 08:08

## 2022-01-17 RX ADMIN — GUAIFENESIN SYRUP AND DEXTROMETHORPHAN 5 ML: 100; 10 SYRUP ORAL at 05:45

## 2022-01-17 RX ADMIN — Medication 1 TABLET: at 08:05

## 2022-01-17 RX ADMIN — BACLOFEN 20 MG: 10 TABLET ORAL at 22:24

## 2022-01-17 RX ADMIN — Medication 1000 MCG: at 08:04

## 2022-01-17 ASSESSMENT — PAIN SCALES - GENERAL
PAINLEVEL_OUTOF10: 0
PAINLEVEL_OUTOF10: 6
PAINLEVEL_OUTOF10: 5
PAINLEVEL_OUTOF10: 1
PAINLEVEL_OUTOF10: 5
PAINLEVEL_OUTOF10: 5

## 2022-01-17 NOTE — PLAN OF CARE
Problem: Skin Integrity:  Goal: Will show no infection signs and symptoms  Description: Will show no infection signs and symptoms  1/17/2022 0129 by Latonia Link RN  Outcome: Ongoing  1/16/2022 1821 by Mehdi Green RN  Outcome: Ongoing  Goal: Absence of new skin breakdown  Description: Absence of new skin breakdown  1/17/2022 0129 by Latonia Link RN  Outcome: Ongoing  1/16/2022 1821 by Mehdi Green RN  Outcome: Ongoing     Problem: Falls - Risk of:  Goal: Will remain free from falls  Description: Will remain free from falls  1/17/2022 0129 by Latonia Link RN  Outcome: Ongoing  1/16/2022 1821 by Mehdi Green RN  Outcome: Ongoing  Goal: Absence of physical injury  Description: Absence of physical injury  1/17/2022 0129 by Latonia Link RN  Outcome: Ongoing  1/16/2022 1821 by Mehdi Green RN  Outcome: Ongoing     Problem: SAFETY  Goal: Free from accidental physical injury  1/17/2022 0129 by Latonia Link RN  Outcome: Ongoing  1/16/2022 1821 by Mehdi Green RN  Outcome: Ongoing  Goal: Free from intentional harm  1/17/2022 0129 by Latonia Link RN  Outcome: Ongoing  1/16/2022 1821 by Mehdi Green RN  Outcome: Ongoing     Problem: DAILY CARE  Goal: Daily care needs are met  1/17/2022 0129 by Latonia Link RN  Outcome: Ongoing  1/16/2022 1821 by Mehdi Green RN  Outcome: Ongoing     Problem: PAIN  Goal: Patient's pain/discomfort is manageable  1/17/2022 0129 by Latonia Link RN  Outcome: Ongoing  1/16/2022 1821 by Mehdi Green RN  Outcome: Ongoing     Problem: SKIN INTEGRITY  Goal: Skin integrity is maintained or improved  1/17/2022 0129 by Latonia Link RN  Outcome: Ongoing  1/16/2022 1821 by Mehdi Green RN  Outcome: Ongoing     Problem: KNOWLEDGE DEFICIT  Goal: Patient/S.O. demonstrates understanding of disease process, treatment plan, medications, and discharge instructions.   1/17/2022 0129 by Latonia Link RN  Outcome: Ongoing  1/16/2022 1821 by Mehdi Green RN  Outcome: Ongoing     Problem: DISCHARGE BARRIERS  Goal: Patient's continuum of care needs are met  1/17/2022 0129 by Tez Mcnamara RN  Outcome: Ongoing  1/16/2022 1821 by Rishi Gardner RN  Outcome: Ongoing     Problem: Mobility - Impaired:  Goal: Mobility will improve  Description: Mobility will improve  1/17/2022 0129 by Tez Mcnamara RN  Outcome: Ongoing  1/16/2022 1821 by Rishi Gardner RN  Outcome: Ongoing     Problem: Pain:  Goal: Pain level will decrease  Description: Pain level will decrease  1/17/2022 0129 by Tez Mcnamara RN  Outcome: Ongoing  1/16/2022 1821 by Rishi Gardner RN  Outcome: Ongoing  Goal: Control of acute pain  Description: Control of acute pain  1/17/2022 0129 by Tez Mcnamara RN  Outcome: Ongoing  1/16/2022 1821 by Rishi Gardner RN  Outcome: Ongoing  Goal: Control of chronic pain  Description: Control of chronic pain  1/17/2022 0129 by Tez Mcnamara RN  Outcome: Ongoing  1/16/2022 1821 by Rishi Gardner RN  Outcome: Ongoing

## 2022-01-17 NOTE — PROGRESS NOTES
Occupational Therapy  OCCUPATIONAL THERAPY DAILY NOTE    Date:2022  Patient Name: James Mccauley  MRN: [id number]  : 1975  Room: [address] [place] [address]-A     Diagnosis: Polyneuropathy   Precautions: falls ,slide board transfers     Functional Assessment:   Date Status AE  Comments   Feeding 22 Independent      Grooming 22  set up           Oral Care 22  Set up      Bathing 22  UB- set up   LB-Min/Mod Assist  Bed level    UB Dressing 22  Set up     LB Dressing 22  Mod A  Reacher  Pt threaded pants on BLE's seated in w/c    Footwear 22  Mod/max  A  long shoe horn  Sock aid Pt donned socks at set up with sock aid. Assist to don B AFO's and shoes    Toileting 22  Mod A x2  Grab bar  3:1 commode     Homemaking 22 SBA/CGA W/c  Reacher       Functional Transfers / Balance:   Date Status DME  Comments   Sit Balance 22  SBA W/c   Bed rails During LB dressing at w/c level    Stand Balance 22  Min/mod A High/low table     [] Tub  [] Shower   Transfer 22 TBD     Commode   Transfer 22 Mod A ( assist x2 staff)- graded task  3 in 1 commode     Functional   Mobility 22  Mod I/Sup W/c propel     Other: stand pivot transfer bed to w/c with ww           22                Mod A x2 ( 2 staff for safe technique)               ww             Functional Exercises / Activity:   BUE strength exercises: 3 # dowel molina 3 sets 10 reps in all planes                                             Green can do bar 3 sets 15 reps                                             Mister  with 2 # wt 15 reps     R hand fine motor coordination with pt completing purdue peg board with occasional dropping of pegs and washers. Pt reports some numbness in tips of fingers   Core strength and dynamic sitting balance with pt completing trunk flexion exercises seated in w/c with 5 # weighted ball 3 sets 15 reps        Sensory / Neuromuscular Re-Education:     Cognitive Skills:   Status Comments   Problem   Solving good     Memory good     Sequencing good     Safety fair       Visual Perception:    Education:   pt was educated on use of AE for LB dressing while seated in w/c     [] Family teach completed on:    Pain Level: 4-10 BLE's     Additional Notes:       Patient has made good progress during treatment sessions toward set goals. Therapy emphasis to obtain goals:Current Treatment Recommendations: Strengthening,Gait Training,Patient/Caregiver Education & Training,Home Management Training,Equipment Evaluation, Education, & procurement,Balance Training,Functional Mobility Training,Positioning,Endurance Training,Wheelchair Mobility Training,Safety Education & Training,Self-Care / ADL    [x] Continue with current OT Plan of care. [] Prepare for Discharge    Goals  Long term goals  Time Frame for Long term goals : 4 weeks to address above problem areas  Long term goal 1: Pt demo Mod I to eat all meals  Long term goal 2: Pt demo Mod I grooming seated @ sink level  Long term goal 3: Pt demo Sup to bathe @ shower level both seated & use LHS  Long term goal 4: Pt demo Mod I UE dress to don a shirt & Mod I to don underwear, pants, socks & shoes using AE as needed  Long term goal 5: Pt demo Mod I commode trf using a slide board & drop arm commode & demo Mod I toileting & demo G safety  Long term goals 6: Pt demo SBA tub trf using appropriate DME- bench & slide board to ensure pt safety  Long term goal 7: Pt demo Mod I light homemaking activity @ wc level & demo G- safety & insight  Long term goal 8: Pt demo G- endurance for a 20-30 minute functional activity  Long term goal 9: Pt demo Mod I wc propulsion thorughout a living environment & around obstacles    1/12/22  OT plan of care updated on this date.  Tentative length of stay 4-5 weeks    Fernando Galeas OTR/L [id number]    DISCHARGE RECOMMENDATIONS  Recommended DME:    Post Discharge Care:   []Home Independently  []Home with 24hr Care / Supervision []Home with Partial Supervision []Home with Home Health OT []Home with Out Pt OT []Other: ___   Comments:         Time in Time out Tx Time Breakdown  Variance:   First Session  [id number] [id number]  [] Individual Tx-  [x] Concurrent Tx -45  [] Co-Tx -   [] Group Tx -   [] Time Missed -     Second Session [id number]  [] Individual Tx-  [x] Concurrent Tx -45  [] Co-Tx -   [] Group Tx -   [] Time Missed -              Third Session    [] Individual Tx-   [] Concurrent Tx -  [] Co-Tx -   [] Group Tx -   [] Time Missed -         Total Tx Time: 90 mins      Aletha Jack OTR/L [id number]

## 2022-01-17 NOTE — PROGRESS NOTES
Physical Therapy    Facility/Department: [address] [facility]  Treatment note     NAME: Wallace Caraballo  : 1975  MRN: [id number]     Date of Service: 2022     Evaluating Therapist: Estrellita Navarro., PT, DPT [id number]        ROOM: [address] [place] [address]  DIAGNOSIS: Polyneuropathy  PRECAUTIONS: Falls, Regular diet, **PRAFOS when not in therapy**  HPI: Patient admitted to the hospital on 21 with progressively worsening leg weakness after receiving an antibody infusion for Covid diagnosed in November. Neurosurgery and neurology all saw patient on consult. Chyna Porfirio has shown lumbar radiculopathy. EMG done on 21: Recommendations: This is predominantly a neuropathic process, not myopathic in nature.  Post viral inflammatory processes can present early on with this picture however usually progress to development of slowing of velocity with the development of myelin abnormalities as well as increasing axonal pathology. B12 level low during admission and she has received several injections.     Social:  Pt lives with fiance and son in [facility] style home, 3 PONCE with no railing + 1 step to enter. May have [address] Prior to admission: Pt independent with all functional mobility                    Initial Evaluation  Date:  AM     PM    Short Term Goals Long Term Goals    Was pt agreeable to Eval/treatment? 22 Yes yes       Does pt have pain? Reports allodynia in BLE No sig c/o pain No sig pain        Bed Mobility  Rolling: SBA  Supine to sit: Francesco  Sit to supine: modA  Scooting: Francesco NT NT sup Mod i   Transfers Sit to stand: maxA + SBA form 2nd helper  Stand to sit: maxA + SBA from 2nd helper  Stand pivot: NT  Slideboard tx- maxA     5xSTS: NT Sit to stand: Francesco  Stand to sit: Francesco   Stand pivot: min-modA,  jr. Panchito Valentino PLS, verbal cueing to sequence  Slideboard tx- NT Sit to stand: rFancesco  Stand to sit: Francesco   Stand pivot: min-modA,  NADEGE jr. Panchito Arellano, verbal cueing to sequence   Francesco    sup      Ambulation NT 25'x2 with Joann Lien, turning  to chair,   37'x1 with chair follow to maximize distance  Francesco for steadying, min-modA to assist with advancing BLE 30'x2 with Joann Lien, turning  to chair    Francesco for steadying, min-modA to assist with advancing BLE 10', LRAD, maxA       50', LRAD, Francesco         Walking 10 feet on uneven surface NT NT  10', LRAD, maxA 10', LRAD, Francesco   Wheel Chair Mobility NT NT  100', BUE propulsion, sup > 200', BUE propulson, mod i   Car Transfers NT NT  maxA Francesco   Stair negotiation: ascended and descended  NT NT  1 step, maxA, KUSUM HR 4 steps, Kusum HR, Francesco   Curb Step:   ascended and descended NT NT  2\" curb, LRAD, maxA 2\" curb, LRAD, Francesco   Picking up object off the floor NT NT  MaxA with assistive device Francesco with LRAD   BLE ROM WFL           BLE Strength KUSUM hip flex- 1/5  KUSUM hip ext- 1/5  KUSUM knee ext 2+/5  KUSUM knee flex 2-/5  KUSUM PF 2+/5  KUSUM DF 0/5           Balance  Sitting EOB- SBA  Standing- maxA                          Date Family Teach Completed             Is additional Family Teaching Needed? Y or N             Hindering Progress Weakness, impulsivity, pain, endurance, balance, body habitus Weakness, impulsivity, pain, endurance, balance, body habitus  Weakness, impulsivity, pain, endurance, balance, body habitus         PT recommended ELOS 4-5 weeks           Team's Discharge Plan             Therapist at Team Meeting                Therapeutic Exercise:     AM:   1. functional mobility    PM:   1. functional mobility  2. Step taps- 2x5 each, Francesco steadying, mod-maxA assisting foot up onto 2\" block, BUE support at ww  3.  STS- x5 reps, from w/c to ww, Francesco, verbal cueing for pushing BUE off arm rests provided      Patient education    Pt educated on tx, gait, balance, current status and POC    Patient response to education:   Pt verbalized understanding Pt demonstrated skill Pt requires further education in this area   yes partial All areas     Additional Comments:   AM: Focus of session on progressing gait. Pt able to complete 25' with turn to chair, pt able to initiate BLE advancement for 100% of steps, requiring Francesco initially to assist with completing advancement, progressing to mod A as pt fatigued. On 3rd bout of gait, complete with chair follow for pt to maximize distance, pt completing 37', furthest bout this admission, mostly modA to advance BLE. Pt continues to display significant hip flexor weakness limiting swing through phase of gait, pt compensates with exagerated weight shift, hip hike on advancing limb, and pelvic rotation with trunk extension to aide in swing through. PM: pt progressing amb distance with turn to chair. Pt showing sig improved ability to advance RLE, completing ~ 50% of reps with no assist, other 50% with Francesco, LLE requiring Francesco ~ 75% of reps and modA for remaining 25% of reps. Worked on STS from w/c, practicing motor control and timing of quad engagement to prevent NADEGE knees jutting fwd and causing near falls, pt showing good improvement with verbal cues, pushing BUE off arm rests and obtaining increased fwd lean over YANDEL, decreased anterior translation of knees upon initial rise. Pt continues to struggle with generating enough hip flexor force to lift foot to complete amanda tap to low box, will continue to strengthen BLE as pt tolerates. AM  Time in: 0915  Time out: 1000    PM  Time in:1430   Time out: 1515    Pt is making good progress toward established Physical Therapy goals. Continue with physical therapy current plan of care.     Teresia Habermann., PT, DPT  [id number]

## 2022-01-18 PROCEDURE — 97530 THERAPEUTIC ACTIVITIES: CPT

## 2022-01-18 PROCEDURE — 99232 SBSQ HOSP IP/OBS MODERATE 35: CPT | Performed by: PHYSICAL MEDICINE & REHABILITATION

## 2022-01-18 PROCEDURE — 1280000000 HC REHAB R&B

## 2022-01-18 PROCEDURE — 97116 GAIT TRAINING THERAPY: CPT

## 2022-01-18 PROCEDURE — 6370000000 HC RX 637 (ALT 250 FOR IP): Performed by: PHYSICAL MEDICINE & REHABILITATION

## 2022-01-18 PROCEDURE — 97535 SELF CARE MNGMENT TRAINING: CPT

## 2022-01-18 PROCEDURE — 6360000002 HC RX W HCPCS: Performed by: INTERNAL MEDICINE

## 2022-01-18 PROCEDURE — 6370000000 HC RX 637 (ALT 250 FOR IP): Performed by: INTERNAL MEDICINE

## 2022-01-18 PROCEDURE — 97110 THERAPEUTIC EXERCISES: CPT

## 2022-01-18 RX ADMIN — ACETAMINOPHEN 650 MG: 325 TABLET ORAL at 08:08

## 2022-01-18 RX ADMIN — DICLOFENAC SODIUM 4 G: 10 GEL TOPICAL at 13:25

## 2022-01-18 RX ADMIN — Medication 5 MG: at 21:22

## 2022-01-18 RX ADMIN — Medication 1 TABLET: at 08:08

## 2022-01-18 RX ADMIN — BACLOFEN 20 MG: 10 TABLET ORAL at 21:30

## 2022-01-18 RX ADMIN — Medication 1000 MCG: at 08:08

## 2022-01-18 RX ADMIN — FOLIC ACID 1 MG: 1 TABLET ORAL at 08:08

## 2022-01-18 RX ADMIN — BACLOFEN 10 MG: 10 TABLET ORAL at 13:25

## 2022-01-18 RX ADMIN — Medication: at 08:09

## 2022-01-18 RX ADMIN — BACLOFEN 10 MG: 10 TABLET ORAL at 11:11

## 2022-01-18 RX ADMIN — NAPROXEN 500 MG: 500 TABLET ORAL at 05:56

## 2022-01-18 RX ADMIN — ACETAMINOPHEN 650 MG: 325 TABLET ORAL at 21:30

## 2022-01-18 RX ADMIN — Medication 2000 UNITS: at 08:08

## 2022-01-18 RX ADMIN — ACETAMINOPHEN 650 MG: 325 TABLET ORAL at 13:28

## 2022-01-18 RX ADMIN — DOCUSATE SODIUM 100 MG: 100 CAPSULE, LIQUID FILLED ORAL at 08:08

## 2022-01-18 RX ADMIN — DOCUSATE SODIUM 100 MG: 100 CAPSULE, LIQUID FILLED ORAL at 21:22

## 2022-01-18 RX ADMIN — DICLOFENAC SODIUM 4 G: 10 GEL TOPICAL at 08:07

## 2022-01-18 RX ADMIN — GUAIFENESIN SYRUP AND DEXTROMETHORPHAN 5 ML: 100; 10 SYRUP ORAL at 21:30

## 2022-01-18 RX ADMIN — GUAIFENESIN SYRUP AND DEXTROMETHORPHAN 5 ML: 100; 10 SYRUP ORAL at 05:56

## 2022-01-18 RX ADMIN — ENOXAPARIN SODIUM 40 MG: 100 INJECTION SUBCUTANEOUS at 08:09

## 2022-01-18 RX ADMIN — FLUTICASONE PROPIONATE 1 SPRAY: 50 SPRAY, METERED NASAL at 08:08

## 2022-01-18 RX ADMIN — DICLOFENAC SODIUM 4 G: 10 GEL TOPICAL at 21:34

## 2022-01-18 RX ADMIN — DICLOFENAC SODIUM 4 G: 10 GEL TOPICAL at 16:49

## 2022-01-18 ASSESSMENT — PAIN SCALES - GENERAL
PAINLEVEL_OUTOF10: 2
PAINLEVEL_OUTOF10: 4
PAINLEVEL_OUTOF10: 9
PAINLEVEL_OUTOF10: 5
PAINLEVEL_OUTOF10: 3
PAINLEVEL_OUTOF10: 0
PAINLEVEL_OUTOF10: 5
PAINLEVEL_OUTOF10: 5

## 2022-01-18 ASSESSMENT — PAIN DESCRIPTION - DESCRIPTORS
DESCRIPTORS: ACHING;DISCOMFORT
DESCRIPTORS: ACHING
DESCRIPTORS: ACHING;SORE;DISCOMFORT
DESCRIPTORS: ACHING;DISCOMFORT
DESCRIPTORS: ACHING;DISCOMFORT

## 2022-01-18 ASSESSMENT — PAIN DESCRIPTION - FREQUENCY
FREQUENCY: CONTINUOUS
FREQUENCY: CONTINUOUS
FREQUENCY: INTERMITTENT
FREQUENCY: CONTINUOUS
FREQUENCY: CONTINUOUS

## 2022-01-18 ASSESSMENT — PAIN DESCRIPTION - PROGRESSION
CLINICAL_PROGRESSION: NOT CHANGED
CLINICAL_PROGRESSION: GRADUALLY IMPROVING
CLINICAL_PROGRESSION: NOT CHANGED

## 2022-01-18 ASSESSMENT — PAIN DESCRIPTION - ONSET
ONSET: ON-GOING

## 2022-01-18 ASSESSMENT — PAIN DESCRIPTION - LOCATION
LOCATION: BACK
LOCATION: HIP;LEG

## 2022-01-18 ASSESSMENT — PAIN DESCRIPTION - PAIN TYPE
TYPE: CHRONIC PAIN

## 2022-01-18 ASSESSMENT — PAIN - FUNCTIONAL ASSESSMENT
PAIN_FUNCTIONAL_ASSESSMENT: ACTIVITIES ARE NOT PREVENTED

## 2022-01-18 ASSESSMENT — PAIN DESCRIPTION - ORIENTATION
ORIENTATION: LOWER
ORIENTATION: RIGHT;LEFT

## 2022-01-18 NOTE — PROGRESS NOTES
Occupational Therapy  OCCUPATIONAL THERAPY DAILY NOTE    Date:2022  Patient Name: James Mccauley  MRN: [id number]  : 1975  Room: [address] [place] [address]A     Diagnosis: Polyneuropathy   Precautions: falls ,slide board transfers     Functional Assessment:   Date Status AE  Comments   Feeding 22 Independent      Grooming 22  set up  Pt washed face and brushed and styled her hair seated at sink          Oral Care 22  Set up   Pt brushed teeth seated at sink    Bathing 22 UB- set up   LB-Mod A   Bathing completed seated in shower. Assist to wash lower legs, feet and buttocks    UB Dressing 22 Set up  Pt donned bra and pull over shirt    LB Dressing 22  Mod A  Reacher  Pt able to thread depends and pants on over BLE's. Assist to stand and pull up over hips    Footwear 22  Mod/max  A  long shoe horn  Sock aid Pt able to don socks with sock aid.  Assist to don shoes and B AFO braces    Toileting 22  Mod A x2  Grab bar  3:1 commode     Homemaking 22 SBA/CGA W/c  Reacher       Functional Transfers / Balance:   Date Status DME  Comments   Sit Balance 22   Min A  Shower chair  During bathing seated in shower    Stand Balance 22   Mod A  Grab rail  Standing at grab rail to pull pants up following LB dressing    [] Tub  [x] Shower   Transfer 22  Max A  Rolling shower chair     Commode   Transfer 22 Max A ( assist x 2 for safety) 3 in 1 commode     Functional   Mobility 22  Mod I/Sup W/c propel     Other: stand pivot transfer bed to w/c with ww           22                 Mod A x2 ( 2 staff for safe technique)               ww             Functional Exercises / Activity:   BUE strength exercises: 3 # dumbbell weights 3 sets 10 reps in all planes                                             Yellow (moderate resistive) can do bar 3 sets 10 reps   Dynamic sitting balance/UB strength exercises with rancho box with 5 # wt up incline wedge pushing bean bags x3 reps at [facility]             Sensory / Neuromuscular Re-Education:     Cognitive Skills:   Status Comments   Problem   Solving good     Memory good     Sequencing good     Safety fair       Visual Perception:    Education:   pt was educated on use of AE for LB dressing     [] Family teach completed on:    Pain Level: 5/10 low back      Additional Notes:       Patient has made good progress during treatment sessions toward set goals. Therapy emphasis to obtain goals:Current Treatment Recommendations: Strengthening,Gait Training,Patient/Caregiver Education & Training,Home Management Training,Equipment Evaluation, Education, & procurement,Balance Training,Functional Mobility Training,Positioning,Endurance Training,Wheelchair Mobility Training,Safety Education & Training,Self-Care / ADL    [x] Continue with current OT Plan of care. [] Prepare for Discharge    Goals  Long term goals  Time Frame for Long term goals : 4 weeks to address above problem areas  Long term goal 1: Pt demo Mod I to eat all meals  Long term goal 2: Pt demo Mod I grooming seated @ sink level  Long term goal 3: Pt demo Sup to bathe @ shower level both seated & use LHS  Long term goal 4: Pt demo Mod I UE dress to don a shirt & Mod I to don underwear, pants, socks & shoes using AE as needed  Long term goal 5: Pt demo Mod I commode trf using a slide board & drop arm commode & demo Mod I toileting & demo G safety  Long term goals 6: Pt demo SBA tub trf using appropriate DME- bench & slide board to ensure pt safety  Long term goal 7: Pt demo Mod I light homemaking activity @ wc level & demo G- safety & insight  Long term goal 8: Pt demo G- endurance for a 20-30 minute functional activity  Long term goal 9: Pt demo Mod I wc propulsion thorughout a living environment & around obstacles    1/12/22  OT plan of care updated on this date.  Tentative length of stay 4-5 weeks    Jaime Fink OTR/L [id number]    DISCHARGE RECOMMENDATIONS  Recommended DME:    Ovidio Johnson Discharge Care:   []Home Independently  []Home with 24hr Care / Supervision []Home with Partial Supervision []Home with Home Health OT []Home with Out Pt OT []Other: ___   Comments:         Time in Time out Tx Time Breakdown  Variance:   First Session  0830 0915  [x] Individual Tx-45  [] Concurrent Tx   [] Co-Tx -   [] Group Tx -   [] Time Missed -     Second Session [id number]  [] Individual Tx-  [x] Concurrent Tx -45  [] Co-Tx -   [] Group Tx -   [] Time Missed -              Third Session    [] Individual Tx-   [] Concurrent Tx -  [] Co-Tx -   [] Group Tx -   [] Time Missed -         Total Tx Time: 90 mins      Vicki Smith OTR/L [id number]

## 2022-01-18 NOTE — PROGRESS NOTES
Physical Therapy    Facility/Department: [address] REHAB  Treatment note     NAME: Sheryl Keys  : 1975  MRN: [id number]     Date of Service: 2022     Evaluating Therapist: Facundo Velazquez., PT, DPT [id number]        ROOM: [address] [place] [address]  DIAGNOSIS: Polyneuropathy  PRECAUTIONS: Falls, Regular diet, **PRAFOS when not in therapy**  HPI: Patient admitted to the hospital on 21 with progressively worsening leg weakness after receiving an antibody infusion for Covid diagnosed in November. Neurosurgery and neurology all saw patient on consult. Imaging has shown lumbar radiculopathy. EMG done on 21: Recommendations: This is predominantly a neuropathic process, not myopathic in nature. Post viral inflammatory processes can present early on with this picture however usually progress to development of slowing of velocity with the development of myelin abnormalities as well as increasing axonal pathology. B12 level low during admission and she has received several injections. Social:  Pt lives with fiance and son in [address] [facility] style home, 3 [address] with no railing + 1 step to enter. May have [address] Prior to admission: Pt independent with all functional mobility                    Initial Evaluation  Date:  AM     PM    Short Term Goals Long Term Goals    Was pt agreeable to Eval/treatment? 22 Yes yes       Does pt have pain? Reports allodynia in BLE No sig c/o pain No sig pain        Bed Mobility  Rolling: SBA  Supine to sit: Francesco  Sit to supine: modA  Scooting: Francesco NT Rolling: SBA  Supine to sit: Francesco at mat table  Sit to supine: min A at mat table  Scooting: Francesco sup Mod i   Transfers Sit to stand: maxA + SBA form 2nd helper  Stand to sit: maxA + SBA from 2nd helper  Stand pivot: NT  Slideboard tx- maxA     5xSTS: NT Sit to stand: Francesco  Stand to sit: Francesco   Stand pivot: min-modA,  NADEGE jr. Panchito Arellano, verbal cueing to sequence  Slideboard tx- NT Sit to stand: Francesco  Stand to sit: Francesco   Stand pivot: min-modA,  KUSUM PLS donned, jr. Ww, verbal cueing to sequence   Francesco    sup      Ambulation    NT 30'x2, jr. Ww,  10', ww    KUSUM PLS donned  Francesco for steadying, min-modA to assist with advancing BLE 10'x with turn to mat table with ww,  Francesco    20' with ww, Francesco, chair follow to maximize distance     10', LRAD, maxA       50', LRAD, Francesco         Walking 10 feet on uneven surface NT NT NT 10', LRAD, maxA 10', LRAD, Francesco   Wheel Chair Mobility NT NT ', BUE propulsion, sup > 200', BUE propulson, mod i   Car Transfers NT NT NT maxA Francesco   Stair negotiation: ascended and descended  NT NT NT 1 step, maxA, KUSUM HR 4 steps, Kusum HR, Francesco   Curb Step:   ascended and descended NT NT NT 2\" curb, LRAD, maxA 2\" curb, LRAD, Francesco   Picking up object off the floor NT NT NT MaxA with assistive device Francesco with LRAD   BLE ROM WFL           BLE Strength KUSUM hip flex- 1/5  KUSUM hip ext- 1/5  KUSUM knee ext 2+/5  KUSUM knee flex 2-/5  KUSUM PF 2+/5  KUSUM DF 0/5           Balance  Sitting EOB- SBA  Standing- maxA                          Date Family Teach Completed             Is additional Family Teaching Needed? Y or N             Hindering Progress Weakness, impulsivity, pain, endurance, balance, body habitus Weakness, impulsivity, pain, endurance, balance, body habitus  Weakness, impulsivity, pain, endurance, balance, body habitus         PT recommended ELOS 4-5 weeks           Team's Discharge Plan             Therapist at Team Meeting             Therapeutic Exercise:     AM:   1. Amb  2. STS- x10 reps, tactile cueing and assist to prevent genu recurvatum    PM:   1. Supine hip abduction/adduction- [id number],  AAROM BLE, pillow case on feet and powder baord provided to reduce friction  2. Heel slides- 2x5 each,  AAROM BLE, pillow case on feet and powder baord provided to reduce friction  3. hook lying bridges- 2x10 reps, sig assist to brace feet and stabilize knees from abduction  4.  SAQ- 2x10 each, AROM    Patient education    Pt educated on tx, gait, STS, current status and POC, d/c planning    Patient response to education:   Pt verbalized understanding Pt demonstrated skill Pt requires further education in this area   yes partial All areas     Additional Comments:   AM: Pt reporting fatigue in BLE, having just finished ADL with OT, but continues to show good effort and motivation with task. Adjusted ww height to raise level of hand  to help promote more upright posture as pt continues to require moderate verbal cueing for trunk extension and upright posture during amb, posture mildly improved, however pt very fatigued at time of assessing, will re-assess in later session. Pt demo's increase genu recurvatum during STS as she fatigues, having increased difficulty initiating knee flexion in RLE > LLE. PM: Focus of session on progressing strength in BLE with supine TE. Pt tolerated well continues to show increased weakness in NADEGE hip flexors and abductors compared to quads and hamstrings. Amb sessions focusing on pt advancing BLE without any assistance, pt able to complete bout up to 20' with verbal cueing and steadying only. Chair/bed alarm:     AM  Time in: 0915  Time out: 1000    PM  Time in: 1430  Time out: 1515    Pt is making good progress toward established Physical Therapy goals. Continue with physical therapy current plan of care.     Marco Coello., PT, DPT  [id number]

## 2022-01-18 NOTE — PLAN OF CARE
Problem: Skin Integrity:  Goal: Will show no infection signs and symptoms  Description: Will show no infection signs and symptoms  1/18/2022 1034 by Roc Snow RN  Outcome: Met This Shift  1/18/2022 0138 by Herbert Galeazzi, RN  Outcome: Met This Shift  1/17/2022 2247 by Melissa Berrios RN  Outcome: Met This Shift  Goal: Absence of new skin breakdown  Description: Absence of new skin breakdown  1/18/2022 1034 by Roc Snow RN  Outcome: Met This Shift  1/18/2022 0138 by Herbert Galeazzi, RN  Outcome: Met This Shift  1/17/2022 2247 by Melissa Berrios RN  Outcome: Met This Shift     Problem: Falls - Risk of:  Goal: Will remain free from falls  Description: Will remain free from falls  1/18/2022 1034 by Roc Snow RN  Outcome: Met This Shift  1/18/2022 0138 by Herbert Galeazzi, RN  Outcome: Met This Shift  1/17/2022 2247 by Melissa Berrios RN  Outcome: Met This Shift  Goal: Absence of physical injury  Description: Absence of physical injury  1/18/2022 1034 by Roc Snow RN  Outcome: Met This Shift  1/17/2022 2247 by Melissa Berrios RN  Outcome: Met This Shift     Problem: SAFETY  Goal: Free from accidental physical injury  1/18/2022 1034 by Roc Snow RN  Outcome: Met This Shift  1/18/2022 0138 by Herbert Galeazzi, RN  Outcome: Met This Shift  1/17/2022 2247 by Melissa Berrios RN  Outcome: Met This Shift  Goal: Free from intentional harm  1/18/2022 1034 by Roc Snow RN  Outcome: Met This Shift  1/17/2022 2247 by Melissa Berrios RN  Outcome: Met This Shift     Problem: DAILY CARE  Goal: Daily care needs are met  1/18/2022 1034 by Roc Snow RN  Outcome: Met This Shift  1/18/2022 0138 by Herbert Galeazzi, RN  Outcome: Met This Shift  1/17/2022 2247 by Melissa Berrios RN  Outcome: Met This Shift     Problem: PAIN  Goal: Patient's pain/discomfort is manageable  1/18/2022 1034 by Roc Snow RN  Outcome: Met This Shift  1/18/2022 0138 by Herbert Galeazzi, RN  Outcome: Met This Shift  1/17/2022 2247 by Melissa Berrios, RN  Outcome: Met This Shift     Problem: SKIN INTEGRITY  Goal: Skin integrity is maintained or improved  1/18/2022 1034 by Jocelin Pang RN  Outcome: Met This Shift  1/18/2022 0138 by Dennie Corns, RN  Outcome: Met This Shift  1/17/2022 2247 by Ibrahima Christensen RN  Outcome: Met This Shift     Problem: KNOWLEDGE DEFICIT  Goal: Patient/S.O. demonstrates understanding of disease process, treatment plan, medications, and discharge instructions.   1/18/2022 1034 by Jocelin Pang RN  Outcome: Met This Shift  1/17/2022 2247 by Ibrahima Christensen RN  Outcome: Met This Shift     Problem: DISCHARGE BARRIERS  Goal: Patient's continuum of care needs are met  1/18/2022 1034 by Jocelin Pang RN  Outcome: Met This Shift  1/17/2022 2247 by Ibrahima Christensen RN  Outcome: Met This Shift     Problem: Mobility - Impaired:  Goal: Mobility will improve  Description: Mobility will improve  1/18/2022 1034 by Jocelin Pang RN  Outcome: Met This Shift  1/18/2022 0138 by Dennie Corns, RN  Outcome: Met This Shift  1/17/2022 2247 by Ibrahima Christensen RN  Outcome: Met This Shift     Problem: Pain:  Goal: Pain level will decrease  Description: Pain level will decrease  1/18/2022 1034 by Jocelin Pang RN  Outcome: Met This Shift  1/18/2022 0138 by Dennie Corns, RN  Outcome: Met This Shift  1/17/2022 2247 by Ibrahima Christensen RN  Outcome: Met This Shift  Goal: Control of acute pain  Description: Control of acute pain  1/18/2022 1034 by Jocelin Pang RN  Outcome: Met This Shift  1/18/2022 0138 by Dennie Corns, RN  Outcome: Met This Shift  1/17/2022 2247 by Ibrahima Christensen RN  Outcome: Met This Shift  Goal: Control of chronic pain  Description: Control of chronic pain  1/18/2022 1034 by Jocelin Pang RN  Outcome: Met This Shift  1/18/2022 0138 by Dennie Corns, RN  Outcome: Met This Shift  1/17/2022 2247 by Ibrahima Christensen RN  Outcome: Met This Shift

## 2022-01-18 NOTE — PROGRESS NOTES
extremities. Lower extremity strength is 2/5 bilateral HF; 3-/5 bilateral KE and PF; 0/5 bilateral DF and EHL. Bilateral lower extremity spasticity, at least MAS 1+ - 2. Exam stable    Laboratory:    Lab Results   Component Value Date    WBC 10.1 01/06/2022    HGB 12.5 01/06/2022    HCT 38.5 01/06/2022    MCV 90.0 01/06/2022     01/06/2022     Lab Results   Component Value Date     01/06/2022    K 4.1 01/06/2022     01/06/2022    CO2 21 01/06/2022    BUN 13 01/06/2022    CREATININE 0.5 01/06/2022    GLUCOSE 103 01/06/2022    CALCIUM 9.2 01/06/2022      Lab Results   Component Value Date    ALT 12 12/26/2021    AST 13 12/26/2021    ALKPHOS 52 12/26/2021    BILITOT 0.3 12/26/2021       Lab Results   Component Value Date    COLORU Yellow 12/20/2021    NITRU Negative 12/20/2021    GLUCOSEU Negative 12/20/2021    KETUA Negative 12/20/2021    UROBILINOGEN 0.2 12/20/2021    BILIRUBINUR Negative 12/20/2021       Functional Status:   Physical Therapy:  Bed mobility: SBA - Min A  Transfers: Min A  Ambulation: 10 ft with turn to mat table [employer] Min A     Occupational Therapy:  Feeding: Independent   Grooming: Setup  UB dressing: Setup  LB dressing: Mod A         Assessment/Plan:       48 y.o. female admitted to inpatient rehabilitation for impairments and acitivities limitations in ADLs and mobility secondary to polyneuropathy.     -Polyneuropathy: progressive since late November after she had Covid-19 and monoclonal antibody infusion. Found to have B12 deficiency and per Neurology felt to be cause of neuropathy. Monitor Neuro exam. Continue Acute rehab program.  -B12 deficiency: Continue oral B12, B12 now within normal limits. Monitor  -Borderline low vitamin D: started vitamin D supplement   -Lower extremity spasticity: Continue Baclofen, dose adjusted.  Contracture prevention.   -Pain control: tylenol PRN, naproxen prn, voltaren gel, heating pad  -Overactive bladder: continue home Myrbetriq   -DVT prophylaxis: lovenox     Progressing in therapy.  She continues to be very motivated   Spasms improved with baclofen   Team conference tomorrow        Electronically signed by Mariann Valente MD on 1/18/2022 at 8:14 AM

## 2022-01-18 NOTE — PROGRESS NOTES
Physical Therapy    Facility/Department: [facility]S REHAB  Weekly Note    NAME: Marbin Cabral  : 1975  MRN: [id number]    Date of Service: 2022    Evaluating Therapist: Teresia Habermann., PT, DPT [id number]      ROOM: [address] [place] [address]  DIAGNOSIS: Polyneuropathy  PRECAUTIONS: Falls, Regular diet, **PRAFOS when not in therapy**  HPI: Patient admitted to the hospital on 21 with progressively worsening leg weakness after receiving an antibody infusion for Covid diagnosed in November. Neurosurgery and neurology all saw patient on consult. Imaging has shown lumbar radiculopathy. EMG done on 21: Recommendations: This is predominantly a neuropathic process, not myopathic in nature.  Post viral inflammatory processes can present early on with this picture however usually progress to development of slowing of velocity with the development of myelin abnormalities as well as increasing axonal pathology. B12 level low during admission and she has received several injections. Social:  Pt lives with fiance and son in [address] [facility] style home, [address] with no railing + 1 step to enter. May have [address] Prior to admission: Pt independent with all functional mobility     Initial Evaluation  Date:  22 Comments Short Term Goals Long Term Goals    Was pt agreeable to Eval/treatment? 22 Yes yes      Does pt have pain?  Reports allodynia in BLE No sig c/o pain No sig c/o pain      Bed Mobility  Rolling: SBA  Supine to sit: Francesco  Sit to supine: modA  Scooting: Francesco Rolling: sup  Supine to sit: sup  Sit to supine: Francesco  Scooting: Sup Rolling: sup  Supine to sit: sup  Sit to supine: SBA  Scooting: Sup In hospital bed with bed rails    Francesco at mat table without rails sup Mod i   Transfers Sit to stand: maxA + SBA form 2nd helper  Stand to sit: maxA + SBA from 2nd helper  Stand pivot: NT  Slideboard tx- maxA    5xSTS: NT Sit to stand: modA   Stand to sit: modA   Stand pivot: NT  Slideboard tx- mod-maxA Sit to stand: Francesco  Stand to sit: Francesco  Stand pivot: Francesco with ww, KUSUM PLS donned       KUSUM PLS donned, heavy UE support on ww, verbal cueing for sequencing turning and weight shift, steadying assist provided Francesco   sup     Ambulation    NT 20' in pedro lift, KUSUM KI and PLS donned, MaxA Up to 40' with ww, Francesco    Min-modA to advance BLE PT providing verbal cueing to sequence turning, able to amb up to 20' advancing BLE independently, progressing to min-modA to assist    Compensatory strategies used to overcome hip weakness 10', LRAD, maxA      100 feet, LRAD,  SBA        Walking 10 feet on uneven surface NT NT NT  10', LRAD, maxA 10', LRAD, Francesco   Wheel Chair Mobility ', BUE propulsion, Sup 200', BUE propulsion, sup approaching mod i 100', BUE propulsion, sup > 200', BUE propulson, mod i   Car Transfers NT NT NT  maxA Francesco   Stair negotiation: ascended and descended  NT NT NT  1 step, maxA, KUSUM HR 4 steps, Kusum HR, Francesco   Curb Step:   ascended and descended NT NT NT  2\" curb, LRAD, maxA 2\" curb, LRAD, Francesco   Picking up object off the floor NT NT NT  MaxA with assistive device Francesco with LRAD   BLE ROM WFL        BLE Strength KUSUM hip flex- 1/5  KUSUM hip ext- 1/5  KUSUM knee ext 2+/5  KUSUM knee flex 2-/5  KUSUM PF 2+/5  KUSUM DF 0/5 KUSUM hip flex- 2-/5  KUSUM hip ext- 2-/5  KUSUM knee ext 2+/5  KUSUM knee flex 2-/5  KUSUM PF 2+/5  KUSUM DF 0/5 KUSUM hip flex- 2-/5  KUSUM hip ext- 3/5  KUSUM knee ext 3+/5  KUSUM knee flex 3/5  KUSUM PF 2+/5  KUSUM DF 0/5      Balance  Sitting EOB- SBA  Standing- maxA     Sitting EOB- SBA  Standing- maxA Sitting EOB- SBA  Standing- Francesco with ww            Date Family Teach Completed  None to date None to date      Is additional Family Teaching Needed?   Y or N  Y Y      Hindering Progress Weakness, impulsivity, pain, endurance, balance, body habitus Weakness, impulsivity, pain, endurance, balance, body habitus, proprioceptive deficits, tone, motor control deficits Weakness, impulsivity, pain, endurance, balance, body habitus, proprioceptive deficits, tone, motor control deficits         PT recommended ELOS 4-5 weeks 4-5 weeks 4 weeks      Team's Discharge Plan  4-5 weeks 3-4 weeks      Therapist at Demetriusalli Santiago, PT, DPT [id number]   Maria Victoria Diallo, PT, DPT [id number]          Date:  1/18/22  Supporting factors: Motivated, progressing  Barriers to discharge:  Stairs to enter home, Weakness, endurance, balance, body habitus, proprioceptive deficits, tone, motor control deficits  Additional comments:  Pt has shown significant improvement in strength and motor control since last week. We have progressed to ambulation training with just a ww and NADEGE PLS donned, able to complete turn to chair. Pt continues to shows a great deal of hip weakness, especially hip flexors, however pt compensates with exaggerated trunk extension and pelvic rotation to help use momentum to allow limb to swing through and advance. Pt also continues to show increased genu recurvatum as she fatigues. DME:  TBD  After Care:  TBD    Estrellita Navarro., PT, DPT  [id number]    Date:  1/11/22  Supporting factors: Motivated, showing early progress  Barriers to discharge:  Stairs to enter home, Weakness, impulsivity, pain, endurance, balance, body habitus, proprioceptive deficits, tone, motor control deficits  Additional comments:  Pt has been showing improvement on standing and gait trials, but requires sig heavy skilled assistance and skilled set up. Will need to reach out to orthotist to have NADEGE bracing to help get her stabilized so she may progress training. at this point she needs NADEGE KAFOs,she has very weak hips and very poor dynamic knee stability as pt snaps both knees back into hypertension aggressively, having difficulty initiating knee flexion. L ankle is more tight into plantarflexion. Due to adductor tone and NADEGE hip abductor weakness, pt has difficulty with foot placement prior to STS positioning self in chair.    DME:  TBD  After Care: TBBON Tomlin., PT, DPT  [id number]

## 2022-01-19 PROCEDURE — 97535 SELF CARE MNGMENT TRAINING: CPT

## 2022-01-19 PROCEDURE — 99232 SBSQ HOSP IP/OBS MODERATE 35: CPT | Performed by: PHYSICAL MEDICINE & REHABILITATION

## 2022-01-19 PROCEDURE — 6370000000 HC RX 637 (ALT 250 FOR IP): Performed by: INTERNAL MEDICINE

## 2022-01-19 PROCEDURE — 6360000002 HC RX W HCPCS: Performed by: INTERNAL MEDICINE

## 2022-01-19 PROCEDURE — 97110 THERAPEUTIC EXERCISES: CPT

## 2022-01-19 PROCEDURE — 97530 THERAPEUTIC ACTIVITIES: CPT

## 2022-01-19 PROCEDURE — 6370000000 HC RX 637 (ALT 250 FOR IP): Performed by: PHYSICAL MEDICINE & REHABILITATION

## 2022-01-19 PROCEDURE — 97116 GAIT TRAINING THERAPY: CPT

## 2022-01-19 PROCEDURE — 1280000000 HC REHAB R&B

## 2022-01-19 RX ADMIN — NAPROXEN 500 MG: 500 TABLET ORAL at 06:00

## 2022-01-19 RX ADMIN — ACETAMINOPHEN 650 MG: 325 TABLET ORAL at 13:18

## 2022-01-19 RX ADMIN — FOLIC ACID 1 MG: 1 TABLET ORAL at 07:57

## 2022-01-19 RX ADMIN — ACETAMINOPHEN 650 MG: 325 TABLET ORAL at 07:57

## 2022-01-19 RX ADMIN — Medication 1000 MCG: at 07:57

## 2022-01-19 RX ADMIN — BACLOFEN 10 MG: 10 TABLET ORAL at 13:13

## 2022-01-19 RX ADMIN — ENOXAPARIN SODIUM 40 MG: 100 INJECTION SUBCUTANEOUS at 07:57

## 2022-01-19 RX ADMIN — FLUTICASONE PROPIONATE 1 SPRAY: 50 SPRAY, METERED NASAL at 07:58

## 2022-01-19 RX ADMIN — ACETAMINOPHEN 650 MG: 325 TABLET ORAL at 20:22

## 2022-01-19 RX ADMIN — Medication 5 MG: at 20:14

## 2022-01-19 RX ADMIN — Medication 2000 UNITS: at 07:57

## 2022-01-19 RX ADMIN — DICLOFENAC SODIUM 4 G: 10 GEL TOPICAL at 13:14

## 2022-01-19 RX ADMIN — DICLOFENAC SODIUM 4 G: 10 GEL TOPICAL at 07:58

## 2022-01-19 RX ADMIN — GUAIFENESIN SYRUP AND DEXTROMETHORPHAN 5 ML: 100; 10 SYRUP ORAL at 06:02

## 2022-01-19 RX ADMIN — DOCUSATE SODIUM 100 MG: 100 CAPSULE, LIQUID FILLED ORAL at 20:14

## 2022-01-19 RX ADMIN — Medication: at 07:59

## 2022-01-19 RX ADMIN — GUAIFENESIN SYRUP AND DEXTROMETHORPHAN 5 ML: 100; 10 SYRUP ORAL at 20:19

## 2022-01-19 RX ADMIN — BACLOFEN 20 MG: 10 TABLET ORAL at 21:18

## 2022-01-19 RX ADMIN — BACLOFEN 10 MG: 10 TABLET ORAL at 07:57

## 2022-01-19 RX ADMIN — DOCUSATE SODIUM 100 MG: 100 CAPSULE, LIQUID FILLED ORAL at 07:57

## 2022-01-19 RX ADMIN — DICLOFENAC SODIUM 4 G: 10 GEL TOPICAL at 20:16

## 2022-01-19 RX ADMIN — Medication 1 TABLET: at 07:57

## 2022-01-19 ASSESSMENT — PAIN DESCRIPTION - PAIN TYPE
TYPE: CHRONIC PAIN
TYPE: CHRONIC PAIN

## 2022-01-19 ASSESSMENT — PAIN DESCRIPTION - FREQUENCY
FREQUENCY: CONTINUOUS
FREQUENCY: INTERMITTENT

## 2022-01-19 ASSESSMENT — PAIN SCALES - GENERAL
PAINLEVEL_OUTOF10: 5
PAINLEVEL_OUTOF10: 0
PAINLEVEL_OUTOF10: 3
PAINLEVEL_OUTOF10: 5

## 2022-01-19 ASSESSMENT — PAIN DESCRIPTION - LOCATION
LOCATION: BACK
LOCATION: BACK

## 2022-01-19 ASSESSMENT — PAIN DESCRIPTION - ORIENTATION
ORIENTATION: LOWER
ORIENTATION: LOWER

## 2022-01-19 ASSESSMENT — PAIN DESCRIPTION - DESCRIPTORS: DESCRIPTORS: ACHING;DISCOMFORT;SORE

## 2022-01-19 ASSESSMENT — PAIN DESCRIPTION - ONSET
ONSET: ON-GOING
ONSET: ON-GOING

## 2022-01-19 ASSESSMENT — PAIN DESCRIPTION - PROGRESSION
CLINICAL_PROGRESSION: NOT CHANGED
CLINICAL_PROGRESSION: NOT CHANGED

## 2022-01-19 NOTE — PROGRESS NOTES
Physical Therapy    Facility/Department: [address] REHAB  Treatment note     NAME: Evangelina Quintanilla  : 1975  MRN: [id number]     Date of Service: 2022     Evaluating Therapist: Audrey Dial., PT, DPT [id number]        ROOM: [facility]  DIAGNOSIS: Polyneuropathy  PRECAUTIONS: Falls, Regular diet, **PRAFOS when not in therapy**  HPI: Patient admitted to the hospital on 21 with progressively worsening leg weakness after receiving an antibody infusion for Covid diagnosed in November. Neurosurgery and neurology all saw patient on consult. Imaging has shown lumbar radiculopathy. EMG done on 21: Recommendations: This is predominantly a neuropathic process, not myopathic in nature. Post viral inflammatory processes can present early on with this picture however usually progress to development of slowing of velocity with the development of myelin abnormalities as well as increasing axonal pathology. B12 level low during admission and she has received several injections. Social:  Pt lives with fiance and son in [address] [facility] style home, [address] with no railing + 1 step to enter. May have [address] Prior to admission: Pt independent with all functional mobility                    Initial Evaluation  Date:  AM     PM    Short Term Goals Long Term Goals    Was pt agreeable to Eval/treatment? 22 Yes yes       Does pt have pain? Reports allodynia in BLE No sig c/o pain No sig pain        Bed Mobility  Rolling: SBA  Supine to sit: Francesco  Sit to supine: modA  Scooting: Francesco NT Rolling: SBA  Supine to sit: SBA at mat table  Sit to supine: min A at mat table  Scooting: Francesco sup Mod i   Transfers Sit to stand: maxA + SBA form 2nd helper  Stand to sit: maxA + SBA from 2nd helper  Stand pivot: NT  Slideboard tx- maxA     5xSTS: NT Sit to stand: Francesco  Stand to sit: Francesco   Stand pivot: min-modA,  NADEGE gonzalez jr.  Ww, verbal cueing to sequence  Slideboard tx- NT Sit to stand: Francesco  Stand to sit: Francesco   Stand pivot: min-modA,  KUSUM PLS jr lisa. Ww, verbal cueing to sequence   Francesco    sup      Ambulation    NT 27', ww, chair  required 2/2 fatigue    25', ww, urn to chair    KUSUM PLS donned  Francesco for steadying, min-modA to assist with advancing BLE 10'x 'x2, ww, Francesco       10', LRAD, maxA       48', LRAD, Francesco         Walking 10 feet on uneven surface NT NT NT 10', LRAD, maxA 10', LRAD, Francesco   Wheel Chair Mobility NT NT ', BUE propulsion, sup > 200', BUE propulson, mod i   Car Transfers NT NT NT maxA Francesco   Stair negotiation: ascended and descended  NT NT NT 1 step, maxA, KUSUM HR 4 steps, Kusum HR, Francesco   Curb Step:   ascended and descended NT NT NT 2\" curb, LRAD, maxA 2\" curb, LRAD, Francesco   Picking up object off the floor NT NT NT MaxA with assistive device Francesco with LRAD   BLE ROM WFL           BLE Strength KUSUM hip flex- 1/5  KUSUM hip ext- 1/5  KUSUM knee ext 2+/5  KUSUM knee flex 2-/5  KUSUM PF 2+/5  KUSUM DF 0/5           Balance  Sitting EOB- SBA  Standing- maxA                          Date Family Teach Completed             Is additional Family Teaching Needed? Y or N             Hindering Progress Weakness, impulsivity, pain, endurance, balance, body habitus Weakness, impulsivity, pain, endurance, balance, body habitus  Weakness, impulsivity, pain, endurance, balance, body habitus         PT recommended ELOS 4-5 weeks           Team's Discharge Plan             Therapist at Team Meeting             Therapeutic Exercise:     AM:   1. Gait  2. bwd amb- with ww Francesco to complete 10'  3. Static standing- 2x5 reps alternating UE support on ww, lofting 1 hand at a time and holding for 3-5 sec each  3. Static standing- no UE support, min-modA, 2x10-20 sec, limited by fatigue and anxiety    PM:   1. SAQ- 3x10 reps, 1# ankle weight on each extremity  2. Supine hip abduction/adduction- 3x10 reps, AAROM  3.  SLR- 2x5 reps each, AAROM     Patient education    Pt educated on tx, gait, balance, current status and POC, d/c

## 2022-01-19 NOTE — CARE COORDINATION
Per Team: ELOS: Re-evaluation next week (3-4). LTG: Min Assist/ Touching to Mod I/Independent. No insulin, IV-ABX, or Oxygen use. Pt needs some help to advance L.E. Patient has made good progress. Per Speech pt is functional.     Patient will update her fiance.     DME: ALENA    Aftercare: TBD    FT: JOEL Mandujano Intern  Rula [place], [place]

## 2022-01-19 NOTE — PROGRESS NOTES
Occupational Therapy  OCCUPATIONAL THERAPY DAILY NOTE    Date:2022  Patient Name: Marbin Cabral  MRN: [id number]  : 1975  Room: [address] [place] [address]A     Diagnosis: Polyneuropathy   Precautions: falls ,slide board transfers     Functional Assessment:   Date Status AE  Comments   Feeding 22 Independent      Grooming 22  set up            Oral Care 22  Set up      Bathing 22 UB- set up   LB-Mod A      UB Dressing 22 Set up     LB Dressing 22  Mod A  Reacher     Footwear 22  Mod/max  A  long shoe horn  Sock aid    Toileting 22  Mod A x2  Grab bar  3:1 commode     Homemaking 22 SBA/CGA W/w, w/c  Pt completed light meal prep making toast standing and w/c level; v/c's for technique retrieving items from cupboard/drawers, and refrigerator; Pt seated in w/c to wash dishes; Functional Transfers / Balance:   Date Status DME  Comments   Sit Balance 22   Min A  Shower chair      Stand Balance 22   Mod A  Grab rail     [] Tub  [x] Shower   Transfer 22  Max A  Rolling shower chair     Commode   Transfer 22 Max A ( assist x 2 for safety) 3 in 1 commode     Functional   Mobility 22  Mod I/Sup W/c propel Completed throughout rehab unit and OT clinic with min cues for safety during directional changes. Other: stand pivot transfer bed to w/c with ww           22                 Mod A x2 ( 2 staff for safe technique)               ww             Functional Exercises / Activity:  Pt seated at high/low table completed 3# dowel shoulder up/down 2 x 10 reps focusing on UE strength/endurance to increase independence with transfers/mobility and ADL tasks. -Towel stretches completed on incline with focus on increasing trunk control/core strength and providing B UE stretch. Pt completed 2x8 minutes with fair+ tolerance. -Pipe tree activity completed seated in w/c with focus on functional reaching in all planes and increasing trunk control/core strength. Completed x2 reps taking all pipes on/off with fair tolerance. Sensory / Neuromuscular Re-Education:     Cognitive Skills:   Status Comments   Problem   Solving good     Memory good     Sequencing good     Safety fair       Visual Perception:    Education:  -Pt educated on w/c mobility safety during directional changes. Pt verbalized/demonstrated fair+ understanding, recommend continued education. [] Family teach completed on:    Pain Level: 5/10 low back      Additional Notes:       Patient has made good progress during treatment sessions toward set goals. Therapy emphasis to obtain goals:Current Treatment Recommendations: Strengthening,Gait Training,Patient/Caregiver Education & Training,Home Management Training,Equipment Evaluation, Education, & procurement,Balance Training,Functional Mobility Training,Positioning,Endurance Training,Wheelchair Mobility Training,Safety Education & Training,Self-Care / ADL    [x] Continue with current OT Plan of care.   [] Prepare for Discharge    Goals  Long term goals  Time Frame for Long term goals : 4 weeks to address above problem areas  Long term goal 1: Pt demo Mod I to eat all meals  Long term goal 2: Pt demo Mod I grooming seated @ sink level  Long term goal 3: Pt demo Sup to bathe @ shower level both seated & use LHS  Long term goal 4: Pt demo Mod I UE dress to don a shirt & Mod I to don underwear, pants & Min A socks & shoes using AE as needed  Long term goal 5: Pt demo Mod I commode trf using a slide board & drop arm commode & demo Mod I toileting & demo G safety  Long term goals 6: Pt demo SBA tub trf using appropriate DME- bench & slide board to ensure pt safety  Long term goal 7: Pt demo Mod I light homemaking activity @ wc level & demo G- safety & insight  Long term goal 8: Pt demo G- endurance for a 20-30 minute functional activity  Long term goal 9: Pt demo Mod I wc propulsion thorughout a living environment & around obstacles  1/12/22  OT plan of care updated on this date. Tentative length of stay 4-5 weeks     Vidal OTR/L [id number]  1/19/22 OT plan of care updated on this date. Tentative length of stay is 3-4 weeksAshli Ruiz OTR/L [id number]      DISCHARGE RECOMMENDATIONS  Recommended DME:    Post Discharge Care:   []Home Independently  []Home with 24hr Care / Supervision []Home with Partial Supervision []Home with Home Health OT []Home with Out Pt OT []Other: ___   Comments:         Time in Time out Tx Time Breakdown  Variance:   First Session  [id number] [x] Individual Tx- 45 min   [] Concurrent Tx   [] Co-Tx -   [] Group Tx -   [] Time Missed -     Second Session [id number] [x] Individual Tx- 45 Mins  [] Concurrent Tx -  [] Co-Tx -   [] Group Tx -   [] Time Missed -              Third Session    [] Individual Tx-   [] Concurrent Tx -  [] Co-Tx -   [] Group Tx -   [] Time Missed -         Total Tx Time: 90 Mins      Funmilayo Mcgee DURAN/L [address] Annel Sanabria DURAN/L [id number]  I have read the above note and agree with the documentation.    Vidal OTR/L [id number]

## 2022-01-19 NOTE — PROGRESS NOTES
[facility]#[id number] Occupational Therapy  OCCUPATIONAL THERAPY DAILY NOTE    Date:2022  Patient Name: Jolene Merchant  MRN: [id number]  : 1975  Room: [address] [place] [address]A     Diagnosis: Polyneuropathy   Precautions: falls ,slide board transfers     Functional Assessment:   Date Status AE  Comments   Feeding 22 Independent      Grooming 22  set up  Pt washed face and brushed and styled her hair seated at sink          Oral Care 22  Set up   Pt brushed teeth seated at sink    Bathing 22 UB- set up   LB-Mod A   Bathing completed seated in shower. Assist to wash lower legs, feet and buttocks    UB Dressing 22 Set up  Pt donned bra and pull over shirt    LB Dressing 22  Mod A  Reacher  Pt able to thread depends and pants on over BLE's. Assist to stand and pull up over hips    Footwear 22  Mod/max  A  long shoe horn  Sock aid Pt able to don socks with sock aid.  Assist to don shoes and B AFO braces    Toileting 22  Mod A x2  Grab bar  3:1 commode     Homemaking 22 SBA/CGA W/c  Reacher       Functional Transfers / Balance:   Date Status DME  Comments   Sit Balance 22   Min A  Shower chair  During bathing seated in shower    Stand Balance 22   Mod A  Grab rail  Standing at grab rail to pull pants up following LB dressing    [] Tub  [x] Shower   Transfer 22  Max A  Rolling shower chair     Commode   Transfer 22 Max A ( assist x 2 for safety) 3 in 1 commode     Functional   Mobility 22  Mod I/Sup W/c propel     Other: stand pivot transfer bed to w/c with ww           22                 Mod A x2 ( 2 staff for safe technique)               ww             Functional Exercises / Activity:        Sensory / Neuromuscular Re-Education:     Cognitive Skills:   Status Comments   Problem   Solving good     Memory good     Sequencing good     Safety fair       Visual Perception:    Education:   pt was educated on use of AE for LB dressing     [] Family teach completed on:    Pain Level: 5/10 low back      Additional Notes:       Patient has made good progress during treatment sessions toward set goals. Therapy emphasis to obtain goals:Current Treatment Recommendations: Strengthening,Gait Training,Patient/Caregiver Education & Training,Home Management Training,Equipment Evaluation, Education, & procurement,Balance Training,Functional Mobility Training,Positioning,Endurance Training,Wheelchair Mobility Training,Safety Education & Training,Self-Care / ADL    [x] Continue with current OT Plan of care. [] Prepare for Discharge    Goals  Long term goals  Time Frame for Long term goals : 4 weeks to address above problem areas  Long term goal 1: Pt demo Mod I to eat all meals  Long term goal 2: Pt demo Mod I grooming seated @ sink level  Long term goal 3: Pt demo Sup to bathe @ shower level both seated & use LHS  Long term goal 4: Pt demo Mod I UE dress to don a shirt & Mod I to don underwear, pants & Min A socks & shoes using AE as needed  Long term goal 5: Pt demo Mod I commode trf using a slide board & drop arm commode & demo Mod I toileting & demo G safety  Long term goals 6: Pt demo SBA tub trf using appropriate DME- bench & slide board to ensure pt safety  Long term goal 7: Pt demo Mod I light homemaking activity @ wc level & demo G- safety & insight  Long term goal 8: Pt demo G- endurance for a 20-30 minute functional activity  Long term goal 9: Pt demo Mod I wc propulsion thorughout a living environment & around obstacles  1/12/22  OT plan of care updated on this date. Tentative length of stay 4-5 weeks    Colby Drake OTR/L [id number]  1/19/22 OT plan of care updated on this date. Tentative length of stay is 3-4 weeksAshli Ruiz OTR/L [id number]      DISCHARGE RECOMMENDATIONS  Recommended DME:    Post Discharge Care:   []Home Independently  []Home with 24hr Care / Supervision []Home with Partial Supervision []Home with Home Health OT []Home with Out Pt OT []Other: ___   Comments:         Time in Time out Tx Time Breakdown  Variance:   First Session    [] Individual Tx-  [] Concurrent Tx   [] Co-Tx -   [] Group Tx -   [] Time Missed -     Second Session   [] Individual Tx-  [] Concurrent Tx -  [] Co-Tx -   [] Group Tx -   [] Time Missed -              Third Session    [] Individual Tx-   [] Concurrent Tx -  [] Co-Tx -   [] Group Tx -   [] Time Missed -         Total Tx Time:  mins

## 2022-01-19 NOTE — PLAN OF CARE
Problem: Skin Integrity:  Goal: Will show no infection signs and symptoms  Description: Will show no infection signs and symptoms  1/19/2022 0855 by Angie Thomas RN  Outcome: Met This Shift  1/19/2022 0329 by Jai Deluca RN  Outcome: Met This Shift  Goal: Absence of new skin breakdown  Description: Absence of new skin breakdown  1/19/2022 0855 by Angie Thomas RN  Outcome: Met This Shift  1/19/2022 0329 by Jai Deluca RN  Outcome: Met This Shift     Problem: Falls - Risk of:  Goal: Will remain free from falls  Description: Will remain free from falls  1/19/2022 0855 by Angie Thomas RN  Outcome: Met This Shift  1/19/2022 0329 by Jai Deluca RN  Outcome: Met This Shift  Goal: Absence of physical injury  Description: Absence of physical injury  Outcome: Met This Shift     Problem: SAFETY  Goal: Free from accidental physical injury  1/19/2022 0855 by Angie Thomas RN  Outcome: Met This Shift  1/19/2022 0329 by Jai Deluca RN  Outcome: Met This Shift  Goal: Free from intentional harm  Outcome: Met This Shift     Problem: DAILY CARE  Goal: Daily care needs are met  1/19/2022 0855 by Angie Thomas RN  Outcome: Met This Shift  1/19/2022 0329 by Jai Deluca RN  Outcome: Met This Shift     Problem: PAIN  Goal: Patient's pain/discomfort is manageable  1/19/2022 0855 by Angie Thomas RN  Outcome: Met This Shift  1/19/2022 0329 by Jai Deluca RN  Outcome: Met This Shift     Problem: SKIN INTEGRITY  Goal: Skin integrity is maintained or improved  1/19/2022 0855 by Angie Thomas RN  Outcome: Met This Shift  1/19/2022 0329 by Jai Deluca RN  Outcome: Met This Shift     Problem: KNOWLEDGE DEFICIT  Goal: Patient/S.O. demonstrates understanding of disease process, treatment plan, medications, and discharge instructions.   Outcome: Met This Shift     Problem: DISCHARGE BARRIERS  Goal: Patient's continuum of care needs are met  Outcome: Met This Shift     Problem: Mobility - Impaired:  Goal: Mobility will improve  Description: Mobility will improve  1/19/2022 0855 by Grazyna Baron RN  Outcome: Met This Shift  1/19/2022 0329 by Sheeba Kraft RN  Outcome: Met This Shift     Problem: Pain:  Goal: Pain level will decrease  Description: Pain level will decrease  1/19/2022 0855 by Grazyna Baron RN  Outcome: Met This Shift  1/19/2022 0329 by Sheeba Kraft RN  Outcome: Met This Shift  Goal: Control of acute pain  Description: Control of acute pain  1/19/2022 0855 by Grazyna Baron RN  Outcome: Met This Shift  1/19/2022 0329 by Sheeba Kraft RN  Outcome: Met This Shift  Goal: Control of chronic pain  Description: Control of chronic pain  1/19/2022 0855 by Grazyna Baron RN  Outcome: Met This Shift  1/19/2022 0329 by Sheeba Kraft RN  Outcome: Met This Shift

## 2022-01-19 NOTE — PROGRESS NOTES
Nutrition Assessment     Type and Reason for Visit: Reassess    Nutrition Recommendations/Plan: Continue current diet    Nutrition Assessment:  Pt stable from a nutritional standpoint w/ continued >75% intakes noted. Admit to ARU w/ polyneuropathy 2/2 noted B12 deficiency. Will continue to monitor. Malnutrition Assessment:  Malnutrition Status: No malnutrition    Estimated Daily Nutrient Needs:  Energy (kcal): MSj 1518 x 1.1 SF = 3404-6144; Weight Used for Energy Requirements:  Current     Protein (g): 70-80; Weight Used for Protein Requirements:  Ideal (1.5-1.8)        Fluid (ml/day): 5532-0931; Weight Used for Fluid Requirements:  1 ml/kcal      Nutrition Related Findings: pt alert, active BS, soft abd, no edema, +I/Os      Current Nutrition Therapies:    ADULT DIET; Regular    Anthropometric Measures:  · Height: 5' (152.4 cm)  · Current Body Wt: 213 lb (96.6 kg) (1/5 no method)   · BMI: 41.6    Nutrition Diagnosis:   No nutrition diagnosis at this time     Nutrition Interventions:   Food and/or Nutrient Delivery:  Continue Current Diet  Nutrition Education/Counseling:  Education not indicated   Coordination of Nutrition Care:  Continue to monitor while inpatient    Goals:  pt to consume >75% meals       Nutrition Monitoring and Evaluation:   Food/Nutrient Intake Outcomes:  Food and Nutrient Intake  Physical Signs/Symptoms Outcomes:  Biochemical Data,GI Status,Fluid Status or Edema,Nutrition Focused Physical Findings,Skin,Weight     Discharge Planning:     Too soon to determine     Electronically signed by Jonn Noel, MS, RD, LD on 1/19/22 at 1:06 PM EST    Contact: [phone]

## 2022-01-19 NOTE — PATIENT CARE CONFERENCE
[facility] [facility] NOTE/PATIENT PLAN OF CARE    The physician was present and led this team conference    Date: 2022  Admission date: 2022  Patient Name: Ledy Silva        MRN: [id number]    : 1975  (48 y.o.)  Gender: female   Rehab diagnosis/surgery with date:  Polyneuropathy   Impairment Group Code:  3.3      MEDICAL/FUNCTIONAL HISTORY/STATUS:  bedtime Baclofen increased for nighttime spasms in bilateral lowers, participating and making gains    Consultations/Labs/X-rays: none recent      MEDICATION UPDATE:    Scheduled Meds:ammonium lactate, , Daily  baclofen, 10 mg, BID- 8&2   And  baclofen, 20 mg, Nightly  mirabegron, 25 mg, Daily  enoxaparin, 40 mg, Daily  diclofenac sodium, 4 g, 4x Daily  fluticasone, 1 spray, Daily  folic acid, 1 mg, Daily  melatonin, 5 mg, Nightly  multivitamin, 1 tablet, Daily  polyethylene glycol, 17 g, BID  vitamin B-12, 1,000 mcg, Daily  influenza virus vaccine, 0.5 mL, Prior to discharge  docusate sodium, 100 mg, BID  senna, 2 tablet, Nightly  vitamin D, 2,000 Units, Daily    NURSING :    Bowel:   Always Continent  [x]   Occasionally incontinent  []   Frequently incontinent  []   Always incontinent  []   Not occurred  []     Bladder:   Always Continent  [x]    Incontinent less than daily[]   Incontinent  daily []   Always incontinent  []   No urine output    []   Indwelling catheter []       Toilet Hygiene:   Current level : mod assist-progressed over last few days  Short term Toilet hygiene goal: supervision  Long term toilet hygiene goal:  supervision    Skin integrity: intact  Pain: low back, on naprosyn or tylenol    NUTRITION    Diet  regular  Liquid consistency   thin    SOCIAL INFORMATION:  Lives with: duke and son   Prior community services:  none  Home Architecture:  [facility], 3+1 entry, 12 down to basement, duke is home on oxygen due to recent Covid infection  Prior Level of function: independent  DME:  wheelchair, wheeled walker    FAMILY / PATIENT EDUCATION:  safety and self care are ongoing with patient    PHYSICAL THERAPY    Bed mobility:   Current level: standby assist-supervision in hospital bed, min assist on mat table  Short term bed mobility goal: supervision  Long term bed mobility goal: Modified independent    Chair/bed transfers:  Current level: min assist with wheeled walker, bilateral trial posterior [employer] AFOs  Short term Chair/bed transfers goal: min assist  Long term Chair/bed transfers goal: supervision      Ambulation:   Current level: 37 ft wheeled walker at min assist, some help to advance lowers  Short term ambulation goal: met  Long term ambulation goal: 100 ft at standby assist    Wheelchair Mobility:  Current level: 200 ft at supervision  Short term wheelchair goal: met  Long term wheelchair goal: 200 ft at Modified independent      Car transfers:   Current level: to be assessed    Stairs:   Current level : to be assessed      Lower Extremity Strength Issues:    NADEGE hip flex- 2-/5  NADEGE hip ext- 3/5  NADEGE knee ext 3+/5  NADEGE knee flex 3/5  NADEGE PF 2+/5  NADEGE DF 0/5  Other comments: standing balance is min assist with a wheeled walker      OCCUPATIONAL THERAPY:      Tub/shower:   Current level: max assist  Short term tub/shower goal: mod assist  Long term tub/shower goal: standby assist      Feeding:  Current level: independent  Short term feeding goal: independent  Long term feeding goal: independent    Grooming:   Current level: supervision  Short term grooming goal: Modified independent  Long term grooming goal: Modified independent    Bathing:  Current level: shower level is mod assist  Short term bathing goal: min assist  Long term bathing goal: standby assist    Homemaking:   Current level: at wheelchair  level Contact guard assist-standby assist  Short term homemaking goal: Modified independent  Long term homemaking goal: Modified independent    Upper body dressing:  Current level: supervision  Short term upper body dressing goal: Modified independent  Long term upper body dressing goal: independent    Lower body dressing:                                                                          Current level: mod assist             Short term lower body dressing goal: min assist          Long term lower body dressing goal: Modified independent            Footwear  Current level: mod assist-max assist with adaptive equipment  Short term goal: mod assist  Long term goal:min assist due to bracing      Toilet transfer:   Current level: max assist-dependent for stand pivots  to 3 in 1  Short term toilet transfer goal: max assist  Long term toilet transfer goal: Modified independent      Safety awareness: fair      Patient/family's personal goals: walk again  Factors supporting goal achievement:  making gains, motivated  Factors hindering goal achievement:  morbid obesity, lower extremity weakness      Discharge Plan   Estimated Length of Stay: 3-4  weeks    Destination: home  Services at Discharge: to be assessed  Equipment at Discharge: to be assessed      INTERDISCIPLINARY TEAM/PHYSICIAN RECOMMENDATION AND/OR REVISIONS OF PLAN OF CARE:  continue to trial bracing, monitor response to baclofen      I approve the established interdisciplinary plan of care as documented within the medical record of Javid Hyman. Electronically signed by Jennie Bartholomew RN  on 1/19/2022 at 8:54 AM  The following interdisciplinary team members were present:  LUISANA Helms, MSSA,LSW  Brock Lewis.  Rich, PT, DPT  Bg Jefferson OTR

## 2022-01-20 PROCEDURE — 97535 SELF CARE MNGMENT TRAINING: CPT

## 2022-01-20 PROCEDURE — 99232 SBSQ HOSP IP/OBS MODERATE 35: CPT | Performed by: PHYSICAL MEDICINE & REHABILITATION

## 2022-01-20 PROCEDURE — 97110 THERAPEUTIC EXERCISES: CPT

## 2022-01-20 PROCEDURE — 97530 THERAPEUTIC ACTIVITIES: CPT

## 2022-01-20 PROCEDURE — 6360000002 HC RX W HCPCS: Performed by: INTERNAL MEDICINE

## 2022-01-20 PROCEDURE — 6370000000 HC RX 637 (ALT 250 FOR IP): Performed by: INTERNAL MEDICINE

## 2022-01-20 PROCEDURE — 97116 GAIT TRAINING THERAPY: CPT

## 2022-01-20 PROCEDURE — 6370000000 HC RX 637 (ALT 250 FOR IP): Performed by: PHYSICAL MEDICINE & REHABILITATION

## 2022-01-20 PROCEDURE — 1280000000 HC REHAB R&B

## 2022-01-20 PROCEDURE — 97112 NEUROMUSCULAR REEDUCATION: CPT

## 2022-01-20 RX ADMIN — ACETAMINOPHEN 650 MG: 325 TABLET ORAL at 14:24

## 2022-01-20 RX ADMIN — Medication 1 TABLET: at 08:42

## 2022-01-20 RX ADMIN — BACLOFEN 20 MG: 10 TABLET ORAL at 20:20

## 2022-01-20 RX ADMIN — ACETAMINOPHEN 650 MG: 325 TABLET ORAL at 20:22

## 2022-01-20 RX ADMIN — Medication: at 07:22

## 2022-01-20 RX ADMIN — BACLOFEN 10 MG: 10 TABLET ORAL at 08:42

## 2022-01-20 RX ADMIN — DICLOFENAC SODIUM 4 G: 10 GEL TOPICAL at 07:21

## 2022-01-20 RX ADMIN — FLUTICASONE PROPIONATE 1 SPRAY: 50 SPRAY, METERED NASAL at 08:43

## 2022-01-20 RX ADMIN — GUAIFENESIN SYRUP AND DEXTROMETHORPHAN 5 ML: 100; 10 SYRUP ORAL at 05:16

## 2022-01-20 RX ADMIN — DICLOFENAC SODIUM 4 G: 10 GEL TOPICAL at 20:22

## 2022-01-20 RX ADMIN — GUAIFENESIN SYRUP AND DEXTROMETHORPHAN 5 ML: 100; 10 SYRUP ORAL at 20:22

## 2022-01-20 RX ADMIN — DOCUSATE SODIUM 100 MG: 100 CAPSULE, LIQUID FILLED ORAL at 08:42

## 2022-01-20 RX ADMIN — FOLIC ACID 1 MG: 1 TABLET ORAL at 08:42

## 2022-01-20 RX ADMIN — ENOXAPARIN SODIUM 40 MG: 100 INJECTION SUBCUTANEOUS at 08:52

## 2022-01-20 RX ADMIN — NAPROXEN 500 MG: 500 TABLET ORAL at 05:16

## 2022-01-20 RX ADMIN — ACETAMINOPHEN 650 MG: 325 TABLET ORAL at 08:52

## 2022-01-20 RX ADMIN — Medication 2000 UNITS: at 08:52

## 2022-01-20 RX ADMIN — Medication 5 MG: at 20:20

## 2022-01-20 RX ADMIN — Medication 1000 MCG: at 08:42

## 2022-01-20 RX ADMIN — BACLOFEN 10 MG: 10 TABLET ORAL at 14:24

## 2022-01-20 ASSESSMENT — PAIN DESCRIPTION - DESCRIPTORS
DESCRIPTORS: ACHING
DESCRIPTORS: ACHING

## 2022-01-20 ASSESSMENT — PAIN DESCRIPTION - LOCATION
LOCATION: BACK;OTHER (COMMENT)
LOCATION: HIP;BACK

## 2022-01-20 ASSESSMENT — PAIN - FUNCTIONAL ASSESSMENT: PAIN_FUNCTIONAL_ASSESSMENT: ACTIVITIES ARE NOT PREVENTED

## 2022-01-20 ASSESSMENT — PAIN DESCRIPTION - ORIENTATION: ORIENTATION: LOWER

## 2022-01-20 ASSESSMENT — PAIN SCALES - GENERAL
PAINLEVEL_OUTOF10: 4
PAINLEVEL_OUTOF10: 5
PAINLEVEL_OUTOF10: 5
PAINLEVEL_OUTOF10: 3
PAINLEVEL_OUTOF10: 0
PAINLEVEL_OUTOF10: 5

## 2022-01-20 ASSESSMENT — PAIN DESCRIPTION - PAIN TYPE
TYPE: CHRONIC PAIN
TYPE: CHRONIC PAIN

## 2022-01-20 ASSESSMENT — PAIN DESCRIPTION - ONSET: ONSET: ON-GOING

## 2022-01-20 ASSESSMENT — PAIN DESCRIPTION - PROGRESSION: CLINICAL_PROGRESSION: NOT CHANGED

## 2022-01-20 ASSESSMENT — PAIN DESCRIPTION - FREQUENCY: FREQUENCY: CONTINUOUS

## 2022-01-20 NOTE — PROGRESS NOTES
Occupational Therapy  OCCUPATIONAL THERAPY DAILY NOTE    Date:2022  Patient Name: Anna Hughes  MRN: [id number]  : 1975  Room: [address] [place] [address]A     Diagnosis: Polyneuropathy   Precautions: falls ,slide board transfers     Functional Assessment:   Date Status AE  Comments   Feeding 22 Independent      Grooming 22  set up            Oral Care 22  Set up      Bathing 22 UB- set up   LB-Mod A      UB Dressing 22 Set up     LB Dressing 22  Mod A  Reacher     Footwear 22  Mod/max  A  long shoe horn  Sock aid Pt able to place BLE's into AFO's while long sitting in bed. Assist needed to fasten velcro straps and to don shoes and tie laces    Toileting 22   Mod A  Grab bar  3:1 commode  Pt completed hygiene seated. Mod A to stand and pull up pants    Homemaking 22 SBA/CGA W/w, w/c        Functional Transfers / Balance:   Date Status DME  Comments   Sit Balance 22   Min A  Shower chair      Stand Balance 22   Mod A  Grab rail  During clothing management following toileting    [] Tub  [x] Shower   Transfer 22  Max A  Rolling shower chair     Commode   Transfer 22  Max A ( stand pivot from w/c to [facility] placed over commode  3 in 1 commode     Functional   Mobility 22  Mod I/Sup W/c propel .    Other: stand pivot transfer bed to w/c with ww           22                 Mod A                ww             Functional Exercises / Activity:  BUE strength and core strength exercises seated  with 5 # weighted ball  for 3 sets 15 reps elbow flex/ext and 2 # weighted ball for shoulder flex and abduction   BUE strength exercises with 3 # dumbbell weight 3 sets 10 reps with focus on shld flex/abd  BUE forearm strengthening with green can do bar 3 sets 15 reps   Dynamic sitting balance/activity with pt's B feet placed on box on floor and leaning in various planes at SBA to place rings on ring tree   B hand fine motor coordination/desensitization activity  with pt using fingers to find objects hidden in dry rice with vision occluded      Sensory / Neuromuscular Re-Education:     Cognitive Skills:   Status Comments   Problem   Solving good     Memory good     Sequencing good     Safety fair       Visual Perception:    Education:  Pt was educated on stand pivot transfers from w/c to [facility] placed over commode with ww     [] Family teach completed on:    Pain Level: 5/10 low back      Additional Notes:       Patient has made good progress during treatment sessions toward set goals. Therapy emphasis to obtain goals:Current Treatment Recommendations: Strengthening,Gait Training,Patient/Caregiver Education & Training,Home Management Training,Equipment Evaluation, Education, & procurement,Balance Training,Functional Mobility Training,Positioning,Endurance Training,Wheelchair Mobility Training,Safety Education & Training,Self-Care / ADL    [x] Continue with current OT Plan of care. [] Prepare for Discharge    Goals  Long term goals  Time Frame for Long term goals : 4 weeks to address above problem areas  Long term goal 1: Pt demo Mod I to eat all meals  Long term goal 2: Pt demo Mod I grooming seated @ sink level  Long term goal 3: Pt demo Sup to bathe @ shower level both seated & use LHS  Long term goal 4: Pt demo Mod I UE dress to don a shirt & Mod I to don underwear, pants & Min A socks & shoes using AE as needed  Long term goal 5: Pt demo Mod I commode trf using a slide board & drop arm commode & demo Mod I toileting & demo G safety  Long term goals 6: Pt demo SBA tub trf using appropriate DME- bench & slide board to ensure pt safety  Long term goal 7: Pt demo Mod I light homemaking activity @ wc level & demo G- safety & insight  Long term goal 8: Pt demo G- endurance for a 20-30 minute functional activity  Long term goal 9: Pt demo Mod I wc propulsion thorughout a living environment & around obstacles  1/12/22  OT plan of care updated on this date.  Tentative length of stay 4-5 weeks    Antonio Palacios OTR/L [id number]  1/19/22 OT plan of care updated on this date. Tentative length of stay is 3-4 weeksOmarbritney Ruiz OTR/L [id number]      DISCHARGE RECOMMENDATIONS  Recommended DME:    Post Discharge Care:   []Home Independently  []Home with 24hr Care / Supervision [x]Home with Partial Supervision []Home with Home Health OT []Home with Out Pt OT []Other: ___   Comments:         Time in Time out Tx Time Breakdown  Variance:   First Session  [id number] [] Individual Tx-   [x] Concurrent Tx-45   [] Co-Tx -   [] Group Tx -   [] Time Missed -     Second Session [id number] [] Individual Tx-   [x] Concurrent Tx -45  [] Co-Tx -   [] Group Tx -   [] Time Missed -              Third Session    [] Individual Tx-   [] Concurrent Tx -  [] Co-Tx -   [] Group Tx -   [] Time Missed -         Total Tx Time: 90 Mins        Antonio Palacios OTR/L [id number]

## 2022-01-20 NOTE — PROGRESS NOTES
Physical Therapy    Facility/Department: [address] REHAB  Treatment note     NAME: Harleen Sanchez  : 1975  MRN: [id number]     Date of Service: 2022     Evaluating Therapist: Yusuf Rivas., PT, DPT [id number]        ROOM: [address] [place] [address]  DIAGNOSIS: Polyneuropathy  PRECAUTIONS: Falls, Regular diet, **PRAFOS when not in therapy**  HPI: Patient admitted to the hospital on 21 with progressively worsening leg weakness after receiving an antibody infusion for Covid diagnosed in November. Neurosurgery and neurology all saw patient on consult. Imaging has shown lumbar radiculopathy. EMG done on 21: Recommendations: This is predominantly a neuropathic process, not myopathic in nature. Post viral inflammatory processes can present early on with this picture however usually progress to development of slowing of velocity with the development of myelin abnormalities as well as increasing axonal pathology. B12 level low during admission and she has received several injections. Social:  Pt lives with fiance and son in [address] [facility] style home, 3 [address] with no railing + 1 step to enter. May have [address] Prior to admission: Pt independent with all functional mobility                    Initial Evaluation  Date:  AM     PM    Short Term Goals Long Term Goals    Was pt agreeable to Eval/treatment? 22 Yes yes       Does pt have pain? Reports allodynia in BLE No sig c/o pain No sig c/o pain       Bed Mobility  Rolling: SBA  Supine to sit: Francesco  Sit to supine: modA  Scooting: Francesco NT NT sup Mod i   Transfers Sit to stand: maxA + SBA form 2nd helper  Stand to sit: maxA + SBA from 2nd helper  Stand pivot: NT  Slideboard tx- maxA     5xSTS: NT Sit to stand: Francesco  Stand to sit: Francesco   Stand pivot: min-modA,  NADEGE JENNIFER gonzalez jr.  Ww, verbal cueing to sequence  Slideboard tx- NT Sit to stand: Francesco  Stand to sit: Francesco   Stand pivot: NT   Francesco    sup                  Ambulation    NT 26', 41',  ww, chair  required 2/2 fatigue    35', ww, turn to chair    KUSUM PLS donned  Francesco for steadying, min-modA to assist with advancing BLE 40', 28', 8', 30'     Ww, Francesco to steady, min-modA to assist with advancing BLE    KUSUM PLS donned   10', LRAD, maxA       50', LRAD, Francesco         Walking 10 feet on uneven surface NT NT  10', LRAD, maxA 10', LRAD, Francesco   Wheel Chair Mobility NT NT  100', BUE propulsion, sup > 200', BUE propulson, mod i   Car Transfers NT NT  maxA Francesco   Stair negotiation: ascended and descended  NT NT Step, KUSUM HR, mod-maxA 1 step, maxA, KUSUM HR 4 steps, Kusum HR, Francesco   Curb Step:   ascended and descended NT NT  2\" curb, LRAD, maxA 2\" curb, LRAD, Francesco   Picking up object off the floor NT NT  MaxA with assistive device Francesco with LRAD   BLE ROM WFL           BLE Strength KUSUM hip flex- 1/5  KUSUM hip ext- 1/5  KUSUM knee ext 2+/5  KUSUM knee flex 2-/5  KUSUM PF 2+/5  KUSUM DF 0/5           Balance  Sitting EOB- SBA  Standing- maxA                          Date Family Teach Completed             Is additional Family Teaching Needed? Y or N             Hindering Progress Weakness, impulsivity, pain, endurance, balance, body habitus Weakness, impulsivity, pain, endurance, balance, body habitus  Weakness, impulsivity, pain, endurance, balance, body habitus         PT recommended ELOS 4-5 weeks           Team's Discharge Plan             Therapist at Team Meeting               General:        HRmax: 174 bpm       Beta blockers (Y/N): N      Adjusted HRmax: 174  bpm   Blood pressure (mmHg): AM:  - Prior to Tx:   - During Tx:   - Post Tx:  PM:  - Prior to Tx: 143/88  - During Tx: -  - Post Tx: -   Time spent in HR ranges: Moderate intensity (60-70% HRmax): AM: - PM: 14 min 18 sec   High intensity (70-85% HRmax): AM: - PM: 12 min 42 sec         Therapeutic Exercise:     AM:   1.  Functional mobility    PM:   Patient education    Pt educated on tx, gait, balance, current status and POC, d/c planning    Patient response to education:   Pt verbalized understanding Pt demonstrated skill Pt requires further education in this area   yes partial All areas     Additional Comments:   AM: Sessions continue to focus on gait training. Pt able to progress amb distance with chair follow at end of session,up to 41', requiring min-modA to advance BLE as she fatigues. Pt completed first bout of gait with turn to chair, 2nd bout of gait pt fatiguing and needing chair . Pt may benefit from more solid structured orthotic to help maintain enough DF for advancing limb. Will discuss with orthotist.     PM: Session focused on progress amb volume. Pt toelrating activity well, tracked intensity f gait activity, HR reachign 90% HR max at peak points, provided with rest breaks to allow HR to recover. Orthotist present to witness pt amb, suggest Kusum articulating AFO with check straps and solid posterior pin to help promote DF for toe clearance and prevent aggressive genu recurvatum. Discussed with MD. Pt trialing stair negotiation x1 rep, requires maxA to advance LE onto step, able to nithya KUSUM HR and ascend with min-modA, poor dynamic knee control show as pt snapping into genu recurvatum aggressively. modA to descend with maxA to help initiate L knee flexion to eccentrically lower. AM  Time in: 0915  Time out: 1000    PM  Time in: 1430  Time out: 1515    Pt is making good progress toward established Physical Therapy goals. Continue with physical therapy current plan of care.     Bret Ordonez., PT, DPT  [id number]

## 2022-01-20 NOTE — PROGRESS NOTES
[address] [facility] Physical Medicine and Rehabilitation  Comprehensive Progress Note    Subjective:      Neo Sharma is a 48 y.o. female admitted to inpatient rehabilitation for impairments and acitivities limitations in ADLs and mobility secondary to polyneuropathy. No acute events overnight. No cp, sob, n/v. Tolerating therapy, making progress. She reports some increase in neuropathic symptoms in the legs No other complaints. The patient's medical records have been reviewed. Scheduled Meds:ammonium lactate, , Daily  baclofen, 10 mg, BID- 8&2   And  baclofen, 20 mg, Nightly  mirabegron, 25 mg, Daily  enoxaparin, 40 mg, Daily  diclofenac sodium, 4 g, 4x Daily  fluticasone, 1 spray, Daily  folic acid, 1 mg, Daily  melatonin, 5 mg, Nightly  multivitamin, 1 tablet, Daily  polyethylene glycol, 17 g, BID  vitamin B-12, 1,000 mcg, Daily  influenza virus vaccine, 0.5 mL, Prior to discharge  docusate sodium, 100 mg, BID  senna, 2 tablet, Nightly  vitamin D, 2,000 Units, Daily      Continuous Infusions:  PRN Meds:sodium chloride, 1 spray, PRN  guaiFENesin-dextromethorphan, 5 mL, Q4H PRN  melatonin, 5 mg, Nightly PRN  naproxen, 500 mg, BID PRN  acetaminophen, 650 mg, Q4H PRN  ondansetron, 4 mg, Q6H PRN  polyethylene glycol, 17 g, Daily PRN         Objective:      Vitals:    01/18/22 0745 01/19/22 0024 01/19/22 0715 01/19/22 1303   BP: 118/78  133/83    Pulse: 81  80    Resp: 17  16    Temp: 97.8 °F (36.6 °C)  97.9 °F (36.6 °C)    TempSrc: Temporal  Temporal    SpO2: 99% 99% 97%    Weight:       Height:    5' (1.524 m)     General appearance: alert,  NAD  Eyes: conjunctivae/corneas clear. PERRL, EOM's intact. Lungs: clear to auscultation bilaterally  Heart: regular rate and rhythm, S1, S2   Abdomen: soft, non-tender, normal bowel sounds   Extremities: extremities atraumatic, no cyanosis or edema  Neurologic: AAOx4. LT and PP sensation intact through T6 and altered (1/2) at T7 and below.  Strength is 5/5 throughout bilateral upper extremities. Lower extremity strength is 2/5 bilateral HF; 3-/5 bilateral KE and PF; 0/5 bilateral DF and EHL. Bilateral lower extremity spasticity, at MAS 1+     Exam stable    Laboratory:    Lab Results   Component Value Date    WBC 10.1 01/06/2022    HGB 12.5 01/06/2022    HCT 38.5 01/06/2022    MCV 90.0 01/06/2022     01/06/2022     Lab Results   Component Value Date     01/06/2022    K 4.1 01/06/2022     01/06/2022    CO2 21 01/06/2022    BUN 13 01/06/2022    CREATININE 0.5 01/06/2022    GLUCOSE 103 01/06/2022    CALCIUM 9.2 01/06/2022      Lab Results   Component Value Date    ALT 12 12/26/2021    AST 13 12/26/2021    ALKPHOS 52 12/26/2021    BILITOT 0.3 12/26/2021       Lab Results   Component Value Date    COLORU Yellow 12/20/2021    NITRU Negative 12/20/2021    GLUCOSEU Negative 12/20/2021    KETUA Negative 12/20/2021    UROBILINOGEN 0.2 12/20/2021    BILIRUBINUR Negative 12/20/2021       Functional Status:   Physical Therapy:  Bed mobility: SBA - Min A  Transfers: Min A  Ambulation: 10 ft with turn to mat table [employer] Min A     Occupational Therapy:  Feeding: Independent   Grooming: Setup  UB dressing: Setup  LB dressing: Mod A         Assessment/Plan:       48 y.o. female admitted to inpatient rehabilitation for impairments and acitivities limitations in ADLs and mobility secondary to polyneuropathy.     -Polyneuropathy: progressive since late November after she had Covid-19 and monoclonal antibody infusion. Found to have B12 deficiency and per Neurology felt to be cause of neuropathy. Monitor Neuro exam. Continue Acute rehab program.  -B12 deficiency: Continue oral B12, B12 now within normal limits. Monitor  -Borderline low vitamin D: started vitamin D supplement   -Lower extremity spasticity: Continue Baclofen, dose adjusted.  Contracture prevention.   -Pain control: tylenol PRN, naproxen prn, voltaren gel, heating pad  -Overactive bladder: continue home Myrbetriq   -DVT

## 2022-01-20 NOTE — PLAN OF CARE
Problem: Skin Integrity:  Goal: Will show no infection signs and symptoms  Description: Will show no infection signs and symptoms  1/20/2022 1237 by Aniceto Callahan RN  Outcome: Met This Shift  1/20/2022 0323 by Franklin Livingston RN  Outcome: Met This Shift  1/19/2022 2301 by Hal Flores RN  Outcome: Ongoing  Goal: Absence of new skin breakdown  Description: Absence of new skin breakdown  1/20/2022 1237 by Aniceto Callahan RN  Outcome: Met This Shift  1/20/2022 0323 by Franklin Livingston RN  Outcome: Met This Shift  1/19/2022 2301 by Hal Flores RN  Outcome: Ongoing     Problem: Falls - Risk of:  Goal: Will remain free from falls  Description: Will remain free from falls  1/20/2022 1237 by Aniceto Callahan RN  Outcome: Met This Shift  1/20/2022 0323 by Franklin Livingston RN  Outcome: Met This Shift  1/19/2022 2301 by Hal Flores RN  Outcome: Ongoing  Goal: Absence of physical injury  Description: Absence of physical injury  1/20/2022 1237 by Aniceto Callahan RN  Outcome: Met This Shift  1/19/2022 2301 by Hal Flores RN  Outcome: Ongoing     Problem: SAFETY  Goal: Free from accidental physical injury  1/20/2022 1237 by Aniceto Callahan RN  Outcome: Met This Shift  1/20/2022 0323 by Franklin Livingston RN  Outcome: Met This Shift  1/19/2022 2301 by Hal Flores RN  Outcome: Ongoing  Goal: Free from intentional harm  1/20/2022 1237 by Aniceto Callahan RN  Outcome: Met This Shift  1/19/2022 2301 by Hal Flores RN  Outcome: Ongoing     Problem: DAILY CARE  Goal: Daily care needs are met  1/20/2022 1237 by Aniceto Callahan RN  Outcome: Met This Shift  1/20/2022 0323 by Franklin Livingston RN  Outcome: Met This Shift  1/19/2022 2301 by Hal Flores RN  Outcome: Ongoing     Problem: PAIN  Goal: Patient's pain/discomfort is manageable  1/20/2022 1237 by Aniceto Callahan RN  Outcome: Met This Shift  1/20/2022 0323 by Franklin Livingston RN  Outcome: Met This Shift  1/19/2022 2301 by Hal Flores RN  Outcome: Ongoing     Problem: SKIN INTEGRITY  Goal: Skin integrity is maintained or improved  1/20/2022 1237 by Hannah Tineo RN  Outcome: Met This Shift  1/20/2022 0323 by Manuel Rowe RN  Outcome: Met This Shift  1/19/2022 2301 by Janice Ward RN  Outcome: Ongoing     Problem: KNOWLEDGE DEFICIT  Goal: Patient/S.O. demonstrates understanding of disease process, treatment plan, medications, and discharge instructions.   1/20/2022 1237 by Hannah Tineo RN  Outcome: Met This Shift  1/19/2022 2301 by Janice Ward RN  Outcome: Ongoing     Problem: DISCHARGE BARRIERS  Goal: Patient's continuum of care needs are met  1/20/2022 1237 by Hannah Tineo RN  Outcome: Met This Shift  1/19/2022 2301 by Janice Ward RN  Outcome: Ongoing     Problem: Mobility - Impaired:  Goal: Mobility will improve  Description: Mobility will improve  1/20/2022 1237 by Hannah Tineo RN  Outcome: Met This Shift  1/20/2022 0323 by Manuel Rowe RN  Outcome: Met This Shift  1/19/2022 2301 by Janice Ward RN  Outcome: Ongoing     Problem: Pain:  Goal: Pain level will decrease  Description: Pain level will decrease  1/20/2022 1237 by Hannah Tineo RN  Outcome: Met This Shift  1/20/2022 0323 by Manuel Rowe RN  Outcome: Met This Shift  1/19/2022 2301 by Janice Ward RN  Outcome: Ongoing  Goal: Control of acute pain  Description: Control of acute pain  1/20/2022 1237 by Hannah Tineo RN  Outcome: Met This Shift  1/20/2022 0323 by Manuel Rowe RN  Outcome: Met This Shift  1/19/2022 2301 by Janice Ward RN  Outcome: Ongoing  Goal: Control of chronic pain  Description: Control of chronic pain  1/20/2022 1237 by Hannah Tineo RN  Outcome: Met This Shift  1/20/2022 0323 by Manuel Rowe RN  Outcome: Met This Shift  1/19/2022 2301 by Janice Ward RN  Outcome: Ongoing

## 2022-01-20 NOTE — PROGRESS NOTES
[facility] Physical Medicine and Rehabilitation  Comprehensive Progress Note    Subjective:      Nikole Sandy is a 48 y.o. female admitted to inpatient rehabilitation for impairments and acitivities limitations in ADLs and mobility secondary to polyneuropathy. No acute events overnight. No cp, sob, n/v. Tolerating therapy, making progress. No new issues. The patient's medical records have been reviewed. Scheduled Meds:ammonium lactate, , Daily  baclofen, 10 mg, BID- 8&2   And  baclofen, 20 mg, Nightly  mirabegron, 25 mg, Daily  enoxaparin, 40 mg, Daily  diclofenac sodium, 4 g, 4x Daily  fluticasone, 1 spray, Daily  folic acid, 1 mg, Daily  melatonin, 5 mg, Nightly  multivitamin, 1 tablet, Daily  polyethylene glycol, 17 g, BID  vitamin B-12, 1,000 mcg, Daily  influenza virus vaccine, 0.5 mL, Prior to discharge  docusate sodium, 100 mg, BID  senna, 2 tablet, Nightly  vitamin D, 2,000 Units, Daily      Continuous Infusions:  PRN Meds:sodium chloride, 1 spray, PRN  guaiFENesin-dextromethorphan, 5 mL, Q4H PRN  melatonin, 5 mg, Nightly PRN  naproxen, 500 mg, BID PRN  acetaminophen, 650 mg, Q4H PRN  ondansetron, 4 mg, Q6H PRN  polyethylene glycol, 17 g, Daily PRN         Objective:      Vitals:    01/18/22 0745 01/19/22 0024 01/19/22 0715 01/19/22 1303   BP: 118/78  133/83    Pulse: 81  80    Resp: 17  16    Temp: 97.8 °F (36.6 °C)  97.9 °F (36.6 °C)    TempSrc: Temporal  Temporal    SpO2: 99% 99% 97%    Weight:       Height:    5' (1.524 m)     General appearance: alert,  NAD  Eyes: conjunctivae/corneas clear. PERRL, EOM's intact. Lungs: clear to auscultation bilaterally  Heart: regular rate and rhythm, S1, S2   Abdomen: soft, non-tender, normal bowel sounds   Extremities: extremities atraumatic, no cyanosis or edema  Neurologic: AAOx4. LT and PP sensation intact through T6 and altered (1/2) at T7 and below. Strength is 5/5 throughout bilateral upper extremities.  Lower extremity strength is 2/5 bilateral HF; 3-/5 bilateral KE and PF; 0/5 bilateral DF and EHL (bilateral foot drop). Bilateral lower extremity spasticity, at MAS 1+     Exam stable    Laboratory:    Lab Results   Component Value Date    WBC 10.1 01/06/2022    HGB 12.5 01/06/2022    HCT 38.5 01/06/2022    MCV 90.0 01/06/2022     01/06/2022     Lab Results   Component Value Date     01/06/2022    K 4.1 01/06/2022     01/06/2022    CO2 21 01/06/2022    BUN 13 01/06/2022    CREATININE 0.5 01/06/2022    GLUCOSE 103 01/06/2022    CALCIUM 9.2 01/06/2022      Lab Results   Component Value Date    ALT 12 12/26/2021    AST 13 12/26/2021    ALKPHOS 52 12/26/2021    BILITOT 0.3 12/26/2021       Lab Results   Component Value Date    COLORU Yellow 12/20/2021    NITRU Negative 12/20/2021    GLUCOSEU Negative 12/20/2021    KETUA Negative 12/20/2021    UROBILINOGEN 0.2 12/20/2021    BILIRUBINUR Negative 12/20/2021       Functional Status:   Physical Therapy:  Bed mobility: SBA - Min A  Transfers: Min A  Ambulation: 10 ft with turn to mat table [employer] Min A     Occupational Therapy:  Feeding: Independent   Grooming: Setup  UB dressing: Setup  LB dressing: Mod A         Assessment/Plan:       48 y.o. female admitted to inpatient rehabilitation for impairments and acitivities limitations in ADLs and mobility secondary to polyneuropathy.     -Polyneuropathy: progressive since late November after she had Covid-19 and monoclonal antibody infusion. Found to have B12 deficiency and per Neurology felt to be cause of neuropathy. Monitor Neuro exam. Continue Acute rehab program.  -B12 deficiency: Continue oral B12, B12 now within normal limits. Monitor  -Borderline low vitamin D: started vitamin D supplement   -Lower extremity spasticity: Continue Baclofen, dose adjusted.  Contracture prevention.   -Pain control: tylenol PRN, naproxen prn, voltaren gel, heating pad  -Overactive bladder: continue home Myrbetriq   -DVT prophylaxis: lovenox     Orthotics consult for bilateral AFO braces due to bilateral foot drop  Patient making ongoing gains in therapy.  Very motivated  Continue rehab program       Electronically signed by Lennox Lu MD on 1/20/2022 at 7:50 AM

## 2022-01-21 PROCEDURE — 99232 SBSQ HOSP IP/OBS MODERATE 35: CPT | Performed by: PHYSICAL MEDICINE & REHABILITATION

## 2022-01-21 PROCEDURE — 97110 THERAPEUTIC EXERCISES: CPT

## 2022-01-21 PROCEDURE — 97530 THERAPEUTIC ACTIVITIES: CPT

## 2022-01-21 PROCEDURE — 1280000000 HC REHAB R&B

## 2022-01-21 PROCEDURE — 97535 SELF CARE MNGMENT TRAINING: CPT

## 2022-01-21 PROCEDURE — 6370000000 HC RX 637 (ALT 250 FOR IP): Performed by: INTERNAL MEDICINE

## 2022-01-21 PROCEDURE — 97542 WHEELCHAIR MNGMENT TRAINING: CPT

## 2022-01-21 PROCEDURE — 97112 NEUROMUSCULAR REEDUCATION: CPT

## 2022-01-21 PROCEDURE — 6370000000 HC RX 637 (ALT 250 FOR IP): Performed by: PHYSICAL MEDICINE & REHABILITATION

## 2022-01-21 PROCEDURE — 97116 GAIT TRAINING THERAPY: CPT

## 2022-01-21 PROCEDURE — 6360000002 HC RX W HCPCS: Performed by: INTERNAL MEDICINE

## 2022-01-21 RX ADMIN — BACLOFEN 10 MG: 10 TABLET ORAL at 08:32

## 2022-01-21 RX ADMIN — DICLOFENAC SODIUM 4 G: 10 GEL TOPICAL at 13:10

## 2022-01-21 RX ADMIN — GUAIFENESIN SYRUP AND DEXTROMETHORPHAN 5 ML: 100; 10 SYRUP ORAL at 05:56

## 2022-01-21 RX ADMIN — NAPROXEN 500 MG: 500 TABLET ORAL at 05:56

## 2022-01-21 RX ADMIN — DICLOFENAC SODIUM 4 G: 10 GEL TOPICAL at 22:11

## 2022-01-21 RX ADMIN — ACETAMINOPHEN 650 MG: 325 TABLET ORAL at 14:32

## 2022-01-21 RX ADMIN — ACETAMINOPHEN 650 MG: 325 TABLET ORAL at 10:33

## 2022-01-21 RX ADMIN — Medication 2000 UNITS: at 10:34

## 2022-01-21 RX ADMIN — ENOXAPARIN SODIUM 40 MG: 100 INJECTION SUBCUTANEOUS at 10:34

## 2022-01-21 RX ADMIN — ACETAMINOPHEN 650 MG: 325 TABLET ORAL at 22:08

## 2022-01-21 RX ADMIN — BACLOFEN 10 MG: 10 TABLET ORAL at 14:15

## 2022-01-21 RX ADMIN — FOLIC ACID 1 MG: 1 TABLET ORAL at 10:38

## 2022-01-21 RX ADMIN — DOCUSATE SODIUM 100 MG: 100 CAPSULE, LIQUID FILLED ORAL at 10:38

## 2022-01-21 RX ADMIN — Medication 1 TABLET: at 10:34

## 2022-01-21 RX ADMIN — BACLOFEN 20 MG: 10 TABLET ORAL at 22:08

## 2022-01-21 RX ADMIN — DICLOFENAC SODIUM 4 G: 10 GEL TOPICAL at 17:10

## 2022-01-21 RX ADMIN — Medication: at 10:37

## 2022-01-21 RX ADMIN — Medication 1000 MCG: at 10:34

## 2022-01-21 RX ADMIN — Medication 5 MG: at 22:07

## 2022-01-21 RX ADMIN — DICLOFENAC SODIUM 4 G: 10 GEL TOPICAL at 10:35

## 2022-01-21 RX ADMIN — GUAIFENESIN SYRUP AND DEXTROMETHORPHAN 5 ML: 100; 10 SYRUP ORAL at 22:07

## 2022-01-21 RX ADMIN — FLUTICASONE PROPIONATE 1 SPRAY: 50 SPRAY, METERED NASAL at 10:38

## 2022-01-21 ASSESSMENT — PAIN DESCRIPTION - PROGRESSION
CLINICAL_PROGRESSION: RESOLVED
CLINICAL_PROGRESSION: GRADUALLY IMPROVING
CLINICAL_PROGRESSION: NOT CHANGED

## 2022-01-21 ASSESSMENT — PAIN SCALES - GENERAL
PAINLEVEL_OUTOF10: 3
PAINLEVEL_OUTOF10: 5
PAINLEVEL_OUTOF10: 0
PAINLEVEL_OUTOF10: 2
PAINLEVEL_OUTOF10: 5
PAINLEVEL_OUTOF10: 1

## 2022-01-21 ASSESSMENT — PAIN DESCRIPTION - DESCRIPTORS
DESCRIPTORS: DISCOMFORT
DESCRIPTORS: ACHING;BURNING;DISCOMFORT
DESCRIPTORS: ACHING

## 2022-01-21 ASSESSMENT — PAIN - FUNCTIONAL ASSESSMENT: PAIN_FUNCTIONAL_ASSESSMENT: ACTIVITIES ARE NOT PREVENTED

## 2022-01-21 ASSESSMENT — PAIN DESCRIPTION - FREQUENCY
FREQUENCY: CONTINUOUS
FREQUENCY: CONTINUOUS
FREQUENCY: INTERMITTENT

## 2022-01-21 ASSESSMENT — PAIN DESCRIPTION - ONSET
ONSET: ON-GOING
ONSET: ON-GOING
ONSET: OTHER (COMMENT)

## 2022-01-21 ASSESSMENT — PAIN DESCRIPTION - LOCATION
LOCATION: BACK
LOCATION: BACK
LOCATION: GENERALIZED
LOCATION: BACK

## 2022-01-21 ASSESSMENT — PAIN DESCRIPTION - PAIN TYPE
TYPE: ACUTE PAIN
TYPE: CHRONIC PAIN
TYPE: CHRONIC PAIN

## 2022-01-21 ASSESSMENT — PAIN DESCRIPTION - ORIENTATION
ORIENTATION: LOWER
ORIENTATION: LOWER

## 2022-01-21 NOTE — PROGRESS NOTES
Physical Therapy    Facility/Department: [address] REHAB  Treatment note     NAME: Harleen Sanchez  : 1975  MRN: [id number]     Date of Service: 2022     Evaluating Therapist: Yusuf Rivas., PT, DPT [id number]        ROOM: 6251/2619-L  DIAGNOSIS: Polyneuropathy  PRECAUTIONS: Falls, Regular diet, **PRAFOS when not in therapy**  HPI: Patient admitted to the hospital on 21 with progressively worsening leg weakness after receiving an antibody infusion for Covid diagnosed in November. Neurosurgery and neurology all saw patient on consult. Imaging has shown lumbar radiculopathy. EMG done on 21: Recommendations: This is predominantly a neuropathic process, not myopathic in nature. Post viral inflammatory processes can present early on with this picture however usually progress to development of slowing of velocity with the development of myelin abnormalities as well as increasing axonal pathology. B12 level low during admission and she has received several injections. Social:  Pt lives with fiance and son in [address] [facility] style home, 3 [address] with no railing + 1 step to enter. May have [address] Prior to admission: Pt independent with all functional mobility                    Initial Evaluation  Date:  AM     PM    Short Term Goals Long Term Goals    Was pt agreeable to Eval/treatment? 22 Yes yes       Does pt have pain?  Reports allodynia in BLE No sig c/o pain No sig c/o pain       Bed Mobility  Rolling: SBA  Supine to sit: Francesco  Sit to supine: modA  Scooting: Francesco NT NT sup Mod i   Transfers Sit to stand: maxA + SBA form 2nd helper  Stand to sit: maxA + SBA from 2nd helper  Stand pivot: NT  Slideboard tx- maxA     5xSTS: NT Sit to stand: CGA  Stand to sit: CGA   Stand pivot: NT  Slideboard tx- NT Sit to stand: Francesco  Stand to sit: Francesco   Stand pivot: NT   Francesco    sup                  Ambulation    NT 45', 20', 35'    NADEGE PLS donned  Francesco for steadying, min-modA to assist with advancing BLE    Chair follow to optimize distance   NT 10', LRAD, maxA       50', LRAD, Francesco         Walking 10 feet on uneven surface NT NT NT 10', LRAD, maxA 10', LRAD, Francesco   Wheel Chair Mobility NT ', BUE propulsion, mod i 100', BUE propulsion, sup > 200', BUE propulson, mod i   Car Transfers NT NT x2 reps, Francesco    x1 rep with handi-bar device maxA Francesco   Stair negotiation: ascended and descended  NT NT NT 1 step, maxA, KUSUM HR 4 steps, Kusum HR, Francesco   Curb Step:   ascended and descended NT NT  2\" curb, LRAD, maxA 2\" curb, LRAD, Francesco   Picking up object off the floor NT NT  MaxA with assistive device Francesco with LRAD   BLE ROM WFL           BLE Strength KUSUM hip flex- 1/5  KUSUM hip ext- 1/5  KUSUM knee ext 2+/5  KUSUM knee flex 2-/5  KUSUM PF 2+/5  KUSUM DF 0/5           Balance  Sitting EOB- SBA  Standing- maxA                          Date Family Teach Completed             Is additional Family Teaching Needed? Y or N             Hindering Progress Weakness, impulsivity, pain, endurance, balance, body habitus Weakness, impulsivity, pain, endurance, balance, body habitus  Weakness, impulsivity, pain, endurance, balance, body habitus         PT recommended ELOS 4-5 weeks           Team's Discharge Plan             Therapist at Team Meeting               General:        HRmax: 174 bpm       Beta blockers (Y/N): N      Adjusted HRmax: 174  bpm   Blood pressure (mmHg): AM:  - Prior to Tx: 124/85  - During Tx:   - Post Tx: 128/87 PM:  - Prior to Tx: -  - During Tx: -  - Post Tx: -   Time spent in HR ranges: Moderate intensity (60-70% HRmax):  104-122 BPM AM: -11 min 45 sec PM: -   High intensity (70-85% HRmax):  122-148 BPM AM: 17 min 10 sec PM: -       Therapeutic Exercise:     AM:   1. STS- x10 reps, from w/c, SBA-CGA provided  2. Mini squats- x10 reps, BUE support at ww, min-modA to assist with initiation of KUSUM knee flexing to begin sitting  3. LAQ- x10 reps each  4.  Manual resisted knee flexion- very light resistance, range limited to ~ 90% 2/2 weakness    PM:   Step ups- x10 each up to 2\" block, RUE at HR, LUE on PT's shoulder, modA to provide posterior knee block preventing hyperextension, Francesco to steady  Staggered stance weight shift- lead foot elevated on 4\" block, focusing on fwd weight shift onto block to promote weight bearing with knee in flexion, x10 reps each lower extremity, Francesco to steady    Patient education    Pt educated on tx, gait, balance, dynamic knee control, current status and POC, d/c planning    Patient response to education:   Pt verbalized understanding Pt demonstrated skill Pt requires further education in this area   yes partial All areas     Additional Comments:  AM: Pt progressing amb bout this session to 39' before requiring rest break. Pt continues to use compensatory strategies to accomodates for hip weakness and limitations with pre and initial swing phase of gait. Pt's HR elevating during session to > 90% HR max with amb trials. Pt may benefit from gait trials in BWS harness to help promote increased step repetitions while decreasing intensity of activity. PM: Pt completed car tx x2 reps this session, each time at [address] Pt requiring lift assist to stand from car first rep, 2nd rep provided hand-bar device for pt to push off of, requiring steadying assist to stand and assist to block from bumping head on door. Worked on step up and weight shifting activities to help promote dynamic knee control and proprioception. Focused on weight bearing with lead leg in knee flexed position elevated on step to help pt feel joint positioning and strengthening quads in specific range to help promote improved motor control, preventing snapping back into hyperextension. Pt continues to show sig difficulty with preventing genu recurvatum during step up activity onto 2\" block.  Pt has at least 3+/5 quad strength and able to passively achieve DF beyond neutral in NADEGE ankles, suspect proprioception deficits and motor apraxia main causes of poor dynamic knee control. Will continue to practice. AM  Time in: 0915  Time out: 1000    PM  Time in: 1430  Time out: 1515    Pt is making good progress toward established Physical Therapy goals. Continue with physical therapy current plan of care.     Amos Merchant, PT, DPT  [id number]

## 2022-01-21 NOTE — PROGRESS NOTES
[facility] Physical Medicine and Rehabilitation  Comprehensive Progress Note    Subjective:      Nikole Sandy is a 48 y.o. female admitted to inpatient rehabilitation for impairments and acitivities limitations in ADLs and mobility secondary to polyneuropathy. No acute events overnight. No cp, sob, n/v. Neuropathic discomfort in legs is improving. Tolerating therapy, making progress. No other new complaints. The patient's medical records have been reviewed. Scheduled Meds:ammonium lactate, , Daily  baclofen, 10 mg, BID- 8&2   And  baclofen, 20 mg, Nightly  mirabegron, 25 mg, Daily  enoxaparin, 40 mg, Daily  diclofenac sodium, 4 g, 4x Daily  fluticasone, 1 spray, Daily  folic acid, 1 mg, Daily  melatonin, 5 mg, Nightly  multivitamin, 1 tablet, Daily  polyethylene glycol, 17 g, BID  vitamin B-12, 1,000 mcg, Daily  influenza virus vaccine, 0.5 mL, Prior to discharge  docusate sodium, 100 mg, BID  senna, 2 tablet, Nightly  vitamin D, 2,000 Units, Daily      Continuous Infusions:  PRN Meds:sodium chloride, 1 spray, PRN  guaiFENesin-dextromethorphan, 5 mL, Q4H PRN  melatonin, 5 mg, Nightly PRN  naproxen, 500 mg, BID PRN  acetaminophen, 650 mg, Q4H PRN  ondansetron, 4 mg, Q6H PRN  polyethylene glycol, 17 g, Daily PRN         Objective:      Vitals:    01/19/22 0715 01/19/22 1303 01/20/22 0852 01/21/22 1015   BP: 133/83  135/76 (!) 141/83   Pulse: 80  101 102   Resp: 16  18 16   Temp: 97.9 °F (36.6 °C)  97.5 °F (36.4 °C) 98.4 °F (36.9 °C)   TempSrc: Temporal  Temporal Oral   SpO2: 97%      Weight:       Height:  5' (1.524 m)       General appearance: alert,  NAD  Eyes: conjunctivae/corneas clear. PERRL, EOM's intact. Lungs: clear to auscultation bilaterally  Heart: regular rate and rhythm, S1, S2   Abdomen: soft, non-tender, normal bowel sounds   Extremities: extremities atraumatic, no cyanosis or edema  Neurologic: AAOx4. LT and PP sensation intact through T6 and altered (1/2) at T7 and below.  Strength is 5/5 throughout bilateral upper extremities. Lower extremity strength is 2/5 bilateral HF; 3-/5 bilateral KE and PF; 0/5 bilateral DF and EHL (bilateral foot drop). Bilateral lower extremity spasticity, at MAS 1+     Exam stable    Laboratory:    Lab Results   Component Value Date    WBC 10.1 01/06/2022    HGB 12.5 01/06/2022    HCT 38.5 01/06/2022    MCV 90.0 01/06/2022     01/06/2022     Lab Results   Component Value Date     01/06/2022    K 4.1 01/06/2022     01/06/2022    CO2 21 01/06/2022    BUN 13 01/06/2022    CREATININE 0.5 01/06/2022    GLUCOSE 103 01/06/2022    CALCIUM 9.2 01/06/2022      Lab Results   Component Value Date    ALT 12 12/26/2021    AST 13 12/26/2021    ALKPHOS 52 12/26/2021    BILITOT 0.3 12/26/2021       Lab Results   Component Value Date    COLORU Yellow 12/20/2021    NITRU Negative 12/20/2021    GLUCOSEU Negative 12/20/2021    KETUA Negative 12/20/2021    UROBILINOGEN 0.2 12/20/2021    BILIRUBINUR Negative 12/20/2021       Functional Status:   Physical Therapy:  Bed mobility: SBA - Min A  Transfers: Min A  Ambulation: 10 ft with turn to mat table [employer] Min A     Occupational Therapy:  Feeding: Independent   Grooming: Setup  UB dressing: Setup  LB dressing: Mod A         Assessment/Plan:       48 y.o. female admitted to inpatient rehabilitation for impairments and acitivities limitations in ADLs and mobility secondary to polyneuropathy.     -Polyneuropathy: progressive since late November after she had Covid-19 and monoclonal antibody infusion. Found to have B12 deficiency and per Neurology felt to be cause of neuropathy. Monitor Neuro exam. Continue Acute rehab program.  -B12 deficiency: Continue oral B12, B12 now within normal limits. Monitor  -Borderline low vitamin D: started vitamin D supplement   -Lower extremity spasticity: Continue Baclofen, dose adjusted.  Contracture prevention.   -Pain control: tylenol PRN, naproxen prn, voltaren gel, heating pad  -Overactive bladder: continue home Myrbetriq   -DVT prophylaxis: lovenox     Awaiting AFO braces  Continue rehab program       Electronically signed by Nancy Mckeon MD on 1/21/2022 at 11:16 AM

## 2022-01-22 PROCEDURE — 1280000000 HC REHAB R&B

## 2022-01-22 PROCEDURE — 6360000002 HC RX W HCPCS: Performed by: INTERNAL MEDICINE

## 2022-01-22 PROCEDURE — 97535 SELF CARE MNGMENT TRAINING: CPT

## 2022-01-22 PROCEDURE — 97110 THERAPEUTIC EXERCISES: CPT

## 2022-01-22 PROCEDURE — 97530 THERAPEUTIC ACTIVITIES: CPT

## 2022-01-22 PROCEDURE — 6370000000 HC RX 637 (ALT 250 FOR IP): Performed by: INTERNAL MEDICINE

## 2022-01-22 PROCEDURE — 6370000000 HC RX 637 (ALT 250 FOR IP): Performed by: PHYSICAL MEDICINE & REHABILITATION

## 2022-01-22 PROCEDURE — 99232 SBSQ HOSP IP/OBS MODERATE 35: CPT | Performed by: PHYSICAL MEDICINE & REHABILITATION

## 2022-01-22 RX ADMIN — FOLIC ACID 1 MG: 1 TABLET ORAL at 09:27

## 2022-01-22 RX ADMIN — DICLOFENAC SODIUM 4 G: 10 GEL TOPICAL at 14:37

## 2022-01-22 RX ADMIN — BACLOFEN 20 MG: 10 TABLET ORAL at 21:01

## 2022-01-22 RX ADMIN — ACETAMINOPHEN 650 MG: 325 TABLET ORAL at 14:42

## 2022-01-22 RX ADMIN — Medication 1000 MCG: at 09:28

## 2022-01-22 RX ADMIN — BACLOFEN 10 MG: 10 TABLET ORAL at 14:36

## 2022-01-22 RX ADMIN — Medication 2000 UNITS: at 09:27

## 2022-01-22 RX ADMIN — DICLOFENAC SODIUM 4 G: 10 GEL TOPICAL at 21:02

## 2022-01-22 RX ADMIN — ACETAMINOPHEN 650 MG: 325 TABLET ORAL at 21:01

## 2022-01-22 RX ADMIN — GUAIFENESIN SYRUP AND DEXTROMETHORPHAN 5 ML: 100; 10 SYRUP ORAL at 05:53

## 2022-01-22 RX ADMIN — ENOXAPARIN SODIUM 40 MG: 100 INJECTION SUBCUTANEOUS at 09:27

## 2022-01-22 RX ADMIN — DICLOFENAC SODIUM 4 G: 10 GEL TOPICAL at 09:35

## 2022-01-22 RX ADMIN — BACLOFEN 10 MG: 10 TABLET ORAL at 09:28

## 2022-01-22 RX ADMIN — Medication 1 TABLET: at 09:27

## 2022-01-22 RX ADMIN — Medication 5 MG: at 21:01

## 2022-01-22 RX ADMIN — FLUTICASONE PROPIONATE 1 SPRAY: 50 SPRAY, METERED NASAL at 09:36

## 2022-01-22 RX ADMIN — DOCUSATE SODIUM 100 MG: 100 CAPSULE, LIQUID FILLED ORAL at 09:28

## 2022-01-22 RX ADMIN — Medication: at 09:35

## 2022-01-22 RX ADMIN — ACETAMINOPHEN 650 MG: 325 TABLET ORAL at 09:26

## 2022-01-22 RX ADMIN — NAPROXEN 500 MG: 500 TABLET ORAL at 05:53

## 2022-01-22 RX ADMIN — GUAIFENESIN SYRUP AND DEXTROMETHORPHAN 5 ML: 100; 10 SYRUP ORAL at 21:01

## 2022-01-22 ASSESSMENT — PAIN DESCRIPTION - DESCRIPTORS
DESCRIPTORS: ACHING;DISCOMFORT
DESCRIPTORS: ACHING
DESCRIPTORS: ACHING;DISCOMFORT

## 2022-01-22 ASSESSMENT — PAIN - FUNCTIONAL ASSESSMENT
PAIN_FUNCTIONAL_ASSESSMENT: ACTIVITIES ARE NOT PREVENTED
PAIN_FUNCTIONAL_ASSESSMENT: PREVENTS OR INTERFERES SOME ACTIVE ACTIVITIES AND ADLS
PAIN_FUNCTIONAL_ASSESSMENT: ACTIVITIES ARE NOT PREVENTED
PAIN_FUNCTIONAL_ASSESSMENT: ACTIVITIES ARE NOT PREVENTED

## 2022-01-22 ASSESSMENT — PAIN DESCRIPTION - PAIN TYPE
TYPE: CHRONIC PAIN

## 2022-01-22 ASSESSMENT — PAIN DESCRIPTION - LOCATION
LOCATION: BACK

## 2022-01-22 ASSESSMENT — PAIN DESCRIPTION - FREQUENCY
FREQUENCY: CONTINUOUS

## 2022-01-22 ASSESSMENT — PAIN DESCRIPTION - ORIENTATION
ORIENTATION: LOWER

## 2022-01-22 ASSESSMENT — PAIN DESCRIPTION - PROGRESSION
CLINICAL_PROGRESSION: GRADUALLY IMPROVING

## 2022-01-22 ASSESSMENT — PAIN SCALES - GENERAL
PAINLEVEL_OUTOF10: 4
PAINLEVEL_OUTOF10: 2
PAINLEVEL_OUTOF10: 6
PAINLEVEL_OUTOF10: 2
PAINLEVEL_OUTOF10: 5
PAINLEVEL_OUTOF10: 0
PAINLEVEL_OUTOF10: 0
PAINLEVEL_OUTOF10: 5
PAINLEVEL_OUTOF10: 5

## 2022-01-22 ASSESSMENT — PAIN DESCRIPTION - ONSET
ONSET: ON-GOING

## 2022-01-22 NOTE — PROGRESS NOTES
[id number] [facility] Physical Medicine and Rehabilitation  Comprehensive Progress Note    Subjective:      Reid Kern is a 48 y.o. female admitted to inpatient rehabilitation for impairments and acitivities limitations in ADLs and mobility secondary to polyneuropathy. No acute events overnight. No cp, sob, n/v. Progressing in therapy. No new complaints. The patient's medical records have been reviewed. Scheduled Meds:ammonium lactate, , Daily  baclofen, 10 mg, BID- 8&2   And  baclofen, 20 mg, Nightly  mirabegron, 25 mg, Daily  enoxaparin, 40 mg, Daily  diclofenac sodium, 4 g, 4x Daily  fluticasone, 1 spray, Daily  folic acid, 1 mg, Daily  melatonin, 5 mg, Nightly  multivitamin, 1 tablet, Daily  polyethylene glycol, 17 g, BID  vitamin B-12, 1,000 mcg, Daily  influenza virus vaccine, 0.5 mL, Prior to discharge  docusate sodium, 100 mg, BID  senna, 2 tablet, Nightly  vitamin D, 2,000 Units, Daily      Continuous Infusions:  PRN Meds:sodium chloride, 1 spray, PRN  guaiFENesin-dextromethorphan, 5 mL, Q4H PRN  melatonin, 5 mg, Nightly PRN  naproxen, 500 mg, BID PRN  acetaminophen, 650 mg, Q4H PRN  ondansetron, 4 mg, Q6H PRN  polyethylene glycol, 17 g, Daily PRN         Objective:      Vitals:    01/19/22 1303 01/20/22 0852 01/21/22 1015 01/22/22 0730   BP:  135/76 (!) 141/83 (!) 149/80   Pulse:  101 102 81   Resp:  18 16 16   Temp:  97.5 °F (36.4 °C) 98.4 °F (36.9 °C) 98.4 °F (36.9 °C)   TempSrc:  Temporal Oral Temporal   SpO2:    97%   Weight:       Height: 5' (1.524 m)        General appearance: alert,  NAD  Eyes: conjunctivae/corneas clear. PERRL, EOM's intact. Lungs: clear to auscultation bilaterally  Heart: regular rate and rhythm, S1, S2   Abdomen: soft, non-tender, normal bowel sounds   Extremities: extremities atraumatic, no cyanosis or edema  Neurologic: AAOx4. LT and PP sensation intact through T6 and altered (1/2) at T7 and below. Strength is 5/5 throughout bilateral upper extremities.  Lower extremity strength is 2/5 bilateral HF; 3-/5 bilateral KE and PF; 0/5 bilateral DF and EHL (bilateral foot drop). Bilateral lower extremity spasticity, at MAS 1+     Exam stable    Laboratory:    Lab Results   Component Value Date    WBC 10.1 01/06/2022    HGB 12.5 01/06/2022    HCT 38.5 01/06/2022    MCV 90.0 01/06/2022     01/06/2022     Lab Results   Component Value Date     01/06/2022    K 4.1 01/06/2022     01/06/2022    CO2 21 01/06/2022    BUN 13 01/06/2022    CREATININE 0.5 01/06/2022    GLUCOSE 103 01/06/2022    CALCIUM 9.2 01/06/2022      Lab Results   Component Value Date    ALT 12 12/26/2021    AST 13 12/26/2021    ALKPHOS 52 12/26/2021    BILITOT 0.3 12/26/2021       Lab Results   Component Value Date    COLORU Yellow 12/20/2021    NITRU Negative 12/20/2021    GLUCOSEU Negative 12/20/2021    KETUA Negative 12/20/2021    UROBILINOGEN 0.2 12/20/2021    BILIRUBINUR Negative 12/20/2021       Functional Status:   Physical Therapy:  Bed mobility: SBA - Min A  Transfers: Min A  Ambulation: 10 ft with turn to mat table [facility] Min A     Occupational Therapy:  Feeding: Independent   Grooming: Setup  UB dressing: Setup  LB dressing: Mod A         Assessment/Plan:       48 y.o. female admitted to inpatient rehabilitation for impairments and acitivities limitations in ADLs and mobility secondary to polyneuropathy.     -Polyneuropathy: progressive since late November after she had Covid-19 and monoclonal antibody infusion. Found to have B12 deficiency and per Neurology felt to be cause of neuropathy. Monitor Neuro exam. Bilateral AFOs ordered for bilateral foot drop. Continue Acute rehab program.  -B12 deficiency: Continue oral B12, B12 now within normal limits. Monitor  -Borderline low vitamin D: started vitamin D supplement   -Lower extremity spasticity: Continue Baclofen, dose adjusted.  Contracture prevention.   -Pain control: tylenol PRN, naproxen prn, voltaren gel, heating pad  -Overactive bladder: continue home Myrbetriq   -DVT prophylaxis: lovenox     Continue rehab program, making steady progress  No medication changes today        Electronically signed by Gayatri Hernandez MD on 1/22/2022 at 12:58 PM

## 2022-01-22 NOTE — PLAN OF CARE
Problem: Skin Integrity:  Goal: Will show no infection signs and symptoms  Description: Will show no infection signs and symptoms  1/22/2022 1621 by Moises Llanes  Outcome: Met This Shift  1/22/2022 0312 by Papa Boston RN  Outcome: Ongoing  Goal: Absence of new skin breakdown  Description: Absence of new skin breakdown  1/22/2022 1621 by Moises Llanes  Outcome: Met This Shift  1/22/2022 0312 by Papa Boston RN  Outcome: Ongoing     Problem: Falls - Risk of:  Goal: Will remain free from falls  Description: Will remain free from falls  1/22/2022 1621 by Moises Llanes  Outcome: Met This Shift  1/22/2022 0312 by Papa Boston RN  Outcome: Ongoing  Goal: Absence of physical injury  Description: Absence of physical injury  1/22/2022 1621 by Moises Llanes  Outcome: Met This Shift  1/22/2022 0312 by Papa Boston RN  Outcome: Ongoing     Problem: SAFETY  Goal: Free from accidental physical injury  1/22/2022 1621 by Moises Llanes  Outcome: Met This Shift  1/22/2022 0312 by Papa Boston RN  Outcome: Ongoing  Goal: Free from intentional harm  1/22/2022 1621 by Moises Llanes  Outcome: Met This Shift  1/22/2022 0312 by Papa Boston RN  Outcome: Ongoing     Problem: DAILY CARE  Goal: Daily care needs are met  1/22/2022 1621 by Moises Llanes  Outcome: Met This Shift  1/22/2022 0312 by Papa Boston RN  Outcome: Ongoing     Problem: PAIN  Goal: Patient's pain/discomfort is manageable  1/22/2022 1621 by Moises Llanes  Outcome: Met This Shift  1/22/2022 0312 by Papa Boston RN  Outcome: Ongoing     Problem: SKIN INTEGRITY  Goal: Skin integrity is maintained or improved  1/22/2022 1621 by Moises Llanes  Outcome: Met This Shift  1/22/2022 0312 by Papa Boston RN  Outcome: Ongoing     Problem: KNOWLEDGE DEFICIT  Goal: Patient/S.O. demonstrates understanding of disease process, treatment plan, medications, and discharge instructions. 1/22/2022 1621 by Moises Llanes  Outcome:  Met This Shift  1/22/2022 0312 by Latonia Link RN  Outcome: Ongoing     Problem: DISCHARGE BARRIERS  Goal: Patient's continuum of care needs are met  1/22/2022 1621 by Phil Landrum  Outcome: Met This Shift  1/22/2022 0312 by Latonia Link RN  Outcome: Ongoing     Problem: Mobility - Impaired:  Goal: Mobility will improve  Description: Mobility will improve  1/22/2022 1621 by Phil Landrum  Outcome: Met This Shift  1/22/2022 0312 by Latonia Link RN  Outcome: Ongoing     Problem: Pain:  Goal: Pain level will decrease  Description: Pain level will decrease  1/22/2022 1621 by Phil Landrum  Outcome: Met This Shift  1/22/2022 0312 by Latonia Link RN  Outcome: Ongoing  Goal: Control of acute pain  Description: Control of acute pain  1/22/2022 1621 by Phil Landrum  Outcome: Met This Shift  1/22/2022 0312 by Latonia Link RN  Outcome: Ongoing  Goal: Control of chronic pain  Description: Control of chronic pain  1/22/2022 1621 by Phil Landrum  Outcome: Met This Shift  1/22/2022 0312 by Latonia Link RN  Outcome: Ongoing

## 2022-01-22 NOTE — PLAN OF CARE
Problem: Skin Integrity:  Goal: Will show no infection signs and symptoms  Description: Will show no infection signs and symptoms  1/22/2022 8095 by Antonio Ortiz RN  Outcome: Ongoing  1/21/2022 1607 by Betty Moreno RN  Outcome: Met This Shift  Goal: Absence of new skin breakdown  Description: Absence of new skin breakdown  1/22/2022 0312 by Antonio Ortiz RN  Outcome: Ongoing  1/21/2022 1607 by Betty Moreno RN  Outcome: Met This Shift     Problem: Falls - Risk of:  Goal: Will remain free from falls  Description: Will remain free from falls  1/22/2022 4263 by Antonio Ortiz RN  Outcome: Ongoing  1/21/2022 1607 by Betty Moreno RN  Outcome: Met This Shift  Goal: Absence of physical injury  Description: Absence of physical injury  1/22/2022 2374 by Antonio Ortiz RN  Outcome: Ongoing  1/21/2022 1607 by Betty Moreno RN  Outcome: Met This Shift     Problem: SAFETY  Goal: Free from accidental physical injury  1/22/2022 9498 by Antonio Ortiz RN  Outcome: Ongoing  1/21/2022 1607 by Betty Moreno RN  Outcome: Met This Shift  Goal: Free from intentional harm  1/22/2022 0312 by Antonio Ortiz RN  Outcome: Ongoing  1/21/2022 1607 by Betty Moreno RN  Outcome: Met This Shift     Problem: DAILY CARE  Goal: Daily care needs are met  1/22/2022 4657 by Antonio Ortiz RN  Outcome: Ongoing  1/21/2022 1607 by Betty Moreno RN  Outcome: Met This Shift     Problem: PAIN  Goal: Patient's pain/discomfort is manageable  1/22/2022 9099 by Antonio Ortiz RN  Outcome: Ongoing  1/21/2022 1607 by Betty Moreno RN  Outcome: Met This Shift     Problem: SKIN INTEGRITY  Goal: Skin integrity is maintained or improved  1/22/2022 0312 by Antonio Ortiz RN  Outcome: Ongoing  1/21/2022 1607 by Betty Moreno RN  Outcome: Met This Shift     Problem: KNOWLEDGE DEFICIT  Goal: Patient/S.O. demonstrates understanding of disease process, treatment plan, medications, and discharge instructions.   1/22/2022 1194 by Antonio Ortiz RN  Outcome: Ongoing  1/21/2022 1607 by Josie Estrella RN  Outcome: Met This Shift     Problem: DISCHARGE BARRIERS  Goal: Patient's continuum of care needs are met  1/22/2022 0723 by Denise Quiñones RN  Outcome: Ongoing  1/21/2022 1607 by Josie Estrella RN  Outcome: Met This Shift     Problem: Mobility - Impaired:  Goal: Mobility will improve  Description: Mobility will improve  1/22/2022 0312 by Denise Quiñones RN  Outcome: Ongoing  1/21/2022 1607 by Josie Estrella RN  Outcome: Met This Shift     Problem: Pain:  Goal: Pain level will decrease  Description: Pain level will decrease  1/22/2022 0312 by Denise Quiñones RN  Outcome: Ongoing  1/21/2022 1607 by Josie Estrella RN  Outcome: Met This Shift  Goal: Control of acute pain  Description: Control of acute pain  1/22/2022 0312 by Denise Quiñones RN  Outcome: Ongoing  1/21/2022 1607 by Josie Estrella RN  Outcome: Met This Shift  Goal: Control of chronic pain  Description: Control of chronic pain  1/22/2022 0312 by Denise Quiñones RN  Outcome: Ongoing  1/21/2022 1607 by Josie Estrella RN  Outcome: Met This Shift

## 2022-01-22 NOTE — PROGRESS NOTES
Occupational Therapy  OCCUPATIONAL THERAPY DAILY NOTE    Date:2022  Patient Name: Nikole Sandy  MRN: [id number]  : 1975  Room: [address] [place] [address]A     Diagnosis: Polyneuropathy   Precautions: falls ,slide board transfers     Functional Assessment:   Date Status AE  Comments   Feeding 22 Independent      Grooming 22  set up w/c Demo;'d following toilet needs seated at sink to wash hands. Oral Care 22  Set up      Bathing 22 UB- set up   LB-Min A LH sponge Simulated bathing seated up in w/c needing set up for UB skills. Pt used LH sponge to reach below knees & feet for LB skills. UB Dressing 22 Set up     LB Dressing 22  Mod A  Reacher  To [facility] pants up to thighs  using reacher seated up in w/c. Footwear 22  Mod/max  A  long shoe horn  Sock aid    Toileting 22   Mod A  Grab bar  3:1 commode   ww Pt able to complete hygiene seated following voiding. Assist to pull pants up and down at w. Walker level. Homemaking 22 SBA/CGA W/w, w/c        Functional Transfers / Balance:   Date Status DME  Comments   Sit Balance 22   CGA Shower chair   W/c & 3:1 commode  CGA demo'd on commode. CGA with trunk control/core strengthening using 2 # ball then 4 # dowel molina with feet up on wooden box to promote wt bearing thru BLE's. Pt tolerated 3 reps of 10 ea x2 sets  bending down, punches outward. Stand Balance 22    Min A ww Demo'd during fxl mobility, clothing  Mgmt and transfers using w. Lige Fling for balance wearing Bilateral  AFO's     [] Tub  [x] Shower   Transfer 22  Max A  Rolling shower chair     Commode   Transfer 22 Min A  3 in 1 commode  ww  Pt completed 3:1 commode transfers from w/c level using grab bar and w. Walker. Functional   Mobility 22  Mod I/Sup(w/c level)    Ambulating with ww- Min A W/c propel Min A using w. Walker to ambulate into bathroom wearing BLE AFO's for balance.     Other: stand pivot transfer bed to w/c with ww           1/21/22                 Mod A                ww , BLE AFO's              Functional Exercises / Activity:  BUE exercises using 2# ball for bicep curls and shoulder punches to increase strength and endurance for ADL's, transfers and mobility skills. Pt tolerated 3 reps of 10 ea x2 sets performed seated up in w/c. Trunk control ex's using 4# dowel with BUE's to increase core strength along with dyn sitting balance  for functional tasks. Pt tolerated 5-6 reps of 10 ea. Sensory / Neuromuscular Re-Education:         Cognitive Skills:   Status Comments   Problem   Solving good     Memory good     Sequencing good     Safety fair +      Visual Perception:    Education:  Pt educated with safety during fxl mobility using w. Walker and commode transfers to increase awareness. [] Family teach completed on:    Pain Level: 4/10 low back      Additional Notes:   1/22/22 LH sponge issued to patient on this date for bathing skills. Patient has made good progress during treatment sessions toward set goals. Therapy emphasis to obtain goals:Current Treatment Recommendations: Strengthening,Gait Training,Patient/Caregiver Education & Training,Home Management Training,Equipment Evaluation, Education, & procurement,Balance Training,Functional Mobility Training,Positioning,Endurance Training,Wheelchair Mobility Training,Safety Education & Training,Self-Care / ADL    [x] Continue with current OT Plan of care.   [] Prepare for Discharge    Goals  Long term goals  Time Frame for Long term goals : 4 weeks to address above problem areas  Long term goal 1: Pt demo Mod I to eat all meals  Long term goal 2: Pt demo Mod I grooming seated @ sink level  Long term goal 3: Pt demo Sup to bathe @ shower level both seated & use LHS  Long term goal 4: Pt demo Mod I UE dress to don a shirt & Mod I to don underwear, pants & Min A socks & shoes using AE as needed  Long term goal 5: Pt demo Mod I commode trf using a slide board & drop arm commode & demo Mod I toileting & demo G safety  Long term goals 6: Pt demo SBA tub trf using appropriate DME- bench & slide board to ensure pt safety  Long term goal 7: Pt demo Mod I light homemaking activity @ wc level & demo G- safety & insight  Long term goal 8: Pt demo G- endurance for a 20-30 minute functional activity  Long term goal 9: Pt demo Mod I wc propulsion thorughout a living environment & around obstacles  1/12/22  OT plan of care updated on this date. Tentative length of stay 4-5 weeks    Prince Brandon OTR/L [id number]  1/19/22 OT plan of care updated on this date. Tentative length of stay is 3-4 weeksOmarbritney Ruiz OTR/L [id number]      DISCHARGE RECOMMENDATIONS  Recommended DME:    Post Discharge Care:   []Home Independently  []Home with 24hr Care / Supervision [x]Home with Partial Supervision []Home with Home Health OT []Home with Out Pt OT []Other: ___   Comments:         Time in Time out Tx Time Breakdown  Variance:   First Session  9:45 10:45am [] Individual Tx-   [x] Concurrent Tx- 60  [] Co-Tx -   [] Group Tx -   [] Time Missed -     Second Session   [] Individual Tx-   [] Concurrent Tx -  [] Co-Tx -   [] Group Tx -   [] Time Missed -              Third Session    [] Individual Tx-   [] Concurrent Tx -  [] Co-Tx -   [] Group Tx -   [] Time Missed -         Total Tx Time: [address] [facility] DURAN/L [id number]

## 2022-01-23 PROCEDURE — 6370000000 HC RX 637 (ALT 250 FOR IP): Performed by: INTERNAL MEDICINE

## 2022-01-23 PROCEDURE — 6370000000 HC RX 637 (ALT 250 FOR IP): Performed by: PHYSICAL MEDICINE & REHABILITATION

## 2022-01-23 PROCEDURE — 6360000002 HC RX W HCPCS: Performed by: INTERNAL MEDICINE

## 2022-01-23 PROCEDURE — 1280000000 HC REHAB R&B

## 2022-01-23 PROCEDURE — 97530 THERAPEUTIC ACTIVITIES: CPT | Performed by: PHYSICAL THERAPIST

## 2022-01-23 RX ADMIN — DOCUSATE SODIUM 100 MG: 100 CAPSULE, LIQUID FILLED ORAL at 21:27

## 2022-01-23 RX ADMIN — FLUTICASONE PROPIONATE 1 SPRAY: 50 SPRAY, METERED NASAL at 08:59

## 2022-01-23 RX ADMIN — FOLIC ACID 1 MG: 1 TABLET ORAL at 08:58

## 2022-01-23 RX ADMIN — BACLOFEN 10 MG: 10 TABLET ORAL at 12:58

## 2022-01-23 RX ADMIN — NAPROXEN 500 MG: 500 TABLET ORAL at 05:11

## 2022-01-23 RX ADMIN — GUAIFENESIN SYRUP AND DEXTROMETHORPHAN 5 ML: 100; 10 SYRUP ORAL at 21:23

## 2022-01-23 RX ADMIN — ENOXAPARIN SODIUM 40 MG: 100 INJECTION SUBCUTANEOUS at 08:57

## 2022-01-23 RX ADMIN — DOCUSATE SODIUM 100 MG: 100 CAPSULE, LIQUID FILLED ORAL at 08:58

## 2022-01-23 RX ADMIN — BACLOFEN 20 MG: 10 TABLET ORAL at 21:24

## 2022-01-23 RX ADMIN — Medication 5 MG: at 21:24

## 2022-01-23 RX ADMIN — Medication: at 09:03

## 2022-01-23 RX ADMIN — Medication 1 TABLET: at 08:58

## 2022-01-23 RX ADMIN — DICLOFENAC SODIUM 4 G: 10 GEL TOPICAL at 08:59

## 2022-01-23 RX ADMIN — Medication 2000 UNITS: at 08:58

## 2022-01-23 RX ADMIN — Medication 1000 MCG: at 08:58

## 2022-01-23 RX ADMIN — BACLOFEN 10 MG: 10 TABLET ORAL at 09:01

## 2022-01-23 RX ADMIN — DICLOFENAC SODIUM 4 G: 10 GEL TOPICAL at 12:59

## 2022-01-23 RX ADMIN — ACETAMINOPHEN 650 MG: 325 TABLET ORAL at 08:19

## 2022-01-23 RX ADMIN — ACETAMINOPHEN 650 MG: 325 TABLET ORAL at 21:24

## 2022-01-23 RX ADMIN — GUAIFENESIN SYRUP AND DEXTROMETHORPHAN 5 ML: 100; 10 SYRUP ORAL at 05:12

## 2022-01-23 RX ADMIN — DICLOFENAC SODIUM 4 G: 10 GEL TOPICAL at 16:21

## 2022-01-23 ASSESSMENT — PAIN DESCRIPTION - PROGRESSION
CLINICAL_PROGRESSION: GRADUALLY IMPROVING

## 2022-01-23 ASSESSMENT — PAIN SCALES - GENERAL
PAINLEVEL_OUTOF10: 5
PAINLEVEL_OUTOF10: 10
PAINLEVEL_OUTOF10: 0
PAINLEVEL_OUTOF10: 8
PAINLEVEL_OUTOF10: 4
PAINLEVEL_OUTOF10: 3

## 2022-01-23 ASSESSMENT — PAIN DESCRIPTION - ORIENTATION
ORIENTATION: LEFT
ORIENTATION: LEFT;LOWER
ORIENTATION: LEFT;LOWER

## 2022-01-23 ASSESSMENT — PAIN DESCRIPTION - DESCRIPTORS
DESCRIPTORS: ACHING;DISCOMFORT

## 2022-01-23 ASSESSMENT — PAIN DESCRIPTION - FREQUENCY
FREQUENCY: CONTINUOUS

## 2022-01-23 ASSESSMENT — PAIN DESCRIPTION - LOCATION
LOCATION: HIP;LEG
LOCATION: HIP;BACK;LEG
LOCATION: HIP;BACK;LEG

## 2022-01-23 ASSESSMENT — PAIN DESCRIPTION - PAIN TYPE
TYPE: CHRONIC PAIN

## 2022-01-23 ASSESSMENT — PAIN DESCRIPTION - ONSET
ONSET: ON-GOING

## 2022-01-23 NOTE — PROGRESS NOTES
Physical Therapy  Facility/Department: [address] REHAB  Daily Treatment Note  NAME: Loren Hogan  : 1975  MRN: [id number]    Date of Service: 2022    Evaluating Therapist: Samson Yo., PT, DPT [id number]        HAYLEE: [id number]  DIAGNOSIS: Polyneuropathy  PRECAUTIONS: Falls, Regular diet, **PRAFOS when not in therapy**  HPI: Patient admitted to the hospital on 21 with progressively worsening leg weakness after receiving an antibody infusion for Covid diagnosed in November. Neurosurgery and neurology all saw patient on consult. Carlos Peng has shown lumbar radiculopathy. EMG done on 21: Recommendations: This is predominantly a neuropathic process, not myopathic in nature.  Post viral inflammatory processes can present early on with this picture however usually progress to development of slowing of velocity with the development of myelin abnormalities as well as increasing axonal pathology. B12 level low during admission and she has received several injections.     Social:  Pt lives with fiance and son in [facility] style home, 3 PONCE with no railing + 1 step to enter. May have [address] Prior to admission: Pt independent with all functional mobility                                               Initial Evaluation  Date:  AM     PM    Short Term Goals Long Term Goals    Was pt agreeable to Eval/treatment? 22 Yes yes       Does pt have pain?  Reports allodynia in BLE No sig c/o pain No sig c/o pain       Bed Mobility  Rolling: SBA  Supine to sit: Francesco  Sit to supine: modA  Scooting: Francesco NT NT sup Mod i   Transfers Sit to stand: maxA + SBA form 2nd helper  Stand to sit: maxA + SBA from 2nd helper  Stand pivot: NT  Slideboard tx- maxA     5xSTS: NT Sit to stand: CGA  Stand to sit: CGA   Stand pivot: Francesco  Slideboard tx- NT Sit to stand: Francesco  Stand to sit: Francesco   Stand pivot: NT    Francesco    sup        Ambulation    NT 35' x2 reps, 40' x2 reps     NADEGE PLS donned  Francesco for steadying, min-modA to assist with advancing L LE     Chair follow to optimize distance    NT 10', LRAD, maxA       50', LRAD, Francesco         Walking 10 feet on uneven surface NT NT  10', LRAD, maxA 10', LRAD, Francesco   Wheel Chair Mobility NT 50 feet B UE propulsion Mod Independent  100', BUE propulsion, sup > 200', BUE propulson, mod i   Car Transfers NT NT  maxA Francesco   Stair negotiation: ascended and descended  NT NT  1 step, maxA, KUSUM HR 4 steps, Kusum HR, Francesco   Curb Step:   ascended and descended NT NT  2\" curb, LRAD, maxA 2\" curb, LRAD, Francesco   Picking up object off the floor NT NT  MaxA with assistive device Francesco with LRAD   BLE ROM WFL          BLE Strength KUSUM hip flex- 1/5  KUSUM hip ext- 1/5  KUSUM knee ext 2+/5  KUSUM knee flex 2-/5  KUSUM PF 2+/5  KUSUM DF 0/5          Balance  Sitting EOB- SBA  Standing- maxA                         Date Family Teach Completed            Is additional Family Teaching Needed?  Y or N            Hindering Progress Weakness, impulsivity, pain, endurance, balance, body habitus Weakness, impulsivity, pain, endurance, balance, body habitus         PT recommended ELOS 4-5 weeks           Team's Discharge Plan             Therapist at Team Meeting                Therapeutic Exercise:   AM:  Gait training with B PLS donned, adjustments made to shoes as family delivered new shoes since last session. Patient education  Pt educated on improved stance and mechanics during gait     Patient response to education:   Pt verbalized understanding Pt demonstrated skill Pt requires further education in this area   yes yes yes     Additional Comments: Pt resting supine upon arrival, agreeable to PT session. Pt demonstrated TE's in bed that she has been completing between sessions to continue improved B LE strengthening. Pt was able to compete transfers with decreased assist this date, light assist intermittently required for sit>stand.  Pt reports feeling change in mechanics due to increased heel height of new shoes delivered since last session but was able to compensate for continued gait. Pt required intermittent manual assist to initiate L knee flexion and progression as she fatigued with ambulation. Trunk rotation towards L noted with R LE swing, was able to continue to progress R LE when therapist applied posterior resistance to R pelvis to decrease rotational component. Chair follow utilized to continue to increase amb distance prior to fatigue. Braces removed at end of session to decrease risk of breakdown. AM  Time in: 0815  Time out: 0850    Pt is making good progress toward established Physical Therapy goals. Continue with physical therapy current plan of care.     Janee Bob, PT, DPT  [id number]

## 2022-01-24 PROCEDURE — 1280000000 HC REHAB R&B

## 2022-01-24 PROCEDURE — 6370000000 HC RX 637 (ALT 250 FOR IP): Performed by: PHYSICAL MEDICINE & REHABILITATION

## 2022-01-24 PROCEDURE — 6360000002 HC RX W HCPCS: Performed by: INTERNAL MEDICINE

## 2022-01-24 PROCEDURE — 99232 SBSQ HOSP IP/OBS MODERATE 35: CPT | Performed by: PHYSICAL MEDICINE & REHABILITATION

## 2022-01-24 PROCEDURE — 97535 SELF CARE MNGMENT TRAINING: CPT

## 2022-01-24 PROCEDURE — 97116 GAIT TRAINING THERAPY: CPT

## 2022-01-24 PROCEDURE — 97110 THERAPEUTIC EXERCISES: CPT

## 2022-01-24 PROCEDURE — 97530 THERAPEUTIC ACTIVITIES: CPT

## 2022-01-24 PROCEDURE — 6370000000 HC RX 637 (ALT 250 FOR IP): Performed by: INTERNAL MEDICINE

## 2022-01-24 RX ADMIN — NAPROXEN 500 MG: 500 TABLET ORAL at 05:20

## 2022-01-24 RX ADMIN — FOLIC ACID 1 MG: 1 TABLET ORAL at 08:15

## 2022-01-24 RX ADMIN — ENOXAPARIN SODIUM 40 MG: 100 INJECTION SUBCUTANEOUS at 08:11

## 2022-01-24 RX ADMIN — Medication 2000 UNITS: at 08:11

## 2022-01-24 RX ADMIN — Medication 5 MG: at 21:26

## 2022-01-24 RX ADMIN — ACETAMINOPHEN 650 MG: 325 TABLET ORAL at 09:16

## 2022-01-24 RX ADMIN — BACLOFEN 20 MG: 10 TABLET ORAL at 21:26

## 2022-01-24 RX ADMIN — Medication 1 TABLET: at 08:12

## 2022-01-24 RX ADMIN — Medication 1000 MCG: at 08:11

## 2022-01-24 RX ADMIN — BACLOFEN 10 MG: 10 TABLET ORAL at 12:51

## 2022-01-24 RX ADMIN — DICLOFENAC SODIUM 4 G: 10 GEL TOPICAL at 12:50

## 2022-01-24 RX ADMIN — GUAIFENESIN SYRUP AND DEXTROMETHORPHAN 5 ML: 100; 10 SYRUP ORAL at 21:27

## 2022-01-24 RX ADMIN — GUAIFENESIN SYRUP AND DEXTROMETHORPHAN 5 ML: 100; 10 SYRUP ORAL at 05:20

## 2022-01-24 RX ADMIN — ACETAMINOPHEN 650 MG: 325 TABLET ORAL at 13:57

## 2022-01-24 RX ADMIN — ACETAMINOPHEN 650 MG: 325 TABLET ORAL at 21:27

## 2022-01-24 RX ADMIN — BACLOFEN 10 MG: 10 TABLET ORAL at 08:11

## 2022-01-24 ASSESSMENT — PAIN DESCRIPTION - FREQUENCY
FREQUENCY: CONTINUOUS
FREQUENCY: CONTINUOUS
FREQUENCY: INTERMITTENT

## 2022-01-24 ASSESSMENT — PAIN DESCRIPTION - LOCATION
LOCATION: BACK
LOCATION: BACK;HIP;LEG
LOCATION: BACK

## 2022-01-24 ASSESSMENT — PAIN SCALES - GENERAL
PAINLEVEL_OUTOF10: 0
PAINLEVEL_OUTOF10: 0
PAINLEVEL_OUTOF10: 5
PAINLEVEL_OUTOF10: 2
PAINLEVEL_OUTOF10: 0
PAINLEVEL_OUTOF10: 5
PAINLEVEL_OUTOF10: 5
PAINLEVEL_OUTOF10: 0

## 2022-01-24 ASSESSMENT — PAIN DESCRIPTION - DESCRIPTORS
DESCRIPTORS: ACHING
DESCRIPTORS: ACHING

## 2022-01-24 ASSESSMENT — PAIN DESCRIPTION - ORIENTATION
ORIENTATION: LEFT;LOWER
ORIENTATION: LOWER
ORIENTATION: LEFT;LOWER

## 2022-01-24 ASSESSMENT — PAIN DESCRIPTION - PAIN TYPE
TYPE: CHRONIC PAIN

## 2022-01-24 ASSESSMENT — PAIN DESCRIPTION - PROGRESSION: CLINICAL_PROGRESSION: NOT CHANGED

## 2022-01-24 ASSESSMENT — PAIN DESCRIPTION - ONSET: ONSET: ON-GOING

## 2022-01-24 NOTE — PROGRESS NOTES
Occupational Therapy  OCCUPATIONAL THERAPY DAILY NOTE    Date:2022  Patient Name: Wallace Caraballo  MRN: [id number]  : 1975  Room: [address] [place] [address]A     Diagnosis: Polyneuropathy   Precautions: falls ,slide board transfers     Functional Assessment:   Date Status AE  Comments   Feeding 22 Independent      Grooming 22 set up w/c Pt washed face and brushed at styled hair seated at sink          Oral Care 22  Set up   Pt completed oral care seated at sink    Bathing 22  UB- set up   LB-Min A LH sponge Bathing completed in shower. Assist to wash buttocks. Pt used long handled sponge to wash lower legs and feet    UB Dressing 22  Set up  Pt donned bra and pull over shirt    LB Dressing 22  Mod A  Reacher  Pt able to thread depends and pants on with reacher. Assist to stand and pull up clothing    Footwear 22  Mod A   long shoe horn  Sock aid Pt donned socks at setup using sock aid seated in w/c. Pt able to don shoes with long shoe horn at [address] [place] [address] without AFO's. Pt continues to need total assist to don B AFO's , shoes and to tie shoe laces    Toileting 22   Mod A  Grab bar  3:1 commode   ww Pt completed hygiene following void.  Assist to stand and for balance to pull pants up and down    Homemaking 22 SBA/CGA W/w, w/c        Functional Transfers / Balance:   Date Status DME  Comments   Sit Balance 22  CGA Shower chair   W/c & 3:1 commode  During shower    Stand Balance 22   Min A/Mod A  ww During clothing management    [] Tub  [x] Shower   Transfer 22  Max A  Rolling shower chair  Transfer in and out of rolling shower chair    Commode   Transfer 22  Mod A  3 in 1 commode  ww  Stand pivot from w/c and rolling shower chair to [facility] placed over commode    Functional   Mobility 22  Mod I/Sup(w/c level)    Ambulating with ww- Min A W/c propel    Other: stand pivot transfer bed to w/c with ww           22                 Mod A                ww , BLE AFO's Functional Exercises / Activity:  BUE strength exercises: 3 # dowel molina 3 sets 10 reps in all planes                                            Green can do bar 3 sets 10 reps   B hand strength with 10 # power gripper 3 sets 10 reps         Sensory / Neuromuscular Re-Education:         Cognitive Skills:   Status Comments   Problem   Solving good     Memory good     Sequencing good     Safety fair +      Visual Perception:    Education:  Pt educated on AE for LB dressing and bathing      [] Family teach completed on:    Pain Level: 4/10 low back      Additional Notes:   1/22/22 LH sponge issued to patient on this date for bathing skills. Patient has made good progress during treatment sessions toward set goals. Therapy emphasis to obtain goals:Current Treatment Recommendations: Strengthening,Gait Training,Patient/Caregiver Education & Training,Home Management Training,Equipment Evaluation, Education, & procurement,Balance Training,Functional Mobility Training,Positioning,Endurance Training,Wheelchair Mobility Training,Safety Education & Training,Self-Care / ADL    [x] Continue with current OT Plan of care.   [] Prepare for Discharge    Goals  Long term goals  Time Frame for Long term goals : 4 weeks to address above problem areas  Long term goal 1: Pt demo Mod I to eat all meals  Long term goal 2: Pt demo Mod I grooming seated @ sink level  Long term goal 3: Pt demo Sup to bathe @ shower level both seated & use LHS  Long term goal 4: Pt demo Mod I UE dress to don a shirt & Mod I to don underwear, pants & Min A socks & shoes using AE as needed  Long term goal 5: Pt demo Mod I commode trf using a slide board & drop arm commode & demo Mod I toileting & demo G safety  Long term goals 6: Pt demo SBA tub trf using appropriate DME- bench & slide board to ensure pt safety  Long term goal 7: Pt demo Mod I light homemaking activity @ wc level & demo G- safety & insight  Long term goal 8: Pt demo G- endurance for a 20-30 minute functional activity  Long term goal 9: Pt demo Mod I wc propulsion thorughout a living environment & around obstacles  1/12/22  OT plan of care updated on this date. Tentative length of stay 4-5 weeks    Marietta Fulton OTR/L [id number]  1/19/22 OT plan of care updated on this date. Tentative length of stay is 3-4 weeksOmarbritney Ruiz OTR/L [id number]      DISCHARGE RECOMMENDATIONS  Recommended DME:    Post Discharge Care:   []Home Independently  []Home with 24hr Care / Supervision [x]Home with Partial Supervision []Home with Home Health OT []Home with Out Pt OT []Other: ___   Comments:         Time in Time out Tx Time Breakdown  Variance:   First Session  0815 0900  [x] Individual Tx- 45  [] Concurrent Tx-   [] Co-Tx -   [] Group Tx -   [] Time Missed -     Second Session 2051 0343  [] Individual Tx-   [x] Concurrent Tx -45  [] Co-Tx -   [] Group Tx -   [] Time Missed -              Third Session    [] Individual Tx-   [] Concurrent Tx -  [] Co-Tx -   [] Group Tx -   [] Time Missed -         Total Tx Time: 90 mins      Marietta Fulton OTR/L [id number]

## 2022-01-24 NOTE — PROGRESS NOTES
Physical Therapy    Facility/Department: [facility] REH[facility]  Treatment note     NAME: Delia Silva  : 1975  MRN: [id number]     Date of Service: 2022     Evaluating Therapist: Bren Ogden, PT, DPT [id number]        ROOM: [address] [place] [address]  DIAGNOSIS: Polyneuropathy  PRECAUTIONS: Falls, Regular diet, **PRAFOS when not in therapy**  HPI: Patient admitted to the hospital on 21 with progressively worsening leg weakness after receiving an antibody infusion for Covid diagnosed in November. Neurosurgery and neurology all saw patient on consult. Imaging has shown lumbar radiculopathy. EMG done on 21: Recommendations: This is predominantly a neuropathic process, not myopathic in nature. Post viral inflammatory processes can present early on with this picture however usually progress to development of slowing of velocity with the development of myelin abnormalities as well as increasing axonal pathology. B12 level low during admission and she has received several injections. Social:  Pt lives with fiance and son in [address] [facility] style home, [address] with no railing + 1 step to enter. May have [address] Prior to admission: Pt independent with all functional mobility                    Initial Evaluation  Date:  AM     PM    Short Term Goals Long Term Goals    Was pt agreeable to Eval/treatment? 22 Yes yes       Does pt have pain?  Reports allodynia in BLE No sig c/o pain No sig c/o pain       Bed Mobility  Rolling: SBA  Supine to sit: Francesco  Sit to supine: modA  Scooting: Francesco NT NT sup Mod i   Transfers Sit to stand: maxA + SBA form 2nd helper  Stand to sit: maxA + SBA from 2nd helper  Stand pivot: NT  Slideboard tx- maxA     5xSTS: NT Sit to stand: CGA  Stand to sit: CGA   Stand pivot: NT  Slideboard tx- NT Sit to stand: CGA  Stand to sit: CGA   Stand pivot: NT Francesco    sup                  Ambulation    NT 42'x2, ww    NADEGE PLS donned  Francesco for steadying, min-modA to assist with advancing BLE 34'x2, ww, Francesco    KUSUM Custom AFO donned 10', LRAD, maxA       50', LRAD, Francesco         Walking 10 feet on uneven surface NT NT NT 10', LRAD, maxA 10', LRAD, Francesco   Wheel Chair Mobility NT ', BUE propulsion, mod i 100', BUE propulsion, sup > 200', BUE propulson, mod i   Car Transfers NT NT NT maxA Francesco   Stair negotiation: ascended and descended  NT NT 4 steps, KUSUM HR, modA    KUSUM custom AFOs donned 1 step, maxA, KUSUM HR 4 steps, Kusum HR, Francesco   Curb Step:   ascended and descended NT NT NT 2\" curb, LRAD, maxA 2\" curb, LRAD, Francesco   Picking up object off the floor NT NT NT MaxA with assistive device Francesco with LRAD   BLE ROM WFL           BLE Strength KUSUM hip flex- 1/5  KUSUM hip ext- 1/5  KUSUM knee ext 2+/5  KUSUM knee flex 2-/5  KUSUM PF 2+/5  KUSUM DF 0/5           Balance  Sitting EOB- SBA  Standing- maxA                          Date Family Teach Completed             Is additional Family Teaching Needed? Y or N             Hindering Progress Weakness, impulsivity, pain, endurance, balance, body habitus Weakness, impulsivity, pain, endurance, balance, body habitus  Weakness, impulsivity, pain, endurance, balance, body habitus         PT recommended ELOS 4-5 weeks           Team's Discharge Plan             Therapist at Team Meeting               General:        HRmax: 174 bpm       Beta blockers (Y/N): N      Adjusted HRmax: 174  bpm   Blood pressure (mmHg): AM:  - Prior to Tx: 139/79  - During Tx:   - Post Tx:  PM:  - Prior to Tx: -  - During Tx: -  - Post Tx: -   Time spent in HR ranges: Moderate intensity (60-70% HRmax):  104-122 BPM AM: 12 min 19 sec PM: -   High intensity (70-85% HRmax):  122-148 BPM AM: 15 min 55 sec PM: -     Therapeutic Exercise:     AM:   1. amb in BWS ceiling hareness- 2x15' with ww, Francesco, limited by fit of sling and discomfort    PM:   1.  Functional mobility    Patient education    Pt educated on tx, gait, stair negotiation, current status and POC, d/c planning    Patient response to education:   Pt verbalized understanding Pt demonstrated skill Pt requires further education in this area          Additional Comments:   AM: Trialed amb in BWS ceiling harness, bouts limited due to fit of harness on pt. Pt edvin to amb over ground, progressing distance per bout with 2 turns, tolerating 42' each. Pt continues to benefit from moderate knee block in posterior knee to prevent genu recurvatum. PM: Orthotist present during session to provide custom AFOs to pt. Adjusted posterior pin length to decrease amount of plantarflexion allowed, reducing severity of genu recurvatum. Pt able to advance % independently during amb bouts. Pt able to complete 4 steps suign NADEGE HR, requiring mod-maxA to help advance leading leg when ascending, and trailing leg when descending. Pt educated extensively on performing routine skin checks with bracing donned. Pt verbalizing understanding and in agreement. AM  Time in: 0915  Time out: 1000    PM  Time in: 1430  Time out: 1515     Pt is making good progress toward established Physical Therapy goals. Continue with physical therapy current plan of care.     Brain Choe., PT, DPT  [id number]

## 2022-01-24 NOTE — PROGRESS NOTES
extremity strength is 2/5 bilateral HF; 3-/5 bilateral KE and PF; 1/5 bilateral DF and EHL (bilateral foot drop). Bilateral lower extremity spasticity, at MAS 1+         Laboratory:    Lab Results   Component Value Date    WBC 10.1 01/06/2022    HGB 12.5 01/06/2022    HCT 38.5 01/06/2022    MCV 90.0 01/06/2022     01/06/2022     Lab Results   Component Value Date     01/06/2022    K 4.1 01/06/2022     01/06/2022    CO2 21 01/06/2022    BUN 13 01/06/2022    CREATININE 0.5 01/06/2022    GLUCOSE 103 01/06/2022    CALCIUM 9.2 01/06/2022      Lab Results   Component Value Date    ALT 12 12/26/2021    AST 13 12/26/2021    ALKPHOS 52 12/26/2021    BILITOT 0.3 12/26/2021       Lab Results   Component Value Date    COLORU Yellow 12/20/2021    NITRU Negative 12/20/2021    GLUCOSEU Negative 12/20/2021    KETUA Negative 12/20/2021    UROBILINOGEN 0.2 12/20/2021    BILIRUBINUR Negative 12/20/2021       Functional Status:   Physical Therapy:  Bed mobility: SBA - Min A  Transfers: Min A  Ambulation: 10 ft with turn to mat table [employer] Min A     Occupational Therapy:  Feeding: Independent   Grooming: Setup  UB dressing: Setup  LB dressing: Mod A         Assessment/Plan:       48 y.o. female admitted to inpatient rehabilitation for impairments and acitivities limitations in ADLs and mobility secondary to polyneuropathy.     -Polyneuropathy: progressive since late November after she had Covid-19 and monoclonal antibody infusion. Found to have B12 deficiency and per Neurology felt to be cause of neuropathy. Monitor Neuro exam. Bilateral AFOs ordered for bilateral foot drop. Continue Acute rehab program.  -B12 deficiency: Continue oral B12, B12 now within normal limits. Monitor  -Borderline low vitamin D: started vitamin D supplement   -Lower extremity spasticity: Continue Baclofen, dose adjusted.  Contracture prevention.   -Pain control: tylenol PRN, naproxen prn, voltaren gel, heating pad  -Overactive bladder: continue home Myrbetriq   -DVT prophylaxis: lovenox     Continue rehab program, making steady progress    Gradually getting some motor return in the lower extremities        Electronically signed by Tammie Vasquez MD on 1/24/2022 at 3:33 PM

## 2022-01-25 PROCEDURE — L2200 LIMITED ANKLE MOTION EA JNT: HCPCS

## 2022-01-25 PROCEDURE — 6370000000 HC RX 637 (ALT 250 FOR IP): Performed by: INTERNAL MEDICINE

## 2022-01-25 PROCEDURE — 97116 GAIT TRAINING THERAPY: CPT

## 2022-01-25 PROCEDURE — 6370000000 HC RX 637 (ALT 250 FOR IP): Performed by: PHYSICAL MEDICINE & REHABILITATION

## 2022-01-25 PROCEDURE — L2210 DORSIFLEXION ASSIST EACH JOI: HCPCS

## 2022-01-25 PROCEDURE — 6360000002 HC RX W HCPCS: Performed by: INTERNAL MEDICINE

## 2022-01-25 PROCEDURE — 97530 THERAPEUTIC ACTIVITIES: CPT

## 2022-01-25 PROCEDURE — 1280000000 HC REHAB R&B

## 2022-01-25 PROCEDURE — L1970 AFO PLASTIC MOLDED W/ANKLE J: HCPCS

## 2022-01-25 PROCEDURE — 99232 SBSQ HOSP IP/OBS MODERATE 35: CPT | Performed by: PHYSICAL MEDICINE & REHABILITATION

## 2022-01-25 PROCEDURE — 97110 THERAPEUTIC EXERCISES: CPT

## 2022-01-25 PROCEDURE — 97112 NEUROMUSCULAR REEDUCATION: CPT

## 2022-01-25 RX ADMIN — DOCUSATE SODIUM 100 MG: 100 CAPSULE, LIQUID FILLED ORAL at 07:59

## 2022-01-25 RX ADMIN — DICLOFENAC SODIUM 4 G: 10 GEL TOPICAL at 08:00

## 2022-01-25 RX ADMIN — DOCUSATE SODIUM 100 MG: 100 CAPSULE, LIQUID FILLED ORAL at 19:46

## 2022-01-25 RX ADMIN — Medication 1000 MCG: at 07:59

## 2022-01-25 RX ADMIN — Medication 1 TABLET: at 07:59

## 2022-01-25 RX ADMIN — ENOXAPARIN SODIUM 40 MG: 100 INJECTION SUBCUTANEOUS at 07:58

## 2022-01-25 RX ADMIN — ACETAMINOPHEN 650 MG: 325 TABLET ORAL at 07:56

## 2022-01-25 RX ADMIN — ACETAMINOPHEN 650 MG: 325 TABLET ORAL at 19:45

## 2022-01-25 RX ADMIN — Medication 5 MG: at 19:45

## 2022-01-25 RX ADMIN — GUAIFENESIN SYRUP AND DEXTROMETHORPHAN 5 ML: 100; 10 SYRUP ORAL at 19:45

## 2022-01-25 RX ADMIN — BACLOFEN 10 MG: 10 TABLET ORAL at 14:00

## 2022-01-25 RX ADMIN — Medication 2000 UNITS: at 07:58

## 2022-01-25 RX ADMIN — Medication: at 08:00

## 2022-01-25 RX ADMIN — FLUTICASONE PROPIONATE 1 SPRAY: 50 SPRAY, METERED NASAL at 08:01

## 2022-01-25 RX ADMIN — BACLOFEN 20 MG: 10 TABLET ORAL at 19:46

## 2022-01-25 RX ADMIN — BACLOFEN 10 MG: 10 TABLET ORAL at 07:59

## 2022-01-25 RX ADMIN — DICLOFENAC SODIUM 4 G: 10 GEL TOPICAL at 14:11

## 2022-01-25 RX ADMIN — GUAIFENESIN SYRUP AND DEXTROMETHORPHAN 5 ML: 100; 10 SYRUP ORAL at 06:11

## 2022-01-25 RX ADMIN — FOLIC ACID 1 MG: 1 TABLET ORAL at 07:59

## 2022-01-25 RX ADMIN — NAPROXEN 500 MG: 500 TABLET ORAL at 06:11

## 2022-01-25 RX ADMIN — DICLOFENAC SODIUM 4 G: 10 GEL TOPICAL at 19:48

## 2022-01-25 RX ADMIN — DICLOFENAC SODIUM 4 G: 10 GEL TOPICAL at 17:21

## 2022-01-25 ASSESSMENT — PAIN SCALES - GENERAL
PAINLEVEL_OUTOF10: 3
PAINLEVEL_OUTOF10: 5
PAINLEVEL_OUTOF10: 5
PAINLEVEL_OUTOF10: 3
PAINLEVEL_OUTOF10: 5
PAINLEVEL_OUTOF10: 0

## 2022-01-25 ASSESSMENT — PAIN DESCRIPTION - DESCRIPTORS
DESCRIPTORS: ACHING;CONSTANT;DISCOMFORT
DESCRIPTORS: ACHING;CONSTANT;DISCOMFORT

## 2022-01-25 ASSESSMENT — PAIN DESCRIPTION - PAIN TYPE
TYPE: CHRONIC PAIN
TYPE: CHRONIC PAIN

## 2022-01-25 ASSESSMENT — PAIN DESCRIPTION - LOCATION
LOCATION: BACK
LOCATION: BACK

## 2022-01-25 ASSESSMENT — PAIN DESCRIPTION - FREQUENCY: FREQUENCY: CONTINUOUS

## 2022-01-25 ASSESSMENT — PAIN DESCRIPTION - ORIENTATION
ORIENTATION: LEFT;LOWER
ORIENTATION: LEFT;LOWER

## 2022-01-25 ASSESSMENT — PAIN DESCRIPTION - PROGRESSION: CLINICAL_PROGRESSION: NOT CHANGED

## 2022-01-25 ASSESSMENT — PAIN DESCRIPTION - ONSET: ONSET: ON-GOING

## 2022-01-25 NOTE — PROGRESS NOTES
Physical Therapy    Facility/Department: [facility] REHAB  Treatment note     NAME: Jesus Saeed  : 1975  MRN: [id number]     Date of Service: 2022     Evaluating Therapist: Cielo Simmons., PT, DPT [id number]        ROOM: [address] [place] [address]  DIAGNOSIS: Polyneuropathy  PRECAUTIONS: Falls, Regular diet, **PRAFOS when not in therapy**  HPI: Patient admitted to the hospital on 21 with progressively worsening leg weakness after receiving an antibody infusion for Covid diagnosed in November. Neurosurgery and neurology all saw patient on consult. Imaging has shown lumbar radiculopathy. EMG done on 21: Recommendations: This is predominantly a neuropathic process, not myopathic in nature. Post viral inflammatory processes can present early on with this picture however usually progress to development of slowing of velocity with the development of myelin abnormalities as well as increasing axonal pathology. B12 level low during admission and she has received several injections. Social:  Pt lives with fiance and son in [address] [facility] style home, [address] with no railing + 1 step to enter. May have [address] Prior to admission: Pt independent with all functional mobility                    Initial Evaluation  Date:  AM     PM    Short Term Goals Long Term Goals    Was pt agreeable to Eval/treatment? 22 Yes yes       Does pt have pain?  Reports allodynia in BLE No sig c/o pain No sig c/o pain       Bed Mobility  Rolling: SBA  Supine to sit: Francesco  Sit to supine: modA  Scooting: Francesco NT NT sup Mod i   Transfers Sit to stand: maxA + SBA form 2nd helper  Stand to sit: maxA + SBA from 2nd helper  Stand pivot: NT  Slideboard tx- maxA     5xSTS: NT Sit to stand: SBA-CGA  Stand to sit: SBACGA   Stand pivot: NT  Slideboard tx- NT Sit to stand: SBA-CGA  Stand to sit: SBA-CGA   Stand pivot: NT Francesco    sup                  Ambulation    NT 62', 50', 22', 23'x2    WW, Francesco  NADEGE Custom AFO 63'x2, 30',  ww, Francesco    NADEGE Custom AFO understanding Pt demonstrated skill Pt requires further education in this area   yes partial All areas     Additional Comments:   AM: Pt able to progress amb distance with new custom AFO donned. Pt advancing BLE independently for ~ 90% of session, occasional assist for controlling foot placement. Provided posterior resistance at pelvis to prevent pt using majority of momentum to help with limb advancement, challenging pt to engage hip flexors for swing through phase of gait. Pt's HR elevating to max of 91% HR max during bouts. Intermittent seated rest breaks provided to recover, pt rating exertion 17/20 on scale. PM: Session focused on challenging ambulation, pt tolerated 2 bouts of longest distance this admission. Pt continues to demo compensatory strategies with pelvic rotation and suing momentum to allow LE to swing through due to proximal hip weakness. Worked on limb advancement in parallel bars with cane step over activity. Pt requiring sig verbal cueing and min-modA assist to help advance LE over cane. Pt seemed to respond well with visual cue of PT's hand to set height level for pt to lift knee to. Will continue to practice. Chair/bed alarm:     AM  Time in: 0915  Time out: 1000    PM  Time in:  1430  Time out: 1515    Pt is making good progress toward established Physical Therapy goals. Continue with physical therapy current plan of care.     Bernadette Tomlin., PT, DPT  [id number]

## 2022-01-25 NOTE — PROGRESS NOTES
Occupational Therapy  OCCUPATIONAL THERAPY DAILY NOTE    Date:2022  Patient Name: Milady Petersen  MRN: [id number]  : 1975  Room: [address] [place], [place] [address]A     Diagnosis: Polyneuropathy   Precautions: falls ,slide board transfers     Functional Assessment:   Date Status AE  Comments   Feeding 22 Independent      Grooming 22 set up w/c          Oral Care 22  Set up      Bathing 22  UB- set up   LB-Min A LH sponge    UB Dressing 22  Set up     LB Dressing 22  Mod A  Reacher     Footwear 22  Mod A   long shoe horn  Sock aid    Toileting 22   Mod A  Grab bar  3:1 commode   ww    Homemaking 22 SBA/CGA W/w, w/c        Functional Transfers / Balance:   Date Status DME  Comments   Sit Balance 22  CGA Shower chair   W/c & 3:1 commode     Stand Balance 22   Min A  ww Standing at table top level engaging BUE's in tx activity    [] Tub  [x] Shower   Transfer 22  Max A  Rolling shower chair     Commode   Transfer 22  Mod A  3 in 1 commode  ww     Functional   Mobility 22  Mod I/Sup(w/c level)    Ambulating with ww- Min A W/c propel    Other: stand pivot transfer bed to w/c with ww     Sit to stand from w/c to table top level  22  Mod A         Mod A      ww , BLE AFO's              Functional Exercises / Activity:   BUE strength exercises and core strengthening with 5 # and 2 # weighted balls. Pt used 5 # ball for elbow and core strength and 2 # ball for shoulder exercises. Pt able to complete 3 sets 20 reps    BUE strength exercises: mister  with 2 # wt 15 reps                                            Green can do bar 3 sets 15 reps   Standing balance/endurance activity at table top level while completing graded clothespins and ROM shoulder arc. Pt able to stand 5 mins x 3 reps at Chiqui Silverio A level      Sensory / Neuromuscular Re-Education:         Cognitive Skills:   Status Comments   Problem   Solving good     Memory good     Sequencing good     Safety fair +      Visual Perception:    Education:  Pt educated on hand placement during sit to stand transfers from w/c     [] Family teach completed on:    Pain Level: 4/10 low back      Additional Notes:   1/22/22 LH sponge issued to patient on this date for bathing skills. Patient has made good progress during treatment sessions toward set goals. Therapy emphasis to obtain goals:Current Treatment Recommendations: Strengthening,Gait Training,Patient/Caregiver Education & Training,Home Management Training,Equipment Evaluation, Education, & procurement,Balance Training,Functional Mobility Training,Positioning,Endurance Training,Wheelchair Mobility Training,Safety Education & Training,Self-Care / ADL    [x] Continue with current OT Plan of care. [] Prepare for Discharge    Goals  Long term goals  Time Frame for Long term goals : 4 weeks to address above problem areas  Long term goal 1: Pt demo Mod I to eat all meals  Long term goal 2: Pt demo Mod I grooming seated @ sink level  Long term goal 3: Pt demo Sup to bathe @ shower level both seated & use LHS  Long term goal 4: Pt demo Mod I UE dress to don a shirt & Mod I to don underwear, pants & Min A socks & shoes using AE as needed  Long term goal 5: Pt demo Mod I commode trf using a slide board & drop arm commode & demo Mod I toileting & demo G safety  Long term goals 6: Pt demo SBA tub trf using appropriate DME- bench & slide board to ensure pt safety  Long term goal 7: Pt demo Mod I light homemaking activity @ wc level & demo G- safety & insight  Long term goal 8: Pt demo G- endurance for a 20-30 minute functional activity  Long term goal 9: Pt demo Mod I wc propulsion thorughout a living environment & around obstacles  1/12/22  OT plan of care updated on this date. Tentative length of stay 4-5 weeks    Conrad Lou OTR/L [id number]  1/19/22 OT plan of care updated on this date. Tentative length of stay is 3-4 weeksAshli Ruiz OTR/L [facility]  Recommended DME:    Post Discharge Care:   []Home Independently  []Home with 24hr Care / Supervision [x]Home with Partial Supervision []Home with Home Health OT []Home with Out Pt OT []Other: ___   Comments:         Time in Time out Tx Time Breakdown  Variance:   First Session  [id number]  [] Individual Tx-   [x] Concurrent Tx- 45  [] Co-Tx -   [] Group Tx -   [] Time Missed -     Second Session [id number]  [x] Individual Tx- 45  [] Concurrent Tx -  [] Co-Tx -   [] Group Tx -   [] Time Missed -              Third Session    [] Individual Tx-   [] Concurrent Tx -  [] Co-Tx -   [] Group Tx -   [] Time Missed -         Total Tx Time: 90 mins      Antonio Palacios OTR/L [id number]

## 2022-01-26 PROCEDURE — 97116 GAIT TRAINING THERAPY: CPT

## 2022-01-26 PROCEDURE — 6360000002 HC RX W HCPCS: Performed by: INTERNAL MEDICINE

## 2022-01-26 PROCEDURE — 6370000000 HC RX 637 (ALT 250 FOR IP): Performed by: PHYSICAL MEDICINE & REHABILITATION

## 2022-01-26 PROCEDURE — 97530 THERAPEUTIC ACTIVITIES: CPT

## 2022-01-26 PROCEDURE — 1280000000 HC REHAB R&B

## 2022-01-26 PROCEDURE — 99232 SBSQ HOSP IP/OBS MODERATE 35: CPT | Performed by: PHYSICAL MEDICINE & REHABILITATION

## 2022-01-26 PROCEDURE — 97112 NEUROMUSCULAR REEDUCATION: CPT

## 2022-01-26 PROCEDURE — 97110 THERAPEUTIC EXERCISES: CPT

## 2022-01-26 PROCEDURE — 97535 SELF CARE MNGMENT TRAINING: CPT

## 2022-01-26 PROCEDURE — 6370000000 HC RX 637 (ALT 250 FOR IP): Performed by: INTERNAL MEDICINE

## 2022-01-26 RX ADMIN — GUAIFENESIN SYRUP AND DEXTROMETHORPHAN 5 ML: 100; 10 SYRUP ORAL at 20:32

## 2022-01-26 RX ADMIN — Medication: at 08:25

## 2022-01-26 RX ADMIN — ACETAMINOPHEN 650 MG: 325 TABLET ORAL at 13:59

## 2022-01-26 RX ADMIN — DICLOFENAC SODIUM 4 G: 10 GEL TOPICAL at 13:00

## 2022-01-26 RX ADMIN — ACETAMINOPHEN 650 MG: 325 TABLET ORAL at 20:32

## 2022-01-26 RX ADMIN — ACETAMINOPHEN 650 MG: 325 TABLET ORAL at 08:22

## 2022-01-26 RX ADMIN — DICLOFENAC SODIUM 4 G: 10 GEL TOPICAL at 20:33

## 2022-01-26 RX ADMIN — DOCUSATE SODIUM 100 MG: 100 CAPSULE, LIQUID FILLED ORAL at 08:23

## 2022-01-26 RX ADMIN — Medication 2000 UNITS: at 08:23

## 2022-01-26 RX ADMIN — BACLOFEN 10 MG: 10 TABLET ORAL at 08:23

## 2022-01-26 RX ADMIN — Medication 5 MG: at 20:32

## 2022-01-26 RX ADMIN — FOLIC ACID 1 MG: 1 TABLET ORAL at 08:23

## 2022-01-26 RX ADMIN — DICLOFENAC SODIUM 4 G: 10 GEL TOPICAL at 08:24

## 2022-01-26 RX ADMIN — FLUTICASONE PROPIONATE 1 SPRAY: 50 SPRAY, METERED NASAL at 08:25

## 2022-01-26 RX ADMIN — GUAIFENESIN SYRUP AND DEXTROMETHORPHAN 5 ML: 100; 10 SYRUP ORAL at 06:13

## 2022-01-26 RX ADMIN — ENOXAPARIN SODIUM 40 MG: 100 INJECTION SUBCUTANEOUS at 08:23

## 2022-01-26 RX ADMIN — BACLOFEN 10 MG: 10 TABLET ORAL at 13:59

## 2022-01-26 RX ADMIN — BACLOFEN 20 MG: 10 TABLET ORAL at 20:32

## 2022-01-26 RX ADMIN — Medication 1 TABLET: at 08:23

## 2022-01-26 RX ADMIN — NAPROXEN 500 MG: 500 TABLET ORAL at 06:13

## 2022-01-26 RX ADMIN — Medication 1000 MCG: at 08:23

## 2022-01-26 RX ADMIN — DOCUSATE SODIUM 100 MG: 100 CAPSULE, LIQUID FILLED ORAL at 20:34

## 2022-01-26 ASSESSMENT — PAIN DESCRIPTION - PAIN TYPE
TYPE: CHRONIC PAIN

## 2022-01-26 ASSESSMENT — PAIN DESCRIPTION - DESCRIPTORS
DESCRIPTORS: ACHING;CONSTANT
DESCRIPTORS: ACHING;CONSTANT;DISCOMFORT

## 2022-01-26 ASSESSMENT — PAIN DESCRIPTION - PROGRESSION
CLINICAL_PROGRESSION: NOT CHANGED

## 2022-01-26 ASSESSMENT — PAIN DESCRIPTION - FREQUENCY
FREQUENCY: CONTINUOUS

## 2022-01-26 ASSESSMENT — PAIN SCALES - GENERAL
PAINLEVEL_OUTOF10: 6
PAINLEVEL_OUTOF10: 3
PAINLEVEL_OUTOF10: 5
PAINLEVEL_OUTOF10: 3
PAINLEVEL_OUTOF10: 6
PAINLEVEL_OUTOF10: 5

## 2022-01-26 ASSESSMENT — PAIN DESCRIPTION - LOCATION
LOCATION: BACK
LOCATION: BACK
LOCATION: BACK;LEG
LOCATION: BACK;LEG
LOCATION: BACK

## 2022-01-26 ASSESSMENT — PAIN DESCRIPTION - ONSET
ONSET: ON-GOING

## 2022-01-26 ASSESSMENT — PAIN DESCRIPTION - ORIENTATION
ORIENTATION: LEFT;LOWER

## 2022-01-26 ASSESSMENT — PAIN - FUNCTIONAL ASSESSMENT: PAIN_FUNCTIONAL_ASSESSMENT: ACTIVITIES ARE NOT PREVENTED

## 2022-01-26 NOTE — PROGRESS NOTES
[id number] [facility] Physical Medicine and Rehabilitation  Comprehensive Progress Note    Subjective:      Nikole Sandy is a 48 y.o. female admitted to inpatient rehabilitation for impairments and acitivities limitations in ADLs and mobility secondary to polyneuropathy. No acute events overnight. No cp, sob, n/v. No new issues. She is a little disappointed in her estimated length of stay. No other complaints. The patient's medical records have been reviewed. Scheduled Meds:ammonium lactate, , Daily  baclofen, 10 mg, BID- 8&2   And  baclofen, 20 mg, Nightly  mirabegron, 25 mg, Daily  enoxaparin, 40 mg, Daily  diclofenac sodium, 4 g, 4x Daily  fluticasone, 1 spray, Daily  folic acid, 1 mg, Daily  melatonin, 5 mg, Nightly  multivitamin, 1 tablet, Daily  polyethylene glycol, 17 g, BID  vitamin B-12, 1,000 mcg, Daily  influenza virus vaccine, 0.5 mL, Prior to discharge  docusate sodium, 100 mg, BID  senna, 2 tablet, Nightly  vitamin D, 2,000 Units, Daily      Continuous Infusions:  PRN Meds:sodium chloride, 1 spray, PRN  guaiFENesin-dextromethorphan, 5 mL, Q4H PRN  melatonin, 5 mg, Nightly PRN  naproxen, 500 mg, BID PRN  acetaminophen, 650 mg, Q4H PRN  ondansetron, 4 mg, Q6H PRN  polyethylene glycol, 17 g, Daily PRN         Objective:      Vitals:    01/23/22 1448 01/24/22 0900 01/25/22 0724 01/26/22 0800   BP: 138/60 (!) 146/79 116/80 (!) 140/85   Pulse: 90 90 83 105   Resp: 18 20 16 16   Temp: 97.6 °F (36.4 °C) 97.6 °F (36.4 °C) 97 °F (36.1 °C) 97.3 °F (36.3 °C)   TempSrc: Temporal Temporal Temporal Temporal   SpO2:  97% 97% 97%   Weight:       Height:         General appearance: alert,  NAD, up in chair   Eyes: conjunctivae/corneas clear. PERRL, EOM's intact. Lungs: clear to auscultation bilaterally  Heart: regular rate and rhythm, S1, S2   Abdomen: soft, non-tender, normal bowel sounds   Extremities: extremities atraumatic, no cyanosis or edema  Neurologic: AAOx4.  LT and PP sensation intact through T6 and altered (1/2) at T7 and below. Strength is 5/5 throughout bilateral upper extremities. Lower extremity strength is 2/5 bilateral HF; 3-/5 bilateral KE and PF; 1/5 bilateral DF and EHL (bilateral foot drop). Bilateral lower extremity spasticity, at MAS 1+         Laboratory:    Lab Results   Component Value Date    WBC 10.1 01/06/2022    HGB 12.5 01/06/2022    HCT 38.5 01/06/2022    MCV 90.0 01/06/2022     01/06/2022     Lab Results   Component Value Date     01/06/2022    K 4.1 01/06/2022     01/06/2022    CO2 21 01/06/2022    BUN 13 01/06/2022    CREATININE 0.5 01/06/2022    GLUCOSE 103 01/06/2022    CALCIUM 9.2 01/06/2022      Lab Results   Component Value Date    ALT 12 12/26/2021    AST 13 12/26/2021    ALKPHOS 52 12/26/2021    BILITOT 0.3 12/26/2021       Lab Results   Component Value Date    COLORU Yellow 12/20/2021    NITRU Negative 12/20/2021    GLUCOSEU Negative 12/20/2021    KETUA Negative 12/20/2021    UROBILINOGEN 0.2 12/20/2021    BILIRUBINUR Negative 12/20/2021       Functional Status:   Physical Therapy:  Bed mobility: Supervision   Transfers: SBA - CGA  Ambulation: 62 ft [employer] CGA,  bilateral custom AFO braces.      Occupational Therapy:  Feeding: Independent   Grooming: Setup  UB dressing: Setup  LB dressing: Mod A         Assessment/Plan:       48 y.o. female admitted to inpatient rehabilitation for impairments and acitivities limitations in ADLs and mobility secondary to polyneuropathy.     -Polyneuropathy: progressive since late November after she had Covid-19 and monoclonal antibody infusion. Found to have B12 deficiency and per Neurology felt to be cause of neuropathy. Monitor Neuro exam. Bilateral AFOs ordered and received for bilateral foot drop. Continue Acute rehab program.  -B12 deficiency: Continue oral B12, B12 now within normal limits. Monitor  -Borderline low vitamin D: started vitamin D supplement   -Lower extremity spasticity: Continue Baclofen, dose adjusted. Contracture prevention.   -Pain control: tylenol PRN, naproxen prn, voltaren gel, heating pad  -Overactive bladder: continue home Myrbetriq   -DVT prophylaxis: lovenox     Team conference today    Patient is making steady functional progress and having slow but progressive return of strength in the lower extremities.  Continue interdisciplinary rehab program.       Electronically signed by Amanda Fang MD on 1/26/2022 at 1:27 PM

## 2022-01-26 NOTE — PROGRESS NOTES
Physical Therapy    Facility/Department: [facility] 5S REHAB  Treatment note     NAME: Lashaun Jane  : 1975  MRN: [id number]     Date of Service: 2022     Evaluating Therapist: Elizabet Cardenas., PT, DPT [id number]        ROOM: [address] [place] [address]  DIAGNOSIS: Polyneuropathy  PRECAUTIONS: Falls, Regular diet, **PRAFOS when not in therapy**  HPI: Patient admitted to the hospital on 21 with progressively worsening leg weakness after receiving an antibody infusion for Covid diagnosed in November. Neurosurgery and neurology all saw patient on consult. Imaging has shown lumbar radiculopathy. EMG done on 21: Recommendations: This is predominantly a neuropathic process, not myopathic in nature. Post viral inflammatory processes can present early on with this picture however usually progress to development of slowing of velocity with the development of myelin abnormalities as well as increasing axonal pathology. B12 level low during admission and she has received several injections. Social:  Pt lives with fiance and son in [address] [facility] style home, 3 [address]ONCE with no railing + 1 step to enter. May have [address] Prior to admission: Pt independent with all functional mobility                    Initial Evaluation  Date:  AM     PM    Short Term Goals Long Term Goals    Was pt agreeable to Eval/treatment? 22 Yes yes       Does pt have pain?  Reports allodynia in BLE No sig c/o pain No sig c/o pain       Bed Mobility  Rolling: SBA  Supine to sit: Francesco  Sit to supine: modA  Scooting: Francesco NT NT sup Mod i   Transfers Sit to stand: maxA + SBA form 2nd helper  Stand to sit: maxA + SBA from 2nd helper  Stand pivot: NT  Slideboard tx- maxA     5xSTS: NT Sit to stand: SBA-CGA  Stand to sit: SBA-CGA   Stand pivot: NT  Slideboard tx- NT Sit to stand: SBA-CGA  Stand to sit: SBA-CGA   Stand pivot: NT Francesco    sup                  Ambulation    NT 40'x5, 25'x1    [employer], Francesco  NADEGE Custom AFO NT 10', LRAD, maxA       50', LRAD, Francesco Walking 10 feet on uneven surface NT NT NT 10', LRAD, maxA 10', LRAD, Francesco   Wheel Chair Mobility NT NT ', BUE propulsion, sup > 200', BUE propulson, mod i   Car Transfers NT NT Francesco maxA Francesco   Stair negotiation: ascended and descended  NT NT 4 steps x3 rounds, Francesco to steady, min-modA to advance LE 1 step, maxA, KUSUM HR 4 steps, Kusum HR, Francesco   Curb Step:   ascended and descended NT NT 2\" curb, ww, Francesco 2\" curb, LRAD, maxA 2\" curb, LRAD, Francesco   Picking up object off the floor NT NT NT MaxA with assistive device Francesco with LRAD   BLE ROM WFL           BLE Strength KUSUM hip flex- 1/5  KUSUM hip ext- 1/5  KUSUM knee ext 2+/5  KUSUM knee flex 2-/5  KUSUM PF 2+/5  KUSUM DF 0/5           Balance  Sitting EOB- SBA  Standing- maxA                          Date Family Teach Completed             Is additional Family Teaching Needed? Y or N             Hindering Progress Weakness, impulsivity, pain, endurance, balance, body habitus Weakness, impulsivity, pain, endurance, balance, body habitus  Weakness, impulsivity, pain, endurance, balance, body habitus         PT recommended ELOS 4-5 weeks           Team's Discharge Plan             Therapist at Team Meeting               General:        HRmax: 174 bpm       Beta blockers (Y/N): N      Adjusted HRmax: 174  bpm   Blood pressure (mmHg): AM:  - Prior to Tx:138/82  - During Tx:   - Post Tx: 152/83 PM:  - Prior to Tx: -  - During Tx: -  - Post Tx: -   Time spent in HR ranges: Moderate intensity (60-70% HRmax):  104-122 BPM AM:14 min 28 sec PM: -   High intensity (70-85% HRmax):  122-148 BPM AM: -21 min 56 sec PM: -     Therapeutic Exercise:     AM: 1. Functional mobility  PM: 1.  Functional mobility  Patient education    Pt educated on tx, gait, stair negotiation, curb negotiation, current statsus and POC, d/c palnning    Patient response to education:   Pt verbalized understanding Pt demonstrated skill Pt requires further education in this area   yes partial All areas Additional Comments:   AM: Session focused on multiple shortened amb bouts to help limit completely exhausting pt, allowing pt to complete more reps and overall increase total amb volume tolerated. Pt able to independently advance BLE > 90% of steps, occ foot block to prevent pt from rotating foot outward with excessive pelvic rotation when attempting to advance contralateral limb. PM: Pt showing sig improvement in ability to flex hip to bring foot onto step, completing at Francesco initially but progressing to [address] as she fatigues with LLE leading. With RLE leading pt requiring modA consisently to help advance foot onto step. Pt requiring light Francesco to steady completing car tx. Pt able to ascend 2\"curb using ww at ight Francesco to steady but able to advance foot up onto curb without assist, pt reporting she will have a 6\" curb to ascend to enter home, will continue to practice. Chair/bed alarm:     AM  Time in: 0915  Time out: 1000    PM  Time in: 1430  Time out: 1515    Pt is making good progress toward established Physical Therapy goals. Continue with physical therapy current plan of care.     Samson Yo., PT, DPT  [id number]

## 2022-01-26 NOTE — PROGRESS NOTES
Physical Therapy    Facility/Department: [facility] 5S REHAB  Weekly Note    NAME: April Rojas  : 1975  MRN: [id number]    Date of Service: 2022    Evaluating Therapist: Glenny Lea, PT, DPT [id number]      ROOM: [address] [place] [address]  DIAGNOSIS: Polyneuropathy  PRECAUTIONS: Falls, Regular diet, **PRAFOS when not in therapy**  HPI: Patient admitted to the hospital on 21 with progressively worsening leg weakness after receiving an antibody infusion for Covid diagnosed in November. Neurosurgery and neurology all saw patient on consult. Imaging has shown lumbar radiculopathy. EMG done on 21: Recommendations: This is predominantly a neuropathic process, not myopathic in nature.  Post viral inflammatory processes can present early on with this picture however usually progress to development of slowing of velocity with the development of myelin abnormalities as well as increasing axonal pathology. B12 level low during admission and she has received several injections. Social:  Pt lives with fiance and son in [address] [facility] style home, 3 [address] with no railing + 1 step to enter. May have [address] Prior to admission: Pt independent with all functional mobility     Initial Evaluation  Date:  22 Comments Short Term Goals Long Term Goals    Was pt agreeable to Eval/treatment? 22 Yes yes yes      Does pt have pain?  Reports allodynia in BLE No sig c/o pain No sig c/o pain No c/o pain      Bed Mobility  Rolling: SBA  Supine to sit: Francesco  Sit to supine: modA  Scooting: Francesco Rolling: sup  Supine to sit: sup  Sit to supine: Francesco  Scooting: Sup Rolling: sup  Supine to sit: sup  Sit to supine: SBA  Scooting: Sup Rolling: sup  Supine to sit: sup  Sit to supine: Sup  Scooting: Sup In hospital bed with bed rails    Francesco at mat table without rails sup Mod i   Transfers Sit to stand: maxA + SBA form 2nd helper  Stand to sit: maxA + SBA from 2nd helper  Stand pivot: NT  Slideboard tx- maxA    5xSTS: NT Sit to stand: modA   Stand to sit: modA   Stand pivot: NT  Slideboard tx- mod-maxA Sit to stand: Francesco  Stand to sit: Francesco  Stand pivot: Francesco with ww, KUSUM PLS donned       Sit to stand: SBA-CGA  Stand to sit: SBA-CGA   Stand pivot: CGA with ww KUSUM PLS donned, heavy UE support on ww, verbal cueing for sequencing turning and weight shift, steadying assist provided Francesco   sup     Ambulation    NT 20' in pedro lift, KUSUM KI and PLS donned, MaxA Up to 40' with ww, Francesco    Min-modA to advance BLE Up to 61' with ww, CGA     KUSUM custom AFOs    Pace- 0.118 m/s PT providing verbal cueing to sequence turning, able to amb up to 20' advancing BLE independently, progressing to min-modA to assist    Compensatory strategies used to overcome hip weakness 10', LRAD, maxA      100 feet, LRAD,  SBA        Walking 10 feet on uneven surface NT NT NT NT  10', LRAD, maxA 10', LRAD, Francesco   Wheel Chair Mobility ', BUE propulsion, Sup 200', BUE propulsion, sup 160', BUE propulsion, mod i approaching mod i 100', BUE propulsion, sup > 200', BUE propulson, mod i   Car Transfers NT NT NT  Francesco, handi-bar device  handi-bar device  maxA Francesco   Stair negotiation: ascended and descended  NT NT NT 4 steps, KUSUM HR, modA Assist to advance lead leg up onto step and trailing leg down onto step 1 step, maxA, KUSUM HR 4 steps, Kusum HR, Francesco   Curb Step:   ascended and descended NT NT NT   2\" curb, LRAD, maxA 2\" curb, LRAD, Francesco   Picking up object off the floor NT NT NT   MaxA with assistive device Francesco with LRAD   BLE ROM WFL         BLE Strength KUSUM hip flex- 1/5  KUSUM hip ext- 1/5  KUSUM knee ext 2+/5  KUSUM knee flex 2-/5  KUSUM PF 2+/5  KUSUM DF 0/5 KUSUM hip flex- 2-/5  KUSUM hip ext- 2-/5  KUSUM knee ext 2+/5  KUSUM knee flex 2-/5  KUSUM PF 2+/5  KUSUM DF 0/5 KUSUM hip flex- 2-/5  KUSUM hip ext- 3/5  KUSUM knee ext 3+/5  KUSUM knee flex 3/5  KUSUM PF 2+/5  KUSUM DF 0/5 KUSUM hip flex- 2-/5  KUSUM hip ext- 3/5  KUSUM knee ext 3+/5  KUSUM knee flex 3/5  KUSUM PF 3/5  KUSUM DF 1+/5      Balance Sitting EOB- SBA  Standing- maxA     Sitting EOB- SBA  Standing- maxA Sitting EOB- SBA  Standing- Francesco with ww Sitting EOB- sup  Standing- Francesco with ww            Date Family Teach Completed  None to date None to date None to date      Is additional Family Teaching Needed? Y or N  Y Y Y      Hindering Progress Weakness, impulsivity, pain, endurance, balance, body habitus Weakness, impulsivity, pain, endurance, balance, body habitus, proprioceptive deficits, tone, motor control deficits Weakness, impulsivity, pain, endurance, balance, body habitus, proprioceptive deficits, tone, motor control deficits    Weakness, endurance, balance, body habitus, proprioceptive deficits,  motor control deficits         PT recommended ELOS 4-5 weeks 4-5 weeks 4 weeks 2 weeks      Team's Discharge Plan  4-5 weeks 3-4 weeks 2 weeks      [facility], [facility], DPT [id number]   Kaitlynn Chapman, PT, DPT [id number]   Kaitlynn Chapman, PT, DPT [id number]          Date:  1/25/22  Supporting factors: Motivated, progressing  Barriers to discharge:  Stairs to enter home, Weakness, endurance, balance, body habitus, proprioceptive deficits, tone, motor control deficits  Additional comments:  Pt received NADEGE custom AFOs on 1/24/22, shown sig improvement in ability to clear toes to help advance BLE. Pt is showing continued strength gains in BLE but continues to rely on compensatory strategies for limb advancement, especially as she fatigues. Pt reports increased confidence with mobility and motivated by progress. Pt requiring increased physical assist to advance limbs on stair negotiation, but pt able to push down to ascend without lift assist. May possibly still need a ramp at d/c.    DME:  Manual w/c, possibly ramp for home entrance, ww, NADEGE custom AFOs  After Care:  Mono Mccormick., PT, DPT  [id number]    Date:  1/18/22  Supporting factors:   Motivated, progressing  Barriers to discharge:  Stairs to enter home, Weakness, endurance, balance, body habitus, proprioceptive deficits, tone, motor control deficits  Additional comments:  Pt has shown significant improvement in strength and motor control since last week. We have progressed to ambulation training with just a ww and NADEGE PLS donned, able to complete turn to chair. Pt continues to shows a great deal of hip weakness, especially hip flexors, however pt compensates with exaggerated trunk extension and pelvic rotation to help use momentum to allow limb to swing through and advance. Pt also continues to show increased genu recurvatum as she fatigues. DME:  TBD  After Care:  ALENA Yung., PT, DPT  [id number]    Date:  1/11/22  Supporting factors: Motivated, showing early progress  Barriers to discharge:  Stairs to enter home, Weakness, impulsivity, pain, endurance, balance, body habitus, proprioceptive deficits, tone, motor control deficits  Additional comments:  Pt has been showing improvement on standing and gait trials, but requires sig heavy skilled assistance and skilled set up. Will need to reach out to orthotist to have NADEGE bracing to help get her stabilized so she may progress training. at this point she needs NADEGE KAFOs,she has very weak hips and very poor dynamic knee stability as pt snaps both knees back into hypertension aggressively, having difficulty initiating knee flexion. L ankle is more tight into plantarflexion. Due to adductor tone and NADEGE hip abductor weakness, pt has difficulty with foot placement prior to STS positioning self in chair.    DME:  TBD  After Care: ALENA Yung., PT, DPT  [id number]

## 2022-01-26 NOTE — PROGRESS NOTES
Occupational Therapy  OCCUPATIONAL THERAPY DAILY NOTE    Date:2022  Patient Name: Nila Snow  MRN: [id number]  : 1975  Room: 10/10-A     Diagnosis: Polyneuropathy   Precautions: falls ,slide board transfers     Functional Assessment:   Date Status AE  Comments   Feeding 22 Independent      Grooming 22 set up w/c          Oral Care 22  Set up      Bathing 22  UB- set up   LB-Min A LH sponge    UB Dressing 22  Set up     LB Dressing 22  Mod A  Reacher     Footwear 22  Mod A   long shoe horn  Sock aid    Toileting 22  Min A  Grab bar  3:1 commode   ww Pt completed hygiene.  Assist for balance as pt pulled up pants    Homemaking 22 SBA/CGA W/w, w/c        Functional Transfers / Balance:   Date Status DME  Comments   Sit Balance 22  CGA Shower chair   W/c & 3:1 commode     Stand Balance 22   Min A  ww    [x] Tub      [x] Shower   Transfer 22 Max A       Max A  Ww, ETB    Rolling shower chair  Assist to lift BLE's over edge of tub and with sit to stand from tub bench surface   Commode   Transfer 22  Min A  3 in 1 commode  ww  Stand pivot transfer from w/c to 3 in placed over commode with ww    Functional   Mobility 22   Mod I/Sup(w/c level)    Ambulating with ww- Min A W/c propel     Pt ambulated short distance on carpet floor surface   Other: stand pivot transfer bed to w/c with ww     Sit to stand from w/c to table top level       Transfer on/off recliner  22  Mod A         Mod A         Mod A    ww , BLE AFO's               ww                      Assist with sit to stand from lower chair surface      Functional Exercises / Activity:   BUE strength exercises: 3 # dowel molina 3 sets 10 reps in all planes on table top surface                                             Shoulder ladder with 2 # dowel molina 20 reps                                              Green thera tube 3 sets 10 reps with focus on shld flex/ext   Standing balance/endurance activity  at table top level with pt able to engage B hands in activity of folding wash clothes at [employer] A level for standing balance  B hand strength with 10 # power gripper 3 sets 10 reps      Sensory / Neuromuscular Re-Education:         Cognitive Skills:   Status Comments   Problem   Solving good     Memory good     Sequencing good     Safety fair +      Visual Perception:    Education:  Pt was educated on safe tub/shower transfers and types of DME     [] Family teach completed on:    Pain Level: 4/10 low back      Additional Notes:   1/22/22 LH sponge issued to patient on this date for bathing skills. Patient has made good progress during treatment sessions toward set goals. Therapy emphasis to obtain goals:Current Treatment Recommendations: Strengthening,Gait Training,Patient/Caregiver Education & Training,Home Management Training,Equipment Evaluation, Education, & procurement,Balance Training,Functional Mobility Training,Positioning,Endurance Training,Wheelchair Mobility Training,Safety Education & Training,Self-Care / ADL    [x] Continue with current OT Plan of care.   [] Prepare for Discharge    Goals  Long term goals  Time Frame for Long term goals : 4 weeks to address above problem areas  Long term goal 1: Pt demo Mod I to eat all meals  Long term goal 2: Pt demo Mod I grooming seated @ sink level  Long term goal 3: Pt demo Sup to bathe @ shower level both seated & use LHS  Long term goal 4: Pt demo Mod I UE dress to don a shirt & Mod I to don underwear, pants & Min A socks & shoes using AE as needed  Long term goal 5: Pt demo Mod I commode trf using a slide board & drop arm commode & demo Mod I toileting & demo G safety  Long term goals 6: Pt demo SBA tub trf using appropriate DME- bench & slide board to ensure pt safety  Long term goal 7: Pt demo Mod I light homemaking activity @ wc level & demo G- safety & insight  Long term goal 8: Pt demo G- endurance for a 20-30 minute functional activity  Long term goal 9: Pt demo Mod I wc propulsion thorughout a living environment & around obstacles  1/12/22  OT plan of care updated on this date. Tentative length of stay 4-5 weeks    Chantel Quijano OTR/L [id number]  1/19/22 OT plan of care updated on this date. Tentative length of stay is 3-4 Derrichard Ocampo OTR/L [id number]  1/26/22 OT plan of care updated on this date. Tentative length of stay is 2 weeks Chantel Quijano OTR/L [id number]      DISCHARGE RECOMMENDATIONS  Recommended DME:    Post Discharge Care:   []Home Independently  []Home with 24hr Care / Supervision [x]Home with Partial Supervision []Home with Home Health OT []Home with Out Pt OT []Other: ___   Comments:         Time in Time out Tx Time Breakdown  Variance:   First Session  [id number]  [x] Individual Tx- 45  [] Concurrent Tx-   [] Co-Tx -   [] Group Tx -   [] Time Missed -     Second Session [id number]  [] Individual Tx-   [x] Concurrent Tx -45  [] Co-Tx -   [] Group Tx -   [] Time Missed -              Third Session    [] Individual Tx-   [] Concurrent Tx -  [] Co-Tx -   [] Group Tx -   [] Time Missed -         Total Tx Time: 90 mins      Chantel Quijano OTR/L [id number]

## 2022-01-26 NOTE — PROGRESS NOTES
[facility]  Comprehensive Progress Note    Subjective:      Neo Sharma is a 48 y.o. female admitted to inpatient rehabilitation for impairments and acitivities limitations in ADLs and mobility secondary to polyneuropathy. No acute events overnight. No cp, sob, n/v. Progressing in therapy. No new issues. Patient denies complaints. The patient's medical records have been reviewed. Scheduled Meds:ammonium lactate, , Daily  baclofen, 10 mg, BID- 8&2   And  baclofen, 20 mg, Nightly  mirabegron, 25 mg, Daily  enoxaparin, 40 mg, Daily  diclofenac sodium, 4 g, 4x Daily  fluticasone, 1 spray, Daily  folic acid, 1 mg, Daily  melatonin, 5 mg, Nightly  multivitamin, 1 tablet, Daily  polyethylene glycol, 17 g, BID  vitamin B-12, 1,000 mcg, Daily  influenza virus vaccine, 0.5 mL, Prior to discharge  docusate sodium, 100 mg, BID  senna, 2 tablet, Nightly  vitamin D, 2,000 Units, Daily      Continuous Infusions:  PRN Meds:sodium chloride, 1 spray, PRN  guaiFENesin-dextromethorphan, 5 mL, Q4H PRN  melatonin, 5 mg, Nightly PRN  naproxen, 500 mg, BID PRN  acetaminophen, 650 mg, Q4H PRN  ondansetron, 4 mg, Q6H PRN  polyethylene glycol, 17 g, Daily PRN         Objective:      Vitals:    01/22/22 0730 01/23/22 1448 01/24/22 0900 01/25/22 0724   BP:  138/60 (!) 146/79 116/80   Pulse:  90 90 83   Resp:  18 20 16   Temp:  97.6 °F (36.4 °C) 97.6 °F (36.4 °C) 97 °F (36.1 °C)   TempSrc:  Temporal Temporal Temporal   SpO2: 97%  97% 97%   Weight:       Height:         General appearance: alert,  NAD  Eyes: conjunctivae/corneas clear. PERRL, EOM's intact. Lungs: clear to auscultation bilaterally  Heart: regular rate and rhythm, S1, S2   Abdomen: soft, non-tender, normal bowel sounds   Extremities: extremities atraumatic, no cyanosis or edema  Neurologic: AAOx4. LT and PP sensation intact through T6 and altered (1/2) at T7 and below. Strength is 5/5 throughout bilateral upper extremities. Lower extremity strength is 2/5 bilateral HF; 3-/5 bilateral KE and PF; 1/5 bilateral DF and EHL (bilateral foot drop). Bilateral lower extremity spasticity, at MAS 1+         Laboratory:    Lab Results   Component Value Date    WBC 10.1 01/06/2022    HGB 12.5 01/06/2022    HCT 38.5 01/06/2022    MCV 90.0 01/06/2022     01/06/2022     Lab Results   Component Value Date     01/06/2022    K 4.1 01/06/2022     01/06/2022    CO2 21 01/06/2022    BUN 13 01/06/2022    CREATININE 0.5 01/06/2022    GLUCOSE 103 01/06/2022    CALCIUM 9.2 01/06/2022      Lab Results   Component Value Date    ALT 12 12/26/2021    AST 13 12/26/2021    ALKPHOS 52 12/26/2021    BILITOT 0.3 12/26/2021       Lab Results   Component Value Date    COLORU Yellow 12/20/2021    NITRU Negative 12/20/2021    GLUCOSEU Negative 12/20/2021    KETUA Negative 12/20/2021    UROBILINOGEN 0.2 12/20/2021    BILIRUBINUR Negative 12/20/2021       Functional Status:   Physical Therapy:  Bed mobility: SBA - Min A  Transfers: SBA - CGA  Ambulation: 58 ft [employer] Min a bilateral custom AFO braces.      Occupational Therapy:  Feeding: Independent   Grooming: Setup  UB dressing: Setup  LB dressing: Mod A         Assessment/Plan:       48 y.o. female admitted to inpatient rehabilitation for impairments and acitivities limitations in ADLs and mobility secondary to polyneuropathy.     -Polyneuropathy: progressive since late November after she had Covid-19 and monoclonal antibody infusion. Found to have B12 deficiency and per Neurology felt to be cause of neuropathy. Monitor Neuro exam. Bilateral AFOs ordered and received for bilateral foot drop. Continue Acute rehab program.  -B12 deficiency: Continue oral B12, B12 now within normal limits. Monitor  -Borderline low vitamin D: started vitamin D supplement   -Lower extremity spasticity: Continue Baclofen, dose adjusted.  Contracture prevention.   -Pain control: tylenol PRN, naproxen prn, voltaren gel, heating pad  -Overactive bladder: continue home Myrbetriq   -DVT prophylaxis: lovenox     Received her AFOs, they are helping allow for progress with ambulation  Team conference tomorrow

## 2022-01-26 NOTE — CARE COORDINATION
Per team: ELOS: 2 weeks. Updated pt. And she will update [facility]'. During update pt. Was tearful. Pt. Was expecting to go home sooner. Pt. Sergo Span her progress and is hopeful to continue to improve prior to dc. LTG: Min A/ Mod I/ Independent. Pt. Received Bi-lateral AFO's on 1/24. SW asked about ramp installation. Pt. Stated a ramp is not possible due to structure of entryway. Discussed alternative means to enter the home without ramp. Haleigh Richardson stated with the help of her fiance she was able to go up stairs prior to admission and now she is stronger so she doesn't foresee any issues.     DME: TBE    Aftercare: TBE    FT: 1/28 & 2/4 PM- Javier Hayward and son Kaz Layton, JOEL Intern  Alexis [place], [place]  1/26/2022

## 2022-01-26 NOTE — PATIENT CARE CONFERENCE
[facility] NOTE/PATIENT PLAN OF CARE    The physician was present and led this team conference    Date: 2022  Admission date: 2022  Patient Name: Evangelina Quintanilla        MRN: [id number]    : 1975  (48 y.o.)  Gender: female   Rehab diagnosis/surgery with date:  peripheral neuropathy  Impairment Group Code:  3.3      MEDICAL/FUNCTIONAL HISTORY/STATUS:  stable medically, for repeat B12 levels this week    Consultations/Labs/X-rays: orthotics for bilateral custom AFOs-here      MEDICATION UPDATE:   Scheduled Meds:ammonium lactate, , Daily  baclofen, 10 mg, BID- 8&2   And  baclofen, 20 mg, Nightly  mirabegron, 25 mg, Daily  enoxaparin, 40 mg, Daily  diclofenac sodium, 4 g, 4x Daily  fluticasone, 1 spray, Daily  folic acid, 1 mg, Daily  melatonin, 5 mg, Nightly  multivitamin, 1 tablet, Daily  polyethylene glycol, 17 g, BID  vitamin B-12, 1,000 mcg, Daily  influenza virus vaccine, 0.5 mL, Prior to discharge  docusate sodium, 100 mg, BID  senna, 2 tablet, Nightly  vitamin D, 2,000 Units, Daily    NURSING :    Bowel:   Always Continent  [x]   Occasionally incontinent  []   Frequently incontinent  []   Always incontinent  []   Not occurred  []     Bladder:   Always Continent  [x]    Incontinent less than daily[]   Incontinent  daily []   Always incontinent  []   No urine output    []   Indwelling catheter []       Toilet Hygiene:   Current level : mod assist  Short term Toilet hygiene goal: min assist  Long term toilet hygiene goal:  Modified independent    Skin integrity: intact  Pain: low back-tylenol and  naprosyn    NUTRITION    Diet  regular  Liquid consistency   thin    SOCIAL INFORMATION:  Lives with: [facility] and son   Prior community services:  none  Home Architecture:  [facility], 3+1 entry, 12 down to basement, [facility] is home on oxygen due to recent Covid infection  Prior Level of function:  independent  DME:  wheelchair, wheeled walker    FAMILY / PATIENT EDUCATION:  safety and self care are ongoing with patient    PHYSICAL THERAPY    Bed mobility:   Current level: supervision in a hospital bed with rails, on mat table is min assist  Short term bed mobility goal: supervision  Long term bed mobility goal: Modified independent    Chair/bed transfers:  Current level: standby assist-Contact guard assist with a wheeled walker  Short term Chair/bed transfers goal: met  Long term Chair/bed transfers goal: supervision      Ambulation:   Current level: 65 ft wheeled walker at Contact guard assist with bilateral custom AFOs  Short term ambulation goal: met  Long term ambulation goal: 100 ft standby assist    Wheelchair Mobility:  Current level: 160 ft at Modified independent  Short term wheelchair goal: met  Long term wheelchair goal: 200 ft at Modified independent      Car transfers:   Current level: min assist  Short term car transfers goal: min assist  Long term car transfers goal:min assist    Stairs:   Current level : 4 with 2 rails at mod assist  Short term stairs goal: 4 at min assist  Long term stairs goal: 4 at min assist    Lower Extremity Strength Issues: NADEGE hip flex- 2-/5  NADEGE hip ext- 3/5  NADEGE knee ext 3+/5  NADEGE knee flex 3/5  NADEGE PF 3/5  NADEGE DF 1+/5        OCCUPATIONAL THERAPY:      Tub/shower:   Current level: max assist with extended tub bench  Short term tub/shower goal: mod assist  Long term tub/shower goal: standby assist      Feeding:  Current level: independent  Short term feeding goal: independent  Long term feeding goal: independent    Grooming:   Current level: supervision  Short term grooming goal: Modified independent  Long term grooming goal: Modified independent    Bathing:  Current level: min assist  Short term bathing goal: Contact guard assist  Long term bathing goal: supervision    Homemaking:   Current level: wheelchair  level Contact guard assist-standby assist  Short term homemaking goal: supervision  Long term homemaking goal: Modified independent    Upper body dressing:  Current level: supervision  Short term upper body dressing goal: Modified independent  Long term upper body dressing goal: independent    Lower body dressing:                                                                          Current level: mod assist with adaptive equipment            Short term lower body dressing goal: min assist          Long term lower body dressing goal: Modified independent            Footwear  Current level: mod assist-max assist  Short term goal: min assist  Long term goal:Modified independent      Toilet transfer:   Current level: min assist-mod assist with AFOS and  wheeled walker  Short term toilet transfer goal: supervision  Long term toilet transfer goal: Modified independent      Safety awareness: fair+      Patient/family's personal goals: return home  Factors supporting goal achievement:  motivated, making gains  Factors hindering goal achievement:  lower body weakness, morbid obesity      Discharge Plan   Estimated Length of Stay: 2 weeks            Destination: home  Services at Discharge: to be assessed  Equipment at Discharge: manual wheelchair, walker      INTERDISCIPLINARY TEAM/PHYSICIAN RECOMMENDATION AND/OR REVISIONS OF PLAN OF CARE:  schedule family education      I approve the established interdisciplinary plan of care as documented within the medical record of Dany Morataya. Electronically signed by Ronit Kelly RN  on 1/26/2022 at 8:50 AM  The following interdisciplinary team members were present:  LUISANA Deluna, SAVANNAHW  Brock Licea, DPT  Armando Frausto OTR

## 2022-01-27 PROCEDURE — 1280000000 HC REHAB R&B

## 2022-01-27 PROCEDURE — 6360000002 HC RX W HCPCS: Performed by: INTERNAL MEDICINE

## 2022-01-27 PROCEDURE — 97116 GAIT TRAINING THERAPY: CPT

## 2022-01-27 PROCEDURE — 97530 THERAPEUTIC ACTIVITIES: CPT

## 2022-01-27 PROCEDURE — 6370000000 HC RX 637 (ALT 250 FOR IP): Performed by: INTERNAL MEDICINE

## 2022-01-27 PROCEDURE — 99232 SBSQ HOSP IP/OBS MODERATE 35: CPT | Performed by: PHYSICAL MEDICINE & REHABILITATION

## 2022-01-27 PROCEDURE — 97112 NEUROMUSCULAR REEDUCATION: CPT

## 2022-01-27 PROCEDURE — 97110 THERAPEUTIC EXERCISES: CPT

## 2022-01-27 PROCEDURE — 6370000000 HC RX 637 (ALT 250 FOR IP): Performed by: PHYSICAL MEDICINE & REHABILITATION

## 2022-01-27 PROCEDURE — 97535 SELF CARE MNGMENT TRAINING: CPT

## 2022-01-27 RX ADMIN — Medication 1 TABLET: at 08:54

## 2022-01-27 RX ADMIN — ACETAMINOPHEN 650 MG: 325 TABLET ORAL at 09:01

## 2022-01-27 RX ADMIN — BACLOFEN 10 MG: 10 TABLET ORAL at 08:54

## 2022-01-27 RX ADMIN — FOLIC ACID 1 MG: 1 TABLET ORAL at 08:54

## 2022-01-27 RX ADMIN — ENOXAPARIN SODIUM 40 MG: 100 INJECTION SUBCUTANEOUS at 08:54

## 2022-01-27 RX ADMIN — BACLOFEN 10 MG: 10 TABLET ORAL at 13:39

## 2022-01-27 RX ADMIN — DOCUSATE SODIUM 100 MG: 100 CAPSULE, LIQUID FILLED ORAL at 08:54

## 2022-01-27 RX ADMIN — NAPROXEN 500 MG: 500 TABLET ORAL at 05:37

## 2022-01-27 RX ADMIN — FLUTICASONE PROPIONATE 1 SPRAY: 50 SPRAY, METERED NASAL at 08:58

## 2022-01-27 RX ADMIN — Medication 2000 UNITS: at 08:54

## 2022-01-27 RX ADMIN — Medication: at 08:57

## 2022-01-27 RX ADMIN — DICLOFENAC SODIUM 4 G: 10 GEL TOPICAL at 16:45

## 2022-01-27 RX ADMIN — DICLOFENAC SODIUM 4 G: 10 GEL TOPICAL at 08:57

## 2022-01-27 RX ADMIN — BACLOFEN 20 MG: 10 TABLET ORAL at 21:49

## 2022-01-27 RX ADMIN — GUAIFENESIN SYRUP AND DEXTROMETHORPHAN 5 ML: 100; 10 SYRUP ORAL at 21:50

## 2022-01-27 RX ADMIN — Medication 1000 MCG: at 08:54

## 2022-01-27 RX ADMIN — DOCUSATE SODIUM 100 MG: 100 CAPSULE, LIQUID FILLED ORAL at 21:50

## 2022-01-27 RX ADMIN — GUAIFENESIN SYRUP AND DEXTROMETHORPHAN 5 ML: 100; 10 SYRUP ORAL at 05:37

## 2022-01-27 RX ADMIN — DICLOFENAC SODIUM 4 G: 10 GEL TOPICAL at 21:53

## 2022-01-27 RX ADMIN — ACETAMINOPHEN 650 MG: 325 TABLET ORAL at 21:51

## 2022-01-27 RX ADMIN — ACETAMINOPHEN 650 MG: 325 TABLET ORAL at 13:39

## 2022-01-27 RX ADMIN — Medication 5 MG: at 21:49

## 2022-01-27 RX ADMIN — DICLOFENAC SODIUM 4 G: 10 GEL TOPICAL at 13:39

## 2022-01-27 ASSESSMENT — PAIN SCALES - GENERAL
PAINLEVEL_OUTOF10: 5
PAINLEVEL_OUTOF10: 2
PAINLEVEL_OUTOF10: 5
PAINLEVEL_OUTOF10: 5
PAINLEVEL_OUTOF10: 0
PAINLEVEL_OUTOF10: 6
PAINLEVEL_OUTOF10: 5

## 2022-01-27 ASSESSMENT — PAIN DESCRIPTION - FREQUENCY
FREQUENCY: INTERMITTENT
FREQUENCY: CONTINUOUS

## 2022-01-27 ASSESSMENT — PAIN DESCRIPTION - PAIN TYPE
TYPE: CHRONIC PAIN
TYPE: CHRONIC PAIN

## 2022-01-27 ASSESSMENT — PAIN DESCRIPTION - PROGRESSION
CLINICAL_PROGRESSION: NOT CHANGED
CLINICAL_PROGRESSION: GRADUALLY IMPROVING

## 2022-01-27 ASSESSMENT — PAIN DESCRIPTION - ORIENTATION
ORIENTATION: LOWER
ORIENTATION: LEFT;LOWER

## 2022-01-27 ASSESSMENT — PAIN - FUNCTIONAL ASSESSMENT: PAIN_FUNCTIONAL_ASSESSMENT: ACTIVITIES ARE NOT PREVENTED

## 2022-01-27 ASSESSMENT — PAIN DESCRIPTION - DESCRIPTORS: DESCRIPTORS: ACHING;DISCOMFORT;CONSTANT

## 2022-01-27 ASSESSMENT — PAIN DESCRIPTION - LOCATION
LOCATION: BACK;LEG
LOCATION: BACK

## 2022-01-27 ASSESSMENT — PAIN DESCRIPTION - ONSET
ONSET: ON-GOING
ONSET: ON-GOING

## 2022-01-27 NOTE — PROGRESS NOTES
[facility] Physical Medicine and Rehabilitation  Comprehensive Progress Note    Subjective:      Marbin Cabral is a 48 y.o. female admitted to inpatient rehabilitation for impairments and acitivities limitations in ADLs and mobility secondary to polyneuropathy. No acute events overnight. No cp, sob, n/v. No new issues. Minimal pain reported. Progressing with therapy. The patient's medical records have been reviewed. Scheduled Meds:ammonium lactate, , Daily  baclofen, 10 mg, BID- 8&2   And  baclofen, 20 mg, Nightly  mirabegron, 25 mg, Daily  enoxaparin, 40 mg, Daily  diclofenac sodium, 4 g, 4x Daily  fluticasone, 1 spray, Daily  folic acid, 1 mg, Daily  melatonin, 5 mg, Nightly  multivitamin, 1 tablet, Daily  polyethylene glycol, 17 g, BID  vitamin B-12, 1,000 mcg, Daily  influenza virus vaccine, 0.5 mL, Prior to discharge  docusate sodium, 100 mg, BID  senna, 2 tablet, Nightly  vitamin D, 2,000 Units, Daily      Continuous Infusions:  PRN Meds:sodium chloride, 1 spray, PRN  guaiFENesin-dextromethorphan, 5 mL, Q4H PRN  melatonin, 5 mg, Nightly PRN  naproxen, 500 mg, BID PRN  acetaminophen, 650 mg, Q4H PRN  ondansetron, 4 mg, Q6H PRN  polyethylene glycol, 17 g, Daily PRN         Objective:      Vitals:    01/24/22 0900 01/25/22 0724 01/26/22 0800 01/27/22 0845   BP: (!) 146/79 116/80 (!) 140/85 123/78   Pulse: 90 83 105 100   Resp: 20 16 16 18   Temp: 97.6 °F (36.4 °C) 97 °F (36.1 °C) 97.3 °F (36.3 °C) 97.9 °F (36.6 °C)   TempSrc: Temporal Temporal Temporal Oral   SpO2: 97% 97% 97% 96%   Weight:       Height:         General appearance: alert,  NAD, up in chair   Eyes: conjunctivae/corneas clear. PERRL, EOM's intact. Lungs: clear to auscultation bilaterally  Heart: regular rate and rhythm, S1, S2   Abdomen: soft, non-tender, normal bowel sounds   Extremities: extremities atraumatic, no cyanosis or edema  Neurologic: AAOx4. LT and PP sensation intact through T6 and altered (1/2) at T7 and below. Strength is 5/5 throughout bilateral upper extremities. Lower extremity strength is 2/5 bilateral HF; 3-/5 bilateral KE and PF; 1/5 bilateral DF and EHL (bilateral foot drop). Bilateral lower extremity spasticity, at MAS 1+         Laboratory:    Lab Results   Component Value Date    WBC 10.1 01/06/2022    HGB 12.5 01/06/2022    HCT 38.5 01/06/2022    MCV 90.0 01/06/2022     01/06/2022     Lab Results   Component Value Date     01/06/2022    K 4.1 01/06/2022     01/06/2022    CO2 21 01/06/2022    BUN 13 01/06/2022    CREATININE 0.5 01/06/2022    GLUCOSE 103 01/06/2022    CALCIUM 9.2 01/06/2022      Lab Results   Component Value Date    ALT 12 12/26/2021    AST 13 12/26/2021    ALKPHOS 52 12/26/2021    BILITOT 0.3 12/26/2021       Lab Results   Component Value Date    COLORU Yellow 12/20/2021    NITRU Negative 12/20/2021    GLUCOSEU Negative 12/20/2021    KETUA Negative 12/20/2021    UROBILINOGEN 0.2 12/20/2021    BILIRUBINUR Negative 12/20/2021       Functional Status:   Physical Therapy:  Bed mobility: Supervision   Transfers: SBA - CGA  Ambulation: 62 ft [employer] CGA,  bilateral custom AFO braces.      Occupational Therapy:  Feeding: Independent   Grooming: Setup  UB dressing: Setup  LB dressing: Mod A         Assessment/Plan:       48 y.o. female admitted to inpatient rehabilitation for impairments and acitivities limitations in ADLs and mobility secondary to polyneuropathy.     -Polyneuropathy: progressive since late November after she had Covid-19 and monoclonal antibody infusion. Found to have B12 deficiency and per Neurology felt to be cause of neuropathy. Monitor Neuro exam. Bilateral AFOs ordered and received for bilateral foot drop. Continue Acute rehab program.  -B12 deficiency: Continue oral B12, B12 now within normal limits. Monitor  -Borderline low vitamin D: started vitamin D supplement   -Lower extremity spasticity: Continue Baclofen, dose adjusted.  Contracture prevention.   -Pain control: tylenol PRN, naproxen prn, voltaren gel, heating pad  -Overactive bladder: continue home Myrbetriq   -DVT prophylaxis: lovenox     Interval labs in am       Electronically signed by Lisa Oliveira MD on 1/27/2022 at 1:08 PM

## 2022-01-27 NOTE — PROGRESS NOTES
Comprehensive Nutrition Assessment    Type and Reason for Visit:  Reassess    Nutrition Recommendations/Plan: Continue Current Diet. ONS not indicated at this time. Nutrition Assessment:  Pt remains stable from a nutritional stand-point w/ noted >75% intake of most meals noted. Adm to ARU w/ polyneuropathy 2/2 B12 deficiency. Will continue to monitor. Malnutrition Assessment:  Malnutrition Status:  No malnutrition    Context:  Acute Illness     Findings of the 6 clinical characteristics of malnutrition:  Energy Intake:  No significant decrease in energy intake  Weight Loss:  Unable to assess     Body Fat Loss:  No significant body fat loss     Muscle Mass Loss:  No significant muscle mass loss    Fluid Accumulation:  No significant fluid accumulation     Strength:  Not Performed    Estimated Daily Nutrient Needs:  Energy (kcal):  ; Weight Used for Energy Requirements:  Current     Protein (g):  1.5-1.8gm/kg IBW= 70-80; Weight Used for Protein Requirements:  Ideal        Fluid (ml/day):  ; Method Used for Fluid Requirements:  1 ml/kcal      Nutrition Related Findings:  A&O, Abd/BS WDL, no edema, +I/O's      Wounds:  None       Current Nutrition Therapies:    ADULT DIET;  Regular    Anthropometric Measures:  · Height: 5' (152.4 cm)  · Current Body Weight: 213 lb (96.6 kg) (unknown method 1/5)   · Admission Body Weight: 213 lb (96.6 kg) (unknown method 1/5)    · Usual Body Weight: 226 lb (102.5 kg) (12/2022 bed scale, per EMR)     · Ideal Body Weight: 100 lbs; % Ideal Body Weight 213 %   · BMI: 41.6  · Adjusted Body Weight:  ; No Adjustment   · Adjusted BMI:      · BMI Categories: Obese Class 3 (BMI 40.0 or greater)       Nutrition Diagnosis:   · No nutrition diagnosis at this time related to   as evidenced by        Nutrition Interventions:   Food and/or Nutrient Delivery:  Continue Current Diet  Nutrition Education/Counseling:  Education not indicated   Coordination of Nutrition Care: Continue to monitor while inpatient    Goals:  PO intake >75%       Nutrition Monitoring and Evaluation:   Behavioral-Environmental Outcomes:  None Identified   Food/Nutrient Intake Outcomes:  Food and Nutrient Intake  Physical Signs/Symptoms Outcomes:  Biochemical Data,GI Status,Fluid Status or Edema,Nutrition Focused Physical Findings,Skin,Weight     Discharge Planning:     Too soon to determine     Electronically signed by Leighton Castillo RD, LD on 1/27/22 at 2:03 PM EST    Contact: ext [phone]

## 2022-01-27 NOTE — PROGRESS NOTES
Occupational Therapy  OCCUPATIONAL THERAPY DAILY NOTE    Date:2022  Patient Name: Nila Snow  MRN: [id number]  : 1975  Room: [address] [place], [place] [address]     Diagnosis: Polyneuropathy   Precautions: falls ,slide board transfers     Functional Assessment:   Date Status AE  Comments   Feeding 22 Independent      Grooming 22 set up w/c          Oral Care 22  Set up      Bathing 22  UB- set up   LB-Min A LH sponge    UB Dressing 22  Set up     LB Dressing 22  Mod A  Reacher     Footwear 22  Mod A   long shoe horn  Sock aid    Toileting 22  Min A  Grab bar  3:1 commode   ww    Homemaking 22 Mod I (w/c level)  W/w, w/c   pt completed light meal prep and washed dishes at w/c level Pt able to retrieve items from refrigerator and drawers at Mod I w/c level      Functional Transfers / Balance:   Date Status DME  Comments   Sit Balance 22  SBA    W/c  During meal prep    Stand Balance 22  Min A  ww Table top level    [x] Tub      [x] Shower   Transfer 22 Max A       Max A  Ww, ETB    Rolling shower chair     Commode   Transfer 22  Min A  3 in 1 commode  ww     Functional   Mobility 22   Mod I/Sup(w/c level)    Ambulating with ww- Min A W/c propel    Other: stand pivot transfer bed to w/c with ww     Sit to stand from w/c to table top level       Transfer on/off recliner  22  Mod A         Min A         Mod A    ww , BLE AFO's               ww                            Functional Exercises / Activity:   BUE strength exercises: 2 # weighted ball 3 sets 10 reps in all planes                                             Yellow can do bar 3 set 15 reps                                              Mister  with 2 # cuff weight 15 reps   B hand desensitization with pt finding items hidden in rice with vision occluded at independent level   B hand strength with 9 # digi flex 3 sets 10 reps      Sensory / Neuromuscular Re-Education:   Standing balance/endurance activity  at table top level engaging BUE's in tx activity with Min A for balance. Cognitive Skills:   Status Comments   Problem   Solving good     Memory good     Sequencing good     Safety fair +      Visual Perception:    Education:   pt was educated on adaptations in kitchen setting to help increase independence at w/c level     [] Family teach completed on:    Pain Level: 4/10 low back      Additional Notes:   1/22/22 LH sponge issued to patient on this date for bathing skills. Patient has made good progress during treatment sessions toward set goals. Therapy emphasis to obtain goals:Current Treatment Recommendations: Strengthening,Gait Training,Patient/Caregiver Education & Training,Home Management Training,Equipment Evaluation, Education, & procurement,Balance Training,Functional Mobility Training,Positioning,Endurance Training,Wheelchair Mobility Training,Safety Education & Training,Self-Care / ADL    [x] Continue with current OT Plan of care.   [] Prepare for Discharge    Goals  Long term goals  Time Frame for Long term goals : 4 weeks to address above problem areas  Long term goal 1: Pt demo Mod I to eat all meals  Long term goal 2: Pt demo Mod I grooming seated @ sink level  Long term goal 3: Pt demo Sup to bathe @ shower level both seated & use LHS  Long term goal 4: Pt demo Mod I UE dress to don a shirt & Mod I to don underwear, pants & Min A socks & shoes using AE as needed  Long term goal 5: Pt demo Mod I commode trf using a slide board & drop arm commode & demo Mod I toileting & demo G safety  Long term goals 6: Pt demo SBA tub trf using appropriate DME- bench & slide board to ensure pt safety  Long term goal 7: Pt demo Mod I light homemaking activity @ wc level & demo G- safety & insight  Long term goal 8: Pt demo G- endurance for a 20-30 minute functional activity  Long term goal 9: Pt demo Mod I wc propulsion thorughout a living environment & around obstacles  1/12/22  OT plan of care updated on this date. Tentative length of stay 4-5 weeks    [facility] OTR/L [id number]  1/19/22 OT plan of care updated on this date. Tentative length of stay is 3-4 Elena Myers OTR/L [id number]  1/26/22 OT plan of care updated on this date. Tentative length of stay is 2 weeks [email or web address] OTR/L [id number]      DISCHARGE RECOMMENDATIONS  Recommended DME:    Post Discharge Care:   []Home Independently  []Home with 24hr Care / Supervision [x]Home with Partial Supervision []Home with Home Health OT []Home with Out Pt OT []Other: ___   Comments:         Time in Time out Tx Time Breakdown  Variance:   First Session  [id number]  [] Individual Tx-   [x] Concurrent Tx-   [] Co-Tx -   [] Group Tx -   [] Time Missed -     Second Session [id number]  [] Individual Tx-   [x] Concurrent Tx -45  [] Co-Tx -   [] Group Tx -   [] Time Missed -              Third Session    [] Individual Tx-   [] Concurrent Tx -  [] Co-Tx -   [] Group Tx -   [] Time Missed -         Total Tx Time: 90 mins      [facility] OTR/L [id number]

## 2022-01-27 NOTE — PROGRESS NOTES
Physical Therapy    Facility/Department: [address] [facility]  Treatment note     NAME: Keenan Hinojosa  : 1975  MRN: [id number]     Date of Service: 2022     Evaluating Therapist: Mike Mendoza PT, SHORTYT [id number]        ROOM: [address] [place] [address]  DIAGNOSIS: Polyneuropathy  PRECAUTIONS: Falls, Regular diet, **PRAFOS when not in therapy**  HPI: Patient admitted to the hospital on 21 with progressively worsening leg weakness after receiving an antibody infusion for Covid diagnosed in November. Neurosurgery and neurology all saw patient on consult. Imaging has shown lumbar radiculopathy. EMG done on 21: Recommendations: This is predominantly a neuropathic process, not myopathic in nature. Post viral inflammatory processes can present early on with this picture however usually progress to development of slowing of velocity with the development of myelin abnormalities as well as increasing axonal pathology. B12 level low during admission and she has received several injections. Social:  Pt lives with fiance and son in [address] [facility] style home, 3 PONCE with no railing + 1 step to enter. May have [address] Prior to admission: Pt independent with all functional mobility                    Initial Evaluation  Date:  AM     PM    Short Term Goals Long Term Goals    Was pt agreeable to Eval/treatment? 22 Yes yes       Does pt have pain?  Reports allodynia in BLE No sig c/o pain No sig c/o pain       Bed Mobility  Rolling: SBA  Supine to sit: Francesco  Sit to supine: modA  Scooting: Francesco NT Rolling: SBA  Supine to sit: Francesco with HHA  Sit to supine: SBA  Scooting: SBA sup Mod i   Transfers Sit to stand: maxA + SBA form 2nd helper  Stand to sit: maxA + SBA from 2nd helper  Stand pivot: NT  Slideboard tx- maxA     5xSTS: NT Sit to stand: SBA-CGA  Stand to sit: SBA-CGA   Stand pivot: NT  Slideboard tx- NT Sit to stand: SBA  Stand to sit: SBA   Stand pivot: NT Francesco    sup                      Ambulation    NT 70'x4, 37'x1    [employer], Francesco  KUSUM Custom AFO 15'x2, ww, CGA-Francesco    [employer] Custom AFO   10', LRAD, maxA       50', LRAD, Francesco         Walking 10 feet on uneven surface NT NT NT 10', LRAD, maxA 10', LRAD, Francesco   Wheel Chair Mobility NT NT ', BUE propulsion, sup > 200', BUE propulson, mod i   Car Transfers NT NT Francesco maxA Francesco   Stair negotiation: ascended and descended  NT NT NT 1 step, maxA, KUSUM HR 4 steps, [employer] HR, Francesco   Curb Step:   ascended and descended NT NT NT 2\" curb, LRAD, maxA 2\" curb, LRAD, Francesco   Picking up object off the floor NT NT NT MaxA with assistive device Francesco with LRAD   BLE ROM WFL           BLE Strength KUSUM hip flex- 1/5  KUSUM hip ext- 1/5  KUSUM knee ext 2+/5  KUSUM knee flex 2-/5  KUSUM PF 2+/5  KUSUM DF 0/5           Balance  Sitting EOB- SBA  Standing- maxA                          Date Family Teach Completed             Is additional Family Teaching Needed? Y or N             Hindering Progress Weakness, impulsivity, pain, endurance, balance, body habitus Weakness, impulsivity, pain, endurance, balance, body habitus  Weakness, impulsivity, pain, endurance, balance, body habitus         PT recommended ELOS 4-5 weeks           Team's Discharge Plan             Therapist at Team Meeting               General:        HRmax: 174 bpm       Beta blockers (Y/N): N      Adjusted HRmax: 174  bpm   Blood pressure (mmHg): AM:  - Prior to Tx:128/84  - During Tx:   - Post Tx: 164/88 PM:  - Prior to Tx: -  - During Tx: -  - Post Tx: -   Time spent in HR ranges: Moderate intensity (60-70% HRmax):  104-122 BPM AM:18 min 42 sec PM: -   High intensity (70-85% HRmax):  122-148 BPM AM: 22 min 30 sec PM: -     Therapeutic Exercise:     AM:   1. Functional mobility    PM:   1.  Cane step overs-  In parallel bars, 4 SPC to step over, x3 rounds with RLE leading, x3 round with LLE leading, able to independently advance legs over canes, visual target for height to flex hip to raise knee to, occ min A to assist with foot placement  Patient education    Pt educated on tx, gait, balance, current status and POC, d/c planning. Patient response to education:   Pt verbalized understanding Pt demonstrated skill Pt requires further education in this area          Additional Comments:   AM: Pt progressing amb volume this session, able to complete short loop around therapy gym doors for 4 rounds, no assist to advance LE provided, however benefits from occ foot block to prevent form rotating outward when advancing contralateral leg. Pt also benefiting from resistance at pelvis to reduce exaggerated rotation for compensating hip flexor weakness to advance extremities. PM: Focused first portion of session on increasing limb advancement and hi flexor strength with cane step overs, pt able to advance and clear canes without assist but requires occ assist for controlling foot placement. Pt often landing in foot flat position, continues to have very weak ankle dorsiflexors. Pt working on bed mobility at mat table, requiring slight assist to tx from supine to sit, pt showing sig effort requirements due to hip weakness and weight of braces and shoes making managing LE difficult. AM  Time in: 0930  Time out: 1020       PM  Time in: 1430  Time out: 1515    Pt is making good progress toward established Physical Therapy goals. Continue with physical therapy current plan of care.     Francine Fields, PT, DPT  [id number]

## 2022-01-27 NOTE — PROGRESS NOTES
Bernadine CARLISLE Psychologist  1/27/2022  6:36 PM    Date of Service:  1/27/2022    Reason for Consult:  Adjustment  Referring Cande Gilmore MD      Patient and psychologist met briefly to focus on expectations and engage in cognitive reframing. She and therapist explored goals related to discharge and identified progress made to assist with building rational thinking. Contact Bernadine Padilla PSYD as needed. Electronically signed by Bernadine Padilla PSYD, Psy.D.   Licensed Clinical Psychologist [id number]

## 2022-01-28 LAB
ANION GAP SERPL CALCULATED.3IONS-SCNC: 9 MMOL/L (ref 7–16)
BUN BLDV-MCNC: 11 MG/DL (ref 6–20)
CALCIUM SERPL-MCNC: 9.3 MG/DL (ref 8.6–10.2)
CHLORIDE BLD-SCNC: 102 MMOL/L (ref 98–107)
CO2: 28 MMOL/L (ref 22–29)
CREAT SERPL-MCNC: 0.5 MG/DL (ref 0.5–1)
FOLATE: 18.6 NG/ML (ref 4.8–24.2)
GFR AFRICAN AMERICAN: >60
GFR NON-AFRICAN AMERICAN: >60 ML/MIN/1.73
GLUCOSE BLD-MCNC: 94 MG/DL (ref 74–99)
HCT VFR BLD CALC: 39.6 % (ref 34–48)
HEMOGLOBIN: 12.7 G/DL (ref 11.5–15.5)
MCH RBC QN AUTO: 29.5 PG (ref 26–35)
MCHC RBC AUTO-ENTMCNC: 32.1 % (ref 32–34.5)
MCV RBC AUTO: 91.9 FL (ref 80–99.9)
PDW BLD-RTO: 13.3 FL (ref 11.5–15)
PLATELET # BLD: 362 E9/L (ref 130–450)
PMV BLD AUTO: 9.7 FL (ref 7–12)
POTASSIUM REFLEX MAGNESIUM: 4.5 MMOL/L (ref 3.5–5)
RBC # BLD: 4.31 E12/L (ref 3.5–5.5)
SODIUM BLD-SCNC: 139 MMOL/L (ref 132–146)
VITAMIN B-12: 552 PG/ML (ref 211–946)
WBC # BLD: 5.5 E9/L (ref 4.5–11.5)

## 2022-01-28 PROCEDURE — 80048 BASIC METABOLIC PNL TOTAL CA: CPT

## 2022-01-28 PROCEDURE — 82746 ASSAY OF FOLIC ACID SERUM: CPT

## 2022-01-28 PROCEDURE — 6360000002 HC RX W HCPCS: Performed by: INTERNAL MEDICINE

## 2022-01-28 PROCEDURE — 97530 THERAPEUTIC ACTIVITIES: CPT

## 2022-01-28 PROCEDURE — 82607 VITAMIN B-12: CPT

## 2022-01-28 PROCEDURE — 85027 COMPLETE CBC AUTOMATED: CPT

## 2022-01-28 PROCEDURE — 1280000000 HC REHAB R&B

## 2022-01-28 PROCEDURE — 6370000000 HC RX 637 (ALT 250 FOR IP): Performed by: INTERNAL MEDICINE

## 2022-01-28 PROCEDURE — 36415 COLL VENOUS BLD VENIPUNCTURE: CPT

## 2022-01-28 PROCEDURE — 97116 GAIT TRAINING THERAPY: CPT

## 2022-01-28 PROCEDURE — 97535 SELF CARE MNGMENT TRAINING: CPT

## 2022-01-28 PROCEDURE — 6370000000 HC RX 637 (ALT 250 FOR IP): Performed by: PHYSICAL MEDICINE & REHABILITATION

## 2022-01-28 PROCEDURE — 99232 SBSQ HOSP IP/OBS MODERATE 35: CPT | Performed by: PHYSICAL MEDICINE & REHABILITATION

## 2022-01-28 RX ORDER — LANOLIN ALCOHOL/MO/W.PET/CERES
2000 CREAM (GRAM) TOPICAL DAILY
Status: DISCONTINUED | OUTPATIENT
Start: 2022-01-29 | End: 2022-02-04 | Stop reason: HOSPADM

## 2022-01-28 RX ADMIN — ENOXAPARIN SODIUM 40 MG: 100 INJECTION SUBCUTANEOUS at 08:17

## 2022-01-28 RX ADMIN — NAPROXEN 500 MG: 500 TABLET ORAL at 06:15

## 2022-01-28 RX ADMIN — ACETAMINOPHEN 650 MG: 325 TABLET ORAL at 08:17

## 2022-01-28 RX ADMIN — GUAIFENESIN SYRUP AND DEXTROMETHORPHAN 5 ML: 100; 10 SYRUP ORAL at 06:15

## 2022-01-28 RX ADMIN — FOLIC ACID 1 MG: 1 TABLET ORAL at 08:17

## 2022-01-28 RX ADMIN — Medication 2000 UNITS: at 08:17

## 2022-01-28 RX ADMIN — Medication 1 TABLET: at 08:16

## 2022-01-28 RX ADMIN — BACLOFEN 10 MG: 10 TABLET ORAL at 14:09

## 2022-01-28 RX ADMIN — Medication 1000 MCG: at 08:17

## 2022-01-28 RX ADMIN — Medication 5 MG: at 21:03

## 2022-01-28 RX ADMIN — DICLOFENAC SODIUM 4 G: 10 GEL TOPICAL at 16:31

## 2022-01-28 RX ADMIN — DICLOFENAC SODIUM 4 G: 10 GEL TOPICAL at 21:06

## 2022-01-28 RX ADMIN — DICLOFENAC SODIUM 4 G: 10 GEL TOPICAL at 08:19

## 2022-01-28 RX ADMIN — ACETAMINOPHEN 650 MG: 325 TABLET ORAL at 14:12

## 2022-01-28 RX ADMIN — DOCUSATE SODIUM 100 MG: 100 CAPSULE, LIQUID FILLED ORAL at 08:17

## 2022-01-28 RX ADMIN — DICLOFENAC SODIUM 4 G: 10 GEL TOPICAL at 13:16

## 2022-01-28 RX ADMIN — ACETAMINOPHEN 650 MG: 325 TABLET ORAL at 21:10

## 2022-01-28 RX ADMIN — GUAIFENESIN SYRUP AND DEXTROMETHORPHAN 5 ML: 100; 10 SYRUP ORAL at 21:11

## 2022-01-28 RX ADMIN — BACLOFEN 20 MG: 10 TABLET ORAL at 21:03

## 2022-01-28 RX ADMIN — BACLOFEN 10 MG: 10 TABLET ORAL at 08:17

## 2022-01-28 RX ADMIN — DOCUSATE SODIUM 100 MG: 100 CAPSULE, LIQUID FILLED ORAL at 21:10

## 2022-01-28 ASSESSMENT — PAIN SCALES - GENERAL
PAINLEVEL_OUTOF10: 0
PAINLEVEL_OUTOF10: 0
PAINLEVEL_OUTOF10: 6
PAINLEVEL_OUTOF10: 0
PAINLEVEL_OUTOF10: 5
PAINLEVEL_OUTOF10: 6
PAINLEVEL_OUTOF10: 5

## 2022-01-28 ASSESSMENT — PAIN DESCRIPTION - FREQUENCY
FREQUENCY: CONTINUOUS
FREQUENCY: CONTINUOUS

## 2022-01-28 ASSESSMENT — PAIN DESCRIPTION - ORIENTATION
ORIENTATION: LOWER
ORIENTATION: LOWER

## 2022-01-28 ASSESSMENT — PAIN DESCRIPTION - ONSET
ONSET: ON-GOING
ONSET: ON-GOING

## 2022-01-28 ASSESSMENT — PAIN DESCRIPTION - PAIN TYPE
TYPE: CHRONIC PAIN
TYPE: CHRONIC PAIN

## 2022-01-28 ASSESSMENT — PAIN DESCRIPTION - PROGRESSION
CLINICAL_PROGRESSION: NOT CHANGED
CLINICAL_PROGRESSION: NOT CHANGED

## 2022-01-28 ASSESSMENT — PAIN DESCRIPTION - DESCRIPTORS
DESCRIPTORS: ACHING;DISCOMFORT;SORE
DESCRIPTORS: ACHING;DISCOMFORT;SORE

## 2022-01-28 ASSESSMENT — PAIN DESCRIPTION - LOCATION
LOCATION: BACK
LOCATION: BACK

## 2022-01-28 NOTE — PROGRESS NOTES
[facility]  Comprehensive Progress Note    Subjective:      Erika Noonan is a 48 y.o. female admitted to inpatient rehabilitation for impairments and acitivities limitations in ADLs and mobility secondary to polyneuropathy. No acute events overnight. No cp, sob, n/v. No new complaints. Progressing with therapy. The patient's medical records have been reviewed. Scheduled Meds:ammonium lactate, , Daily  baclofen, 10 mg, BID- 8&2   And  baclofen, 20 mg, Nightly  mirabegron, 25 mg, Daily  enoxaparin, 40 mg, Daily  diclofenac sodium, 4 g, 4x Daily  fluticasone, 1 spray, Daily  folic acid, 1 mg, Daily  melatonin, 5 mg, Nightly  multivitamin, 1 tablet, Daily  polyethylene glycol, 17 g, BID  vitamin B-12, 1,000 mcg, Daily  influenza virus vaccine, 0.5 mL, Prior to discharge  docusate sodium, 100 mg, BID  senna, 2 tablet, Nightly  vitamin D, 2,000 Units, Daily      Continuous Infusions:  PRN Meds:sodium chloride, 1 spray, PRN  guaiFENesin-dextromethorphan, 5 mL, Q4H PRN  melatonin, 5 mg, Nightly PRN  naproxen, 500 mg, BID PRN  acetaminophen, 650 mg, Q4H PRN  ondansetron, 4 mg, Q6H PRN  polyethylene glycol, 17 g, Daily PRN         Objective:      Vitals:    01/26/22 0800 01/27/22 0845 01/28/22 0000 01/28/22 0758   BP: (!) 140/85 123/78  (!) 155/81   Pulse: 105 100  94   Resp: 16 18  20   Temp: 97.3 °F (36.3 °C) 97.9 °F (36.6 °C)  97.6 °F (36.4 °C)   TempSrc: Temporal Oral     SpO2: 97% 96% 97%    Weight:       Height:         General appearance: alert,  NAD, up in chair   Eyes: conjunctivae/corneas clear. PERRL, EOM's intact. Lungs: clear to auscultation bilaterally  Heart: regular rate and rhythm, S1, S2   Abdomen: soft, non-tender, normal bowel sounds   Extremities: extremities atraumatic, no cyanosis or edema  Neurologic: AAOx4. LT and PP sensation intact through T6 and altered (1/2) at T7 and below. Strength is 5/5 throughout bilateral upper extremities.  Lower extremity strength is 2/5 bilateral HF; 3-/5 bilateral KE and PF; 1/5 bilateral DF and EHL (bilateral foot drop). Bilateral lower extremity spasticity, at MAS 1+         Laboratory:    Lab Results   Component Value Date    WBC 5.5 01/28/2022    HGB 12.7 01/28/2022    HCT 39.6 01/28/2022    MCV 91.9 01/28/2022     01/28/2022     Lab Results   Component Value Date     01/28/2022    K 4.5 01/28/2022     01/28/2022    CO2 28 01/28/2022    BUN 11 01/28/2022    CREATININE 0.5 01/28/2022    GLUCOSE 94 01/28/2022    CALCIUM 9.3 01/28/2022      Lab Results   Component Value Date    ALT 12 12/26/2021    AST 13 12/26/2021    ALKPHOS 52 12/26/2021    BILITOT 0.3 12/26/2021       Lab Results   Component Value Date    COLORU Yellow 12/20/2021    NITRU Negative 12/20/2021    GLUCOSEU Negative 12/20/2021    KETUA Negative 12/20/2021    UROBILINOGEN 0.2 12/20/2021    BILIRUBINUR Negative 12/20/2021       Functional Status:   Physical Therapy:  Bed mobility: Supervision   Transfers: SBA - CGA  Ambulation: 62 ft [employer] CGA,  bilateral custom AFO braces.      Occupational Therapy:  Feeding: Independent   Grooming: Setup  UB dressing: Setup  LB dressing: Mod A         Assessment/Plan:       48 y.o. female admitted to inpatient rehabilitation for impairments and acitivities limitations in ADLs and mobility secondary to polyneuropathy.     -Polyneuropathy: progressive since late November after she had Covid-19 and monoclonal antibody infusion. Found to have B12 deficiency, felt to be the cause of neuropathy. Monitor Neuro exam. Bilateral AFOs ordered and received for bilateral foot drop. Continue Acute rehab program.  -B12 deficiency: Continue oral B12, B12 now within normal limits. Monitor  -Borderline low vitamin D: started vitamin D supplement   -Lower extremity spasticity: Continue Baclofen, dose adjusted.  Contracture prevention.   -Pain control: tylenol PRN, naproxen prn, voltaren gel, heating pad  -Overactive bladder: continue home Myrbetriq   -DVT prophylaxis: lovenox     B12 level is within normal limits, but significant drop from last level and is a bit below goal for her. Will increase supplement to 2000u daily. Progressing in therapy. Gradually improving lower extremity strength.  Continue rehab program.      Electronically signed by Lynn Ryan MD on 1/28/2022 at 1:15 PM

## 2022-01-28 NOTE — PROGRESS NOTES
Occupational Therapy  OCCUPATIONAL THERAPY DAILY NOTE    Date:2022  Patient Name: Dany Morataya  MRN: [id number]  : 1975  Room: [address] [place] [address]A     Diagnosis: Polyneuropathy   Precautions: falls ,slide board transfers     Functional Assessment:   Date Status AE  Comments   Feeding 22  Independent      Grooming 22  Mod I  w/c Pt brushed and styled hair seated at sink          Oral Care 22 Mod I   Pt completed oral care seated at sink    Bathing 22  UB- set up   LB-Min A LH sponge Bathing completed in shower   UB Dressing 22  Set up  Pt donned bra and pull over shirt    LB Dressing 22   Min A  Reacher  Pt donned depends and pants with reacher. Assist to stand and pull up pants    Footwear 22  Mod A   long shoe horn  Sock aid Pt able to don socks long sitting in bed and using sock aid when up in w/c. Assist to don BAFO's and shoes    Toileting 22  Min A  Grab bar  3:1 commode   ww Pt completed hygiene following voiding and bowel movement .  Assist for balance to stand and pull up pants    Homemaking 22 Mod I (w/c level)  W/w, w/c        Functional Transfers / Balance:   Date Status DME  Comments   Sit Balance 22  SBA    W/c     Stand Balance 22   Min A  ww    [x] Tub      [x] Shower   Transfer 22  Mod A        Mod A   Ww, ETB    Rolling shower chair  Assist to lift BLE's over edge of tub    Commode   Transfer 22  Min A  3 in 1 commode  ww     Functional   Mobility 22    Mod I/Sup(w/c level)    Ambulating with ww- Min A W/c propel     Pt ambulated short distance in therapy apt over tile and carpet floor surfaces    Other: stand pivot transfer bed to w/c with ww     Sit to stand from w/c to table top level       Transfer on/off recliner  22  Min A ( with BAFO's)  Mod A ( without BAFO's)      Min A         Min A    ww , BLE AFO's               ww                            Functional Exercises / Activity:        Sensory / Neuromuscular Re-Education:         Cognitive Skills:   Status Comments   Problem   Solving good     Memory good     Sequencing good     Safety fair +      Visual Perception:    Education:   pt was educated on safe transfers in and out of tub/shower      [x] Family teach completed on:1/28/22 Pt's Son and Finance present for family teach session. They were educated on pt's current self care levels, how to don AFO braces and shoes, AE for dressing, and DME for tub/shower combo. They observed pt completing functional transfers and mobility at ww level. Family verbalized good understanding. Pain Level: 4/10 low back      Additional Notes:   1/22/22 LH sponge issued to patient on this date for bathing skills. Patient has made good progress during treatment sessions toward set goals. Therapy emphasis to obtain goals:Current Treatment Recommendations: Strengthening,Gait Training,Patient/Caregiver Education & Training,Home Management Training,Equipment Evaluation, Education, & procurement,Balance Training,Functional Mobility Training,Positioning,Endurance Training,Wheelchair Mobility Training,Safety Education & Training,Self-Care / ADL    [x] Continue with current OT Plan of care.   [] Prepare for Discharge    Goals  Long term goals  Time Frame for Long term goals : 4 weeks to address above problem areas  Long term goal 1: Pt demo Mod I to eat all meals  Long term goal 2: Pt demo Mod I grooming seated @ sink level  Long term goal 3: Pt demo Sup to bathe @ shower level both seated & use LHS  Long term goal 4: Pt demo Mod I UE dress to don a shirt & Mod I to don underwear, pants & Min A socks & shoes using AE as needed  Long term goal 5: Pt demo Mod I commode trf using a slide board & drop arm commode & demo Mod I toileting & demo G safety  Long term goals 6: Pt demo SBA tub trf using appropriate DME- bench & slide board to ensure pt safety  Long term goal 7: Pt demo Mod I light homemaking activity @ wc level & demo G- safety & insight  Long term goal 8: Pt demo G- endurance for a 20-30 minute functional activity  Long term goal 9: Pt demo Mod I wc propulsion thorughout a living environment & around obstacles  1/12/22  OT plan of care updated on this date. Tentative length of stay 4-5 weeks    Larry Dwyer OTR/L [id number]  1/19/22 OT plan of care updated on this date. Tentative length of stay is 3-4 Mando Shad OTR/L [id number]  1/26/22 OT plan of care updated on this date. Tentative length of stay is 2 weeks Theartem Dwyer OTR/L [id number]      DISCHARGE RECOMMENDATIONS  Recommended DME:  ETB, 3 in 1 commode   Post Discharge Care:   []Home Independently  []Home with 24hr Care / Supervision [x]Home with Partial Supervision []Home with Home Health OT []Home with Out Pt OT []Other: ___   Comments:         Time in Time out Tx Time Breakdown  Variance:   First Session  0815  0900   [x] Individual Tx-45   [] Concurrent Tx-   [] Co-Tx -   [] Group Tx -   [] Time Missed -     Second Session [id number]  [x] Individual Tx-45   [] Concurrent Tx -45  [] Co-Tx -   [] Group Tx -   [] Time Missed -              Third Session    [] Individual Tx-   [] Concurrent Tx -  [] Co-Tx -   [] Group Tx -   [] Time Missed -         Total Tx Time: 90 mins      Larry Dwyer OTR/L [id number]

## 2022-01-28 NOTE — PROGRESS NOTES
Physical Therapy    Facility/Department: [address] [facility]  Treatment note     NAME: Dimitry Unger  : 1975  MRN: [id number]     Date of Service: 2022     Evaluating Therapist: Nemo Dvaidson, PT, DPT [id number]        ROOM: [address] [place] [address]  DIAGNOSIS: Polyneuropathy  PRECAUTIONS: Falls, Regular diet, **PRAFOS when not in therapy**  HPI: Patient admitted to the hospital on 21 with progressively worsening leg weakness after receiving an antibody infusion for Covid diagnosed in November. Neurosurgery and neurology all saw patient on consult. Imaging has shown lumbar radiculopathy. EMG done on 21: Recommendations: This is predominantly a neuropathic process, not myopathic in nature. Post viral inflammatory processes can present early on with this picture however usually progress to development of slowing of velocity with the development of myelin abnormalities as well as increasing axonal pathology. B12 level low during admission and she has received several injections. Social:  Pt lives with fiance and son in [address] [facility] [place] 3 PONCE with no railing + 1 step to enter. May have [address] Prior to admission: Pt independent with all functional mobility                    Initial Evaluation  Date:  AM     PM    Short Term Goals Long Term Goals    Was pt agreeable to Eval/treatment? 22 Yes yes       Does pt have pain?  Reports allodynia in BLE No sig c/o pain No sig c/o pain       Bed Mobility  Rolling: SBA  Supine to sit: Francesco  Sit to supine: modA  Scooting: Francesco NT Rolling: SBA  Supine to sit: Francesco with HHA  Sit to supine: SBA  Scooting: SBA sup Mod i   Transfers Sit to stand: maxA + SBA form 2nd helper  Stand to sit: maxA + SBA from 2nd helper  Stand pivot: NT  Slideboard tx- maxA     5xSTS: NT Sit to stand: SBA-CGA  Stand to sit: SBA-CGA   Stand pivot: NT  Slideboard tx- NT Sit to stand: SBA  Stand to sit: SBA   Stand pivot: NT Francesco    sup                      Ambulation    NT See TE list for details 50'x2, ww, CGA-Francesco    KUSUM Custom AFO   10', LRAD, maxA       50', LRAD, Francesco         Walking 10 feet on uneven surface NT NT NT 10', LRAD, maxA 10', LRAD, Francesco   Wheel Chair Mobility NT NT ', BUE propulsion, sup > 200', BUE propulson, mod i   Car Transfers NT NT CGA maxA Francesco   Stair negotiation: ascended and descended  NT NT 4 steps x2 rounds, min-modA, see comments for details 1 step, maxA, KUSUM HR 4 steps, Kusum HR, Francesco   Curb Step:   ascended and descended NT NT 4\" curb, x2 reps, ww, Francesco, see comments for details 2\" curb, LRAD, maxA 2\" curb, LRAD, Francesco   Picking up object off the floor NT NT NT MaxA with assistive device Francesco with LRAD   BLE ROM WFL           BLE Strength UKSUM hip flex- 1/5  KUSUM hip ext- 1/5  KUSUM knee ext 2+/5  KUSUM knee flex 2-/5  KUSUM PF 2+/5  KUSUM DF 0/5           Balance  Sitting EOB- SBA  Standing- maxA                          Date Family Teach Completed             Is additional Family Teaching Needed? Y or N             Hindering Progress Weakness, impulsivity, pain, endurance, balance, body habitus Weakness, impulsivity, pain, endurance, balance, body habitus  Weakness, impulsivity, pain, endurance, balance, body habitus         PT recommended ELOS 4-5 weeks           Team's Discharge Plan             Therapist at Team Meeting               General:        HRmax: 174 bpm       Beta blockers (Y/N): N      Adjusted HRmax: 174  bpm   Blood pressure (mmHg): AM:  - Prior to Tx:128/84  - During Tx:   - Post Tx: 164/88 PM:  - Prior to Tx: -  - During Tx: -  - Post Tx: -   Time spent in HR ranges: Moderate intensity (60-70% HRmax):  104-122 BPM AM:18 min 42 sec PM: -   High intensity (70-85% HRmax):  122-148 BPM AM: 22 min 30 sec PM: -     Therapeutic Exercise:     AM:   1. Amb- 70'x2, ww, KUSUM custom AFO's, CGA  2.  Resisted ambulation- 70'x3, ww, vs purple theraband resistance,  KUSUM custom AFO's, CGA    PM:   1. functional mobility    Patient education    Pt educated on tx, gait, stair negotiation, current status and POC, d/c planning    Patient response to education:   Pt verbalized understanding Pt demonstrated skill Pt requires further education in this area   yes partial All areas     Additional Comments:   AM: Trialed amb with resistance applied at hips to help reduce pelvic rotation for compensatory leg swing and to increase motor unit recruitment and challenge propulsion for strengthening BLE. Pt tolerated activity well, no sig break down fo gait mechanics when compared to not resisted. Pt's HR peaking at 89% HR max which is slightly lower than it has been from previous sessions, often usually going > 90% at peak. Pt reporting sig exertion with activity. Overall total amb volume increased this session, most completed this admission. PM: Son and  present for family training this session. Had pt demo short amb bout, educated family on positioning with guarding and how to cue to promote improved gait mechanics. Practiced stair negotiation, educating family on guarding and assisting with steadying and helping advance LLE up onto step. Pt having difficult time with getting L knee to flex when descending with RLE lead and descending fwd. On 2nd round had son assist pt up steps, provided min cues for positioning and how to help pt advance limb up. Pt attempting to descend stairs in retrograde, PT assisting with this, educating pt on technique and family on how to guard and assist, pt with improved ability to flex L knee when descending in retrograde, continues to require assist to manage LLE off step and down to next. Finished session with having pt complete 4\" curb negotiation with ww, on 2nd rep son assisting pt up and down curb.  observing entire session due to compromised functional capacity. Family would benefit form additional training sessions for more hands on practice.      Chair/bed alarm:     AM  Time in: 0915  Time out: 1000    PM  Time in: 1430  Time out: 1    Pt is making good progress toward established Physical Therapy goals. Continue with physical therapy current plan of care.     Jared Morel., PT, DPT  [id number]

## 2022-01-29 PROCEDURE — 6370000000 HC RX 637 (ALT 250 FOR IP): Performed by: INTERNAL MEDICINE

## 2022-01-29 PROCEDURE — 97530 THERAPEUTIC ACTIVITIES: CPT

## 2022-01-29 PROCEDURE — 1280000000 HC REHAB R&B

## 2022-01-29 PROCEDURE — 6360000002 HC RX W HCPCS: Performed by: INTERNAL MEDICINE

## 2022-01-29 PROCEDURE — 6370000000 HC RX 637 (ALT 250 FOR IP): Performed by: PHYSICAL MEDICINE & REHABILITATION

## 2022-01-29 RX ADMIN — BACLOFEN 10 MG: 10 TABLET ORAL at 08:06

## 2022-01-29 RX ADMIN — BACLOFEN 10 MG: 10 TABLET ORAL at 15:04

## 2022-01-29 RX ADMIN — ENOXAPARIN SODIUM 40 MG: 100 INJECTION SUBCUTANEOUS at 08:04

## 2022-01-29 RX ADMIN — DOCUSATE SODIUM 100 MG: 100 CAPSULE, LIQUID FILLED ORAL at 08:06

## 2022-01-29 RX ADMIN — DICLOFENAC SODIUM 4 G: 10 GEL TOPICAL at 12:22

## 2022-01-29 RX ADMIN — CYANOCOBALAMIN TAB 1000 MCG 2000 MCG: 1000 TAB at 08:05

## 2022-01-29 RX ADMIN — DICLOFENAC SODIUM 4 G: 10 GEL TOPICAL at 17:00

## 2022-01-29 RX ADMIN — DICLOFENAC SODIUM 4 G: 10 GEL TOPICAL at 21:24

## 2022-01-29 RX ADMIN — DICLOFENAC SODIUM 4 G: 10 GEL TOPICAL at 08:07

## 2022-01-29 RX ADMIN — ACETAMINOPHEN 650 MG: 325 TABLET ORAL at 21:23

## 2022-01-29 RX ADMIN — Medication 1 TABLET: at 08:09

## 2022-01-29 RX ADMIN — Medication: at 08:07

## 2022-01-29 RX ADMIN — GUAIFENESIN SYRUP AND DEXTROMETHORPHAN 5 ML: 100; 10 SYRUP ORAL at 21:23

## 2022-01-29 RX ADMIN — Medication 5 MG: at 21:23

## 2022-01-29 RX ADMIN — FOLIC ACID 1 MG: 1 TABLET ORAL at 08:08

## 2022-01-29 RX ADMIN — NAPROXEN 500 MG: 500 TABLET ORAL at 04:58

## 2022-01-29 RX ADMIN — FLUTICASONE PROPIONATE 1 SPRAY: 50 SPRAY, METERED NASAL at 08:08

## 2022-01-29 RX ADMIN — ACETAMINOPHEN 650 MG: 325 TABLET ORAL at 15:04

## 2022-01-29 RX ADMIN — ACETAMINOPHEN 650 MG: 325 TABLET ORAL at 08:56

## 2022-01-29 RX ADMIN — GUAIFENESIN SYRUP AND DEXTROMETHORPHAN 5 ML: 100; 10 SYRUP ORAL at 04:58

## 2022-01-29 RX ADMIN — Medication 2000 UNITS: at 08:10

## 2022-01-29 RX ADMIN — BACLOFEN 20 MG: 10 TABLET ORAL at 21:23

## 2022-01-29 ASSESSMENT — PAIN DESCRIPTION - ONSET
ONSET: GRADUAL

## 2022-01-29 ASSESSMENT — PAIN DESCRIPTION - LOCATION
LOCATION: LEG

## 2022-01-29 ASSESSMENT — PAIN DESCRIPTION - PAIN TYPE
TYPE: CHRONIC PAIN

## 2022-01-29 ASSESSMENT — PAIN SCALES - GENERAL
PAINLEVEL_OUTOF10: 3
PAINLEVEL_OUTOF10: 3
PAINLEVEL_OUTOF10: 2
PAINLEVEL_OUTOF10: 6
PAINLEVEL_OUTOF10: 6
PAINLEVEL_OUTOF10: 0
PAINLEVEL_OUTOF10: 4
PAINLEVEL_OUTOF10: 7

## 2022-01-29 ASSESSMENT — PAIN DESCRIPTION - PROGRESSION
CLINICAL_PROGRESSION: GRADUALLY IMPROVING

## 2022-01-29 ASSESSMENT — PAIN DESCRIPTION - FREQUENCY
FREQUENCY: INTERMITTENT

## 2022-01-29 ASSESSMENT — PAIN DESCRIPTION - DESCRIPTORS
DESCRIPTORS: ACHING;DISCOMFORT
DESCRIPTORS: ACHING;CONSTANT;DISCOMFORT
DESCRIPTORS: NAGGING;ACHING;DISCOMFORT
DESCRIPTORS: ACHING;CONSTANT;DISCOMFORT

## 2022-01-29 ASSESSMENT — PAIN DESCRIPTION - ORIENTATION
ORIENTATION: LOWER;LEFT;RIGHT
ORIENTATION: LOWER
ORIENTATION: LOWER;LEFT;RIGHT

## 2022-01-29 NOTE — PROGRESS NOTES
Physical Therapy    Facility/Department: [facility]  Treatment note     NAME: Maximiliano Jerome  : 1975  MRN: [id number]     Date of Service: 2022     Evaluating Therapist: Jared Morel., PT, DPT [id number]        ROOM: [address] [place] [address]  DIAGNOSIS: Polyneuropathy  PRECAUTIONS: Falls, Regular diet, **PRAFOS when not in therapy**  HPI: Patient admitted to the hospital on 21 with progressively worsening leg weakness after receiving an antibody infusion for Covid diagnosed in November. Neurosurgery and neurology all saw patient on consult. Imaging has shown lumbar radiculopathy. EMG done on 21: Recommendations: This is predominantly a neuropathic process, not myopathic in nature. Post viral inflammatory processes can present early on with this picture however usually progress to development of slowing of velocity with the development of myelin abnormalities as well as increasing axonal pathology. B12 level low during admission and she has received several injections. Social:  Pt lives with fiance and son in [address] [facility] style home, 3 [address] with no railing + 1 step to enter. May have [address] Prior to admission: Pt independent with all functional mobility                   Initial Evaluation  Date:  AM     Short Term Goals Long Term Goals    Was pt agreeable to Eval/treatment? 22 Yes       Does pt have pain?  Reports allodynia in BLE None        Bed Mobility  Rolling: SBA  Supine to sit: Francesco  Sit to supine: modA  Scooting: Francesco NT sup Mod i   Transfers Sit to stand: maxA + SBA form 2nd helper  Stand to sit: maxA + SBA from 2nd helper  Stand pivot: NT  Slideboard tx- maxA     5xSTS: NT Sit to stand:CGA  Stand to sit: CGA   Stand pivot: NT  Slideboard tx- NT Francesco    sup                      Ambulation    NT 75 feet x2 with ww with min/cga  ( B custom AFO)  50'x2, ww, CGA-Francesco    NADEGE Custom AFO   10', LRAD, maxA       50', LRAD, Francesco         Walking 10 feet on uneven surface NT NT NT 10', LRAD, maxA 10', LRAD, Francesco   Wheel Chair Mobility NT NT ', BUE propulsion, sup > 200', BUE propulson, mod i   Car Transfers NT NT CGA maxA Francesco   Stair negotiation: ascended and descended  NT NT 4 steps x2 rounds, min-modA, see comments for details 1 step, maxA, KUSUM HR 4 steps, Kusum HR, Francesco   Curb Step:   ascended and descended NT NT 4\" curb, x2 reps, ww, Francesco, see comments for details 2\" curb, LRAD, maxA 2\" curb, LRAD, Francesco   Picking up object off the floor NT NT NT MaxA with assistive device Francesco with LRAD   BLE ROM WFL           BLE Strength KUSUM hip flex- 1/5  KUSUM hip ext- 1/5  KUSUM knee ext 2+/5  KUSUM knee flex 2-/5  KUSUM PF 2+/5  KUSUM DF 0/5           Balance  Sitting EOB- SBA  Standing- maxA                          Date Family Teach Completed             Is additional Family Teaching Needed? Y or N             Hindering Progress Weakness, impulsivity, pain, endurance, balance, body habitus Weakness, impulsivity, pain, endurance, balance, body habitus  Weakness, impulsivity, pain, endurance, balance, body habitus         PT recommended ELOS 4-5 weeks           Team's Discharge Plan             Therapist at Team Meeting                 Therapeutic Exercise:     AM:   Step ups on/off 6 inch step with bilateral rails 2x3 reps  wc propulsion x100 feet with B UE only with supervision   Patient education    Pt educated on technique/foot placement with stair negotiation     Patient response to education:   Pt verbalized understanding Pt demonstrated skill Pt requires further education in this area   x Inconsistently  x     Additional Comments:   AM: Pt sitting in wc upon arrival and agreed to participate in therapy. Pt propelled self in wc to/from therapy. Pt completed functional mobility as noted above. Pt able to achieve knee flexion on L during swing phase of gait. Still having difficulty with R LE clearance as pt unable to flex R knee during swing phase . Pt completed step ups at stairs using bilateral rails.   Pt able to consistently clear L LE onto step however still requires assistance with L LE to descend step. AM  Time in: 0830  Time out: 0910        Pt is making good progress toward established Physical Therapy goals. Continue with physical therapy current plan of care.     Lg Franco [id number]

## 2022-01-29 NOTE — PROGRESS NOTES
Progress Note  Date:2022       Room:[address] [place] [address]  Patient Clark Benitez     YOB: 1975     Age:46 y.o. Subjective    Subjective:  Symptoms:  Stable. She reports weakness. Diet:  Adequate intake. Activity level: Impaired due to weakness. Pain:  She reports no pain. Review of Systems   Neurological: Positive for weakness. Objective         Vitals Last 24 Hours:  TEMPERATURE:  Temp  Av.5 °F (36.9 °C)  Min: 98.5 °F (36.9 °C)  Max: 98.5 °F (36.9 °C)  RESPIRATIONS RANGE: Resp  Av  Min: 17  Max: 17  PULSE OXIMETRY RANGE: No data recorded  PULSE RANGE: Pulse  Av  Min: 86  Max: 86  BLOOD PRESSURE RANGE: Systolic (96ULW), WBR:132 , Min:138 , XUX:459   ; Diastolic (17NHY), EKB:76, Min:74, Max:74    I/O (24Hr): Intake/Output Summary (Last 24 hours) at 2022 1112  Last data filed at 2022 0600  Gross per 24 hour   Intake 1080 ml   Output 1100 ml   Net -20 ml     Objective:  General Appearance: In no acute distress. Vital signs: (most recent): Blood pressure 138/74, pulse 86, temperature 98.5 °F (36.9 °C), temperature source Oral, resp. rate 17, height 5' (1.524 m), weight 213 lb (96.6 kg), SpO2 97 %. Vital signs are normal.    Output: Producing urine and producing stool. Lungs:  Normal effort and normal respiratory rate. Breath sounds clear to auscultation. Heart: Normal rate. Regular rhythm. S1 normal and S2 normal.    Abdomen: Abdomen is soft. Bowel sounds are normal.   There is no abdominal tenderness. Extremities: Normal range of motion. Neurological: Patient is alert. (Weakness both lowers).       Labs/Imaging/Diagnostics    Labs:  CBC:  Recent Labs     22  0541   WBC 5.5   RBC 4.31   HGB 12.7   HCT 39.6   MCV 91.9   RDW 13.3        CHEMISTRIES:  Recent Labs     22  0541      K 4.5      CO2 28   BUN 11   CREATININE 0.5   GLUCOSE 94     PT/INR:No results for input(s): PROTIME, INR in the last 72 hours.  APTT:No results for input(s): APTT in the last 72 hours. LIVER PROFILE:No results for input(s): AST, ALT, BILIDIR, BILITOT, ALKPHOS in the last 72 hours. Imaging Last 24 Hours:  No results found. Assessment//Plan           Hospital Problems           Last Modified POA    * (Principal) Polyneuropathy 1/9/2022 Yes    B12 deficiency 1/9/2022 Yes    Spasticity 1/9/2022 Yes    Impaired mobility and ADLs 1/9/2022 Yes        Assessment:    Condition: In stable condition. Improving. (Polyneuropathy). Plan:   Encourage ambulation. (Patient is up in the chair  He has bilateral AFOs  Tolerating therapy well otherwise).        Electronically signed by Heather Narayan MD on 1/29/22 at 11:12 AM EST

## 2022-01-30 PROCEDURE — 6370000000 HC RX 637 (ALT 250 FOR IP): Performed by: PHYSICAL MEDICINE & REHABILITATION

## 2022-01-30 PROCEDURE — 97535 SELF CARE MNGMENT TRAINING: CPT

## 2022-01-30 PROCEDURE — 97110 THERAPEUTIC EXERCISES: CPT

## 2022-01-30 PROCEDURE — 6360000002 HC RX W HCPCS: Performed by: INTERNAL MEDICINE

## 2022-01-30 PROCEDURE — 97530 THERAPEUTIC ACTIVITIES: CPT

## 2022-01-30 PROCEDURE — 6370000000 HC RX 637 (ALT 250 FOR IP): Performed by: INTERNAL MEDICINE

## 2022-01-30 PROCEDURE — 1280000000 HC REHAB R&B

## 2022-01-30 RX ADMIN — Medication 5 MG: at 21:06

## 2022-01-30 RX ADMIN — Medication: at 08:10

## 2022-01-30 RX ADMIN — DICLOFENAC SODIUM 4 G: 10 GEL TOPICAL at 08:10

## 2022-01-30 RX ADMIN — DOCUSATE SODIUM 100 MG: 100 CAPSULE, LIQUID FILLED ORAL at 08:07

## 2022-01-30 RX ADMIN — DICLOFENAC SODIUM 4 G: 10 GEL TOPICAL at 14:00

## 2022-01-30 RX ADMIN — Medication 1 TABLET: at 08:11

## 2022-01-30 RX ADMIN — GUAIFENESIN SYRUP AND DEXTROMETHORPHAN 5 ML: 100; 10 SYRUP ORAL at 05:01

## 2022-01-30 RX ADMIN — GUAIFENESIN SYRUP AND DEXTROMETHORPHAN 5 ML: 100; 10 SYRUP ORAL at 21:05

## 2022-01-30 RX ADMIN — CYANOCOBALAMIN TAB 1000 MCG 2000 MCG: 1000 TAB at 08:08

## 2022-01-30 RX ADMIN — DOCUSATE SODIUM 100 MG: 100 CAPSULE, LIQUID FILLED ORAL at 21:06

## 2022-01-30 RX ADMIN — ACETAMINOPHEN 650 MG: 325 TABLET ORAL at 08:08

## 2022-01-30 RX ADMIN — NAPROXEN 500 MG: 500 TABLET ORAL at 05:01

## 2022-01-30 RX ADMIN — BACLOFEN 10 MG: 10 TABLET ORAL at 14:00

## 2022-01-30 RX ADMIN — ACETAMINOPHEN 650 MG: 325 TABLET ORAL at 21:07

## 2022-01-30 RX ADMIN — FLUTICASONE PROPIONATE 1 SPRAY: 50 SPRAY, METERED NASAL at 08:11

## 2022-01-30 RX ADMIN — Medication 2000 UNITS: at 08:09

## 2022-01-30 RX ADMIN — BACLOFEN 20 MG: 10 TABLET ORAL at 21:06

## 2022-01-30 RX ADMIN — ENOXAPARIN SODIUM 40 MG: 100 INJECTION SUBCUTANEOUS at 08:10

## 2022-01-30 RX ADMIN — DICLOFENAC SODIUM 4 G: 10 GEL TOPICAL at 17:00

## 2022-01-30 RX ADMIN — FOLIC ACID 1 MG: 1 TABLET ORAL at 08:08

## 2022-01-30 RX ADMIN — ACETAMINOPHEN 650 MG: 325 TABLET ORAL at 16:13

## 2022-01-30 RX ADMIN — BACLOFEN 10 MG: 10 TABLET ORAL at 08:07

## 2022-01-30 ASSESSMENT — PAIN DESCRIPTION - ONSET
ONSET: ON-GOING

## 2022-01-30 ASSESSMENT — PAIN DESCRIPTION - FREQUENCY
FREQUENCY: INTERMITTENT

## 2022-01-30 ASSESSMENT — PAIN DESCRIPTION - LOCATION
LOCATION: LEG

## 2022-01-30 ASSESSMENT — PAIN DESCRIPTION - PROGRESSION
CLINICAL_PROGRESSION: GRADUALLY IMPROVING
CLINICAL_PROGRESSION: GRADUALLY IMPROVING
CLINICAL_PROGRESSION: NOT CHANGED

## 2022-01-30 ASSESSMENT — PAIN DESCRIPTION - ORIENTATION
ORIENTATION: LEFT;RIGHT
ORIENTATION: RIGHT;LEFT;LOWER
ORIENTATION: LEFT;LOWER;RIGHT
ORIENTATION: RIGHT;LEFT

## 2022-01-30 ASSESSMENT — PAIN SCALES - GENERAL
PAINLEVEL_OUTOF10: 2
PAINLEVEL_OUTOF10: 5
PAINLEVEL_OUTOF10: 3
PAINLEVEL_OUTOF10: 2
PAINLEVEL_OUTOF10: 5
PAINLEVEL_OUTOF10: 4
PAINLEVEL_OUTOF10: 0
PAINLEVEL_OUTOF10: 5

## 2022-01-30 ASSESSMENT — PAIN DESCRIPTION - PAIN TYPE
TYPE: CHRONIC PAIN

## 2022-01-30 ASSESSMENT — PAIN DESCRIPTION - DESCRIPTORS
DESCRIPTORS: ACHING;CONSTANT;DISCOMFORT

## 2022-01-30 NOTE — PROGRESS NOTES
Occupational Therapy  OCCUPATIONAL THERAPY DAILY NOTE    Date:2022  Patient Name: Maximiliano Jerome  MRN: [id number]  : 1975  [facility]: [facility]     Diagnosis: Polyneuropathy   Precautions: falls ,slide board transfers     Functional Assessment:   Date Status AE  Comments   Feeding 22  Independent      Grooming 22  Mod I  w/c          Oral Care 22 Mod I      Bathing 22  UB- set up   LB-Min A LH sponge    UB Dressing 22  Set up     LB Dressing 22   Min A  Reacher     Footwear   Min/mod A  long shoe horn  Sock aid Pt needed assist with BLE AFO's to adjust on legs however pt secured straps while long sitting in bed. Pt required assist to ann NEW wider shoes on B feet and tie laces. Toileting 22  CGA/min A Grab bar  3:1 commode   ww Pt completed toilet hygiene seated on commode then stood for clothing mgmt using ww and grab bar for balance. Pt needed time to pull briefs and garments over hips for balance and safety. Pt can pull up shorts having trouble with fit using attends. Homemaking 22 Mod I (w/c level)   CGA/min A  (w. Walker  W/w, w/c   Pt engaged in coffee cup opening sleeves then  stacking into cabinet using w. Walker & counter for balance. Pt stood to wipe off coffee area along with setting up washcloth at sink needing occ cues for LLE placement and balance. Pt just received NEW shoes and trying to get used their fit on her. Functional Transfers / Balance:   Date Status DME  Comments   Sit Balance 22  Sup    W/c  demo'd on commode, up in w/c and on EOB to increase overall endurance and strength for functional tasks/ADL's. Stand Balance 22   CGA/min A ww Demo'd during hmkg, clothing mgmt, fxl mobility and transfers to improve dyn/static skills.     [x] Tub      [x] Shower   Transfer 22  Mod A        Mod A   Ww, ETB    Rolling shower chair     Commode   Transfer 22  CGA/Min A  3 in 1 commode  ww  Pt completed commode transfers at w. Walker level using grab bar to elevate on/off device. Functional   Mobility 1/30/22 1/30/22    Mod I/Sup(w/c level)    CGA/min A W/c propel    ww BUE's to propel w/c from room <>  Gym to increase strength and endurance. Pt ambulated using [employer] in OT kitchen and room with assist for balance and safety. Pt has NEW shoes and getting used to them when she is walking. Other: stand pivot transfer bed to w/c with ww     Sit to stand from w/c to ww/kitchen counter        Transfer on/off recliner  1/30/22 1/30/22 1/28/22  CGA/Min A ( with BAFO's)Mod A ( without BAFO's)      CGA/Min A         Min A    ww , BLE AFO's               ww      SPT EOB to w. Walker level wearing B AFO's for balance. For balance and safety. Functional Exercises / Activity:   BUE Ex's using 2# ball to increase strength & endurance for ADL's, transfers and mobility skills. Pt completed 3 reps of 10 ea for shoulder flex/ext, tricep/bicep ex's and punches seated in w/c. Pt completed fine motor./coordination task with tweezer dexterity board and Purdue peg board using bilateral skills to increase skills. Pepe box ex's with 2.5 wt inside forwards/backwards, side to side, circles 3 reps of 10 ea to increase BUE strength for functional activities. Sensory / Neuromuscular Re-Education:         Cognitive Skills:   Status Comments   Problem   Solving good  Demo;d during hmkg, standing balance, fxl mobility, transfers and arm exercises. Memory good  \"   Sequencing good  \"   Safety good- \"     Visual Perception:    Education:   Pt educated with safety and balance during kitchen mobility and hmkg tasks to increase awareness. [x] Family teach completed on:1/28/22 Pt's Son and Eulis Denver present for family teach session. They were educated on pt's current self care levels, how to don AFO braces and shoes, AE for dressing, and DME for tub/shower combo.  They observed pt completing functional transfers and mobility at ww level. Family verbalized good understanding. Pain Level: 4/10 low back      Additional Notes:   1/22/22 LH sponge issued to patient on this date for bathing skills. 1/30/22 Discussed measuring current w/c R/L side including wheels in order to accommodate kitchen area at home. Pt maybe getting Transport w/c for home. Patient has made good progress during treatment sessions toward set goals. Therapy emphasis to obtain goals:Current Treatment Recommendations: Strengthening,Gait Training,Patient/Caregiver Education & Training,Home Management Training,Equipment Evaluation, Education, & procurement,Balance Training,Functional Mobility Training,Positioning,Endurance Training,Wheelchair Mobility Training,Safety Education & Training,Self-Care / ADL    [x] Continue with current OT Plan of care. [] Prepare for Discharge    Goals  Long term goals  Time Frame for Long term goals : 4 weeks to address above problem areas  Long term goal 1: Pt demo Mod I to eat all meals  Long term goal 2: Pt demo Mod I grooming seated @ sink level  Long term goal 3: Pt demo Sup to bathe @ shower level both seated & use LHS  Long term goal 4: Pt demo Mod I UE dress to don a shirt & Mod I to don underwear, pants & Min A socks & shoes using AE as needed  Long term goal 5: Pt demo Mod I commode trf using a slide board & drop arm commode & demo Mod I toileting & demo G safety  Long term goals 6: Pt demo SBA tub trf using appropriate DME- bench & slide board to ensure pt safety  Long term goal 7: Pt demo Mod I light homemaking activity @ wc level & demo G- safety & insight  Long term goal 8: Pt demo G- endurance for a 20-30 minute functional activity  Long term goal 9: Pt demo Mod I wc propulsion thorughout a living environment & around obstacles  1/12/22  OT plan of care updated on this date.  Tentative length of stay 4-5 weeks    Dee Tomlinson OTR/L [id number]  1/19/22 OT plan of care updated on this date. Tentative length of stay is 3-4 Mando Shad OTR/L [id number]  1/26/22 OT plan of care updated on this date. Tentative length of stay is 2 weeks Davidartem Alanabritney OTR/L [id number]      DISCHARGE RECOMMENDATIONS  Recommended DME:  ETB, 3 in 1 commode   Post Discharge Care:   []Home Independently  []Home with 24hr Care / Supervision [x]Home with Partial Supervision []Home with Home Health OT []Home with Out Pt OT []Other: ___   Comments:         Time in Time out Tx Time Breakdown  Variance:   First Session   8:20am   9:15am [x] Individual Tx 55mins   [] Concurrent Tx-   [] Co-Tx -   [] Group Tx -   [] Time Missed -     Second Session 9:15am  9:38am [] Individual Tx-   [x] Concurrent Tx -23mins  [] Co-Tx -   [] Group Tx -   [] Time Missed -              Third Session    [] Individual Tx-   [] Concurrent Tx -  [] Co-Tx -   [] Group Tx -   [] Time Missed -         Total Tx Time:78 mins      Geneva Villalobos DURAN/L [id number]

## 2022-01-31 PROCEDURE — 99232 SBSQ HOSP IP/OBS MODERATE 35: CPT | Performed by: PHYSICAL MEDICINE & REHABILITATION

## 2022-01-31 PROCEDURE — 97116 GAIT TRAINING THERAPY: CPT

## 2022-01-31 PROCEDURE — 97530 THERAPEUTIC ACTIVITIES: CPT

## 2022-01-31 PROCEDURE — 97110 THERAPEUTIC EXERCISES: CPT

## 2022-01-31 PROCEDURE — 6370000000 HC RX 637 (ALT 250 FOR IP): Performed by: INTERNAL MEDICINE

## 2022-01-31 PROCEDURE — 97112 NEUROMUSCULAR REEDUCATION: CPT

## 2022-01-31 PROCEDURE — 97535 SELF CARE MNGMENT TRAINING: CPT

## 2022-01-31 PROCEDURE — 6360000002 HC RX W HCPCS: Performed by: INTERNAL MEDICINE

## 2022-01-31 PROCEDURE — 6370000000 HC RX 637 (ALT 250 FOR IP): Performed by: PHYSICAL MEDICINE & REHABILITATION

## 2022-01-31 PROCEDURE — 1280000000 HC REHAB R&B

## 2022-01-31 RX ADMIN — ACETAMINOPHEN 650 MG: 325 TABLET ORAL at 21:54

## 2022-01-31 RX ADMIN — DICLOFENAC SODIUM 4 G: 10 GEL TOPICAL at 16:44

## 2022-01-31 RX ADMIN — BACLOFEN 20 MG: 10 TABLET ORAL at 21:53

## 2022-01-31 RX ADMIN — CYANOCOBALAMIN TAB 1000 MCG 2000 MCG: 1000 TAB at 08:02

## 2022-01-31 RX ADMIN — Medication 2000 UNITS: at 08:01

## 2022-01-31 RX ADMIN — GUAIFENESIN SYRUP AND DEXTROMETHORPHAN 5 ML: 100; 10 SYRUP ORAL at 04:24

## 2022-01-31 RX ADMIN — GUAIFENESIN SYRUP AND DEXTROMETHORPHAN 5 ML: 100; 10 SYRUP ORAL at 21:53

## 2022-01-31 RX ADMIN — DOCUSATE SODIUM 100 MG: 100 CAPSULE, LIQUID FILLED ORAL at 08:03

## 2022-01-31 RX ADMIN — FLUTICASONE PROPIONATE 1 SPRAY: 50 SPRAY, METERED NASAL at 08:04

## 2022-01-31 RX ADMIN — ACETAMINOPHEN 650 MG: 325 TABLET ORAL at 08:02

## 2022-01-31 RX ADMIN — Medication: at 08:01

## 2022-01-31 RX ADMIN — Medication 5 MG: at 21:53

## 2022-01-31 RX ADMIN — BACLOFEN 10 MG: 10 TABLET ORAL at 08:03

## 2022-01-31 RX ADMIN — Medication 1 TABLET: at 08:01

## 2022-01-31 RX ADMIN — FOLIC ACID 1 MG: 1 TABLET ORAL at 08:03

## 2022-01-31 RX ADMIN — DOCUSATE SODIUM 100 MG: 100 CAPSULE, LIQUID FILLED ORAL at 21:53

## 2022-01-31 RX ADMIN — NAPROXEN 500 MG: 500 TABLET ORAL at 04:24

## 2022-01-31 RX ADMIN — ENOXAPARIN SODIUM 40 MG: 100 INJECTION SUBCUTANEOUS at 08:01

## 2022-01-31 RX ADMIN — BACLOFEN 10 MG: 10 TABLET ORAL at 14:00

## 2022-01-31 RX ADMIN — DICLOFENAC SODIUM 4 G: 10 GEL TOPICAL at 08:03

## 2022-01-31 RX ADMIN — DICLOFENAC SODIUM 4 G: 10 GEL TOPICAL at 12:12

## 2022-01-31 ASSESSMENT — PAIN DESCRIPTION - PAIN TYPE
TYPE: CHRONIC PAIN
TYPE: CHRONIC PAIN

## 2022-01-31 ASSESSMENT — PAIN DESCRIPTION - FREQUENCY: FREQUENCY: INTERMITTENT

## 2022-01-31 ASSESSMENT — PAIN SCALES - GENERAL
PAINLEVEL_OUTOF10: 2
PAINLEVEL_OUTOF10: 5
PAINLEVEL_OUTOF10: 2
PAINLEVEL_OUTOF10: 2

## 2022-01-31 ASSESSMENT — PAIN DESCRIPTION - DESCRIPTORS
DESCRIPTORS: ACHING;CONSTANT;DISCOMFORT
DESCRIPTORS: ACHING;CONSTANT;DISCOMFORT

## 2022-01-31 ASSESSMENT — PAIN DESCRIPTION - ORIENTATION
ORIENTATION: LEFT;RIGHT
ORIENTATION: LEFT;RIGHT

## 2022-01-31 ASSESSMENT — PAIN DESCRIPTION - PROGRESSION: CLINICAL_PROGRESSION: NOT CHANGED

## 2022-01-31 ASSESSMENT — PAIN DESCRIPTION - LOCATION
LOCATION: LEG
LOCATION: LEG

## 2022-01-31 ASSESSMENT — PAIN DESCRIPTION - ONSET: ONSET: ON-GOING

## 2022-01-31 NOTE — PROGRESS NOTES
Physical Therapy    Facility/Department: [address] REHAB  Treatment note     NAME: Sheryl Keys  : 1975  MRN: [id number]     Date of Service: 2022     Evaluating Therapist: Facundo Velazquez., PT, DPT [id number]        ROOM: [address] [place] [address]  DIAGNOSIS: Polyneuropathy  PRECAUTIONS: Falls, Regular diet, **PRAFOS when not in therapy**  HPI: Patient admitted to the hospital on 21 with progressively worsening leg weakness after receiving an antibody infusion for Covid diagnosed in November. Neurosurgery and neurology all saw patient on consult. Imaging has shown lumbar radiculopathy. EMG done on 21: Recommendations: This is predominantly a neuropathic process, not myopathic in nature. Post viral inflammatory processes can present early on with this picture however usually progress to development of slowing of velocity with the development of myelin abnormalities as well as increasing axonal pathology. B12 level low during admission and she has received several injections. Social:  Pt lives with fiance and son in [address] [facility] style home, 3 PONCE with no railing + 1 step to enter. May have [address] Prior to admission: Pt independent with all functional mobility                    Initial Evaluation  Date:  AM     PM    Short Term Goals Long Term Goals    Was pt agreeable to Eval/treatment? 22 Yes yes       Does pt have pain?  Reports allodynia in BLE No sig c/o pain No sig c/o pain       Bed Mobility  Rolling: SBA  Supine to sit: Francesco  Sit to supine: modA  Scooting: Francesco NT Rolling: SBA  Supine to sit: Francesco with HHA  Sit to supine: SBA  Scooting: SBA sup Mod i   Transfers Sit to stand: maxA + SBA form 2nd helper  Stand to sit: maxA + SBA from 2nd helper  Stand pivot: NT  Slideboard tx- maxA     5xSTS: NT Sit to stand: SBA-CGA  Stand to sit: SBA-CGA   Stand pivot: NT  Slideboard tx- NT Sit to stand: SBA  Stand to sit: SBA   Stand pivot: NT Francesco    sup                      Ambulation    NT See TE list for details NT   10', LRAD, maxA       50', LRAD, Francesco         Walking 10 feet on uneven surface NT NT NT 10', LRAD, maxA 10', LRAD, Francesco   Wheel Chair Mobility NT NT ', BUE propulsion, sup > 200', BUE propulson, mod i   Car Transfers NT NT CGA maxA Francesco   Stair negotiation: ascended and descended  NT x4 reps, BUE on HR, Francesco ascending, mod A NT 1 step, maxA, KUSUM HR 4 steps, Kusum HR, Francesco   Curb Step:   ascended and descended NT NT NT 2\" curb, LRAD, maxA 2\" curb, LRAD, Francesco   Picking up object off the floor NT NT NT MaxA with assistive device Francesco with LRAD   BLE ROM WFL           BLE Strength KUSUM hip flex- 1/5  KUSUM hip ext- 1/5  KUSUM knee ext 2+/5  KUSUM knee flex 2-/5  KUSUM PF 2+/5  KUSUM DF 0/5           Balance  Sitting EOB- SBA  Standing- maxA                          Date Family Teach Completed             Is additional Family Teaching Needed? Y or N             Hindering Progress Weakness, impulsivity, pain, endurance, balance, body habitus Weakness, impulsivity, pain, endurance, balance, body habitus  Weakness, impulsivity, pain, endurance, balance, body habitus         PT recommended ELOS 4-5 weeks           Team's Discharge Plan             Therapist at Team Meeting               General:        HRmax: 174 bpm       Beta blockers (Y/N): N      Adjusted HRmax: 174  bpm   Blood pressure (mmHg): AM:  - Prior to Tx:146/81  - During Tx:   - Post Tx: 150/84 PM:  - Prior to Tx: -  - During Tx: -  - Post Tx: -   Time spent in HR ranges: Moderate intensity (60-70% HRmax):  104-122 BPM AM: 13 min 14 sec PM: -   High intensity (70-85% HRmax):  122-148 BPM AM: 17 min 14 sec PM: -   Therapeutic Exercise:     AM:   1. amb with no resistance- 70', with ww, SBA  2. amb with resistance- 70'x3, ww, vs purple and orange theraband resistance, SBA    PM:   1.  Resisted cane step overs- x6 rounds,2 # ankle weights on each lower extremity, in // bars for support, SBA  2. amb with resistance- amb 70' with ww, and 2 # ankle weights on each lower extremity to challenge propulsion and limb advancement, tactile cues at hips to prevent excessive pelvic rotation  3. Stair negotiation- x8 res, NADEGE HR, lightassist to initiate knee flexion on RLE,  and cues for slow eccentric control descending steps, Step to pattern, alternating foot that leads. Patient education    Pt educated on tx, gait, stair negotiation, balance, current status and POC, d/c planning    Patient response to education:   Pt verbalized understanding Pt demonstrated skill Pt requires further education in this area   yes partial All areas     Additional Comments:   AM: Progressed resistance with amb to challenge propulsion, pt tolerating well. Completed bout of stair negotiation, pt able to ascend with LLE leading, no assist to place foot on step ascending but requiring assist to help step L foot down when descending in retrograde. Will continue to practice. PM: Worked on resisted cane step overs and amb to challenge limb advancement and strengthening NADEGE hip flexors. Pt showing fatigue as reps progressed, catching R foot on canes when trailing leg. Finished session with practicing stair negotiation with emphasis on quad strengthening through eccentric phase when descending steps, pt requiring light assist to initiate knee flexion to begin descent on ~ 50% of reps. Will continue to practice. Pt showing sig increased heel strike during compared to last week. AM  Time in: 0915  Time out: 1000    PM  Time in: 1430  Time out: 1515    Pt is making good progress toward established Physical Therapy goals. Continue with physical therapy current plan of care.     Farshad Valentino., PT, DPT  [id number]

## 2022-01-31 NOTE — PROGRESS NOTES
[facility]  Comprehensive Progress Note    Subjective:      Alice Serra is a 48 y.o. female admitted to inpatient rehabilitation for impairments and acitivities limitations in ADLs and mobility secondary to polyneuropathy. No acute events overnight. No cp, sob, n/v. No new complaints. Progressing with therapy. The patient's medical records have been reviewed. Scheduled Meds:vitamin B-12, 2,000 mcg, Daily  ammonium lactate, , Daily  baclofen, 10 mg, BID- 8&2   And  baclofen, 20 mg, Nightly  mirabegron, 25 mg, Daily  enoxaparin, 40 mg, Daily  diclofenac sodium, 4 g, 4x Daily  fluticasone, 1 spray, Daily  folic acid, 1 mg, Daily  melatonin, 5 mg, Nightly  multivitamin, 1 tablet, Daily  polyethylene glycol, 17 g, BID  influenza virus vaccine, 0.5 mL, Prior to discharge  docusate sodium, 100 mg, BID  senna, 2 tablet, Nightly  vitamin D, 2,000 Units, Daily      Continuous Infusions:  PRN Meds:sodium chloride, 1 spray, PRN  guaiFENesin-dextromethorphan, 5 mL, Q4H PRN  melatonin, 5 mg, Nightly PRN  naproxen, 500 mg, BID PRN  acetaminophen, 650 mg, Q4H PRN  ondansetron, 4 mg, Q6H PRN  polyethylene glycol, 17 g, Daily PRN         Objective:      Vitals:    01/28/22 0000 01/28/22 0758 01/29/22 0840 01/30/22 0808   BP:  (!) 155/81 138/74 128/72   Pulse:  94 86 74   Resp:  20 17 17   Temp:  97.6 °F (36.4 °C) 98.5 °F (36.9 °C) 98.2 °F (36.8 °C)   TempSrc:   Oral Oral   SpO2: 97%      Weight:       Height:         General appearance: alert,  NAD, up in chair   Eyes: conjunctivae/corneas clear. PERRL, EOM's intact. Lungs: clear to auscultation bilaterally  Heart: regular rate and rhythm, S1, S2   Abdomen: soft, non-tender, normal bowel sounds   Extremities: extremities atraumatic, no cyanosis or edema  Neurologic: AAOx4. LT and PP sensation intact through T6 and altered (1/2) at T7 and below. Strength is 5/5 throughout bilateral upper extremities.  Lower extremity strength is 2/5 bilateral HF; 3-/5 bilateral KE and PF; 1/5 bilateral DF and EHL (bilateral foot drop). Bilateral lower extremity spasticity, at MAS 1+         Laboratory:    Lab Results   Component Value Date    WBC 5.5 01/28/2022    HGB 12.7 01/28/2022    HCT 39.6 01/28/2022    MCV 91.9 01/28/2022     01/28/2022     Lab Results   Component Value Date     01/28/2022    K 4.5 01/28/2022     01/28/2022    CO2 28 01/28/2022    BUN 11 01/28/2022    CREATININE 0.5 01/28/2022    GLUCOSE 94 01/28/2022    CALCIUM 9.3 01/28/2022      Lab Results   Component Value Date    ALT 12 12/26/2021    AST 13 12/26/2021    ALKPHOS 52 12/26/2021    BILITOT 0.3 12/26/2021       Lab Results   Component Value Date    COLORU Yellow 12/20/2021    NITRU Negative 12/20/2021    GLUCOSEU Negative 12/20/2021    KETUA Negative 12/20/2021    UROBILINOGEN 0.2 12/20/2021    BILIRUBINUR Negative 12/20/2021       Functional Status:   Physical Therapy:  Bed mobility: Supervision   Transfers: SBA - CGA  Ambulation: 62 ft [employer] CGA,  bilateral custom AFO braces.      Occupational Therapy:  Feeding: Independent   Grooming: Setup  UB dressing: Setup  LB dressing: Mod A         Assessment/Plan:       48 y.o. female admitted to inpatient rehabilitation for impairments and acitivities limitations in ADLs and mobility secondary to polyneuropathy.     -Polyneuropathy: progressive since late November after she had Covid-19 and monoclonal antibody infusion. Found to have B12 deficiency, felt to be the cause of neuropathy. Monitor Neuro exam. Bilateral AFOs ordered and received for bilateral foot drop. Continue Acute rehab program.  -B12 deficiency: Continue oral B12, B12 now within normal limits. Monitor  -Borderline low vitamin D: started vitamin D supplement   -Lower extremity spasticity: Continue Baclofen, dose adjusted.  Contracture prevention.   -Pain control: tylenol PRN, naproxen prn, voltaren gel, heating pad  -Overactive bladder: continue home

## 2022-01-31 NOTE — PROGRESS NOTES
Occupational Therapy  OCCUPATIONAL THERAPY DAILY NOTE    Date:2022  Patient Name: Nila Snow  MRN: [id number]  : 1975  Room: [address] [place], [place] [address]     Diagnosis: Polyneuropathy   Precautions: falls ,slide board transfers     Functional Assessment:   Date Status AE  Comments   Feeding 22  Independent      Grooming 22  Mod I  w/c          Oral Care 22 Mod I      Bathing 22  UB- set up   LB-Min A LH sponge    UB Dressing 22  Set up     LB Dressing 22   Min A  Reacher     Footwear   Min/mod A  long shoe horn  Sock aid    Toileting 22  CGA  Grab bar  3:1 commode   ww Assist for balance pulling pants up over hips    Homemaking 22 Mod I (w/c level)   CGA/min A  (w. Walker  W/w, w/c       Functional Transfers / Balance:   Date Status DME  Comments   Sit Balance 22  Sup    W/c     Stand Balance 22   CGA/min A ww    [x] Tub      [x] Shower   Transfer 22  Mod A        Mod A   Ww, ETB    Rolling shower chair     Commode   Transfer 22  CGA/Min A  3 in 1 commode  ww     Functional   Mobility 22   Mod I/Sup(w/c level)    CGA/SBA  W/c propel    ww     Back and forth to the bathroom    Other: stand pivot transfer bed to w/c with ww     Sit to stand from w/c to ww/kitchen counter        Transfer on/off recliner  22  CGA/Min A ( with BAFO's)Mod A ( without BAFO's)      CGA/Min A         Min A    ww , BLE AFO's               ww           Functional Exercises / Activity:  BUE strength exercises: arm bike 5 mins for 3 reps                                            5 # weighted ball 3 sets 10 reps in all planes of pt's tolerance                                            Green can do bar 3 sets 15 reps  B hand strength with 10 # power gripper 3 sets 1 reps   Standing balance/endurance at table top level : pt folded laundry at [facility] engaging B hand in the folding Pt completed shoulder ROM arc at CGA   Pt tolerated standing for 3-4 mins before needing a seated rest break         Sensory / Neuromuscular Re-Education:         Cognitive Skills:   Status Comments   Problem   Solving good  Demo;d during hmkg, standing balance, fxl mobility, transfers and arm exercises. Memory good  \"   Sequencing good  \"   Safety good- \"     Visual Perception:    Education:  Pt was educated on safe functional mobility and transfers  in and out of bathroom at ww level      [x] Family teach completed on:1/28/22 Pt's Son and Katie Unger present for family teach session. They were educated on pt's current self care levels, how to don AFO braces and shoes, AE for dressing, and DME for tub/shower combo. They observed pt completing functional transfers and mobility at ww level. Family verbalized good understanding. Pain Level: 4/10 low back      Additional Notes:   1/22/22 LH sponge issued to patient on this date for bathing skills. 1/30/22 Discussed measuring current w/c R/L side including wheels in order to accommodate kitchen area at home. Pt maybe getting Transport w/c for home. Patient has made good progress during treatment sessions toward set goals. Therapy emphasis to obtain goals:Current Treatment Recommendations: Strengthening,Gait Training,Patient/Caregiver Education & Training,Home Management Training,Equipment Evaluation, Education, & procurement,Balance Training,Functional Mobility Training,Positioning,Endurance Training,Wheelchair Mobility Training,Safety Education & Training,Self-Care / ADL    [x] Continue with current OT Plan of care.   [] Prepare for Discharge    Goals  Long term goals  Time Frame for Long term goals : 4 weeks to address above problem areas  Long term goal 1: Pt demo Mod I to eat all meals  Long term goal 2: Pt demo Mod I grooming seated @ sink level  Long term goal 3: Pt demo Sup to bathe @ shower level both seated & use LHS  Long term goal 4: Pt demo Mod I UE dress to don a shirt & Mod I to don underwear, pants & Min A socks & shoes using AE as needed  Long term goal 5: Pt demo Mod I commode trf using a slide board & drop arm commode & demo Mod I toileting & demo G safety  Long term goals 6: Pt demo SBA tub trf using appropriate DME- bench & slide board to ensure pt safety  Long term goal 7: Pt demo Mod I light homemaking activity @ wc level & demo G- safety & insight  Long term goal 8: Pt demo G- endurance for a 20-30 minute functional activity  Long term goal 9: Pt demo Mod I wc propulsion thorughout a living environment & around obstacles  1/12/22  OT plan of care updated on this date. Tentative length of stay 4-5 weeks    Jordan Braun OTR/L [id number]  1/19/22 OT plan of care updated on this date. Tentative length of stay is 3-4 Sulisadnra Doherty OTR/L [id number]  1/26/22 OT plan of care updated on this date. Tentative length of stay is 2 weeks Jordan Braun OTR/L [id number]      DISCHARGE RECOMMENDATIONS  Recommended DME:  ETB, 3 in 1 commode   Post Discharge Care:   []Home Independently  []Home with 24hr Care / Supervision [x]Home with Partial Supervision []Home with Home Health OT []Home with Out Pt OT []Other: ___   Comments:         Time in Time out Tx Time Breakdown  Variance:   First Session  [id number]  [] Individual Tx-   [x] Concurrent Tx- 45  [] Co-Tx -   [] Group Tx -   [] Time Missed -     Second Session [id number]   [] Individual Tx-   [x] Concurrent Tx -45  [] Co-Tx -   [] Group Tx -   [] Time Missed -              Third Session    [] Individual Tx-   [] Concurrent Tx -  [] Co-Tx -   [] Group Tx -   [] Time Missed -         Total Tx Time: 90 mins      Jordan Braun OTR/L [id number]

## 2022-02-01 PROCEDURE — 97530 THERAPEUTIC ACTIVITIES: CPT

## 2022-02-01 PROCEDURE — 97535 SELF CARE MNGMENT TRAINING: CPT

## 2022-02-01 PROCEDURE — 97116 GAIT TRAINING THERAPY: CPT

## 2022-02-01 PROCEDURE — 99232 SBSQ HOSP IP/OBS MODERATE 35: CPT | Performed by: PHYSICAL MEDICINE & REHABILITATION

## 2022-02-01 PROCEDURE — 1280000000 HC REHAB R&B

## 2022-02-01 PROCEDURE — 6360000002 HC RX W HCPCS: Performed by: INTERNAL MEDICINE

## 2022-02-01 PROCEDURE — 6370000000 HC RX 637 (ALT 250 FOR IP): Performed by: INTERNAL MEDICINE

## 2022-02-01 PROCEDURE — 6370000000 HC RX 637 (ALT 250 FOR IP): Performed by: PHYSICAL MEDICINE & REHABILITATION

## 2022-02-01 PROCEDURE — 97110 THERAPEUTIC EXERCISES: CPT

## 2022-02-01 RX ADMIN — BACLOFEN 20 MG: 10 TABLET ORAL at 21:25

## 2022-02-01 RX ADMIN — GUAIFENESIN SYRUP AND DEXTROMETHORPHAN 5 ML: 100; 10 SYRUP ORAL at 21:25

## 2022-02-01 RX ADMIN — CYANOCOBALAMIN TAB 1000 MCG 2000 MCG: 1000 TAB at 08:14

## 2022-02-01 RX ADMIN — GUAIFENESIN SYRUP AND DEXTROMETHORPHAN 5 ML: 100; 10 SYRUP ORAL at 04:44

## 2022-02-01 RX ADMIN — DOCUSATE SODIUM 100 MG: 100 CAPSULE, LIQUID FILLED ORAL at 08:15

## 2022-02-01 RX ADMIN — Medication: at 08:27

## 2022-02-01 RX ADMIN — ENOXAPARIN SODIUM 40 MG: 100 INJECTION SUBCUTANEOUS at 08:16

## 2022-02-01 RX ADMIN — Medication 1 TABLET: at 08:14

## 2022-02-01 RX ADMIN — DOCUSATE SODIUM 100 MG: 100 CAPSULE, LIQUID FILLED ORAL at 21:24

## 2022-02-01 RX ADMIN — DICLOFENAC SODIUM 4 G: 10 GEL TOPICAL at 08:23

## 2022-02-01 RX ADMIN — BACLOFEN 10 MG: 10 TABLET ORAL at 13:15

## 2022-02-01 RX ADMIN — NAPROXEN 500 MG: 500 TABLET ORAL at 04:44

## 2022-02-01 RX ADMIN — Medication 5 MG: at 21:26

## 2022-02-01 RX ADMIN — FOLIC ACID 1 MG: 1 TABLET ORAL at 08:15

## 2022-02-01 RX ADMIN — BACLOFEN 10 MG: 10 TABLET ORAL at 08:19

## 2022-02-01 RX ADMIN — Medication 2000 UNITS: at 08:15

## 2022-02-01 RX ADMIN — ACETAMINOPHEN 650 MG: 325 TABLET ORAL at 08:15

## 2022-02-01 RX ADMIN — ACETAMINOPHEN 650 MG: 325 TABLET ORAL at 21:24

## 2022-02-01 RX ADMIN — ACETAMINOPHEN 650 MG: 325 TABLET ORAL at 13:20

## 2022-02-01 RX ADMIN — FLUTICASONE PROPIONATE 1 SPRAY: 50 SPRAY, METERED NASAL at 08:20

## 2022-02-01 RX ADMIN — DICLOFENAC SODIUM 4 G: 10 GEL TOPICAL at 13:22

## 2022-02-01 ASSESSMENT — PAIN DESCRIPTION - PAIN TYPE
TYPE: CHRONIC PAIN

## 2022-02-01 ASSESSMENT — PAIN DESCRIPTION - ONSET: ONSET: ON-GOING

## 2022-02-01 ASSESSMENT — PAIN SCALES - GENERAL
PAINLEVEL_OUTOF10: 5
PAINLEVEL_OUTOF10: 0
PAINLEVEL_OUTOF10: 5
PAINLEVEL_OUTOF10: 5
PAINLEVEL_OUTOF10: 6
PAINLEVEL_OUTOF10: 5

## 2022-02-01 ASSESSMENT — PAIN DESCRIPTION - LOCATION
LOCATION: LEG
LOCATION: LEG

## 2022-02-01 ASSESSMENT — PAIN DESCRIPTION - ORIENTATION
ORIENTATION: LEFT;RIGHT
ORIENTATION: LEFT;RIGHT

## 2022-02-01 ASSESSMENT — PAIN DESCRIPTION - DESCRIPTORS
DESCRIPTORS: ACHING;CONSTANT
DESCRIPTORS: ACHING;CONSTANT;DISCOMFORT

## 2022-02-01 ASSESSMENT — PAIN DESCRIPTION - FREQUENCY
FREQUENCY: INTERMITTENT
FREQUENCY: INTERMITTENT

## 2022-02-01 ASSESSMENT — PAIN DESCRIPTION - PROGRESSION: CLINICAL_PROGRESSION: NOT CHANGED

## 2022-02-01 ASSESSMENT — PAIN - FUNCTIONAL ASSESSMENT: PAIN_FUNCTIONAL_ASSESSMENT: ACTIVITIES ARE NOT PREVENTED

## 2022-02-01 NOTE — PROGRESS NOTES
Physical Therapy    Facility/Department: [address] REHAB  Treatment note     NAME: James Mccauley  : 1975  MRN: [id number]     Date of Service: 2022     Evaluating Therapist: Amos Merchant PT, DPT [id number]        ROOM: [address] [place] [address]  DIAGNOSIS: Polyneuropathy  PRECAUTIONS: Falls, Regular diet, **PRAFOS when not in therapy**  HPI: Patient admitted to the hospital on 21 with progressively worsening leg weakness after receiving an antibody infusion for Covid diagnosed in November. Neurosurgery and neurology all saw patient on consult. Imaging has shown lumbar radiculopathy. EMG done on 21: Recommendations: This is predominantly a neuropathic process, not myopathic in nature. Post viral inflammatory processes can present early on with this picture however usually progress to development of slowing of velocity with the development of myelin abnormalities as well as increasing axonal pathology. B12 level low during admission and she has received several injections. Social:  Pt lives with fiance and son in [address] [facility] style home, 3 [address] with no railing + 1 step to enter. May have [address] Prior to admission: Pt independent with all functional mobility                    Initial Evaluation  Date:  AM     PM    Short Term Goals Long Term Goals    Was pt agreeable to Eval/treatment? 22 Yes yes       Does pt have pain?  Reports allodynia in BLE No sig c/o pain No sig c/o pain       Bed Mobility  Rolling: SBA  Supine to sit: Francesco  Sit to supine: modA  Scooting: Francesco Rolling: sup with bed rails  Supine to sit: sup with bed rails  Sit to supine: sup with bed rails  Scooting: sup with bed rails NT sup Mod i   Transfers Sit to stand: maxA + SBA form 2nd helper  Stand to sit: maxA + SBA from 2nd helper  Stand pivot: NT  Slideboard tx- maxA     5xSTS: NT Sit to stand: SBA  Stand to sit: SBA   Stand pivot: SBA with ww  Slideboard tx- NT Sit to stand: SBA  Stand to sit: SBA   Stand pivot: SBA with ww Francesco sup                      Ambulation    ', ww, SBA  KUSUM custom AFOs 135', ww, SBA  KUSUM custom AFOs    20' with ww, no bracing, SBA 10', LRAD, maxA       50', LRAD, Francesco         Walking 10 feet on uneven surface NT 12', ww, CGA NT 10', LRAD, maxA 10', LRAD, Francesco   Wheel Chair Mobility NT NT ', BUE propulsion, sup > 200', BUE propulson, mod i   Car Transfers NT NT NT maxA Francesco   Stair negotiation: ascended and descended  NT 8 steps BUE on HR, CGA ascending, min A NT 1 step, maxA, KUSUM HR 4 steps, Kusum HR, Francesco   Curb Step:   ascended and descended NT 4\" curb x2 reps, with ww, CGA-Francesco NT 2\" curb, LRAD, maxA 2\" curb, LRAD, Francesco   Picking up object off the floor NT NT NT MaxA with assistive device Francesco with LRAD   BLE ROM WFL           BLE Strength KUSUM hip flex- 1/5  KUSUM hip ext- 1/5  KUSUM knee ext 2+/5  KUSUM knee flex 2-/5  KUSUM PF 2+/5  KUSUM DF 0/5           Balance  Sitting EOB- SBA  Standing- maxA                          Date Family Teach Completed             Is additional Family Teaching Needed? Y or N             Hindering Progress Weakness, impulsivity, pain, endurance, balance, body habitus Weakness, impulsivity, pain, endurance, balance, body habitus  Weakness, impulsivity, pain, endurance, balance, body habitus         PT recommended ELOS 4-5 weeks           Team's Discharge Plan             Therapist at Team Meeting               General:        HRmax: 174 bpm       Beta blockers (Y/N): N      Adjusted HRmax: 174  bpm   Blood pressure (mmHg): AM:  - Prior to Tx:-  - During Tx: -  - Post Tx: - PM:  - Prior to Tx: -  - During Tx: -  - Post Tx: -   Time spent in HR ranges: Moderate intensity (60-70% HRmax):  104-122 BPM AM: - PM: -   High intensity (70-85% HRmax):  122-148 BPM AM: - PM: -   Therapeutic Exercise:     AM:   1. Functional mobility    PM:  1. Step ups- RLE on 6\" block, working on concentric and eccentric control, x15 reps, mod-max knee block to prevent hyperextension  2.  Gregg jordan- 3x30 sec to BLE, RLE with more tightness compared to LLE    Patient education    Pt educated on tx, gait, balance, current status and POC, d/c planning    Patient response to education:   Pt verbalized understanding Pt demonstrated skill Pt requires further education in this area   yes partial All areas     Additional Comments:   AM: Functional mobility completed as noted above. Pt able to progress amb distance significantly. Pt able to advance leg up steps when ascending, but requires assist to advance trailing leg down when descending for ~ 50% of steps. Pt requiring Francesco to assist with leg advancement on stair and curb negotiation. Will continue to progress. PM: Pt wanting to trial amb with no bracing to assess stability. Pt shows increased genu recurvatum and decreased lateral ankle stability and support when advancing LE, but in stance phase no evidence of ankle rolling. Completed supine rick stretch to help stretch quads and hip flexors as pt reports tightness in NADEGE thighs and feels it limits her ability to flex R knee during gait. Pt with increase muscular tightness in RLE > LLE, but no sig hypertonicity appreciated. Worked on strengthening R quads in knee flexed positioning, pt with sig difficulty initiating knee flexion to eccentrically lower off 6\" block. Will continue to practice. Chair/bed alarm:     AM  Time in: 0915  Time out: 1000    PM  Time in: 1430  Time out: 1515    Pt is making good progress toward established Physical Therapy goals. Continue with physical therapy current plan of care.     Gifty Smith., PT, DPT  [id number]

## 2022-02-01 NOTE — PROGRESS NOTES
[facility]  Comprehensive Progress Note    Subjective:      Marbin Cabral is a 48 y.o. female admitted to inpatient rehabilitation for impairments and acitivities limitations in ADLs and mobility secondary to polyneuropathy. No acute events overnight. No cp, sob, n/v. No new issues. Progressing with therapy. The patient's medical records have been reviewed. Scheduled Meds:vitamin B-12, 2,000 mcg, Daily  ammonium lactate, , Daily  baclofen, 10 mg, BID- 8&2   And  baclofen, 20 mg, Nightly  mirabegron, 25 mg, Daily  enoxaparin, 40 mg, Daily  diclofenac sodium, 4 g, 4x Daily  fluticasone, 1 spray, Daily  folic acid, 1 mg, Daily  melatonin, 5 mg, Nightly  multivitamin, 1 tablet, Daily  polyethylene glycol, 17 g, BID  influenza virus vaccine, 0.5 mL, Prior to discharge  docusate sodium, 100 mg, BID  senna, 2 tablet, Nightly  vitamin D, 2,000 Units, Daily      Continuous Infusions:  PRN Meds:sodium chloride, 1 spray, PRN  guaiFENesin-dextromethorphan, 5 mL, Q4H PRN  melatonin, 5 mg, Nightly PRN  naproxen, 500 mg, BID PRN  acetaminophen, 650 mg, Q4H PRN  ondansetron, 4 mg, Q6H PRN  polyethylene glycol, 17 g, Daily PRN         Objective:      Vitals:    01/28/22 0758 01/29/22 0840 01/30/22 0808 02/01/22 1030   BP: (!) 155/81 138/74 128/72 122/68   Pulse: 94 86 74 68   Resp: 20 17 17 18   Temp: 97.6 °F (36.4 °C) 98.5 °F (36.9 °C) 98.2 °F (36.8 °C)    TempSrc:  Oral Oral    SpO2:       Weight:       Height:         General appearance: alert,  NAD, up in chair   Lungs: clear to auscultation bilaterally  Heart: regular rate and rhythm, S1, S2   Abdomen: soft, non-tender, normal bowel sounds   Extremities: extremities atraumatic, no cyanosis or edema  Neurologic: AAOx4. LT and PP sensation intact through T6 and altered (1/2) at T7 and below. Strength is 5/5 throughout bilateral upper extremities.  Lower extremity strength is 2/5 bilateral HF; 3-/5 bilateral KE and PF; 1/5 bilateral DF and EHL (bilateral foot drop). Bilateral lower extremity spasticity, at MAS 1+         Laboratory:    Lab Results   Component Value Date    WBC 5.5 01/28/2022    HGB 12.7 01/28/2022    HCT 39.6 01/28/2022    MCV 91.9 01/28/2022     01/28/2022     Lab Results   Component Value Date     01/28/2022    K 4.5 01/28/2022     01/28/2022    CO2 28 01/28/2022    BUN 11 01/28/2022    CREATININE 0.5 01/28/2022    GLUCOSE 94 01/28/2022    CALCIUM 9.3 01/28/2022      Lab Results   Component Value Date    ALT 12 12/26/2021    AST 13 12/26/2021    ALKPHOS 52 12/26/2021    BILITOT 0.3 12/26/2021       Lab Results   Component Value Date    COLORU Yellow 12/20/2021    NITRU Negative 12/20/2021    GLUCOSEU Negative 12/20/2021    KETUA Negative 12/20/2021    UROBILINOGEN 0.2 12/20/2021    BILIRUBINUR Negative 12/20/2021       Functional Status:   Physical Therapy:  Bed mobility: Supervision   Transfers: SBA - CGA  Ambulation: 62 ft [employer] CGA,  bilateral custom AFO braces.      Occupational Therapy:  Feeding: Independent   Grooming: Setup  UB dressing: Setup  LB dressing: Mod A         Assessment/Plan:       48 y.o. female admitted to inpatient rehabilitation for impairments and acitivities limitations in ADLs and mobility secondary to polyneuropathy.     -Polyneuropathy: progressive since late November after she had Covid-19 and monoclonal antibody infusion. Found to have B12 deficiency, felt to be the cause of neuropathy. Monitor Neuro exam. Bilateral AFOs ordered and received for bilateral foot drop. Continue Acute rehab program.  -B12 deficiency: Continue oral B12, B12 now within normal limits. Monitor  -Borderline low vitamin D: started vitamin D supplement   -Lower extremity spasticity: Continue Baclofen, dose adjusted.  Contracture prevention.   -Pain control: tylenol PRN, naproxen prn, voltaren gel, heating pad  -Overactive bladder: continue home Myrbetriq   -DVT prophylaxis: lovenox     Continues to make steady progress in therapy  Team conference tomorrow     Electronically signed by Gayatri Hernandez MD on 2/1/2022 at 11:23 AM

## 2022-02-01 NOTE — PROGRESS NOTES
Occupational Therapy  OCCUPATIONAL THERAPY DAILY NOTE    Date:2022  Patient Name: Jesus Saeed  MRN: [id number]  : 1975  Room: [address] [place] [address]A     Diagnosis: Polyneuropathy   Precautions: falls ,slide board transfers     Functional Assessment:   Date Status AE  Comments   Feeding 22  Independent      Grooming 22  Mod I  w/c Pt washed hair in shower. Pt styled hair seated at sink          Oral Care 22  Mod I   Pt brushed teeth seated at sink   Bathing 22  UB- set up   LB-Min A LH sponge Bathing completed seated in shower    UB Dressing 22 Mod I   Pt donned bra and pull over shirt    LB Dressing 22   Min A  Reacher  Pt donned depends and pants with reacher. Assist to stand and pull up clothing    Footwear 22  Mod A   long shoe horn  Sock aid Pt donned socks with sock aid at Mod I.  Assist to don B AFO's and shoes    Toileting 22  SBA  Grab bar  3:1 commode   ww Pt able to complete hygiene and clothing management at SBA    Homemaking 22 Mod I (w/c level)   CGA/min A  (wAgnieszka Hartman  W/w, w/c       Functional Transfers / Balance:   Date Status DME  Comments   Sit Balance 22   Sup    W/c  Bathing in shower    Stand Balance 22   CGA/min A ww Pulling up clothing    [x] Tub      [x] Shower   Transfer 22  Mod A        Min A  Ww, ETB    Rolling shower chair      Transfer on/off rolling shower chair    Commode   Transfer 22  CGA (with B AFO's)     Min A ( without B AFO's)  3 in 1 commode  ww     Functional   Mobility 22   Mod I/Sup(w/c level)    CGA/SBA  W/c propel    ww     Ambulating back and forth to the bathroom       Other: stand pivot transfer bed to w/c with ww     Sit to stand from w/c to ww/kitchen counter        Transfer on/off recliner  22  CGA/Min A ( with BAFO's)  Min A  ( without BAFO's)      CGA/Min A         Min A    ww , BLE AFO's                 ww           Functional Exercises / Activity:  BUE strength exercises: BUE strength exercises with 3 # dowel molina 3 sets 15 reps in all planes                                            Mister  with 2# weight 15 reps                                                                                                Sensory / Neuromuscular Re-Education:         Cognitive Skills:   Status Comments   Problem   Solving good  Demo;d during hmkg, standing balance, fxl mobility, transfers and arm exercises. Memory good  \"   Sequencing good  \"   Safety good- \"     Visual Perception:    Education:  Pt was educated on  functional transfers on /off commode and bed without wearing B AFO's and using ww     [x] Family teach completed on:1/28/22 Pt's Son and Finance present for family teach session. They were educated on pt's current self care levels, how to don AFO braces and shoes, AE for dressing, and DME for tub/shower combo. They observed pt completing functional transfers and mobility at ww level. Family verbalized good understanding. Pain Level: 4/10 low back      Additional Notes:   1/22/22 LH sponge issued to patient on this date for bathing skills. 1/30/22 Discussed measuring current w/c R/L side including wheels in order to accommodate kitchen area at home. Pt maybe getting Transport w/c for home. Patient has made good progress during treatment sessions toward set goals. Therapy emphasis to obtain goals:Current Treatment Recommendations: Strengthening,Gait Training,Patient/Caregiver Education & Training,Home Management Training,Equipment Evaluation, Education, & procurement,Balance Training,Functional Mobility Training,Positioning,Endurance Training,Wheelchair Mobility Training,Safety Education & Training,Self-Care / ADL    [x] Continue with current OT Plan of care.   [] Prepare for Discharge    Goals  Long term goals  Time Frame for Long term goals : 4 weeks to address above problem areas  Long term goal 1: Pt demo Mod I to eat all meals  Long term goal 2: Pt demo Mod I grooming seated @ sink level  Long term goal 3: Pt demo Sup to bathe @ shower level both seated & use LHS  Long term goal 4: Pt demo Mod I UE dress to don a shirt & Mod I to don underwear, pants & Min A socks & shoes using AE as needed  Long term goal 5: Pt demo Mod I commode trf using a slide board & drop arm commode & demo Mod I toileting & demo G safety  Long term goals 6: Pt demo SBA tub trf using appropriate DME- bench & slide board to ensure pt safety  Long term goal 7: Pt demo Mod I light homemaking activity @ wc level & demo G- safety & insight  Long term goal 8: Pt demo G- endurance for a 20-30 minute functional activity  Long term goal 9: Pt demo Mod I wc propulsion thorughout a living environment & around obstacles  1/12/22  OT plan of care updated on this date. Tentative length of stay 4-5 weeks    Brady Velasquez OTR/L [id number]  1/19/22 OT plan of care updated on this date. Tentative length of stay is 3-4 Ar Paddy OTR/L [id number]  1/26/22 OT plan of care updated on this date. Tentative length of stay is 2 weeks Brady Velasquez OTR/L [id number]      DISCHARGE RECOMMENDATIONS  Recommended DME:  ETB, 3 in 1 commode   Post Discharge Care:   []Home Independently  []Home with 24hr Care / Supervision [x]Home with Partial Supervision []Home with Home Health OT []Home with Out Pt OT []Other: ___   Comments:         Time in Time out Tx Time Breakdown  Variance:   First Session  0830 0915  [x] Individual Tx-45   [] Concurrent Tx-  [] Co-Tx -   [] Group Tx -   [] Time Missed -     Second Session 0715 6525   [x] Individual Tx- 45   [] Concurrent Tx -  [] Co-Tx -   [] Group Tx -   [] Time Missed -              Third Session    [] Individual Tx-   [] Concurrent Tx -  [] Co-Tx -   [] Group Tx -   [] Time Missed -         Total Tx Time: 90 mins      Brady Velasquez OTR/L [id number]

## 2022-02-02 PROCEDURE — 97116 GAIT TRAINING THERAPY: CPT

## 2022-02-02 PROCEDURE — 97110 THERAPEUTIC EXERCISES: CPT

## 2022-02-02 PROCEDURE — 6370000000 HC RX 637 (ALT 250 FOR IP): Performed by: PHYSICAL MEDICINE & REHABILITATION

## 2022-02-02 PROCEDURE — 1280000000 HC REHAB R&B

## 2022-02-02 PROCEDURE — 6370000000 HC RX 637 (ALT 250 FOR IP): Performed by: INTERNAL MEDICINE

## 2022-02-02 PROCEDURE — 97530 THERAPEUTIC ACTIVITIES: CPT

## 2022-02-02 PROCEDURE — 99232 SBSQ HOSP IP/OBS MODERATE 35: CPT | Performed by: PHYSICAL MEDICINE & REHABILITATION

## 2022-02-02 PROCEDURE — 6360000002 HC RX W HCPCS: Performed by: INTERNAL MEDICINE

## 2022-02-02 PROCEDURE — 97535 SELF CARE MNGMENT TRAINING: CPT

## 2022-02-02 RX ADMIN — DICLOFENAC SODIUM 4 G: 10 GEL TOPICAL at 21:35

## 2022-02-02 RX ADMIN — ACETAMINOPHEN 650 MG: 325 TABLET ORAL at 09:04

## 2022-02-02 RX ADMIN — Medication: at 09:09

## 2022-02-02 RX ADMIN — DICLOFENAC SODIUM 4 G: 10 GEL TOPICAL at 14:00

## 2022-02-02 RX ADMIN — Medication 5 MG: at 21:33

## 2022-02-02 RX ADMIN — Medication 2000 UNITS: at 09:13

## 2022-02-02 RX ADMIN — FLUTICASONE PROPIONATE 1 SPRAY: 50 SPRAY, METERED NASAL at 09:12

## 2022-02-02 RX ADMIN — FOLIC ACID 1 MG: 1 TABLET ORAL at 09:04

## 2022-02-02 RX ADMIN — CYANOCOBALAMIN TAB 1000 MCG 2000 MCG: 1000 TAB at 09:04

## 2022-02-02 RX ADMIN — BACLOFEN 10 MG: 10 TABLET ORAL at 13:59

## 2022-02-02 RX ADMIN — ENOXAPARIN SODIUM 40 MG: 100 INJECTION SUBCUTANEOUS at 09:03

## 2022-02-02 RX ADMIN — NAPROXEN 500 MG: 500 TABLET ORAL at 04:39

## 2022-02-02 RX ADMIN — DOCUSATE SODIUM 100 MG: 100 CAPSULE, LIQUID FILLED ORAL at 21:34

## 2022-02-02 RX ADMIN — ACETAMINOPHEN 650 MG: 325 TABLET ORAL at 21:33

## 2022-02-02 RX ADMIN — BACLOFEN 20 MG: 10 TABLET ORAL at 21:33

## 2022-02-02 RX ADMIN — DOCUSATE SODIUM 100 MG: 100 CAPSULE, LIQUID FILLED ORAL at 09:04

## 2022-02-02 RX ADMIN — DICLOFENAC SODIUM 4 G: 10 GEL TOPICAL at 09:08

## 2022-02-02 RX ADMIN — ACETAMINOPHEN 650 MG: 325 TABLET ORAL at 13:59

## 2022-02-02 RX ADMIN — BACLOFEN 10 MG: 10 TABLET ORAL at 09:04

## 2022-02-02 RX ADMIN — GUAIFENESIN SYRUP AND DEXTROMETHORPHAN 5 ML: 100; 10 SYRUP ORAL at 04:40

## 2022-02-02 RX ADMIN — Medication 1 TABLET: at 09:05

## 2022-02-02 ASSESSMENT — PAIN DESCRIPTION - ORIENTATION
ORIENTATION: LEFT;LOWER

## 2022-02-02 ASSESSMENT — PAIN DESCRIPTION - LOCATION
LOCATION: BACK

## 2022-02-02 ASSESSMENT — PAIN DESCRIPTION - ONSET
ONSET: ON-GOING
ONSET: ON-GOING

## 2022-02-02 ASSESSMENT — PAIN DESCRIPTION - PROGRESSION: CLINICAL_PROGRESSION: NOT CHANGED

## 2022-02-02 ASSESSMENT — PAIN DESCRIPTION - PAIN TYPE
TYPE: CHRONIC PAIN

## 2022-02-02 ASSESSMENT — PAIN SCALES - GENERAL
PAINLEVEL_OUTOF10: 5
PAINLEVEL_OUTOF10: 0
PAINLEVEL_OUTOF10: 2
PAINLEVEL_OUTOF10: 5

## 2022-02-02 ASSESSMENT — PAIN DESCRIPTION - DESCRIPTORS
DESCRIPTORS: ACHING;CONSTANT;DISCOMFORT
DESCRIPTORS: ACHING;CONSTANT;DISCOMFORT
DESCRIPTORS: ACHING;DISCOMFORT

## 2022-02-02 ASSESSMENT — PAIN DESCRIPTION - FREQUENCY
FREQUENCY: INTERMITTENT
FREQUENCY: INTERMITTENT

## 2022-02-02 NOTE — PROGRESS NOTES
Occupational Therapy  OCCUPATIONAL THERAPY DAILY NOTE    Date:2022  Patient Name: Oanh Olivo  MRN: [id number]  : 1975  Room: [address] [place] [address]A     Diagnosis: Polyneuropathy   Precautions: falls ,slide board transfers     Functional Assessment:   Date Status AE  Comments   Feeding 22  Independent      Grooming 22  Mod I  w/c          Oral Care 22  Mod I      Bathing 22  UB- set up   LB-Min A LH sponge    UB Dressing 22 Mod I      LB Dressing 22   Min A  Reacher     Footwear 22 Mod A   long shoe horn  Sock aid Assist to don B AFO's and shoes    Toileting 22 SBA  Grab bar  3:1 commode   ww    Homemaking 22 Mod I (w/c level)   CGA/min A  (w. Walker  W/w, w/c       Functional Transfers / Balance:   Date Status DME  Comments   Sit Balance 22   Sup    W/c     Stand Balance 22   CGA/min A ww    [x] Tub      [x] Shower   Transfer 22  Mod A        Min A  Ww, ETB    Rolling shower chair     Commode   Transfer 22  CGA (with B AFO's)     Min A ( without B AFO's)  3 in 1 commode  ww     Functional   Mobility 22   Mod I/Sup(w/c level)    CGA/SBA  W/c propel    ww            Other: stand pivot transfer bed to w/c with ww     Sit to stand from w/c to ww/kitchen counter        Transfer on/off recliner     Stand pivot transfer from recliner to 3 in 1 commode without B AFO's  22 CGA/Min A ( with BAFO's)  Min A  ( without BAFO's)      CGA/Min A         CGA       CGA    ww , BLE AFO's                 ww           Functional Exercises / Activity:   BUE strength exercises: arm bike 5 mins for 3 reps                                             5 # weighted ball 3 sets 10 reps for elbow flex/ext, 2 # weighted ball for shld flex , ext and abd                                              Green can do bar 3 sets 15 reps                                              3 # dumbbell weights 3 sets 10 reps with focus on shld abd/and extension   B hand strength with 10 # power gripper                                                                                             Sensory / Neuromuscular Re-Education:         Cognitive Skills:   Status Comments   Problem   Solving good  Demo;d during hmkg, standing balance, fxl mobility, transfers and arm exercises. Memory good  \"   Sequencing good  \"   Safety good- \"     Visual Perception:    Education:  Pt was educated on safe functional transfers from recliner to 3 in 1 commode with ww without use of B AFO's     [x] Family teach completed on:1/28/22 Pt's Son and Finance present for family teach session. They were educated on pt's current self care levels, how to don AFO braces and shoes, AE for dressing, and DME for tub/shower combo. They observed pt completing functional transfers and mobility at ww level. Family verbalized good understanding. Pain Level: 4/10 low back      Additional Notes:   1/22/22 LH sponge issued to patient on this date for bathing skills. 1/30/22 Discussed measuring current w/c R/L side including wheels in order to accommodate kitchen area at home. Pt maybe getting Transport w/c for home. Patient has made good progress during treatment sessions toward set goals. Therapy emphasis to obtain goals:Current Treatment Recommendations: Strengthening,Gait Training,Patient/Caregiver Education & Training,Home Management Training,Equipment Evaluation, Education, & procurement,Balance Training,Functional Mobility Training,Positioning,Endurance Training,Wheelchair Mobility Training,Safety Education & Training,Self-Care / ADL    [x] Continue with current OT Plan of care.   [] Prepare for Discharge    Goals  Long term goals  Time Frame for Long term goals : 4 weeks to address above problem areas  Long term goal 1: Pt demo Mod I to eat all meals  Long term goal 2: Pt demo Mod I grooming seated @ sink level  Long term goal 3: Pt demo Sup to bathe @ shower level both seated & use LHS  Long term goal 4: Pt demo Mod I UE dress to don a shirt & Mod I to don underwear, pants & Min A socks & shoes using AE as needed  Long term goal 5: Pt demo Mod I commode trf using a slide board & drop arm commode & demo Mod I toileting & demo G safety  Long term goals 6: Pt demo SBA tub trf using appropriate DME- bench & slide board to ensure pt safety  Long term goal 7: Pt demo Mod I light homemaking activity @ wc level & demo G- safety & insight  Long term goal 8: Pt demo G- endurance for a 20-30 minute functional activity  Long term goal 9: Pt demo Mod I wc propulsion thorughout a living environment & around obstacles  1/12/22  OT plan of care updated on this date. Tentative length of stay 4-5 weeks    Margy Jackson OTR/L [id number]  1/19/22 OT plan of care updated on this date. Tentative length of stay is 3-4 Filipe Dinora OTR/L [id number]  1/26/22 OT plan of care updated on this date. Tentative length of stay is 2 weeks Margy Jackson OTR/L [id number]  2/2/22 OT plan of care updated on this date. Tentative discharge is 2/4/22  Margy Jackson OTR/L [id number]      DISCHARGE RECOMMENDATIONS  Recommended DME:  ETB, 3 in 1 commode   Post Discharge Care:   []Home Independently  []Home with 24hr Care / Supervision [x]Home with Partial Supervision []Home with Home Health OT []Home with Out Pt OT []Other: ___   Comments:         Time in Time out Tx Time Breakdown  Variance:   First Session  4016 2223  [] Individual Tx-   [x] Concurrent Tx-45  [] Co-Tx -   [] Group Tx -   [] Time Missed -     Second Session [id number]  [x] Individual Tx-45   [] Concurrent Tx -  [] Co-Tx -   [] Group Tx -   [] Time Missed -              Third Session    [] Individual Tx-   [] Concurrent Tx -  [] Co-Tx -   [] Group Tx -   [] Time Missed -         Total Tx Time: 90 mins      Margy Jackson OTR/L [id number]

## 2022-02-02 NOTE — PROGRESS NOTES
Physical Therapy    Facility/Department: [address] [facility]  Treatment note     NAME: Fabienne Still  : 1975  MRN: [id number]     Date of Service: 2022     Evaluating Therapist: Marco Porter, PT, DPT [id number]        ROOM: [address] [place] [address]  DIAGNOSIS: Polyneuropathy  PRECAUTIONS: Falls, Regular diet, **PRAFOS when not in therapy**  HPI: Patient admitted to the hospital on 21 with progressively worsening leg weakness after receiving an antibody infusion for Covid diagnosed in November. Neurosurgery and neurology all saw patient on consult. Imaging has shown lumbar radiculopathy. EMG done on 21: Recommendations: This is predominantly a neuropathic process, not myopathic in nature. Post viral inflammatory processes can present early on with this picture however usually progress to development of slowing of velocity with the development of myelin abnormalities as well as increasing axonal pathology. B12 level low during admission and she has received several injections. Social:  Pt lives with fiance and son in [address] [place] [address] style home, 3 PONCE with no railing + 1 step to enter. May have [address] Prior to admission: Pt independent with all functional mobility                    Initial Evaluation  Date:  AM     PM    Short Term Goals Long Term Goals    Was pt agreeable to Eval/treatment? 22 Yes yes       Does pt have pain?  Reports allodynia in BLE No sig c/o pain No sig c/o pain       Bed Mobility  Rolling: SBA  Supine to sit: Francesco  Sit to supine: modA  Scooting: Francesco NT NT sup Mod i   Transfers Sit to stand: maxA + SBA form 2nd helper  Stand to sit: maxA + SBA from 2nd helper  Stand pivot: NT  Slideboard tx- maxA     5xSTS: NT Sit to stand: SBA  Stand to sit: SBA   Stand pivot: SBA with ww  Slideboard tx- NT Sit to stand: Sup  Stand to sit: Sup   Stand pivot: NT   Francesco    sup                      Ambulation    NT See TE list for details See TE list for details 10', LRAD, maxA       50', LRAD, Francesco Walking 10 feet on uneven surface NT  NT 10', LRAD, maxA 10', LRAD, Francesco   Wheel Chair Mobility NT NT ', BUE propulsion, sup > 200', BUE propulson, mod i   Car Transfers NT NT NT maxA Francesco   Stair negotiation: ascended and descended  NT See TE list for detail NT 1 step, maxA, KUSUM HR 4 steps, Kusum HR, Francesco   Curb Step:   ascended and descended NT NT NT 2\" curb, LRAD, maxA 2\" curb, LRAD, Francesco   Picking up object off the floor NT NT NT MaxA with assistive device Francesco with LRAD   BLE ROM WFL           BLE Strength KUSUM hip flex- 1/5  KUSUM hip ext- 1/5  KUSUM knee ext 2+/5  KUSUM knee flex 2-/5  KUSUM PF 2+/5  KUSUM DF 0/5           Balance  Sitting EOB- SBA  Standing- maxA                          Date Family Teach Completed             Is additional Family Teaching Needed? Y or N             Hindering Progress Weakness, impulsivity, pain, endurance, balance, body habitus Weakness, impulsivity, pain, endurance, balance, body habitus  Weakness, impulsivity, pain, endurance, balance, body habitus         PT recommended ELOS 4-5 weeks           Team's Discharge Plan             Therapist at Team Meeting               General:        HRmax: 174 bpm       Beta blockers (Y/N): N      Adjusted HRmax: 174  bpm   Blood pressure (mmHg): AM:  - Prior to Tx:-149/86  - During Tx: -  - Post Tx: 148/84 PM:  - Prior to Tx: 146/83  - During Tx: -  - Post Tx: 135/84   Time spent in HR ranges: Moderate intensity (60-70% HRmax):  104-122 BPM AM: 17 min 9 sec PM: 17 min 3 sec   High intensity (70-85% HRmax):  122-148 BPM AM: 17 min 51 sec PM: 13 min 37 sec   Therapeutic Exercise:     AM:   1. Resisted stair negotiation- 2# ankle weight on each lower extremity, 4 sets 4 steps, BUE on R HR, ascending fwd, descending bwd, CGA for safety, Francesco to assist LE off step descending on final 2 steps of 4th set    2. Resisted amb- 70'x1, 100'x1, with ww, KUSUM custom AFO's and 2 # ankle weight on each lower extremity.      PM:   1. resisted amb- 70'x1 with 3 # ankle weights on each leg, 70'x4 with 3# ankle weight on each leg and purple theraband resistance pulling in posterior direction    2. Amb at HR-- 30'x2, focus on challenging postural stability, heavy UE support at railing, Francesco    3. Stair negotiation in stair well- x10 steps, BUE on R HR, ascending fwd, descending bwd, seated rest break at top of steps. CGA for balance, min-modA to assist LLE up last 4 steps ascending, min-modA to assist LLE down all 10 steps descending    Patient education    Pt educated on tx, gait, stair negotiation, current status and POC    Patient response to education:   Pt verbalized understanding Pt demonstrated skill Pt requires further education in this area   yes partial All areas     Additional Comments:   AM: Focus of session on applying resistance to BLE during amb and stair negotiation to challenge limb advance and strengthen hip musculature. pt tolerating activity well, requiring assist to help advance LE off step when descend for final 2 reps of final set during stair negotiation. PM: Attempted amb with quad cane to challenge postural stability, pt very anxious, unable to take step. Transitioned to HR where pt could have 1 UE support but a little more stability, pt able to tolerate 2 bouts at 30', requiring Francesco, continues to report sig anxiety and fear with trials due to instability. Ended session with trial of stair negotiation in stair well. Pt requiring assistance to advance LLE up last 4 steps, taking seated rest break at top, and then requiring min-modA to help advance LLE down when descending. Pt fatigued following session, reports exertion ranging between 15-17 on exertion scale. Chair/bed alarm:     AM  Time in: 0915  Time out: 1000    PM  Time in: 1430  Time out: 1520      Pt is making good progress toward established Physical Therapy goals. Continue with physical therapy current plan of care.     Amos Amaya., PT, DPT  [id number]

## 2022-02-02 NOTE — CARE COORDINATION
Per Team: d/c 2/4. Updated pt. And she will update her family. LTG: Min assist/ mod I / I. Pt requires no supervision at d/c. Pt has bi-lateral AFO's. DME: [employer], 3-in-1 commode, extended tub transfer, WC & cushion. -Referral to [facility] per pt. Choice. Pt. Was borrowing the [employer] & WC she had at home. Will order her own. Aftercare: Carmel Zuleta- RN, HHA, PT, OT- Referral to [facility] per pt. choice    FT: 1/28 PM with fiance and son, 2/4 PM with fiance and son    The Plan for Transition of Care is related to the following treatment goals: Home with [address] [place] [address], HHA, PT, OT through [facility] and DME through [facility]. The Patient and/or patient representative Pily Brandon was provided with a choice of provider and agrees   with the discharge plan. [x] Yes [] No    Freedom of choice list was provided with basic dialogue that supports the patient's individualized plan of care/goals, treatment preferences and shares the quality data associated with the providers.  [x] Yes [] No    JOEL Bond Intern  Randee Gutierrez, [place]  2/2/2022

## 2022-02-02 NOTE — PATIENT CARE CONFERENCE
[facility] NOTE/PATIENT PLAN OF CARE    The physician was present and led this team conference    Date: 2022  Admission date: 2022  Patient Name: Alice Serra        MRN: [id number]    : 1975  (48 y.o.)  Gender: female   Rehab diagnosis/surgery with date:  Peripheral Neuropathy  Impairment Group Code:  3.3      MEDICAL/FUNCTIONAL HISTORY/STATUS:  B12 level dropped but still in normal limits, on oral B12 dose, continues to make gains and is motivated    Consultations/Labs/X-rays: Results for GRACIELA MARISEL ANAND    Ref.  Range 2022 05:41   Vitamin B-12 Latest Ref Range: 211 - 946 pg/mL 552       MEDICATION UPDATE:    Scheduled Meds:vitamin B-12, 2,000 mcg, Daily  ammonium lactate, , Daily  baclofen, 10 mg, BID- 8&2   And  baclofen, 20 mg, Nightly  mirabegron, 25 mg, Daily  enoxaparin, 40 mg, Daily  diclofenac sodium, 4 g, 4x Daily  fluticasone, 1 spray, Daily  folic acid, 1 mg, Daily  melatonin, 5 mg, Nightly  multivitamin, 1 tablet, Daily  polyethylene glycol, 17 g, BID  influenza virus vaccine, 0.5 mL, Prior to discharge  docusate sodium, 100 mg, BID  senna, 2 tablet, Nightly  vitamin D, 2,000 Units, Daily    NURSING :    Bowel:   Always Continent  [x]   Occasionally incontinent  []   Frequently incontinent  []   Always incontinent  []   Not occurred  []     Bladder:   Always Continent  [x]    Incontinent less than daily[]   Incontinent  daily []   Always incontinent  []   No urine output    []   Indwelling catheter []       Toilet Hygiene:   Current level : Contact guard assist  Short term Toilet hygiene goal: supervision  Long term toilet hygiene goal:  Modified independent    Skin integrity: intact  Pain: back and legs, tylenol and naprosyn    NUTRITION    Diet  regular  Liquid consistency   thin    SOCIAL INFORMATION:  Lives with: fiance and son   Prior community services:  none  Home Architecture:  [facility], 3+1 entry, 12 down to basement, [facility] is home on oxygen due to recent Covid infection  Prior Level of function:  independent  DME:  wheelchair, wheeled walker  (borrowed items, not hers)    FAMILY / PATIENT EDUCATION:  [facility] and son scheduled for education- not able to do ramp due to home  but will not need it now due to progress    PHYSICAL THERAPY    Bed mobility:   Current level: supervision  Short term bed mobility goal: supervision  Long term bed mobility goal: Modified independent    Chair/bed transfers:  Current level: standby assist with a wheeled walker  Short term Chair/bed transfers goal: met  Long term Chair/bed transfers goal: supervision      Ambulation:   Current level: 150 ft wheeled walker at standby assist with custom bilateral AFOs  Short term ambulation goal: met  Long term ambulation goal: 200 ft at standby assist    Wheelchair Mobility:  Current level: 200 ft at Modified independent  Short term wheelchair goal: met  Long term wheelchair goal: met      Car transfers:   Current level: min assist  Short term car transfers goal: min assist  Long term car transfers goal:min assist    Stairs:   Current level : 8 with 1 rail at min assist, needs more  help going down  Short term stairs goal: met  Long term stairs goal: excceded    Lower Extremity Strength Issues:    NADEGE hip flex- 2-/5  NADEGE hip ext- 3/5  NADEGE knee ext 3+/5  NADEGE knee flex 3/5  NADEGE PF 3/5  NADEGE DF 1+/5        OCCUPATIONAL THERAPY:      Tub/shower:   Current level: mod assist , needs help with legs  Short term tub/shower goal: min assist  Long term tub/shower goal: standby assist      Feeding:  Current level: independent  Short term feeding goal: independent  Long term feeding goal: independent    Grooming:   Current level: Modified independent  Short term grooming goal: Modified independent  Long term grooming goal: Modified independent    Bathing:  Current level: min assist with adaptive equipment  Short term bathing goal: supervision  Long term bathing goal: supervision    Homemaking:   Current level: standing at min assist  Short term homemaking goal: supervision  Long term homemaking goal: Modified independent    Upper body dressing:  Current level: Modified independent  Short term upper body dressing goal: Modified independent  Long term upper body dressing goal: Modified independent    Lower body dressing:                                                                          Current level: Contact guard assist-min assist with adaptive equipment          Short term lower body dressing goal: min assist          Long term lower body dressing goal: Modified independent            Footwear  Current level: mod assist, help with AFOs, can do socks and shoes herself  Short term goal: min assist  Long term goal:Modified independent      Toilet transfer:   Current level: with braces on is Contact guard assist, without is min assist , has bedside commode  next to bed  Short term toilet transfer goal: supervision  Long term toilet transfer goal: Modified independent    Safety awareness: good      Patient/family's personal goals: \"get back home! \"  Factors supporting goal achievement:  made gains, remains motivated  Factors hindering goal achievement:  morbid obesity, continuing weakness in legs      Discharge Plan   Estimated Length of Stay: Friday 2/4            Destination: home  Services at Discharge: home health  for PT, OT, aide, nursing  Equipment at Discharge: wheeled walker, 3 in 1 commode, tub bench, wheelchair with cushion      INTERDISCIPLINARY TEAM/PHYSICIAN RECOMMENDATION AND/OR REVISIONS OF PLAN OF CARE:  additional family education on Friday 2/4/22 with fiance and son prior to discharge home      I approve the established interdisciplinary plan of care as documented within the medical record of Lashaun Buck.       Electronically signed by Juliana Molina RN  on 2/2/2022 at 9:01 AM  The following interdisciplinary team members were present:  Lauri Arreola, Geovanna Adams, LSW  Brock Hdz, DPT  Aletha Jack, OTR

## 2022-02-02 NOTE — PROGRESS NOTES
Physical Therapy    Facility/Department: [facility] 5SE REHAB  Weekly Note    NAME: Evangelina Quintanilla  : 1975  MRN: [id number]    Date of Service: 2022    Evaluating Therapist: Audrey Dial., PT, DPT [id number]      ROOM: [address] [place], [place] [id number]-V  DIAGNOSIS: Polyneuropathy  PRECAUTIONS: Falls, Regular diet, **PRAFOS when not in therapy**  HPI: Patient admitted to the hospital on 21 with progressively worsening leg weakness after receiving an antibody infusion for Covid diagnosed in November. Neurosurgery and neurology all saw patient on consult. Imaging has shown lumbar radiculopathy. EMG done on 21: Recommendations: This is predominantly a neuropathic process, not myopathic in nature.  Post viral inflammatory processes can present early on with this picture however usually progress to development of slowing of velocity with the development of myelin abnormalities as well as increasing axonal pathology. B12 level low during admission and she has received several injections. Social:  Pt lives with fiance and son in [address] [facility] style home, 3 PONCE with no railing + 1 step to enter. May have [address] Prior to admission: Pt independent with all functional mobility     Initial Evaluation  Date:  22 Comments Short Term Goals Long Term Goals    Was pt agreeable to Eval/treatment? 22 Yes yes yes yes      Does pt have pain?  Reports allodynia in BLE No sig c/o pain No sig c/o pain No c/o pain No c/o pain      Bed Mobility  Rolling: SBA  Supine to sit: Francesco  Sit to supine: modA  Scooting: Francesco Rolling: sup  Supine to sit: sup  Sit to supine: Francesco  Scooting: Sup Rolling: sup  Supine to sit: sup  Sit to supine: SBA  Scooting: Sup Rolling: sup  Supine to sit: sup  Sit to supine: Sup  Scooting: Sup Rolling: sup  Supine to sit: sup  Sit to supine: Sup  Scooting: Sup In hospital bed with bed rails    Francesco at mat table without rails sup Mod i   Transfers Sit to stand: maxA + SBA form 2nd helper  Stand to sit: maxA + SBA from 2nd helper  Stand pivot: NT  Slideboard tx- maxA    5xSTS: NT Sit to stand: modA   Stand to sit: modA   Stand pivot: NT  Slideboard tx- mod-maxA Sit to stand: Francesco  Stand to sit: Francesco  Stand pivot: Francesco with ww, KUSUM PLS donned       Sit to stand: SBA-CGA  Stand to sit: SBA-CGA   Stand pivot: CGA with ww Sit to stand: SBA  Stand to sit: SBA  Stand pivot:  SBAwith ww KUSUM PLS donned, heavy UE support on ww Francesco   sup     Ambulation    NT 20' in pedro lift, KUSUM KI and PLS donned, MaxA Up to 40' with ww, Francesco    Min-modA to advance BLE Up to 61' with ww, CGA     KUSUM custom AFOs    Pace- 0.118 m/s Up to 146' with ww, SBA, KUSUM custom AFOs donned Advancing independently    Compensatory strategies used to overcome hip weakness as she fatigues 10', LRAD, maxA      100 feet, LRAD,  SBA        Walking 10 feet on uneven surface NT NT NT NT NT  10', LRAD, maxA 10', LRAD, Francesco   Wheel Chair Mobility ', BUE propulsion, Sup 200', BUE propulsion, sup 160', BUE propulsion, mod i > 200', BUE propulsion, mod i  100', BUE propulsion, sup > 200', BUE propulson, mod i   Car Transfers NT NT NT  Francesco, handi-bar device  Francesco handi-bar device  maxA Francesco   Stair negotiation: ascended and descended  NT NT NT 4 steps, KUSUM HR, modA 8 steps, BUE on HR, Francesco Ascend fwd, descend bwd, able to advance limb up steps, requires assist to help advance limb down descending ~ 50% of reps 1 step, maxA, KUSUM HR 4 steps, Kusum HR, Francesco   Curb Step:   ascended and descended NT NT NT NT 4\" curb, ww, Francesco occ assist to advance trailing limb off step when descending 2\" curb, LRAD, maxA 2\" curb, LRAD, Francesco   Picking up object off the floor NT NT NT NT NT  MaxA with assistive device Francesco with LRAD   BLE ROM WFL          BLE Strength KUSUM hip flex- 1/5  KUSUM hip ext- 1/5  KUSUM knee ext 2+/5  KUSUM knee flex 2-/5  KUSUM PF 2+/5  KUSUM DF 0/5 KUSUM hip flex- 2-/5  KUSUM hip ext- 2-/5  KUSUM knee ext 2+/5  KUSUM knee flex 2-/5  KUSUM PF 2+/5  KUSUM DF 0/5 KUSUM hip flex- 2-/5  NADEGE hip ext- 3/5  NADEGE knee ext 3+/5  NADEGE knee flex 3/5  NADEGE PF 2+/5  NADEGE DF 0/5 NADEGE hip flex- 2-/5  NADEGE hip ext- 3/5  NADEGE knee ext 3+/5  NADEGE knee flex 3/5  NADEGE PF 3/5  NADEGE DF 1+/5 NADEGE hip flex- 2-/5  NADEGE hip ext- 3/5  NADEGE knee ext 3+/5  NADEGE knee flex 3/5  NADEGE PF 3/5  NADEGE DF 1+/5      Balance  Sitting EOB- SBA  Standing- maxA     Sitting EOB- SBA  Standing- maxA Sitting EOB- SBA  Standing- Francesco with ww Sitting EOB- sup  Standing- Francesco with ww Sitting EOB- sup  Standing- SBA with ww            Date Family Teach Completed  None to date None to date None to date 1/28 with son hands on,  observing      Is additional Family Teaching Needed? Y or N  Y Y Y Y      Hindering Progress Weakness, impulsivity, pain, endurance, balance, body habitus Weakness, impulsivity, pain, endurance, balance, body habitus, proprioceptive deficits, tone, motor control deficits Weakness, impulsivity, pain, endurance, balance, body habitus, proprioceptive deficits, tone, motor control deficits    Weakness, endurance, balance, body habitus, proprioceptive deficits,  motor control deficits    Weakness, endurance, balance, body habitus, proprioceptive deficits,  motor control deficits      PT recommended ELOS 4-5 weeks 4-5 weeks 4 weeks 2 weeks 3-7 days      Team's Discharge Plan  4-5 weeks 3-4 weeks 2 weeks Discharge Friday 2/4/22      Therapist at Team Meeting  Claudene Gall, [place], DPT [id number]   Claudene Gall, PT, DPT [id number]   Claudene Gall, PT, DPT [id number]   Claudene Gall, PT, DPT [id number]          Date:  2/1/22  Supporting factors: Motivated, progressing  Barriers to discharge: Stairs to enter home, Weakness, endurance, balance, body habitus, proprioceptive deficits, tone, motor control deficits  Additional comments:  Barriers above are more so deficits to independence with mobility, anticipate pt to do well with d/c home.  Son and  present for family teach, son practicing hands on assist with stair negotiation and curb negotiation.  does not appear to have physical capacity to be able to physically assist pt, however pt has progressed well and son should be more than able to provide assist she needs. I would prefer 1 more family teach session for son to practice 1 more time prior to d/c home, pt expressing wishes to d/c home ASAP. Pt has good awareness of limitations and safety. Anticipate pt will remain at similar level between now and next week in terms on stair and curb negotiation. Pt will not require ramp entrance. DME:  Manual w/c, ww, NADEGE custom, AFOs  After Care:  Alison Mesa., PT, DPT  [id number]    Date:  1/25/22  Supporting factors: Motivated, progressing  Barriers to discharge:  Stairs to enter home, Weakness, endurance, balance, body habitus, proprioceptive deficits, tone, motor control deficits  Additional comments:  Pt received NADEGE custom AFOs on 1/24/22, shown sig improvement in ability to clear toes to help advance BLE. Pt is showing continued strength gains in BLE but continues to rely on compensatory strategies for limb advancement, especially as she fatigues. Pt reports increased confidence with mobility and motivated by progress. Pt requiring increased physical assist to advance limbs on stair negotiation, but pt able to push down to ascend without lift assist. May possibly still need a ramp at d/c.    DME:  Manual w/c, possibly ramp for home entrance, ww, NADEGE custom AFOs  After Care:  Alison Mesa., PT, DPT  [id number]    Date:  1/18/22  Supporting factors: Motivated, progressing  Barriers to discharge:  Stairs to enter home, Weakness, endurance, balance, body habitus, proprioceptive deficits, tone, motor control deficits  Additional comments:  Pt has shown significant improvement in strength and motor control since last week. We have progressed to ambulation training with just a ww and NADEGE PLS donned, able to complete turn to chair.   Pt continues to shows a great deal of hip weakness, especially hip flexors, however pt compensates with exaggerated trunk extension and pelvic rotation to help use momentum to allow limb to swing through and advance. Pt also continues to show increased genu recurvatum as she fatigues. DME:  TBD  After Care:  TICOBON    Kiesha Iglesias., PT, DPT  [id number]    Date:  1/11/22  Supporting factors: Motivated, showing early progress  Barriers to discharge:  Stairs to enter home, Weakness, impulsivity, pain, endurance, balance, body habitus, proprioceptive deficits, tone, motor control deficits  Additional comments:  Pt has been showing improvement on standing and gait trials, but requires sig heavy skilled assistance and skilled set up. Will need to reach out to orthotist to have NADEGE bracing to help get her stabilized so she may progress training. at this point she needs NADEGE KAFOs,she has very weak hips and very poor dynamic knee stability as pt snaps both knees back into hypertension aggressively, having difficulty initiating knee flexion. L ankle is more tight into plantarflexion. Due to adductor tone and NADEGE hip abductor weakness, pt has difficulty with foot placement prior to STS positioning self in chair.    DME:  TBD  After Care: ALENA Iglesias., PT, DPT  [id number]

## 2022-02-02 NOTE — PROGRESS NOTES
[id number] [facility] Physical Medicine and Rehabilitation  Comprehensive Progress Note    Subjective:      Fabienne Still is a 48 y.o. female admitted to inpatient rehabilitation for impairments and acitivities limitations in ADLs and mobility secondary to polyneuropathy. No acute events overnight. No cp, sob, n/v. Patient denies complaints. She is happy with her progress. The patient's medical records have been reviewed.     Scheduled Meds:vitamin B-12, 2,000 mcg, Daily  ammonium lactate, , Daily  baclofen, 10 mg, BID- 8&2   And  baclofen, 20 mg, Nightly  mirabegron, 25 mg, Daily  enoxaparin, 40 mg, Daily  diclofenac sodium, 4 g, 4x Daily  fluticasone, 1 spray, Daily  folic acid, 1 mg, Daily  melatonin, 5 mg, Nightly  multivitamin, 1 tablet, Daily  polyethylene glycol, 17 g, BID  influenza virus vaccine, 0.5 mL, Prior to discharge  docusate sodium, 100 mg, BID  senna, 2 tablet, Nightly  vitamin D, 2,000 Units, Daily      Continuous Infusions:  PRN Meds:sodium chloride, 1 spray, PRN  guaiFENesin-dextromethorphan, 5 mL, Q4H PRN  melatonin, 5 mg, Nightly PRN  naproxen, 500 mg, BID PRN  acetaminophen, 650 mg, Q4H PRN  ondansetron, 4 mg, Q6H PRN  polyethylene glycol, 17 g, Daily PRN         Objective:      Vitals:    01/29/22 0840 01/30/22 0808 02/01/22 1030 02/02/22 0745   BP: 138/74 128/72 122/68 136/85   Pulse: 86 74 68 80   Resp: 17 17 18 18   Temp: 98.5 °F (36.9 °C) 98.2 °F (36.8 °C)  97.3 °F (36.3 °C)   TempSrc: Oral Oral  Tympanic   SpO2:       Weight:       Height:         General appearance: alert,  NAD   Lungs: clear to auscultation bilaterally  Heart: regular rate and rhythm, S1, S2   Abdomen: soft, non-tender, normal bowel sounds   Extremities: extremities atraumatic, no cyanosis or edema  Neurologic: AAOx4. LT and PP sensation intact through T6 and altered (1/2) at T7 and below. Strength is 5/5 throughout bilateral upper extremities. Lower extremity strength is 2/5 bilateral HF; 3-/5 bilateral KE and PF; 1/5 bilateral DF and EHL (bilateral foot drop). Bilateral lower extremity spasticity, at MAS 1+     Laboratory:    Lab Results   Component Value Date    WBC 5.5 01/28/2022    HGB 12.7 01/28/2022    HCT 39.6 01/28/2022    MCV 91.9 01/28/2022     01/28/2022     Lab Results   Component Value Date     01/28/2022    K 4.5 01/28/2022     01/28/2022    CO2 28 01/28/2022    BUN 11 01/28/2022    CREATININE 0.5 01/28/2022    GLUCOSE 94 01/28/2022    CALCIUM 9.3 01/28/2022      Lab Results   Component Value Date    ALT 12 12/26/2021    AST 13 12/26/2021    ALKPHOS 52 12/26/2021    BILITOT 0.3 12/26/2021       Lab Results   Component Value Date    COLORU Yellow 12/20/2021    NITRU Negative 12/20/2021    GLUCOSEU Negative 12/20/2021    KETUA Negative 12/20/2021    UROBILINOGEN 0.2 12/20/2021    BILIRUBINUR Negative 12/20/2021       Functional Status:   Physical Therapy:  Bed mobility: Supervision   Transfers: SBA   Ambulation: 146 ft [employer] SBA,  bilateral custom AFO braces.      Occupational Therapy:  Feeding: Independent   Grooming: Mod I  UB dressing: Mod I  LB dressing: Min A       Assessment/Plan:       48 y. o. female admitted to inpatient rehabilitation for impairments and acitivities limitations in ADLs and mobility secondary to polyneuropathy.     -Polyneuropathy: progressive since late November after she had Covid-19 and monoclonal antibody infusion. Found to have B12 deficiency, felt to be the cause of neuropathy. Monitor Neuro exam. Bilateral AFOs ordered and received for bilateral foot drop.  Continue Acute rehab program.  -B12 deficiency: Continue oral B12, B12 now within normal limits. Monitor  -Borderline low vitamin D: started vitamin D supplement   -Lower extremity spasticity: Continue Baclofen, dose adjusted.  Contracture prevention.   -Pain control: tylenol PRN, naproxen prn, voltaren gel, heating pad  -Overactive bladder: continue home Myrbetriq   -DVT prophylaxis: lovenox     -Gait distance and technique is improving  -Now transferring to [facility] at night and ambulating to bathroom during the day.  No longer using bed pans   -Team conference this am  -Anticipate discharge on friday      Electronically signed by Venita Ugalde MD on 2/2/2022 at 12:50 PM

## 2022-02-03 PROCEDURE — 97110 THERAPEUTIC EXERCISES: CPT

## 2022-02-03 PROCEDURE — 97112 NEUROMUSCULAR REEDUCATION: CPT

## 2022-02-03 PROCEDURE — 1280000000 HC REHAB R&B

## 2022-02-03 PROCEDURE — 6360000002 HC RX W HCPCS: Performed by: INTERNAL MEDICINE

## 2022-02-03 PROCEDURE — 6370000000 HC RX 637 (ALT 250 FOR IP): Performed by: INTERNAL MEDICINE

## 2022-02-03 PROCEDURE — 6370000000 HC RX 637 (ALT 250 FOR IP): Performed by: PHYSICAL MEDICINE & REHABILITATION

## 2022-02-03 PROCEDURE — 97116 GAIT TRAINING THERAPY: CPT

## 2022-02-03 PROCEDURE — 97530 THERAPEUTIC ACTIVITIES: CPT

## 2022-02-03 PROCEDURE — 99232 SBSQ HOSP IP/OBS MODERATE 35: CPT | Performed by: PHYSICAL MEDICINE & REHABILITATION

## 2022-02-03 RX ADMIN — DICLOFENAC SODIUM 4 G: 10 GEL TOPICAL at 13:28

## 2022-02-03 RX ADMIN — ENOXAPARIN SODIUM 40 MG: 100 INJECTION SUBCUTANEOUS at 07:34

## 2022-02-03 RX ADMIN — GUAIFENESIN SYRUP AND DEXTROMETHORPHAN 5 ML: 100; 10 SYRUP ORAL at 04:30

## 2022-02-03 RX ADMIN — DOCUSATE SODIUM 100 MG: 100 CAPSULE, LIQUID FILLED ORAL at 07:32

## 2022-02-03 RX ADMIN — DICLOFENAC SODIUM 4 G: 10 GEL TOPICAL at 07:36

## 2022-02-03 RX ADMIN — CYANOCOBALAMIN TAB 1000 MCG 2000 MCG: 1000 TAB at 07:33

## 2022-02-03 RX ADMIN — NAPROXEN 500 MG: 500 TABLET ORAL at 04:31

## 2022-02-03 RX ADMIN — BACLOFEN 20 MG: 10 TABLET ORAL at 21:43

## 2022-02-03 RX ADMIN — BACLOFEN 10 MG: 10 TABLET ORAL at 13:28

## 2022-02-03 RX ADMIN — FOLIC ACID 1 MG: 1 TABLET ORAL at 07:33

## 2022-02-03 RX ADMIN — ACETAMINOPHEN 650 MG: 325 TABLET ORAL at 07:33

## 2022-02-03 RX ADMIN — ACETAMINOPHEN 650 MG: 325 TABLET ORAL at 21:44

## 2022-02-03 RX ADMIN — Medication 2000 UNITS: at 07:34

## 2022-02-03 RX ADMIN — GUAIFENESIN SYRUP AND DEXTROMETHORPHAN 5 ML: 100; 10 SYRUP ORAL at 21:44

## 2022-02-03 RX ADMIN — Medication 5 MG: at 21:43

## 2022-02-03 RX ADMIN — ACETAMINOPHEN 650 MG: 325 TABLET ORAL at 13:28

## 2022-02-03 RX ADMIN — Medication 1 TABLET: at 07:32

## 2022-02-03 RX ADMIN — Medication: at 07:36

## 2022-02-03 RX ADMIN — BACLOFEN 10 MG: 10 TABLET ORAL at 07:33

## 2022-02-03 ASSESSMENT — PAIN DESCRIPTION - DESCRIPTORS
DESCRIPTORS: ACHING;CONSTANT;DISCOMFORT
DESCRIPTORS: ACHING;CONSTANT;DISCOMFORT
DESCRIPTORS: ACHING

## 2022-02-03 ASSESSMENT — PAIN DESCRIPTION - ORIENTATION
ORIENTATION: LEFT;LOWER
ORIENTATION: LEFT;LOWER
ORIENTATION: LEFT

## 2022-02-03 ASSESSMENT — PAIN SCALES - GENERAL
PAINLEVEL_OUTOF10: 5
PAINLEVEL_OUTOF10: 5
PAINLEVEL_OUTOF10: 0
PAINLEVEL_OUTOF10: 2
PAINLEVEL_OUTOF10: 0
PAINLEVEL_OUTOF10: 2

## 2022-02-03 ASSESSMENT — PAIN DESCRIPTION - FREQUENCY
FREQUENCY: INTERMITTENT

## 2022-02-03 ASSESSMENT — PAIN DESCRIPTION - ONSET
ONSET: ON-GOING

## 2022-02-03 ASSESSMENT — PAIN - FUNCTIONAL ASSESSMENT: PAIN_FUNCTIONAL_ASSESSMENT: ACTIVITIES ARE NOT PREVENTED

## 2022-02-03 ASSESSMENT — PAIN DESCRIPTION - PROGRESSION
CLINICAL_PROGRESSION: GRADUALLY IMPROVING
CLINICAL_PROGRESSION: GRADUALLY IMPROVING

## 2022-02-03 ASSESSMENT — PAIN DESCRIPTION - PAIN TYPE
TYPE: CHRONIC PAIN
TYPE: OTHER (COMMENT)
TYPE: CHRONIC PAIN

## 2022-02-03 ASSESSMENT — PAIN DESCRIPTION - LOCATION
LOCATION: BACK
LOCATION: HIP
LOCATION: BACK

## 2022-02-03 NOTE — PROGRESS NOTES
Physical Therapy    Facility/Department: [address] REHAB  Treatment note     NAME: April Rojas  : 1975  MRN: [id number]     Date of Service: 2022     Evaluating Therapist: Glenny Lea, PT, DPT [id number]        ROOM: [address] [place] [address]  DIAGNOSIS: Polyneuropathy  PRECAUTIONS: Falls, Regular diet, **PRAFOS when not in therapy**  HPI: Patient admitted to the hospital on 21 with progressively worsening leg weakness after receiving an antibody infusion for Covid diagnosed in November. Neurosurgery and neurology all saw patient on consult. Imaging has shown lumbar radiculopathy. EMG done on 21: Recommendations: This is predominantly a neuropathic process, not myopathic in nature. Post viral inflammatory processes can present early on with this picture however usually progress to development of slowing of velocity with the development of myelin abnormalities as well as increasing axonal pathology. B12 level low during admission and she has received several injections. Social:  Pt lives with fiance and son in [address] [facility] style home, 3 PONCE with no railing + 1 step to enter. May have [address] Prior to admission: Pt independent with all functional mobility                    Initial Evaluation  Date:  AM     PM    Short Term Goals Long Term Goals    Was pt agreeable to Eval/treatment? 22 Yes yes       Does pt have pain?  Reports allodynia in BLE No sig c/o pain No pain       Bed Mobility  Rolling: SBA  Supine to sit: Francesco  Sit to supine: modA  Scooting: Francesco NT NT sup Mod i   Transfers Sit to stand: maxA + SBA form 2nd helper  Stand to sit: maxA + SBA from 2nd helper  Stand pivot: NT  Slideboard tx- maxA     5xSTS: NT Sit to stand: Sup  Stand to sit: Sup   Stand pivot: Sup with ww  Slideboard tx- NT Sit to stand: Sup  Stand to sit: Sup   Stand pivot: Sup with ww   Francesco    sup                      Ambulation    NT See TE list for details 142', ww, sup    NADEGE Custom AFOs    Pace of 0.22 m/s 10', LRAD, maxA       50', LRAD, Francesco         Walking 10 feet on uneven surface NT  NT 10', LRAD, maxA 10', LRAD, Francesco   Wheel Chair Mobility NT NT ', BUE propulsion, sup > 200', BUE propulson, mod i   Car Transfers NT NT NT maxA Francesco   Stair negotiation: ascended and descended  NT 8 steps, BUE on R HR ascending, CGA to steady occ Francesco to assist LE off  NT 1 step, maxA, KUSUM HR 4 steps, Kusum HR, Francesco   Curb Step:   ascended and descended NT NT 7.5\" curb x2 reps, with ww, Francesco 2\" curb, LRAD, maxA 2\" curb, LRAD, Francesco   Picking up object off the floor NT NT  MaxA with assistive device Francesco with LRAD   BLE ROM WFL           BLE Strength KUSUM hip flex- 1/5  KUSUM hip ext- 1/5  KUSUM knee ext 2+/5  KUSUM knee flex 2-/5  KUSUM PF 2+/5  KUSUM DF 0/5           Balance  Sitting EOB- SBA  Standing- maxA                          Date Family Teach Completed             Is additional Family Teaching Needed? Y or N             Hindering Progress Weakness, impulsivity, pain, endurance, balance, body habitus Weakness, impulsivity, pain, endurance, balance, body habitus  Weakness, impulsivity, pain, endurance, balance, body habitus         PT recommended ELOS 4-5 weeks           Team's Discharge Plan             Therapist at Team Meeting               General:        HRmax: 174 bpm       Beta blockers (Y/N): N      Adjusted HRmax: 174  bpm   Blood pressure (mmHg): AM:  - Prior to Tx:-136/79  - During Tx: -  - Post Tx: 153/79 PM:  - Prior to Tx: 13/84  - During Tx: -  - Post Tx:    Time spent in HR ranges: Moderate intensity (60-70% HRmax):  104-122 BPM AM: 12 min 8 sec PM: 9 min 25 sec   High intensity (70-85% HRmax):  122-148 BPM AM: 18 min 11 sec PM: 15 min 26 sec   Therapeutic Exercise:     AM:   1. resisted amb- 100'x2 with 3 # ankle weight on each leg and purple+orange theraband resistance applied in posterior direction at hips    2.  Amb at HR-- 30'x2, focus on challenging postural stability, heavy unilateral UE support at railing, Francesco    PM: 1. Resisted amb- amb with orange micro flex bandat each ankle pulling in posterior direction, 2x70', SBA    Patient education    Pt educated on tx, stair negotiation, current status and POC    Patient response to education:   Pt verbalized understanding Pt demonstrated skill Pt requires further education in this area   yes partial All areas     Additional Comments:   AM: Pt tolerating activity well. Pt requiring CGA for steadying, occ Francesco to advance LE during stair negotiation. Pt continues to have dfficulty maintaining balance during amb with 1 UE support, pt relying heavily on UE at HR, showing increased genu recurvatum and compensatory strategies for LE advancement. PM: Worked on curb negotiation on 7.5\" curb to simulate step into home. Pt requiring steadying while lifting ww onto curb, Francesco to help place LLE on step once stepping up, and Francesco to help advance LLE down off step. During amb activity, pt benefiting from verbal cueing to roll weight shift from heel to toe during, pt having difficulty shifting weight off heel, keeping shank of leg posterior and causing increased incidences of genu recurvatum. Pt with slight improvement of amb pace, measured at 0.22 m/s compared to 0.18 m/s when last assessed. Chair/bed alarm:     AM  Time in: 0915  Time out: 1000    PM  Time in: 1430  Time out: 1515    Pt is making good progress toward established Physical Therapy goals. Continue with physical therapy current plan of care.     Marco Coello., PT, DPT  [id number]

## 2022-02-03 NOTE — PROGRESS NOTES
Occupational Therapy  OCCUPATIONAL THERAPY DAILY NOTE    Date:2/3/2022  Patient Name: Fabienne Still  MRN: [id number]  : 1975  Room: [address] [place] [address]A     Diagnosis: Polyneuropathy   Precautions: falls ,slide board transfers     Functional Assessment:   Date Status AE  Comments   Feeding 22  Independent      Grooming 22  Mod I  w/c          Oral Care 22  Mod I      Bathing 22  UB- set up   LB-Min A LH sponge    UB Dressing 22 Mod I      LB Dressing 22   Min A  Reacher     Footwear 22 Mod A   long shoe horn  Sock aid    Toileting 22 SBA  Grab bar  3:1 commode   ww    Homemaking 22 Mod I (w/c level)   CGA/min A  (w. Walker  W/w, w/c       Functional Transfers / Balance:   Date Status DME  Comments   Sit Balance 22   Sup    W/c     Stand Balance 22   CGA/min A ww    [x] Tub      [x] Shower   Transfer 2/3/22       2/1/22  Min A         Min A  Ww, ETB    Rolling shower chair  Assist to lift BLE's over edge of tub    Commode   Transfer 22  CGA (with B AFO's)     Min A ( without B AFO's)  3 in 1 commode  ww     Functional   Mobility 1/30/22    2/3/22    Mod I/Sup(w/c level)    CGA/SBA  W/c propel    ww     Short distance in therapy apt over tile floor surface            Other: stand pivot transfer bed to w/c with ww     Sit to stand from w/c to ww/kitchen counter        Transfer on/off recliner     Stand pivot transfer from recliner to 3 in 1 commode without B AFO's  22 CGA/Min A ( with BAFO's)  Min A  ( without BAFO's)      CGA/Min A         CGA       CGA    ww , BLE AFO's                 ww           Functional Exercises / Activity:  BUE strength exercises: 3 # dowel molina 3 sets 15 reps in all planes                                            Pepe box with 7 # wt 3 sets 10 reps in all planes on table top surface                                             Mister  with 2 # weight 15 reps   B hand strength with 10 # power gripper 3 sets 15 reps                                                                                  Fine motor coordination to right hand: pt completed purdue peg board                                                                Pt picked up coins off of table and placed them in an bank  Bebetocristopher Dave Vega 104 Peg Test completed : L hand 24 secs and R hand 19 secs with both measurements falling into [facility] level                                              Sensory / Neuromuscular Re-Education:         Cognitive Skills:   Status Comments   Problem   Solving good  Demo;d during hmkg, standing balance, fxl mobility, transfers and arm exercises. Memory good  \"   Sequencing good  \"   Safety good- \"     Visual Perception:    Education:  Pt was educated on safe functional transfers in and out of tub/shower combo with extended tub bench     [x] Family teach completed on:1/28/22 Pt's Son and Maria De Jesus Johnson present for family teach session. They were educated on pt's current self care levels, how to don AFO braces and shoes, AE for dressing, and DME for tub/shower combo. They observed pt completing functional transfers and mobility at  level. Family verbalized good understanding. Pain Level: 4/10 low back      Additional Notes:   1/22/22 LH sponge issued to patient on this date for bathing skills. 1/30/22 Discussed measuring current w/c R/L side including wheels in order to accommodate kitchen area at home. Pt maybe getting Transport w/c for home. Patient has made good progress during treatment sessions toward set goals. Therapy emphasis to obtain goals:Current Treatment Recommendations: Strengthening,Gait Training,Patient/Caregiver Education & Training,Home Management Training,Equipment Evaluation, Education, & procurement,Balance Training,Functional Mobility Training,Positioning,Endurance Training,Wheelchair Mobility Training,Safety Education & Training,Self-Care / ADL    [x] Continue with current OT Plan of care.   [] Prepare for Discharge    Goals  Long term goals  Time Frame for Long term goals : 4 weeks to address above problem areas  Long term goal 1: Pt demo Mod I to eat all meals  Long term goal 2: Pt demo Mod I grooming seated @ sink level  Long term goal 3: Pt demo Sup to bathe @ shower level both seated & use LHS  Long term goal 4: Pt demo Mod I UE dress to don a shirt & Mod I to don underwear, pants & Min A socks & shoes using AE as needed  Long term goal 5: Pt demo Mod I commode trf using a slide board & drop arm commode & demo Mod I toileting & demo G safety  Long term goals 6: Pt demo SBA tub trf using appropriate DME- bench & slide board to ensure pt safety  Long term goal 7: Pt demo Mod I light homemaking activity @ wc level & demo G- safety & insight  Long term goal 8: Pt demo G- endurance for a 20-30 minute functional activity  Long term goal 9: Pt demo Mod I wc propulsion thorughout a living environment & around obstacles  1/12/22  OT plan of care updated on this date. Tentative length of stay 4-5 weeks    Aide Bansal OTR/L [id number]  1/19/22 OT plan of care updated on this date. Tentative length of stay is 3-4 Comfort Yanez OTR/L [id number]  1/26/22 OT plan of care updated on this date. Tentative length of stay is 2 weeks Aide Bansal OTR/L [id number]  2/2/22 OT plan of care updated on this date. Tentative discharge is 2/4/22  Aidebritney Bansal OTR/L [id number]      DISCHARGE RECOMMENDATIONS  Recommended DME:  ETB, 3 in 1 commode   Post Discharge Care:   []Home Independently  []Home with 24hr Care / Supervision [x]Home with Partial Supervision []Home with Home Health OT []Home with Out Pt OT []Other: ___   Comments:         Time in Time out Tx Time Breakdown  Variance:   First Session  [id number]  [x] Individual Tx-45   [] Concurrent Tx-  [] Co-Tx -   [] Group Tx -   [] Time Missed -     Second Session [id number]  [x] Individual Tx-45   [] Concurrent Tx -  [] Co-Tx -   [] Group Tx -   [] Time Missed - Third Session    [] Individual Tx-   [] Concurrent Tx -  [] Co-Tx -   [] Group Tx -   [] Time Missed -         Total Tx Time: 90 mins      Jennifer Stanley OTR/L [id number]

## 2022-02-03 NOTE — PROGRESS NOTES
[facility] Physical Medicine and Rehabilitation  Comprehensive Progress Note    Subjective:      Delia Silva is a 48 y.o. female admitted to inpatient rehabilitation for impairments and acitivities limitations in ADLs and mobility secondary to polyneuropathy. No acute events overnight. No cp, sob, n/v. Patient voices no complaints. The patient's medical records have been reviewed.     Scheduled Meds:vitamin B-12, 2,000 mcg, Daily  ammonium lactate, , Daily  baclofen, 10 mg, BID- 8&2   And  baclofen, 20 mg, Nightly  mirabegron, 25 mg, Daily  enoxaparin, 40 mg, Daily  diclofenac sodium, 4 g, 4x Daily  fluticasone, 1 spray, Daily  folic acid, 1 mg, Daily  melatonin, 5 mg, Nightly  multivitamin, 1 tablet, Daily  polyethylene glycol, 17 g, BID  influenza virus vaccine, 0.5 mL, Prior to discharge  docusate sodium, 100 mg, BID  senna, 2 tablet, Nightly  vitamin D, 2,000 Units, Daily      Continuous Infusions:  PRN Meds:sodium chloride, 1 spray, PRN  guaiFENesin-dextromethorphan, 5 mL, Q4H PRN  melatonin, 5 mg, Nightly PRN  naproxen, 500 mg, BID PRN  acetaminophen, 650 mg, Q4H PRN  ondansetron, 4 mg, Q6H PRN  polyethylene glycol, 17 g, Daily PRN         Objective:      Vitals:    02/01/22 1030 02/02/22 0630 02/02/22 0745 02/03/22 0715   BP: 122/68  136/85 125/78   Pulse: 68  80 94   Resp: 18  18 18   Temp:   97.3 °F (36.3 °C) 97.3 °F (36.3 °C)   TempSrc:   Tympanic Temporal   SpO2:    97%   Weight:  237 lb 12.8 oz (107.9 kg)     Height:         General appearance: alert,  NAD, seen in PT  Lungs: clear to auscultation bilaterally  Heart: regular rate and rhythm, S1, S2   Abdomen: soft, non-tender, normal bowel sounds   Extremities: extremities atraumatic, no cyanosis or edema  Neurologic: AAOx4. LT and PP sensation intact through T6 and altered (1/2) at T7 and below. Strength is 5/5 throughout bilateral upper extremities. Lower extremity strength is 2/5 bilateral HF; 3-/5 bilateral KE and PF; 1/5 bilateral DF and EHL (bilateral foot drop). Bilateral lower extremity spasticity, at MAS 1+     Exam stable    Laboratory:    Lab Results   Component Value Date    WBC 5.5 01/28/2022    HGB 12.7 01/28/2022    HCT 39.6 01/28/2022    MCV 91.9 01/28/2022     01/28/2022     Lab Results   Component Value Date     01/28/2022    K 4.5 01/28/2022     01/28/2022    CO2 28 01/28/2022    BUN 11 01/28/2022    CREATININE 0.5 01/28/2022    GLUCOSE 94 01/28/2022    CALCIUM 9.3 01/28/2022      Lab Results   Component Value Date    ALT 12 12/26/2021    AST 13 12/26/2021    ALKPHOS 52 12/26/2021    BILITOT 0.3 12/26/2021       Lab Results   Component Value Date    COLORU Yellow 12/20/2021    NITRU Negative 12/20/2021    GLUCOSEU Negative 12/20/2021    KETUA Negative 12/20/2021    UROBILINOGEN 0.2 12/20/2021    BILIRUBINUR Negative 12/20/2021         Functional Status:   Physical Therapy:  Bed mobility: Supervision   Transfers: SBA   Ambulation: 146 ft [employer] SBA,  bilateral custom AFO braces.      Occupational Therapy:  Feeding: Independent   Grooming: Mod I  UB dressing: Mod I  LB dressing: Min A       Assessment/Plan:       48 y. o. female admitted to inpatient rehabilitation for impairments and acitivities limitations in ADLs and mobility secondary to polyneuropathy.     -Polyneuropathy: progressive since late November after she had Covid-19 and monoclonal antibody infusion. Found to have B12 deficiency, felt to be the cause of neuropathy. Monitor Neuro exam. Bilateral AFOs ordered and received for bilateral foot drop.  Continue Acute rehab program.  -B12 deficiency: Continue oral B12, B12 now within normal limits. Monitor  -Borderline low vitamin D: started vitamin D supplement   -Lower extremity spasticity: Continue Baclofen, dose adjusted. Contracture prevention.   -Pain control: tylenol PRN, naproxen prn, voltaren gel, heating pad  -Overactive bladder: continue home Myrbetriq   -DVT prophylaxis: lovenox while inpatient.  Now ambulatory > 140 ft     Will check B12 level in am  Anticipate discharge tomorrow       Electronically signed by Randy Packer MD on 2/3/2022 at 9:07 AM

## 2022-02-04 VITALS
SYSTOLIC BLOOD PRESSURE: 133 MMHG | RESPIRATION RATE: 16 BRPM | HEIGHT: 60 IN | WEIGHT: 237.8 LBS | DIASTOLIC BLOOD PRESSURE: 99 MMHG | HEART RATE: 100 BPM | BODY MASS INDEX: 46.68 KG/M2 | TEMPERATURE: 97.6 F | OXYGEN SATURATION: 97 %

## 2022-02-04 LAB
FOLATE: >20 NG/ML (ref 4.8–24.2)
VITAMIN B-12: 893 PG/ML (ref 211–946)

## 2022-02-04 PROCEDURE — 97535 SELF CARE MNGMENT TRAINING: CPT

## 2022-02-04 PROCEDURE — 6370000000 HC RX 637 (ALT 250 FOR IP): Performed by: INTERNAL MEDICINE

## 2022-02-04 PROCEDURE — 97116 GAIT TRAINING THERAPY: CPT

## 2022-02-04 PROCEDURE — 97530 THERAPEUTIC ACTIVITIES: CPT

## 2022-02-04 PROCEDURE — 99239 HOSP IP/OBS DSCHRG MGMT >30: CPT | Performed by: PHYSICAL MEDICINE & REHABILITATION

## 2022-02-04 PROCEDURE — 36415 COLL VENOUS BLD VENIPUNCTURE: CPT

## 2022-02-04 PROCEDURE — 6360000002 HC RX W HCPCS: Performed by: INTERNAL MEDICINE

## 2022-02-04 PROCEDURE — 90686 IIV4 VACC NO PRSV 0.5 ML IM: CPT | Performed by: PHYSICAL MEDICINE & REHABILITATION

## 2022-02-04 PROCEDURE — 82607 VITAMIN B-12: CPT

## 2022-02-04 PROCEDURE — 6360000002 HC RX W HCPCS: Performed by: PHYSICAL MEDICINE & REHABILITATION

## 2022-02-04 PROCEDURE — G0008 ADMIN INFLUENZA VIRUS VAC: HCPCS | Performed by: PHYSICAL MEDICINE & REHABILITATION

## 2022-02-04 PROCEDURE — 6370000000 HC RX 637 (ALT 250 FOR IP): Performed by: PHYSICAL MEDICINE & REHABILITATION

## 2022-02-04 PROCEDURE — 82746 ASSAY OF FOLIC ACID SERUM: CPT

## 2022-02-04 RX ORDER — MULTIVITAMIN WITH IRON
1 TABLET ORAL DAILY
Qty: 30 TABLET | Refills: 0 | Status: SHIPPED | OUTPATIENT
Start: 2022-02-04

## 2022-02-04 RX ORDER — FERROUS SULFATE 325(65) MG
325 TABLET ORAL
Qty: 180 TABLET | Refills: 0 | Status: SHIPPED | OUTPATIENT
Start: 2022-02-04 | End: 2022-06-22

## 2022-02-04 RX ORDER — BACLOFEN 10 MG/1
TABLET ORAL
Qty: 120 TABLET | Refills: 2 | Status: SHIPPED | OUTPATIENT
Start: 2022-02-04 | End: 2022-05-20 | Stop reason: SDUPTHER

## 2022-02-04 RX ORDER — FOLIC ACID 1 MG/1
1 TABLET ORAL DAILY
Qty: 30 TABLET | Refills: 0 | Status: SHIPPED | OUTPATIENT
Start: 2022-02-04

## 2022-02-04 RX ADMIN — CYANOCOBALAMIN TAB 1000 MCG 2000 MCG: 1000 TAB at 10:14

## 2022-02-04 RX ADMIN — NAPROXEN 500 MG: 500 TABLET ORAL at 06:03

## 2022-02-04 RX ADMIN — BACLOFEN 10 MG: 10 TABLET ORAL at 14:17

## 2022-02-04 RX ADMIN — FOLIC ACID 1 MG: 1 TABLET ORAL at 10:17

## 2022-02-04 RX ADMIN — Medication 1 TABLET: at 10:14

## 2022-02-04 RX ADMIN — ENOXAPARIN SODIUM 40 MG: 100 INJECTION SUBCUTANEOUS at 10:14

## 2022-02-04 RX ADMIN — BACLOFEN 10 MG: 10 TABLET ORAL at 10:15

## 2022-02-04 RX ADMIN — INFLUENZA A VIRUS A/VICTORIA/2570/2019 IVR-215 (H1N1) ANTIGEN (PROPIOLACTONE INACTIVATED), INFLUENZA A VIRUS A/CAMBODIA/E0826360/2020 IVR-224 (H3N2) ANTIGEN (PROPIOLACTONE INACTIVATED), INFLUENZA B VIRUS B/VICTORIA/705/2018 BVR-11 ANTIGEN (PROPIOLACTONE INACTIVATED), INFLUENZA B VIRUS B/PHUKET/3073/2013 BVR-1B ANTIGEN (PROPIOLACTONE INACTIVATED) 0.5 ML: 15; 15; 15; 15 INJECTION, SUSPENSION INTRAMUSCULAR at 14:59

## 2022-02-04 RX ADMIN — Medication 2000 UNITS: at 10:18

## 2022-02-04 RX ADMIN — FLUTICASONE PROPIONATE 1 SPRAY: 50 SPRAY, METERED NASAL at 10:23

## 2022-02-04 RX ADMIN — DOCUSATE SODIUM 100 MG: 100 CAPSULE, LIQUID FILLED ORAL at 10:15

## 2022-02-04 RX ADMIN — POLYETHYLENE GLYCOL 3350 17 G: 17 POWDER, FOR SOLUTION ORAL at 10:17

## 2022-02-04 RX ADMIN — GUAIFENESIN SYRUP AND DEXTROMETHORPHAN 5 ML: 100; 10 SYRUP ORAL at 06:03

## 2022-02-04 ASSESSMENT — PAIN SCALES - GENERAL: PAINLEVEL_OUTOF10: 6

## 2022-02-04 NOTE — PROGRESS NOTES
Patient request  meds filled here in our pharmacy and would like a CD ROM of her MRI reports. Radiology notified.

## 2022-02-04 NOTE — PROGRESS NOTES
Physical Therapy    Facility/Department: [facility] REH[facility]  Discharge Note     NAME: April Rojas  : 1975  MRN: [id number]     Date of Service: 2022     Evaluating Therapist: Glenny Lea, PT, DPT [id number]        ROOM: [address] [place] [address]  DIAGNOSIS: Polyneuropathy  PRECAUTIONS: Falls, Regular diet, **PRAFOS when not in therapy**  HPI: Patient admitted to the hospital on 21 with progressively worsening leg weakness after receiving an antibody infusion for Covid diagnosed in November. Neurosurgery and neurology all saw patient on consult. Imaging has shown lumbar radiculopathy. EMG done on 21: Recommendations: This is predominantly a neuropathic process, not myopathic in nature. Post viral inflammatory processes can present early on with this picture however usually progress to development of slowing of velocity with the development of myelin abnormalities as well as increasing axonal pathology. B12 level low during admission and she has received several injections. Social:  Pt lives with fiance and son in [address] [facility] style home, 3 PONCE with no railing + 1 step to enter. May have [address] Prior to admission: Pt independent with all functional mobility                   Initial Evaluation  Date:  Discharge Date: 22   Short Term Goals Long Term Goals    Was pt agreeable to Eval/treatment? 22 Yes       Does pt have pain?  Reports allodynia in BLE No sig c/o pain       Bed Mobility  Rolling: SBA  Supine to sit: Francesco  Sit to supine: modA  Scooting: Francesco Mod I all aspects with bed rails sup Mod i   Transfers Sit to stand: maxA + SBA form 2nd helper  Stand to sit: maxA + SBA from 2nd helper  Stand pivot: NT  Slideboard tx- maxA     5xSTS: NT Sit to stand: mod I to ww  Stand to sit: mod I with ww  Stand pivot: mod I with ww   Francesco    sup                      Ambulation    ', ww, mod I on level surfaces in hallway    NADEGE custom AFOs 10', LRAD, maxA       50', LRAD, Francesco         Walking 10 feet on uneven surface NT 12', ww, SBA 10', LRAD, maxA 10', LRAD, Francesco   Wheel Chair Mobility ', BUE propulsion, mod i 100', BUE propulsion, sup > 200', BUE propulson, mod i   Car Transfers NT SBA to concha Fam [employer] negotiation: ascended and descended  NT 4 steps, BUE on R HR ascending, CGA 1 step, maxA, NADEGE HR 4 steps, Nadege HR, Francesco   Curb Step:   ascended and descended NT 4\" curb, ww, CGA    7.5\" curb, ww, CGA-Francesco for light steadying 2\" curb, LRAD, maxA 2\" curb, LRAD, Francesco   Picking up object off the floor NT Sup with reacher MaxA with assistive device Francesco with LRAD   BLE ROM WFL         BLE Strength NADEGE hip flex- 1/5  NADEGE hip ext- 1/5  NADEGE knee ext 2+/5  NADEGE knee flex 2-/5  NADEGE PF 2+/5  NADEGE DF 0/5         Balance  Sitting EOB- SBA  Standing- maxA                        Date Family Teach Completed    1/28 and 2/4 with son and , son hands on,  observing       Is additional Family Teaching Needed? Y or N           Hindering Progress Weakness, impulsivity, pain, endurance, balance, body habitus Weakness, impulsivity, pain, endurance, balance, body habitus        PT recommended ELOS 4-5 weeks         Team's Discharge Plan           Therapist at Team Meeting             Discharge summary- Pt completed 30 day stay at [facility]. At time of admission, pt required min-modA to complete bed mobility and maxAx1-2 to complete slide board and STS, pt unable to amb. At time of d/c, pt progressing to completing tx and amb on level surface at mod I level using ww and donning NADEGE custom AFOs (pt continues to demo deficits in mechanics however is steady with amb). Pt requiring CGA to complete 4 steps with BUE on HR and negotiate 4\" curb with ww at time of d/c. Pt able to mobilize in w/c at mod I level, including managing parts.  Pt's son and  were present for multiple family training sessions, including day of d/c where son participated in hands on practice with guarding/assisting in stair negotiation, curb negotiation (7.5\" to simulate home environment), and tx into truck (for d/c transportation home). Recommend 2 person assist for added safety of pt and family when assisting with curb negotiation and  tx out of truck. Highly recommend pt transports in sedan style cars only at this time. Pt and family verbalizing understanding and agreement with all PT recs at time of d/c. Rec manual w/c and ww at time of d/c to optimize independence with functional mobility. All short and long term ARU goals met, however, rec follow up with HHPT to continue to address ongoing deficits in balance, strength, endurence, motor planning, and safety with mobility.      Bret Ordonez., PT, DPT  [id number]

## 2022-02-04 NOTE — PROGRESS NOTES
CLINICAL PHARMACY NOTE: MEDS TO BEDS    Total # of Prescriptions Filled: 5   The following medications were delivered to the patient:  · ferosul 436AO  · folic acid 1 mg  · baclofen 10 mg  · diclofenac 1% gel  · Vitamin b    Additional Documentation:  Picked up in pharmacy

## 2022-02-04 NOTE — PROGRESS NOTES
Nutrition Assessment     Type and Reason for Visit: Reassess    Nutrition Recommendations/Plan:  Continue Current Diet    Nutrition Assessment:  Pt remains stable from a nutritional stand-point w/ noted >75% intake of most meals noted. Adm to ARU w/ polyneuropathy 2/2 B12 deficiency. Will continue to monitor. Malnutrition Assessment:  Malnutrition Status: No malnutrition    Estimated Daily Nutrient Needs:  Energy (kcal): 9897-4894; Weight Used for Energy Requirements:  Current     Protein (g): 70-80; Weight Used for Protein Requirements:  Ideal        Fluid (ml/day): 0710-8489; Weight Used for Fluid Requirements:  1 ml/kcal      Nutrition Related Findings: Pt alert, +I/O's 5L, no edema, abd WDL      Current Nutrition Therapies:    ADULT DIET; Regular    Anthropometric Measures:  · Height: 5' (152.4 cm)  · Current Body Wt: 237 lb (107.5 kg) (2/2 measured wt)   · BMI: 46.3    Nutrition Diagnosis:   No nutrition diagnosis at this time     Nutrition Interventions:   Nutrition Education/Counseling:  Education not indicated   Coordination of Nutrition Care:  Continue to monitor while inpatient    Goals:  Continue to consume >75% meals       Nutrition Monitoring and Evaluation:   Behavioral-Environmental Outcomes:  None Identified   Food/Nutrient Intake Outcomes:  Food and Nutrient Intake  Physical Signs/Symptoms Outcomes:  Biochemical Data,GI Status,Fluid Status or Edema,Nutrition Focused Physical Findings,Skin,Weight     Discharge Planning:     Too soon to determine     Electronically signed by Abdoul Wright RD, LD on 2/4/22 at 3:55 PM EST    Contact: Ext [phone]

## 2022-02-04 NOTE — PROGRESS NOTES
Physical Therapy    Facility/Department: [address] REHAB  Treatment note     NAME: April Rojas  : 1975  MRN: [id number]     Date of Service: 2022     Evaluating Therapist: Glenny Lea, PT, DPT [id number]        ROOM: [address] [place] [address]  DIAGNOSIS: Polyneuropathy  PRECAUTIONS: Falls, Regular diet, **PRAFOS when not in therapy**  HPI: Patient admitted to the hospital on 21 with progressively worsening leg weakness after receiving an antibody infusion for Covid diagnosed in November. Neurosurgery and neurology all saw patient on consult. Imaging has shown lumbar radiculopathy. EMG done on 21: Recommendations: This is predominantly a neuropathic process, not myopathic in nature. Post viral inflammatory processes can present early on with this picture however usually progress to development of slowing of velocity with the development of myelin abnormalities as well as increasing axonal pathology. B12 level low during admission and she has received several injections. Social:  Pt lives with fiance and son in [address] [facility] style home, 3 PONCE with no railing + 1 step to enter. May have [address] Prior to admission: Pt independent with all functional mobility                    Initial Evaluation  Date:  AM     PM    Short Term Goals Long Term Goals    Was pt agreeable to Eval/treatment? 22 Yes yes       Does pt have pain?  Reports allodynia in BLE No sig c/o pain No pain       Bed Mobility  Rolling: SBA  Supine to sit: Francesco  Sit to supine: modA  Scooting: Francesco Mod I all aspects with bed rails NT sup Mod i   Transfers Sit to stand: maxA + SBA form 2nd helper  Stand to sit: maxA + SBA from 2nd helper  Stand pivot: NT  Slideboard tx- maxA     5xSTS: NT Sit to stand: mod I to ww  Stand to sit: mod I with ww  Stand pivot: mod I with ww   Sit to stand: mod I to ww  Stand to sit: mod I with ww   Francesco    sup                      Ambulation    ', ww, mod I on level surfaces in hallway    NADEGE custom AFOs educated on tx, gait, balance, current status and POC, d/c planning    Patient response to education:   Pt verbalized understanding Pt demonstrated skill Pt requires further education in this area   yes partial All areas     Additional Comments:   AM: Pt completing tx and amb on level surface using ww at mod I this session. Pt continues to require light assist to manage trailing LE  step when descending, but able to ascend without requiring assist to advance limb up. Pt requiring light steadying to complete 7.5\" curb, able to complete 4\" curb without steadying assist, CGA only. Pt shows mild unsteadiness during amb on uneven surface, SBA provided. Pt reports she plans to d/c home in  truck with high seat and running board to step up. Will plan to assess safety with tx. PM: Son and  present for family teach. Son providing hands on assist with stair negotiation, demo'd safe support, pt did not need assist to advance LE off steps, CGA only provided. Pt with increased unsteadiness descending 7.5\" curb this session, having sig anterior Lob attempting to lift ww and place down off curb, PT having to step in and assist son with steadying for 2 reps. Provided pt with rest break, pt then trialing 3rd rep, this time able to complete with only son assisting to steady. Son benefiting from cueing for positioning and steadying technique. At this time, rec 2 person assist at home with 1 person steadying pt and 2nd person helping to manage ww down off curb when descending, pt and family in agreement with rec. Due to inclement weather, family brought  truck with running board for transportation home instead of sedan. Pt has only practiced car tx to sedan, so PT went with pt and family to assist with tx into truck. Pt requiring use of grab bar and 2 helpers to step up onto running board and pivot to sit on seat.  Recommend 2 helpers to tx pt out of truck, and highly recommend to pt that she avoids transporting in truck any more until she is stronger and further assessed from [address] or [facility]. Pt and family in agreement with all recs. Family educated on need for bracing with amb, as well as managing w/c parts and folding chair for transport. AM  Time in: 0915  Time out: 1000    PM  Time in: 1515  Time out: 3319    Pt is making good progress toward established Physical Therapy goals. Continue with physical therapy current plan of care.     Brain Choe., PT, DPT  [id number]

## 2022-02-04 NOTE — PROGRESS NOTES
Occupational Therapy  OCCUPATIONAL THERAPY DAILY NOTE/DISCHARGE SUMMARY     Date:2022  Patient Name: Fabienne Still  MRN: [id number]  : 1975  Room: [address] [place], [place] [address]     Diagnosis: Polyneuropathy   Precautions: falls ,slide board transfers     Functional Assessment:   Date Status AE  Comments   Feeding 22 Independent      Grooming 22  Mod I  w/c Hair care seated at sink          Oral Care 22  Mod I      Bathing 22  UB- set up   LB-SBA  LH sponge Bathing  Completed in shower seated. Pt used long sponge to wash lower legs and feet    UB Dressing 22  Mod I   Donned bra and pull over shirt    LB Dressing 22   CGA  Reacher  Pt donned underpants and pants with reacher. CGA to stand and pull up pants    Footwear 22  Mod A   long shoe horn  Sock aid Pt able to don socks.  Assist to don B AFO's and shoes    Toileting 22  SBA  Grab bar  3:1 commode   ww    Homemaking 22 Mod I (w/c level)   CGA/min A  (w. Walker  W/w, w/c       Functional Transfers / Balance:   Date Status DME  Comments   Sit Balance 22   Sup    W/c     Stand Balance 22    CGA  ww Standing to pull up pants    [x] Tub      [x] Shower   Transfer 2/3/22       2/4/22   Min A         Min A  Ww, ETB    Rolling shower chair      Transfer on/off rolling shower chair for walk in shower    Commode   Transfer 22  CGA (with B AFO's)     CGA ( without B AFO's)  3 in 1 commode  ww     Functional   Mobility 1/30/22    2/3/22    Mod I/Sup(w/c level)    CGA/SBA  W/c propel    ww               Other: stand pivot transfer bed to w/c with ww     Sit to stand from w/c to ww/kitchen counter        Transfer on/off recliner     Stand pivot transfer from recliner to 3 in 1 commode without B AFO's  22 CGA/Min A ( with BAFO's)  Min A  ( without BAFO's)      CGA/Min A         CGA       CGA    ww , BLE AFO's                 ww           Functional Exercises / Activity: Sensory / Neuromuscular Re-Education:         Cognitive Skills:   Status Comments   Problem   Solving good  Demo;d during hmkg, standing balance, fxl mobility, transfers and arm exercises. Memory good  \"   Sequencing good  \"   Safety good- \"     Visual Perception:    Education:      [x] Family teach completed on:1/28/22 Pt's Son and Finance present for family teach session. They were educated on pt's current self care levels, how to don AFO braces and shoes, AE for dressing, and DME for tub/shower combo. They observed pt completing functional transfers and mobility at ww level. Family verbalized good understanding. Pain Level: 4/10 low back      Additional Notes:   1/22/22 LH sponge issued to patient on this date for bathing skills. 1/30/22 Discussed measuring current w/c R/L side including wheels in order to accommodate kitchen area at home. Pt maybe getting Transport w/c for home. Patient has made good progress during treatment sessions toward set goals. Therapy emphasis to obtain goals:Current Treatment Recommendations: Strengthening,Gait Training,Patient/Caregiver Education & Training,Home Management Training,Equipment Evaluation, Education, & procurement,Balance Training,Functional Mobility Training,Positioning,Endurance Training,Wheelchair Mobility Training,Safety Education & Training,Self-Care / ADL    [] Continue with current OT Plan of care.   [x] Prepare for Discharge    Goals  Long term goals  Time Frame for Long term goals : 4 weeks to address above problem areas  Long term goal 1: Pt demo Mod I to eat all meals  Long term goal 2: Pt demo Mod I grooming seated @ sink level  Long term goal 3: Pt demo Sup to bathe @ shower level both seated & use LHS  Long term goal 4: Pt demo Mod I UE dress to don a shirt & Mod I to don underwear, pants & Min A socks & shoes using AE as needed  Long term goal 5: Pt demo Mod I commode trf using a slide board & drop arm commode & demo Mod I toileting & demo G safety  Long term goals 6: Pt demo SBA tub trf using appropriate DME- bench & slide board to ensure pt safety  Long term goal 7: Pt demo Mod I light homemaking activity @ wc level & demo G- safety & insight  Long term goal 8: Pt demo G- endurance for a 20-30 minute functional activity  Long term goal 9: Pt demo Mod I wc propulsion thorughout a living environment & around obstacles  1/12/22  OT plan of care updated on this date. Tentative length of stay 4-5 weeks    Margy Jackson OTR/L [id number]  1/19/22 OT plan of care updated on this date. Tentative length of stay is 3-4 Filipe Dinora OTR/L [id number]  1/26/22 OT plan of care updated on this date. Tentative length of stay is 2 weeks Margy Jackson OTR/L [id number]  2/2/22 OT plan of care updated on this date. Tentative discharge is 2/4/22  Margy Jackson OTR/L [id number]      DISCHARGE RECOMMENDATIONS  OCCUPATIONAL THERAPY DISCHARGE SUMMARY    Discontinue Occupational Therapy intervention. Pt has:   [] met all set goals. [x] made good progress toward set goals. [] has made slow progress toward goals and would benefit from rehab setting change.  [] had a medical decline and therefore was transferred off the Rehab unit.     Long term goals  Time Frame for Long term goals : 4 weeks to address above problem areas  Long term goal 1: Pt demo Mod I to eat all meals  Long term goal 2: Pt demo Mod I grooming seated @ sink level  Long term goal 3: Pt demo Sup to bathe @ shower level both seated & use LHS  Long term goal 4: Pt demo Mod I UE dress to don a shirt & Mod I to don underwear, pants, socks & shoes using AE as needed  Long term goal 5: Pt demo Mod I commode trf using a slide board & drop arm commode & demo Mod I toileting & demo G safety  Long term goals 6: Pt demo SBA tub trf using appropriate DME- bench & slide board to ensure pt safety  Long term goal 7: Pt demo Mod I light homemaking activity @ wc level & demo G- safety & insight  Long term goal 8: Pt demo G- endurance for a 20-30 minute functional activity  Long term goal 9: Pt demo Mod I wc propulsion thorughout a living environment & around obstacles    The Patient has received education on:  [x]Transfer Safety [x]Compensatory tech for ADLs [x]AE training [x]DME training []Energy Conservation [x]Home / Kitchen Safety  [x]Fall Prevention  []Other:    Family training was completed: yes    Recommendations: Home Health OT       Recommended DME:  ETB, 3 in 1 commode   Post Discharge Care:   []Home Independently  []Home with 24hr Care / Supervision [x]Home with Partial Supervision []Home with Home Health OT []Home with Out Pt OT []Other: ___   Comments:         Time in Time out Tx Time Breakdown  Variance:   First Session  0815  0900  [x] Individual Tx-45   [] Concurrent Tx-  [] Co-Tx -   [] Group Tx -   [] Time Missed -     Second Session   [] Individual Tx-   [] Concurrent Tx -  [] Co-Tx -   [] Group Tx -   [] Time Missed -              Third Session    [] Individual Tx-   [] Concurrent Tx -  [] Co-Tx -   [] Group Tx -   [] Time Missed -         Total Tx Time:  Senait LEER/L [id number]

## 2022-02-04 NOTE — DISCHARGE SUMMARY
[id number]  27 Physical Medicine and Rehabilitation  Discharge Summary    Date of Rehab Admission:1/5/2022    Date of Discharge: 2/4/2022    [facility], [facility]     Attending Physician: Hari Lay MD  Primary Service:  Acute Inpatient Rehabilitation     Primary Diagnosis: Polyneuropathy  Secondary Diagnosis/es:   B12 deficiency, bilateral foot drop, spasticity, overactive bladder   Consults:   None  Procedures:  None  Significant Radiologic Results:   None    Discharge Disposition:  Home  Condition on Discharge:  Stable. Functionally improved.   ===================================================================  History of Present Illness:     Fabienne Still is a 48 y.o. female with history of recent Covid-19 infection in November, who presented to [facility] on 12/20/2021 due to progressively worsening weakness of her lower extremities. She reports that she had Covid-19 in November with mild symptoms and received monoclonal antibody infusion (Regeneron) on 11/23/2021 due to risk factors. She states that after receiving the antibody infusion she began having progressive weakness in her legs, sensory changes and paresthesias in the lower extremities up to mid torso, and increasing difficulty walking--started on 11/28/2021. She was seen in the ED, diagnosed with serum sickness after otherwise negative workup, and discharged on Benadryl and prednisone. She was seen again in the ED the next day and discharged and has since continued to follow up with her PCP. Her symptoms continued to progress and she developed bowel/bladder incontinence and began having to use a walker to ambulate. She was sent to the ED by her PCP on 12/20 to rule out cauda equina. She has had extensive work up. MRI of brain, cervical, thoracic, and lumbar spine were completed.  She was noted to have severe bilateral NF narrowing at L4-L5 and chronic degenerative changes, that were not felt to explain patient symptoms per NSGY. EMG showed diffuse abnormalities consistent with a neuropathic process, not consistent with GBS. She was found to have severe B12 deficiency and Neurology felt symptoms likely due to neuropathy related to the B12 deficiency. She was started on B12 injections and her B12 levels are now within normal limits. She has had further decline during this hospital stay and was unable to ambulate at all. She states that today she is beginning to be able to contract her leg muscles a little bit more than before and is hopeful that her strength will improve. She reports perirectal numbness is improving and is now able to sense when she needs to use the restroom. She participated in therapy evaluations and was felt to be a good candidate for further rehab.  She was then admitted for the Acute inpatient rehab program.         ===================================================================      Functional Status      Initial Evaluation  Date: 1/6/2022 Discharge Date: 2/4/22     Bed Mobility  Rolling: SBA  Supine to sit: Francesco  Sit to supine: modA  Scooting: Francesco Mod I all aspects with bed rails   Transfers Sit to stand: maxA + SBA form 2nd helper  Stand to sit: maxA + SBA from 2nd helper  Stand pivot: NT  Slideboard tx- maxA     5xSTS: NT Sit to stand: mod I to ww  Stand to sit: mod I with ww  Stand pivot: mod I with ww         Ambulation    ', ww, mod I on level surfaces in hallway     NADEGE custom AFOs   Walking 10 feet on uneven surface NT 12', ww, SBA   Wheel Chair Mobility ', BUE propulsion, mod i   Car Transfers NT SBA to sedan   Stair negotiation: ascended and descended  NT 4 steps, BUE on R HR ascending, CGA   Curb Step:   ascended and descended NT 4\" curb, ww, CGA     7.5\" curb, ww, CGA-Francesco for light steadying   Picking up object off the floor NT Sup with reacher   Balance  Sitting EOB- SBA  Standing- maxA           Date Family Teach Completed    1/28 and 2/4 with son and , son hands on,  observing       Functional Assessment:      Date   Status   AE    Comments     Feeding   2/4/22   Independent              Grooming   2/4/22    Mod I    w/c   Hair care seated at sink            Oral Care   2/4/22    Mod I              Bathing   2/4/22    UB- set up     LB-SBA    LH sponge   Bathing  Completed in shower seated. Pt used long sponge to wash lower legs and feet      UB Dressing   2/4/22    Mod I        Donned bra and pull over shirt      LB Dressing   2/4/22     CGA    Reacher    Pt donned underpants and pants with reacher. CGA to stand and pull up pants      Footwear   2/4/22    Mod A     long shoe horn    Sock aid   Pt able to don socks. Assist to don B AFO's and shoes      Toileting   2/4/22    SBA    Grab bar    3:1 commode   ww       Homemaking   1/30/22   Mod I (w/c level)     CGA/min A  (lydia Noonan  W/w, w/c            Discharge Physical Examination  General appearance: alert,  NAD  Lungs: clear to auscultation bilaterally  Heart: regular rate and rhythm, S1, S2   Abdomen: soft, non-tender, normal bowel sounds   Extremities: extremities atraumatic, no cyanosis or edema  Neurologic: AAOx4. LT and PP sensation intact through T6 and altered (1/2) at T7 and below. Strength is 5/5 throughout bilateral upper extremities. Lower extremity strength is 2/5 bilateral HF; 3-/5 bilateral KE and PF; 1/5 bilateral DF and EHL (bilateral foot drop). Bilateral lower extremity spasticity, at [facility]+   [facility]   Functional and mobility deficits secondary to Polyneuropathy:  The patient completed an intensive inpatient rehabilitation program including PT, OT,  and achieved significant improvement in function as documented above. Recommended continued therapies are documented below. The patient will follow up with the outpatient PM&R clinics to review ongoing rehab needs.    -Polyneuropathy: progressive since late November after she had Covid-19 and monoclonal antibody infusion.  Found to have B12 deficiency, felt to be the cause of neuropathy. Neuro exam stable to improving during rehab stay. Bilateral AFOs were ordered and received for bilateral foot drop. Completed Acute rehab program with functional progress as above. -B12 deficiency: Oral B12 supplements, B12 level now within normal limits. Monitored B12 level.  -Borderline low vitamin D: started vitamin D supplement   -Lower extremity spasticity: Continued Baclofen, dose adjusted. Spasticity improving. Contracture prevention.   -Pain control: tylenol PRN, naproxen prn, voltaren gel, heating pad while inpatient  -Overactive bladder: continue home Myrbetriq        ==================================================================  Discharge Medications     Medication List      START taking these medications    baclofen 10 MG tablet  Commonly known as: LIORESAL  Take 10mg by mouth every morning, and 10mg every afternoon, and 20mg every night.         CHANGE how you take these medications    cyanocobalamin 2000 MCG tablet  Take 1 tablet by mouth daily  Start taking on: February 5, 2022  What changed:   · medication strength  · how much to take     diclofenac sodium 1 % Gel  Commonly known as: VOLTAREN  Apply 4 g topically 4 times daily as needed for Pain  What changed:   · when to take this  · reasons to take this        CONTINUE taking these medications    acetaminophen 500 MG tablet  Commonly known as: TYLENOL     ferrous sulfate 325 (65 Fe) MG tablet  Commonly known as: IRON 325  Take 1 tablet by mouth daily (with breakfast)     fluticasone 50 MCG/ACT nasal spray  Commonly known as: FLONASE     folic acid 1 MG tablet  Commonly known as: FOLVITE  Take 1 tablet by mouth daily     loratadine 10 MG tablet  Commonly known as: CLARITIN     multivitamin Tabs tablet  Take 1 tablet by mouth daily     Myrbetriq 25 MG Tb24  Generic drug: mirabegron     vitamin D 1.25 MG (95228 UT) Caps capsule  Commonly known as: ERGOCALCIFEROL        STOP taking these medications    diphenhydrAMINE 25 MG capsule  Commonly known as: BENADRYL     naproxen 500 MG tablet  Commonly known as: Naprosyn     polyethylene glycol 17 g packet  Commonly known as: GLYCOLAX     sennosides-docusate sodium 8.6-50 MG tablet  Commonly known as: SENOKOT-S           Where to Get Your Medications      These medications were sent to [facility], [address] [address]    Phone: [phone]   · baclofen 10 MG tablet  · cyanocobalamin 2000 MCG tablet  · diclofenac sodium 1 % Gel  · ferrous sulfate 325 (65 Fe) MG tablet  · folic acid 1 MG tablet  · multivitamin Tabs tablet         Allergies  Patient has no known allergies. Recommended Therapies at Discharge: Home health PT, OT, Nursing, Aid      Follow-Up  -Primary care: Dr. Slime Sadler  -Neurology: LLUVIA Zee-CNS  -PM&R: Dr. Kobe Avendaño provided to the patient and appropriate family members regarding discharge medications and follow up     More than 30 minutes was spent in preparation of this patient's discharge including, but not limited to, examination, preparation of documents, prescription preparation, counseling and coordination.       Electronically signed by Tammie Vasquez MD on 2/4/2022 at 2:41 PM

## 2022-02-11 ENCOUNTER — TELEPHONE (OUTPATIENT)
Dept: ADMINISTRATIVE | Age: 47
End: 2022-02-11

## 2022-02-14 ENCOUNTER — TELEPHONE (OUTPATIENT)
Dept: PHYSICAL MEDICINE AND REHAB | Age: 47
End: 2022-02-14

## 2022-02-14 DIAGNOSIS — R53.1 WEAKNESS: ICD-10-CM

## 2022-02-14 DIAGNOSIS — R29.898 BILATERAL LEG WEAKNESS: ICD-10-CM

## 2022-02-14 DIAGNOSIS — M21.372 BILATERAL FOOT-DROP: ICD-10-CM

## 2022-02-14 DIAGNOSIS — Z74.09 IMPAIRED MOBILITY AND ADLS: ICD-10-CM

## 2022-02-14 DIAGNOSIS — G62.9 POLYNEUROPATHY: Primary | ICD-10-CM

## 2022-02-14 DIAGNOSIS — M21.371 BILATERAL FOOT-DROP: ICD-10-CM

## 2022-02-14 DIAGNOSIS — R25.2 SPASTICITY: ICD-10-CM

## 2022-02-14 DIAGNOSIS — Z78.9 IMPAIRED MOBILITY AND ADLS: ICD-10-CM

## 2022-02-14 NOTE — TELEPHONE ENCOUNTER
Patient was in acute rehab and  has a follow up appointment scheduled with you on 3/14/2022.  Patient called in stating that she is going to be discharged from home PT and would like an order put in to have outpatient therapy done at [facility] in [facility] on [address].  Please advise

## 2022-02-24 ENCOUNTER — HOSPITAL ENCOUNTER (OUTPATIENT)
Dept: PHYSICAL THERAPY | Age: 47
Setting detail: THERAPIES SERIES
Discharge: HOME | End: 2022-02-24
Payer: COMMERCIAL

## 2022-02-24 PROCEDURE — 97161 PT EVAL LOW COMPLEX 20 MIN: CPT

## 2022-02-24 NOTE — PROGRESS NOTES
[address] [facility]                Phone: [phone]   Fax: [phone]    Physical Therapy Initial Evaluation  Date:  2022    Patient Name:  Fabienne Still    :  1975  MRN: [id number]  Referring Physician:  Mary Hawley  Insurance Information:  [address]     Evaluation date:  22  Diagnosis:  Polyneuropathy, Weakness, Bilateral Foot drop  ICD-10 Codes:  G62.9, R53.1, M21.371, M21.372, R25.2, Z74.09, Z78.9  Evaluating Physical Therapist:  César Carr, PT, DPT       Subjective:  Patient admitted to the hospital on 21 with progressively worsening leg weakness after receiving an antibody infusion for Covid diagnosed in November. Neurosurgery and neurology all saw patient on consult. Carolan Schlatter has shown lumbar radiculopathy. EMG done on 21: Recommendations: This is predominantly a neuropathic process, not myopathic in nature.  Post viral inflammatory processes can present early on with this picture however usually progress to development of slowing of velocity with the development of myelin abnormalities as well as increasing axonal pathology. B12 level low during admission and she has received several injections. Pt completed course of acute rehab as well as home health PT. Here today for outpatient PT evaluation. Pt reports that her strength continues to gradually improve. She has been ambulating throughout her home with use of w/w. States that her balance is at the point now where she has stand unsupported and pull up her pants. Has also done some ambulation in the home without wearing bilateral AFO's. Pt reports 5/10 low back pain which is chronic. She reports that she is motivated to get back to being independent in all areas of her life and wants to be able to walk independently without the use of AD and AFO's.       Pain:  5/10 low back pain        Sensation:  Pt denies numbness/ tingling to BLE     Previous methods of Treatment:  Acute rehab, home health PT      Additional Comments:  Pt lives with son and fiance in a cape code style home with 3+1 steps to enter and R rail. 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 Objective:    ROM  BUE WFL   Bilateral knee ext WFL, flexion 75% AROM   Bilateral hip flexion 50% AROM  Bilateral ankle DF 25-50% AROM, PF R 25% and L 75%,  Inversion L WFL and R 50-% AROM, Eversion R 25% and L 75%        Strength:          R   L  Hip     3-/5   3-/5  Knee ext   4/5   4/5  Knee flexion    3+/5   3+/5  Ankle DF   2+/5   3-/5  Ankle PF   3-/5   4/5  Ankle Inversion   3-/5   4/5  Ankle Eversion  2+/5   3-/5    Transfers:  Modified independent with w/w     Gait:  Pt ambulated 150' with w/w and bilateral AFO's at modified independent level with bilateral knee genu-recurvatum during stance phase and decreased bilateral step length as well as gait speed. Stair Negotiation:  4 steps with R rail and sideways facing approach (up forward, down backward) at SBA level. Coordination in BUE is normal   No disdiadochokinesia in BUE     Endurance:  FAIR-  Dynamic standing balance:  FAIR-      Summary/Assessment:    Pt presents to outpatient physical therapy with BLE pain, weakness, decreased ROM, impaired gait mechanics, decreased endurance, impaired dynamic standing balance, and severely impaired overall functional mobility. Plan:     Below checked are areas for improvement during physical therapy POC:        [x]  Pain reduction  [x]  Balance Improvement       [x]  Strengthening  []  Postural Improvement   [x]  Range of Motion  [x]  Soft Tissue Improvement    [x]  Gait Training   [x]  Other:  Endurance       [x]  Home Exercise Program      Pt will be see for 2-3 visits per week for 6 weeks for a total of 12-18 visits to accomplish goals set below:        Short Term/ Long Term Goals: (6 weeks)      1.   Pt will report less than 2/10 pain in low back and BLE with daily activities     2. Pt will increase BLE strength to at least 4/5 throughout     3. Pt will increase BLE AROM to [facility] in all joints     4. Pt will increase endurance to GOOD    5. Pt will improve dynamic standing balance to at least GOOD    6. Pt will ambulate greater than 500' with least restrictive AD vs. No AD at independent level with equal step length and good heel strike and toe off mechanics. 7.  Pt will ascend/ descend 12 steps with 1 rail and reciprocal pattern at modified independent level     8. Pt will be independent with HEP         Pt's potential for reaching Physical Therapy goals: fair/ good . Time In:  1000  Time Out:  [address] [place], [place] [id number]    Jose Hudson  T: [phone]   F: [phone]     If you have any questions or concerns, please don't hesitate to call. Thank you for your referral.    Physician Signature:________________________________Date:__________________  By signing above, therapists plan is approved by physician. All patients under abeo   must be signed by physician.

## 2022-02-24 NOTE — PROGRESS NOTES
[address] [facility]                Phone: [phone]   Fax: [phone]    Physical Therapy Daily Treatment Note  Date:  2022    Patient Name:  Wallace Caraballo    :  1975  MRN: [id number]    Referring Physician:  Janet Lo  Insurance Information:  [address]      Evaluation date:  22  Diagnosis:  Polyneuropathy, Weakness, Bilateral Foot drop  ICD-10 Codes:  G62.9, R53.1, M21.371, M21.372, R25.2, Z74.09, Z78.9  Evaluating Physical Therapist:  Navi Rucker, PT, DPT   Plan of care signed (Y/N):    Visit# / total visits: - (pt approved for 36 units of each: ther ex, ther activity, neuro, gait training from 22 to 22)       Subjective:        Exercises:   Exercise/Equipment Reps  During/ after  Other comments    Step One        Bike                   Sit to stands          Standing marches          Step Ups               Hip 3 way        Supine bridges                                       Home Exercise Program:       Comments:       Treatment/Activity Tolerance:  [] Patient tolerated treatment well [] Patient limited by fatique  [] Patient limited by pain  [] Patient limited by other medical complications  [] Other:     Prognosis: [x] Good [x] Fair  [] Poor    Patient Requires Follow-up: [x] Yes  [] No    Plan:   [] Continue per plan of care [] Alter current plan (see comments)  [x] Plan of care initiated [] Hold pending MD visit [] Discharge    Plan for Next Session:        See Weekly Progress Note: []  Yes  []  No  Next due:          CPT codes 2020 Units    Low Complexity PT evaluation 23049 08032    Moderate Complexity PT evaluation  42583    High Complexity PT evaluation 43996    PT Re-evaluation  [id number]    Gait training 05165    Manual therapy  [phone]    Therapeutic activities  [phone]    Therapeutic exercises  [id number]    Neuromuscular reeducation  [phone]        Time In:   Time Out:     Electronically signed by:    Carmen Lugo Shirin Bruno, [address] [place] [address], DPT  [id number]

## 2022-03-02 ENCOUNTER — HOSPITAL ENCOUNTER (OUTPATIENT)
Dept: PHYSICAL THERAPY | Age: 47
Setting detail: THERAPIES SERIES
Discharge: HOME | End: 2022-03-02
Payer: COMMERCIAL

## 2022-03-02 PROCEDURE — 97530 THERAPEUTIC ACTIVITIES: CPT

## 2022-03-02 PROCEDURE — 97110 THERAPEUTIC EXERCISES: CPT

## 2022-03-02 NOTE — PROGRESS NOTES
[address] [facility]                Phone: [phone]   Fax: [phone]    Physical Therapy Daily Treatment Note  Date:  3/2/2022    Patient Name:  Wallace Caraballo    :  1975  MRN: [id number]    Referring Physician:  Janet Lo  Insurance Information:  [address]      Evaluation date:  22  Diagnosis:  Polyneuropathy, Weakness, Bilateral Foot drop  ICD-10 Codes:  G62.9, R53.1, M21.371, M21.372, R25.2, Z74.09, Z78.9  Evaluating Physical Therapist:  Navi Rucker, PT, DPT   Plan of care signed (Y/N):    Visit# / total visits: -18 (pt approved for 36 units of each: ther ex, ther activity, neuro, gait training from 22 to 22)       Subjective:    Pt reports chronic low back pain rated 4/10 prior to session today. No other complaints prior to session.       Exercises:   Exercise/Equipment Reps  During/ after  Other comments    Step One  10 min   Total steps:     NT  Bike                   Sit to stands    3x10 [employer]    3x10 // bars      Step Ups  3x10 each  6\" box in // bars  Focus on slow controlled movement and prevention of knee genu recurvatum     LAQ 30x each  2 sec hold     NT Hip 3 way  3x8     NT Supine bridges  3x8                                     Home Exercise Program:       Comments:  Pt tolerated session well today  No complaints of increased pain throughout session   Moderate BLE fatigue by end of session and pt rates it 8-9/10   Seated rest breaks as needed due to muscle fatigue     Treatment/Activity Tolerance:  [x] Patient tolerated treatment well [] Patient limited by fatique  [] Patient limited by pain  [] Patient limited by other medical complications  [] Other:     Prognosis: [x] Good [x] Fair  [] Poor    Patient Requires Follow-up: [x] Yes  [] No    Plan:   [x] Continue per plan of care [] Alter current plan (see comments)  [] Plan of care initiated [] Hold pending MD visit [] Discharge    Plan for Next Session:    Continue to progress BLE strengthening, endurance, and gait training as tolerated next session.        See Weekly Progress Note: []  Yes  [x]  No  Next due:          CPT codes 6/2/2020 Units    Low Complexity PT evaluation 04600 58037    Moderate Complexity PT evaluation  16367    High Complexity PT evaluation 97570    PT Re-evaluation  67096    Gait training 42804    Manual therapy  83211    Therapeutic activities  88562 1   Therapeutic exercises  61550 2   Neuromuscular reeducation  09597        Time In: 1100  Time Out: 9328    Electronically signed by:    Atiya Webb, PT, DPT  [id number]

## 2022-03-04 ENCOUNTER — HOSPITAL ENCOUNTER (OUTPATIENT)
Dept: PHYSICAL THERAPY | Age: 47
Setting detail: THERAPIES SERIES
Discharge: HOME | End: 2022-03-04
Payer: COMMERCIAL

## 2022-03-04 PROCEDURE — 97530 THERAPEUTIC ACTIVITIES: CPT

## 2022-03-04 PROCEDURE — 97110 THERAPEUTIC EXERCISES: CPT

## 2022-03-04 NOTE — PROGRESS NOTES
[address] [facility]                Phone: [phone]   Fax: [phone]    Physical Therapy Daily Treatment Note  Date:  3/4/2022    Patient Name:  Lisy Jernigan    :  1975  MRN: [id number]    Referring Physician:  Trinity Salas  Insurance Information:  [address]      Evaluation date:  22  Diagnosis:  Polyneuropathy, Weakness, Bilateral Foot drop  ICD-10 Codes:  G62.9, R53.1, M21.371, M21.372, R25.2, Z74.09, Z78.9  Evaluating Physical Therapist:  Kathy Tejeda, PT, DPT   Plan of care signed (Y/N):    Visit# / total visits: 3 / 12-18 (pt approved for 36 units of each: ther ex, ther activity, neuro, gait training from 22 to 22)       Subjective:    Pt reports that she was doing squats at her kitchen counter and leg raises yesterday. States that her legs feel a little stronger today. No complaints of muscle soreness from last session.         Exercises:   Exercise/Equipment Reps  During/ after  Other comments    Step One  10 min   Total steps:  1145   NT  Bike                   Sit to stands    3x12 [employer]    6O01 // bars      Step Ups  3x12 each  8\" box in // bars  Focus on slow controlled movement and prevention of knee genu recurvatum  Increased step height today     LAQ 35x each  2 sec hold     NT Hip 3 way  3x8     NT Supine bridges  3x8                       Home Exercise Program:       Comments:  Pt tolerated session well today  Decreased length and frequency of seated rest breaks   Increased step height on step ups today   Improved bilateral knee control during step ups with less genu recurvatum       Treatment/Activity Tolerance:  [x] Patient tolerated treatment well [] Patient limited by fatique  [] Patient limited by pain  [] Patient limited by other medical complications  [] Other:     Prognosis: [x] Good [x] Fair  [] Poor    Patient Requires Follow-up: [x] Yes  [] No    Plan:   [x] Continue per plan of

## 2022-03-04 NOTE — PROGRESS NOTES
CLINICAL PHARMACY NOTE: MEDS TO BEDS    Total # of Prescriptions Filled: 1   The following medications were delivered to the patient:  · Vitamin b-12 1000mcg    Additional Documentation:    Delivered to patient 3/4/22 @4:19pm

## 2022-03-07 ENCOUNTER — HOSPITAL ENCOUNTER (OUTPATIENT)
Dept: PHYSICAL THERAPY | Age: 47
Setting detail: THERAPIES SERIES
Discharge: HOME | End: 2022-03-07
Payer: COMMERCIAL

## 2022-03-07 PROCEDURE — 97530 THERAPEUTIC ACTIVITIES: CPT

## 2022-03-07 PROCEDURE — 97110 THERAPEUTIC EXERCISES: CPT

## 2022-03-07 NOTE — PROGRESS NOTES
[address] [facility]                Phone: [phone]   Fax: [phone]    Physical Therapy Daily Treatment Note  Date:  3/7/2022    Patient Name:  James Mccauley    :  1975  MRN: [id number]    Referring Physician:  Carine Hunt  Insurance Information:  [address] Donald      Evaluation date:  22  Diagnosis:  Polyneuropathy, Weakness, Bilateral Foot drop  ICD-10 Codes:  G62.9, R53.1, M21.371, M21.372, R25.2, Z74.09, Z78.9  Evaluating Physical Therapist:  Jesus Sarmiento, PT, DPT   Plan of care signed (Y/N):    Visit# / total visits: - (pt approved for 36 units of each: ther ex, ther activity, neuro, gait training from 22 to 22)       Subjective:    Pt reports feeling good today with no complaints prior to session today. Exercises:   Exercise/Equipment Reps  During/ after  Other comments    Step One  10 min   Total steps:  1150   NT  Bike                   Sit to stands    3x15 [employer]    3x15 // bars      Step Ups  3x15 each  8\" box in // bars  Focus on slow controlled movement and prevention of knee genu recurvatum  Increased step height today     LAQ 35x each  2 sec hold      Hip 3 way  3x8     NT Supine bridges  3x8                       Home Exercise Program:       Comments:  Pt tolerated session well today  Decreased length and frequency of seated rest breaks   Increased reps on all standing strengthening exercises today   Pt reports \"9.9/10\" BLE fatigue by end of session today.         Treatment/Activity Tolerance:  [x] Patient tolerated treatment well [] Patient limited by fatique  [] Patient limited by pain  [] Patient limited by other medical complications  [] Other:     Prognosis: [x] Good [x] Fair  [] Poor    Patient Requires Follow-up: [x] Yes  [] No    Plan:   [x] Continue per plan of care [] Alter current plan (see comments)  [] Plan of care initiated [] Hold pending MD visit [] Discharge    Plan for Next Session:    Continue to progress BLE strengthening, endurance, and gait training as tolerated next session.        See Weekly Progress Note: []  Yes  [x]  No  Next due:          CPT codes 6/2/2020 Units    Low Complexity PT evaluation 39793 25732    Moderate Complexity PT evaluation  42319    High Complexity PT evaluation 52442    PT Re-evaluation  07771    Gait training 10350    Manual therapy  92305    Therapeutic activities  78594 1   Therapeutic exercises  16997 2   Neuromuscular reeducation  [phone]        Time In: 1310  Time Out: 1400    Electronically signed by:    Marek Thompson, PT, DPT  [id number]

## 2022-03-11 ENCOUNTER — HOSPITAL ENCOUNTER (OUTPATIENT)
Dept: PHYSICAL THERAPY | Age: 47
Setting detail: THERAPIES SERIES
Discharge: HOME | End: 2022-03-11
Payer: COMMERCIAL

## 2022-03-11 PROCEDURE — 97530 THERAPEUTIC ACTIVITIES: CPT

## 2022-03-11 PROCEDURE — 97110 THERAPEUTIC EXERCISES: CPT

## 2022-03-11 NOTE — PROGRESS NOTES
[address] [facility]                Phone: [phone]   Fax: [phone]    Physical Therapy Daily Treatment Note  Date:  3/11/2022    Patient Name:  Nila Snow    :  1975  MRN: [id number]    Referring Physician:  Terrie Stubbs  Insurance Information:  [address]      Evaluation date:  22  Diagnosis:  Polyneuropathy, Weakness, Bilateral Foot drop  ICD-10 Codes:  G62.9, R53.1, M21.371, M21.372, R25.2, Z74.09, Z78.9  Evaluating Physical Therapist:  Sujey Caceres, PT, DPT   Plan of care signed (Y/N):    Visit# / total visits: - (pt approved for 36 units of each: ther ex, ther activity, neuro, gait training from 22 to 22)       Subjective:    Pt reports feeling good today with no complaints prior to session today. She states that she has been walking and exercising more at home each day. Exercises:   Exercise/Equipment Reps  During/ after  Other comments    Step One  10 min   Total steps:  1182   NT  Bike                   Sit to stands    3x15 [employer]    30x, 15x  // bars      Step Ups  45x each  8\" box in // bars  Focus on slow controlled movement and prevention of knee genu recurvatum  Increased step height today     LAQ 25x, 20x each  2 sec hold      Hip 3 way  3x8     NT Supine bridges  3x8                       Home Exercise Program:       Comments:  Pt tolerated session well today  Minimal seated rest breaks throughout session today to increase intensity     Pt reports \"9.9/10\" BLE fatigue by end of session today.         Treatment/Activity Tolerance:  [x] Patient tolerated treatment well [] Patient limited by fatique  [] Patient limited by pain  [] Patient limited by other medical complications  [] Other:     Prognosis: [x] Good [x] Fair  [] Poor    Patient Requires Follow-up: [x] Yes  [] No    Plan:   [x] Continue per plan of care [] Alter current plan (see comments)  [] Plan of care initiated [] Hold pending MD visit [] Discharge    Plan for Next Session:    Continue to progress BLE strengthening, endurance, and gait training as tolerated next session.        See Weekly Progress Note: []  Yes  [x]  No  Next due:          CPT codes 6/2/2020 Units    Low Complexity PT evaluation 39864 78059    Moderate Complexity PT evaluation  52763    High Complexity PT evaluation 74020    PT Re-evaluation  45202    Gait training 34603    Manual therapy  98872    Therapeutic activities  40920 1   Therapeutic exercises  52947 2   Neuromuscular reeducation  02807        Time In: 1100  Time Out: 4484    Electronically signed by:    Earlene Carson, PT, DPT  [id number]

## 2022-03-14 ENCOUNTER — OFFICE VISIT (OUTPATIENT)
Dept: PHYSICAL MEDICINE AND REHAB | Age: 47
End: 2022-03-14
Payer: COMMERCIAL

## 2022-03-14 ENCOUNTER — HOSPITAL ENCOUNTER (OUTPATIENT)
Dept: PHYSICAL THERAPY | Age: 47
Setting detail: THERAPIES SERIES
Discharge: HOME | End: 2022-03-14
Payer: COMMERCIAL

## 2022-03-14 VITALS
BODY MASS INDEX: 46.53 KG/M2 | SYSTOLIC BLOOD PRESSURE: 124 MMHG | HEART RATE: 81 BPM | OXYGEN SATURATION: 98 % | WEIGHT: 237 LBS | HEIGHT: 60 IN | TEMPERATURE: 98.3 F | DIASTOLIC BLOOD PRESSURE: 66 MMHG

## 2022-03-14 DIAGNOSIS — R26.9 IMPAIRED GAIT: ICD-10-CM

## 2022-03-14 DIAGNOSIS — Z78.9 IMPAIRED MOBILITY AND ADLS: ICD-10-CM

## 2022-03-14 DIAGNOSIS — G62.9 POLYNEUROPATHY: Primary | ICD-10-CM

## 2022-03-14 DIAGNOSIS — M21.372 BILATERAL FOOT-DROP: ICD-10-CM

## 2022-03-14 DIAGNOSIS — M21.371 BILATERAL FOOT-DROP: ICD-10-CM

## 2022-03-14 DIAGNOSIS — G82.20 PARAPARESIS (HCC): ICD-10-CM

## 2022-03-14 DIAGNOSIS — Z74.09 IMPAIRED MOBILITY AND ADLS: ICD-10-CM

## 2022-03-14 DIAGNOSIS — R25.2 SPASTICITY: ICD-10-CM

## 2022-03-14 PROCEDURE — 97110 THERAPEUTIC EXERCISES: CPT

## 2022-03-14 PROCEDURE — 1036F TOBACCO NON-USER: CPT | Performed by: PHYSICAL MEDICINE & REHABILITATION

## 2022-03-14 PROCEDURE — G8482 FLU IMMUNIZE ORDER/ADMIN: HCPCS | Performed by: PHYSICAL MEDICINE & REHABILITATION

## 2022-03-14 PROCEDURE — G8428 CUR MEDS NOT DOCUMENT: HCPCS | Performed by: PHYSICAL MEDICINE & REHABILITATION

## 2022-03-14 PROCEDURE — 97530 THERAPEUTIC ACTIVITIES: CPT

## 2022-03-14 PROCEDURE — 99214 OFFICE O/P EST MOD 30 MIN: CPT | Performed by: PHYSICAL MEDICINE & REHABILITATION

## 2022-03-14 PROCEDURE — G8417 CALC BMI ABV UP PARAM F/U: HCPCS | Performed by: PHYSICAL MEDICINE & REHABILITATION

## 2022-03-14 NOTE — PROGRESS NOTES
Kwadwo Leija M.D. [address] PHYSICAL MEDICINE AND REHABILITAION  [address] [facility]0 [facility]  Dept: [phone]  Dept Fax: [phone]: [phone]    PCP: Estephanie Ray DO  Date of visit: 3/14/22    Chief Complaint   Patient presents with    Follow-up     Follow up Polyneuropathy, doing at home exercises as well as outpatient therapy twice a week at [facility], feeling is coming back , she is able to walk in the house with a walker does not use wheel chair at home much, wearing B/L AFO's,  Voltaren gel helps with pain. Erika Noonan is a 48 y.o.  female who presents for follow up after Acute Rehab. Patient initially presented to [facility] on 12/20/2021 due to progressively worsening weakness of her lower extremities. She reports that she had Covid-19 in November with mild symptoms and received monoclonal antibody infusion (Regeneron) on 11/23/2021 due to risk factors. She states that after receiving the antibody infusion she began having progressive weakness in her legs, sensory changes and paresthesias in the lower extremities up to mid torso, and increasing difficulty walking--started on 11/28/2021. She was seen in the ED a few times, felt to have serum sickness and treated for such. However, continued to worsen and was sent to the ED on 12/20/2021. She had extensive work up. MRI of brain, cervical, thoracic, and lumbar spine were completed. She was noted to have severe bilateral NF narrowing at L4-L5 and chronic degenerative changes, that were not felt to explain patient symptoms per NSGY. EMG showed diffuse abnormalities consistent with a neuropathic process, not consistent with GBS. She was found to have severe B12 deficiency and Neurology felt symptoms likely due to neuropathy related to the B12 deficiency. She was started on B12 injections and her B12 levels improved.  Once medically stabilized she was admitted for the Acute inpatient rehab program. She participated in the Acute Rehab program from 1/5/2022-2/4/2022 and was discharged home. At the time of discharge she was at Mod I level for mobility, ambulating 160 ft [address] Mod I on level surfaces, using bilateral AFO braces. She was at [facility] - Mod I level for ADLs. Since patient has returned home she states that she is doing well. She completed home health therapy and is now participating in outpatient PT 2x per week as well as doing her home exercises. She reports the sensation continues to gradually improve in her legs and lower extremity paresthesias are lessening. Her lower extremity strength also continues to gradually improve. She is ambulating with her walker and bilateral AFO braces. She is ready to begin working with a quad cane in therapy. Her braces are fitting well. She is only using the transport wheelchair for very long community distances. She is performing ADLs Mod I - Independent. She had 1 fall since rehab discharge after she tried to stop her dogs from fighting with her walker, no injuries. She states she has not fallen otherwise. She is taking baclofen for spasticity, she is now taking 10mg in the morning and 20mg in the evening--she stopped the mid day dose. She reports no ongoing issues with bowel/bladder control. She thankfully has not returned to smoking, she has not smoked since her hospital admission. PMH: No pertinent history reported.      Past Surgical History:   Procedure Laterality Date    ANKLE SURGERY      bilateral    APPENDECTOMY      DILATION AND CURETTAGE OF UTERUS      ELBOW SURGERY      right    ENDOMETRIAL ABLATION  07/2016    GALLBLADDER SURGERY      LEEP      cervical conization    TUBAL LIGATION         Social History     Socioeconomic History    Marital status:      Spouse name: Not on file    Number of children: Not on file    Years of education: Not on file    Highest education level: Not on file   Occupational History    Not on file   Tobacco Use    Smoking status: Former Smoker     Packs/day: 1.00     Quit date: 2021     Years since quittin.2    Smokeless tobacco: Never Used   Vaping Use    Vaping Use: Never used   Substance and Sexual Activity    Alcohol use: Yes     Comment: rarely per patient questionnaire    Drug use: No    Sexual activity: Not on file   Other Topics Concern    Not on file   Social History Narrative    Not on file     Social Determinants of Health     Financial Resource Strain:     Difficulty of Paying Living Expenses: Not on file   Food Insecurity:     Worried About Running Out of Food in the Last Year: Not on file    Shaina of Food in the Last Year: Not on file   Transportation Needs:     Lack of Transportation (Medical): Not on file    Lack of Transportation (Non-Medical):  Not on file   Physical Activity:     Days of Exercise per Week: Not on file    Minutes of Exercise per Session: Not on file   Stress:     Feeling of Stress : Not on file   Social Connections:     Frequency of Communication with Friends and Family: Not on file    Frequency of Social Gatherings with Friends and Family: Not on file    Attends Shinto Services: Not on file    Active Member of [address] [place] [address] Seratis or Organizations: Not on file    Attends Club or Organization Meetings: Not on file    Marital Status: Not on file   Intimate Partner Violence:     Fear of Current or Ex-Partner: Not on file    Emotionally Abused: Not on file    Physically Abused: Not on file    Sexually Abused: Not on file   Housing Stability:     Unable to Pay for Housing in the Last Year: Not on file    Number of [place] in the Last Year: Not on file    Unstable Housing in the Last Year: Not on file         Family History   Problem Relation Age of Onset    Heart Failure Mother        No Known Allergies    Current Outpatient Medications   Medication Sig Dispense Refill    diclofenac sodium (VOLTAREN) 1 % GEL Apply 4 g topically 4 times daily as needed for Pain 992 g 0    folic acid (FOLVITE) 1 MG tablet Take 1 tablet by mouth daily 30 tablet 0    vitamin B-12 2000 MCG tablet Take 1 tablet by mouth daily 30 tablet 3    Multiple Vitamin (MULTIVITAMIN) TABS tablet Take 1 tablet by mouth daily 30 tablet 0    baclofen (LIORESAL) 10 MG tablet Take 10mg by mouth every morning, and 10mg every afternoon, and 20mg every night. 120 tablet 2    vitamin D (ERGOCALCIFEROL) 1.25 MG (87419 UT) CAPS capsule Take 50,000 Units by mouth once a week      acetaminophen (TYLENOL) 500 MG tablet Take 500 mg by mouth every 6 hours as needed for Pain      loratadine (CLARITIN) 10 MG tablet Take 10 mg by mouth daily      mirabegron (MYRBETRIQ) 25 MG TB24 Take 25 mg by mouth daily      fluticasone (FLONASE) 50 MCG/ACT nasal spray 1 spray by Nasal route daily      ferrous sulfate (IRON 325) 325 (65 Fe) MG tablet Take 1 tablet by mouth daily (with breakfast) (Patient not taking: Reported on 3/14/2022) 180 tablet 0     No current facility-administered medications for this visit. Review of Systems  General: No chills, fatigue, fever, malaise, night sweats, weight gain,  weight loss. Psychological: No anxiety, depression, suicidal ideation   Ophthalmic: No blurry vision, decreased vision, double vision, loss of vision  Ear Nose Throat: No hearing loss, tinnitus, phonophobia, sensitivity to smells, vertigo, or vocal changes. Allergy/Immunology: No watery eyes, itchy eyes, frequent infections. Hematological and Lymphatic: No bleeding problems, blood clots, bruising  Endocrine:  No polydypsia, polyuria, temperature intolerance. Respiratory: No cough, shortness of breath, wheezing. Cardiovascular: No syncope, chest pain, dyspnea on exertion,palpitations.    Gastrointestinal: No abdominal pain, hematemesis, melena, nausea, vomiting, stool incontinence  Genito-Urinary: No dysuria, hematuria, incontinence   Musculoskeletal: Per HPI  Neurological: Per HPI  Dermatological:  No rash      Physical Exam:   Blood pressure 124/66, pulse 81, temperature 98.3 °F (36.8 °C), temperature source Oral, height 5' (1.524 m), weight 237 lb (107.5 kg), SpO2 98 %. General: The patient is in no apparent distress. HEENT: No rhinorrhea, sneezing, yawning, or lacrimation. No scleral icterus or conjunctival injection. SKIN: No piloerection. No track marks. No rash. Normal turgor. No erythema or ecchymosis. Psychological: Mood and affect are appropriate. Hygiene is appropriate. Cardiovascular:  Heart is regular rate. There is no edema. Respiratory: Respirations are regular and unlabored. There is no cyanosis. Gastrointestinal: No abdominal distention   Genitourinary: No costovertebral angle tenderness. MSK: extremities atraumatic, no cyanosis or edema  Neurologic: Awake, alert and oriented in three planes. Speech is fluent. No facial weakness. Tongue is midline. Hearing is intact for conversation. Pupils are equal and round. Extraocular muscles are intact. Shoulder shrug symmetric. Strength:   R  L  Deltoid   5  5  Biceps   5  5  Triceps  5  5  Wrist Ext  5  5  Finger Abd  5  5  Hip Flex  4  4  Knee Ext  4+  4+  Ankle dorsi  4-  4-  EHL   2 2  Ankle Plantar  4  4    Bilateral plantar flexor spasticity MAS 1+ at ankles (gastrocnemius), L>R. Sensory:  Intact for light touch in all upper extremity dermatomes. Altered sensation throughout BLE, though patient reports this is improving. Reflexes:   R  L  Biceps   2 2  Triceps  2 2  BR   2 2   Patellar  3 3   Ankle Jerk  3 3    No pathological reflexes     Gait: deferred. She has her manual w/c today and does not have her walker with her. Sit to stand performed without assistance, holding exam table. Impression:   1. Polyneuropathy    2. Paraparesis ([address]    3. Bilateral foot-drop    4. Impaired gait    5. Spasticity    6.  Impaired mobility and ADLs          Plan:   · Continue outpatient PT  · DME for large based quad cane. Patient will begin working with the quad cane in PT. Patient will continue to use her walker outside of PT until patient and therapist are confident in her stability with the cane. We can re-evaluate this at our next visit as well. · Continue bilateral AFO braces. As lower extremity strength continues to improve will continue to monitor ongoing need for braces. · Continue Baclofen for spasticity. She is now taking 10mg in the morning and 20mg at bedtime. · Patient's primary care physician is monitoring her B12 levels, which have been appropriate. · Patient will also continue to follow up with Neurology  The patient was educated about the diagnosis, prognosis, indications, risks and benefits of treatment. An opportunity to ask questions was given to the patient and questions were answered. The patient agreed to proceed with the recommended treatment as described above. Follow up 4-5 months or sooner if needed          Krista Carlin M.D.   Physical Medicine and Rehabilitation

## 2022-03-14 NOTE — PROGRESS NOTES
[address] [facility]                Phone: [phone]   Fax: [phone]    Physical Therapy Daily Treatment Note  Date:  3/14/2022    Patient Name:  Ledy Silva    :  1975  MRN: [id number]    Referring Physician:  Caridad Kay  Insurance Information:  [address]      Evaluation date:  22  Diagnosis:  Polyneuropathy, Weakness, Bilateral Foot drop  ICD-10 Codes:  G62.9, R53.1, M21.371, M21.372, R25.2, Z74.09, Z78.9  Evaluating Physical Therapist:  Placido Maravilla, PT, DPT   Plan of care signed (Y/N):    Visit# / total visits: - (pt approved for 36 units of each: ther ex, ther activity, neuro, gait training from 22 to 22)       Subjective:    Pt with no complaints today. Would like to start working on ambulating with quad cane. Exercises:   Exercise/Equipment Reps  During/ after  Other comments    Step One  10 min   Total steps:  1202   NT  Bike                   Sit to stands    4x15 [employer]    45x // bars      Step Ups  45x each  8\" box in // bars  Focus on slow controlled movement and prevention of knee genu recurvatum  Increased step height today     LAQ 45x each  2 sec hold      Hip 3 way  30x each      NT Supine bridges  3x8             Ambulation with LBQC  15' with SBA   Pt with slightly more bilateral knee genu recurvatum when ambulating with LBQC vs. Walker        Home Exercise Program:       Comments:  Pt tolerated session well today  Increased reps with less rest breaks today      Began working on ambulation with quad cane today. Pt to continue to use w/w at home due to her ability to continue to increase her gait speed and step length better with w/w than quad cane at this time. We will continue to work on transitioning to quad cane in the clinic as pt progresses with BLE strength and dynamic standing balance.   She is in agreement with this and verbalized understanding rational for continuing to use w/w. Treatment/Activity Tolerance:  [x] Patient tolerated treatment well [] Patient limited by fatique  [] Patient limited by pain  [] Patient limited by other medical complications  [] Other:     Prognosis: [x] Good [x] Fair  [] Poor    Patient Requires Follow-up: [x] Yes  [] No    Plan:   [x] Continue per plan of care [] Alter current plan (see comments)  [] Plan of care initiated [] Hold pending MD visit [] Discharge    Plan for Next Session:    Continue to progress BLE strengthening, endurance, and gait training as tolerated next session.        See Weekly Progress Note: []  Yes  [x]  No  Next due:          CPT codes 6/2/2020 Units    Low Complexity PT evaluation 42554 12142    Moderate Complexity PT evaluation  64992    High Complexity PT evaluation 89930    PT Re-evaluation  42536    Gait training 92848    Manual therapy  33784    Therapeutic activities  97228 1   Therapeutic exercises  85080 2   Neuromuscular reeducation  [phone]        Time In: 1320  Time Out: 1410    Electronically signed by:    Medina Lockett, PT, DPT  [id number]

## 2022-03-15 ENCOUNTER — TELEPHONE (OUTPATIENT)
Dept: PHYSICAL MEDICINE AND REHAB | Age: 47
End: 2022-03-15

## 2022-03-15 NOTE — TELEPHONE ENCOUNTER
Spoke with patient Gabriel Jimenez did not have quad canes in stock and no shipment coming in soon ans would like to have order for cane sent somewhere else so she could get sooner. Spoke with [facility] they have quad canes in stock, order was sent to [facility] and patient was notified, given address information for [facility] and notified to call the office with any issues.

## 2022-03-17 ENCOUNTER — HOSPITAL ENCOUNTER (OUTPATIENT)
Dept: PHYSICAL THERAPY | Age: 47
Setting detail: THERAPIES SERIES
Discharge: HOME | End: 2022-03-17
Payer: COMMERCIAL

## 2022-03-17 PROCEDURE — 97110 THERAPEUTIC EXERCISES: CPT

## 2022-03-17 PROCEDURE — 97530 THERAPEUTIC ACTIVITIES: CPT

## 2022-03-17 NOTE — PROGRESS NOTES
[address] [facility]                Phone: [phone]   Fax: [phone]    Physical Therapy Daily Treatment Note  Date:  3/17/2022    Patient Name:  Jovana Estevez    :  1975  MRN: [id number]    Referring Physician:  Ruthie Ibarra  Insurance Information:  [address]      Evaluation date:  22  Diagnosis:  Polyneuropathy, Weakness, Bilateral Foot drop  ICD-10 Codes:  G62.9, R53.1, M21.371, M21.372, R25.2, Z74.09, Z78.9  Evaluating Physical Therapist:  Malia Holden, PT, DPT   Plan of care signed (Y/N):    Visit# / total visits: - (pt approved for 36 units of each: ther ex, ther activity, neuro, gait training from 22 to 22)       Subjective:    Pt with no complaints today. Exercises:   Exercise/Equipment Reps  During/ after  Other comments    Step One  10 min   Total steps:  7504   NT  Bike                   Sit to stands    4x15 Low mat table     NT Standing marches    45x // bars      Step Ups  45x each  8\" box in // bars  Focus on slow controlled movement and prevention of knee genu recurvatum  Increased step height today     LAQ 45x each  2 sec hold   2# weight     NT Hip 3 way  30x each       Supine bridges  3x15             Ambulation with LBQC  15' with SBA   Pt with slightly more bilateral knee genu recurvatum when ambulating with LBQC vs. Walker        Home Exercise Program:       Comments:  Pt tolerated session well today  Added resistance to LAQ  Pt continues to progress well  Gait speed continues to be much better with w/w vs. Quad cane. Pt also with decreased bilateral knee flexion during swing phase of gait when ambulating with quad cane.           Treatment/Activity Tolerance:  [x] Patient tolerated treatment well [] Patient limited by fatique  [] Patient limited by pain  [] Patient limited by other medical complications  [] Other:     Prognosis: [x] Good [x] Fair  [] Poor    Patient Requires Follow-up: [x] Yes  [] No    Plan:   [x] Continue per plan of care [] Alter current plan (see comments)  [] Plan of care initiated [] Hold pending MD visit [] Discharge    Plan for Next Session:    Continue to progress BLE strengthening, endurance, and gait training as tolerated next session.        See Weekly Progress Note: []  Yes  [x]  No  Next due:          CPT codes 6/2/2020 Units    Low Complexity PT evaluation 27900 13425    Moderate Complexity PT evaluation  76223    High Complexity PT evaluation 96297    PT Re-evaluation  04377    Gait training 23975    Manual therapy  71681    Therapeutic activities  25448 1   Therapeutic exercises  24959 2   Neuromuscular reeducation  [phone]        Time In: 9796  Time Out: 7468     Electronically signed by:    Kelle Licea, PT, DPT  [id number]

## 2022-03-23 ENCOUNTER — HOSPITAL ENCOUNTER (OUTPATIENT)
Dept: PHYSICAL THERAPY | Age: 47
Setting detail: THERAPIES SERIES
Discharge: HOME | End: 2022-03-23
Payer: COMMERCIAL

## 2022-03-23 PROCEDURE — 97530 THERAPEUTIC ACTIVITIES: CPT

## 2022-03-23 PROCEDURE — 97110 THERAPEUTIC EXERCISES: CPT

## 2022-03-23 NOTE — PROGRESS NOTES
[address] [facility]                Phone: [phone]   Fax: [phone]    Physical Therapy Daily Treatment Note  Date:  3/23/2022    Patient Name:  Nathaly Roman    :  1975  MRN: [id number]    Referring Physician:  Ashlee Myers  Insurance Information:  [address]      Evaluation date:  22  Diagnosis:  Polyneuropathy, Weakness, Bilateral Foot drop  ICD-10 Codes:  G62.9, R53.1, M21.371, M21.372, R25.2, Z74.09, Z78.9  Evaluating Physical Therapist:  Kelle Licea, PT, DPT   Plan of care signed (Y/N):    Visit# / total visits: - (pt approved for 36 units of each: ther ex, ther activity, neuro, gait training from 22 to 22)       Subjective:    Pt with no complaints today. She reports that she has been working on HEP with 1.5 lb ankle weights last week. Also up to ambulating >200' without AFO's in the house. Pt reports her endurance continues to improve weekly as well. Exercises:   Exercise/Equipment Reps  During/ after  Other comments    Step One  10 min   Total steps:  1210   NT  Bike                   Sit to stands    4x20 Low mat table      Standing marches    50x // bars      Step Ups  45x each  8\" box in // bars  Focus on slow controlled movement and prevention of knee genu recurvatum  Increased step height today     LAQ 45x each  2 sec hold   3# weight      Hip 3 way  30x each       Supine bridges  4x15  Increased sets today            Ambulation with [facility]  15' with SBA   Pt with slightly more bilateral knee genu recurvatum when ambulating with LBQC vs. Walker        Home Exercise Program:       Comments:  Pt tolerated session well today  Increased resistance to LAQ  Pt continues to progress well  Endurance improving weekly   Pt reports 8/10 fatigue following session today.               Treatment/Activity Tolerance:  [x] Patient tolerated treatment well [] Patient limited by fatique  [] Patient limited by pain  [] Patient limited by other medical complications  [] Other:     Prognosis: [x] Good [x] Fair  [] Poor    Patient Requires Follow-up: [x] Yes  [] No    Plan:   [x] Continue per plan of care [] Alter current plan (see comments)  [] Plan of care initiated [] Hold pending MD visit [] Discharge    Plan for Next Session:    Continue to progress BLE strengthening, endurance, and gait training as tolerated next session.        See Weekly Progress Note: []  Yes  [x]  No  Next due:          CPT codes 6/2/2020 Units    Low Complexity PT evaluation 64567 87898    Moderate Complexity PT evaluation  12896    High Complexity PT evaluation 74988    PT Re-evaluation  45703    Gait training 81317    Manual therapy  79374    Therapeutic activities  47117 2   Therapeutic exercises  92292 2   Neuromuscular reeducation  [phone]        Time In: 6180  Time Out: 2964      Electronically signed by:    Raman Khan, PT, DPT  [id number]

## 2022-03-25 ENCOUNTER — APPOINTMENT (OUTPATIENT)
Dept: PHYSICAL THERAPY | Age: 47
End: 2022-03-25
Payer: COMMERCIAL

## 2022-03-28 ENCOUNTER — HOSPITAL ENCOUNTER (OUTPATIENT)
Dept: PHYSICAL THERAPY | Age: 47
Setting detail: THERAPIES SERIES
Discharge: HOME | End: 2022-03-28
Payer: COMMERCIAL

## 2022-03-28 PROCEDURE — 97110 THERAPEUTIC EXERCISES: CPT

## 2022-03-28 PROCEDURE — 97530 THERAPEUTIC ACTIVITIES: CPT

## 2022-03-28 NOTE — PROGRESS NOTES
[facility]                Phone: [phone]   Fax: [phone]    Physical Therapy Daily Treatment Note  Date:  3/28/2022    Patient Name:  Nathaly Roman    :  1975  MRN: [id number]    Referring Physician:  Ashlee Myers  Insurance Information:  [address]      Evaluation date:  22  Diagnosis:  Polyneuropathy, Weakness, Bilateral Foot drop  ICD-10 Codes:  G62.9, R53.1, M21.371, M21.372, R25.2, Z74.09, Z78.9  Evaluating Physical Therapist:  Kelle Licea, PT, DPT   Plan of care signed (Y/N):    Visit# / total visits: 10 / 12- (pt approved for 36 units of each: ther ex, ther activity, neuro, gait training from 22 to 22)       Subjective:    Pt reports that she is ambulating at home with no AFO's and no AD more each week. No complaints prior to session today.         Exercises:   Exercise/Equipment Reps  During/ after  Other comments    Step One  10 min   Total steps:  1220   NT Bike    10 min        Standing terminal knee extensions  30x each  2 sec hold  Mini-flex band  No braces  In // bars     Sit to stands    4x20 Green chair  Without AFO's today    NT Standing marches    50x // bars      Step Ups  25x each no AFO's  20x each no AFO's  8\" box in // bars  Focus on slow controlled movement and prevention of knee genu recurvatum  Without AFO's on today    NT LAQ 45x each  2 sec hold   3# weight     NT Hip 3 way  30x each      NT Supine bridges  4x15  Increased sets today     Stairs  3 sets of 12 stairs with no braces  Non-reciprocal pattern      Ambulation with LBQC  15' with SBA no AFO's   Increased bilateral genu recurvatum and occasional toe drag on R without use of AFO's       Home Exercise Program:       Comments:  Pt tolerated session well today  Stair negotiation continues to improve especially with AFO's on  Improved hip flexion and bilateral ankle strength this week  Continue to ambulate with AFO's on at home for safety and proper gait mechanics   Added resisted terminal knee extensions today            Treatment/Activity Tolerance:  [x] Patient tolerated treatment well [] Patient limited by fatique  [] Patient limited by pain  [] Patient limited by other medical complications  [] Other:     Prognosis: [x] Good [x] Fair  [] Poor    Patient Requires Follow-up: [x] Yes  [] No    Plan:   [x] Continue per plan of care [] Alter current plan (see comments)  [] Plan of care initiated [] Hold pending MD visit [] Discharge    Plan for Next Session:    Continue to progress BLE strengthening, endurance, and gait training as tolerated next session.        See Weekly Progress Note: []  Yes  [x]  No  Next due:          CPT codes 6/2/2020 Units    Low Complexity PT evaluation 47784 15486    Moderate Complexity PT evaluation  49500    High Complexity PT evaluation 23898    PT Re-evaluation  97137    Gait training 16945    Manual therapy  50356    Therapeutic activities  38836 2   Therapeutic exercises  36683 2   Neuromuscular reeducation  [id number]        Time In: 1959  Time Out: 5809     Electronically signed by:    Placido Maravilla, PT, DPT  [id number]

## 2022-03-31 ENCOUNTER — HOSPITAL ENCOUNTER (OUTPATIENT)
Dept: PHYSICAL THERAPY | Age: 47
Setting detail: THERAPIES SERIES
Discharge: HOME | End: 2022-03-31
Payer: COMMERCIAL

## 2022-03-31 PROCEDURE — 97110 THERAPEUTIC EXERCISES: CPT

## 2022-03-31 PROCEDURE — 97530 THERAPEUTIC ACTIVITIES: CPT

## 2022-03-31 NOTE — PROGRESS NOTES
[address] [facility]                Phone: [phone]   Fax: [phone]    Physical Therapy Daily Treatment Note  Date:  3/31/2022    Patient Name:  Echo Andrade    :  1975  MRN: [id number]    Referring Physician:  Adilene Ortega  Insurance Information:  [address]      Evaluation date:  22  Diagnosis:  Polyneuropathy, Weakness, Bilateral Foot drop  ICD-10 Codes:  G62.9, R53.1, M21.371, M21.372, R25.2, Z74.09, Z78.9  Evaluating Physical Therapist:  Blu Israel, PT, DPT   Plan of care signed (Y/N):    Visit# / total visits: - (pt approved for 36 units of each: ther ex, ther activity, neuro, gait training from 22 to 22)       Subjective:   Pt with no complaints prior to session today.     Exercises:   Exercise/Equipment Reps  During/ after  Other comments    Step One  10:02 min   Total steps:  1225   NT Bike    10 min        Standing terminal knee extensions  30x each  2 sec hold  Mini-flex band  No braces  In // bars     Sit to stands    4x20 Green chair  With AFO's today     Standing marches    50x // bars      Step Ups  50x each AFO's    8\" box in // bars  3# weight   Focus on slow controlled movement and prevention of knee genu recurvatum  AFO's on today    NT LAQ 45x each  2 sec hold   3# weight     NT Hip 3 way  30x each       Supine bridges  4x15  Increased sets today    NT Stairs  3 sets of 12 stairs with no braces  Non-reciprocal pattern      Ambulation with LBQC  15' with SBA no AFO's   Increased bilateral genu recurvatum and occasional toe drag on R without use of AFO's       Home Exercise Program:       Comments:  Pt tolerated session well today  Added resistance to standing marches, step ups  Pt reports 9.5/10 BLE fatigue by end of session today            Treatment/Activity Tolerance:  [x] Patient tolerated treatment well [] Patient limited by fatique  [] Patient limited by pain  [] Patient limited by other medical complications  [] Other:     Prognosis: [x] Good [x] Fair  [] Poor    Patient Requires Follow-up: [x] Yes  [] No    Plan:   [x] Continue per plan of care [] Alter current plan (see comments)  [] Plan of care initiated [] Hold pending MD visit [] Discharge    Plan for Next Session:    Continue to progress BLE strengthening, endurance, and gait training as tolerated next session.        See Weekly Progress Note: []  Yes  [x]  No  Next due:          CPT codes 6/2/2020 Units    Low Complexity PT evaluation 41217 12695    Moderate Complexity PT evaluation  60072    High Complexity PT evaluation 63208    PT Re-evaluation  52753    Gait training 15035    Manual therapy  44517    Therapeutic activities  36570 1   Therapeutic exercises  12582 2   Neuromuscular reeducation  [phone]        Time In: 8702  Time Out: 8112    Electronically signed by:    Helen Bustos, PT, DPT  [id number]

## 2022-04-04 ENCOUNTER — HOSPITAL ENCOUNTER (OUTPATIENT)
Dept: PHYSICAL THERAPY | Age: 47
Setting detail: THERAPIES SERIES
Discharge: HOME | End: 2022-04-04
Payer: COMMERCIAL

## 2022-04-04 PROCEDURE — 97530 THERAPEUTIC ACTIVITIES: CPT

## 2022-04-04 PROCEDURE — 97110 THERAPEUTIC EXERCISES: CPT

## 2022-04-04 NOTE — PROGRESS NOTES
[address] [facility]                Phone: [phone]   Fax: [phone]    Physical Therapy Daily Treatment Note  Date:  2022    Patient Name:  Livia Blanco    :  1975  MRN: [id number]    Referring Physician:  Shirin Castillo  Insurance Information:  [address]      Evaluation date:  22  Diagnosis:  Polyneuropathy, Weakness, Bilateral Foot drop  ICD-10 Codes:  G62.9, R53.1, M21.371, M21.372, R25.2, Z74.09, Z78.9  Evaluating Physical Therapist:  Deb Maravilla, PT, DPT   Plan of care signed (Y/N):    Visit# / total visits: -18 (pt approved for 36 units of each: ther ex, ther activity, neuro, gait training from 22 to 22)       Subjective:   Pt reports some BLE muscle spasms as times.       Exercises:   Exercise/Equipment Reps  During/ after  Other comments    Step One  10:02 min   Total steps:  1135    NT Bike    10 min       NT Standing terminal knee extensions  30x each  2 sec hold  Mini-flex band  No braces  In // bars     Sit to stands    4x20 Green chair  With AFO's today     Standing marches    3x10 // bars   5# weight      Step Ups  [id number] AFO's    8\" box in // bars  5# weight   Focus on slow controlled movement and prevention of knee genu recurvatum  AFO's on today     Hamstring curls- standing  3x10  5# weight      Lateral side stepping  5 trips  5# weight      LAQ 3x10 each  2 sec hold   5# weight     NT Hip 3 way  30x each      NT Supine bridges  4x15  Increased sets today    NT Stairs  3 sets of 12 stairs with no braces  Non-reciprocal pattern      Ambulation with LBQC  15'x3 with SBA no AFO's   Increased bilateral genu recurvatum and occasional toe drag on R without use of AFO's       Home Exercise Program:       Comments:  Pt tolerated session well today  Added hamstring curls   Focus more on strengthening with higher resistance and decreased reps today             Treatment/Activity Tolerance:  [x] Patient tolerated treatment well [] Patient limited by fatique  [] Patient limited by pain  [] Patient limited by other medical complications  [] Other:     Prognosis: [x] Good [x] Fair  [] Poor    Patient Requires Follow-up: [x] Yes  [] No    Plan:   [x] Continue per plan of care [] Alter current plan (see comments)  [] Plan of care initiated [] Hold pending MD visit [] Discharge    Plan for Next Session:    Continue to progress BLE strengthening, endurance, and gait training as tolerated next session.        See Weekly Progress Note: []  Yes  [x]  No  Next due:          CPT codes 6/2/2020 Units    Low Complexity PT evaluation 36701 92302    Moderate Complexity PT evaluation  92604    High Complexity PT evaluation 21963    PT Re-evaluation  98655    Gait training 20199    Manual therapy  81808    Therapeutic activities  49040 1   Therapeutic exercises  65632 2   Neuromuscular reeducation  [id number]        Time In: 3427  Time Out: 2591    Electronically signed by:    Brianda Kim, PT, DPT  [id number]

## 2022-04-05 ENCOUNTER — OFFICE VISIT (OUTPATIENT)
Dept: NEUROLOGY | Age: 47
End: 2022-04-05
Payer: COMMERCIAL

## 2022-04-05 VITALS
WEIGHT: 225 LBS | SYSTOLIC BLOOD PRESSURE: 148 MMHG | HEART RATE: 85 BPM | OXYGEN SATURATION: 97 % | TEMPERATURE: 97.9 F | HEIGHT: 60 IN | BODY MASS INDEX: 44.17 KG/M2 | DIASTOLIC BLOOD PRESSURE: 84 MMHG

## 2022-04-05 DIAGNOSIS — G32.0 SUBACUTE COMBINED DEGENERATION (HCC): Primary | ICD-10-CM

## 2022-04-05 DIAGNOSIS — E53.8 B12 DEFICIENCY: ICD-10-CM

## 2022-04-05 DIAGNOSIS — E53.8 SUBACUTE COMBINED DEGENERATION (HCC): Primary | ICD-10-CM

## 2022-04-05 PROCEDURE — 99215 OFFICE O/P EST HI 40 MIN: CPT | Performed by: CLINICAL NURSE SPECIALIST

## 2022-04-05 PROCEDURE — G8427 DOCREV CUR MEDS BY ELIG CLIN: HCPCS | Performed by: CLINICAL NURSE SPECIALIST

## 2022-04-05 PROCEDURE — 1036F TOBACCO NON-USER: CPT | Performed by: CLINICAL NURSE SPECIALIST

## 2022-04-05 PROCEDURE — G8417 CALC BMI ABV UP PARAM F/U: HCPCS | Performed by: CLINICAL NURSE SPECIALIST

## 2022-04-05 RX ORDER — GABAPENTIN 100 MG/1
100 CAPSULE ORAL 2 TIMES DAILY
Qty: 60 CAPSULE | Refills: 2 | Status: SHIPPED
Start: 2022-04-05 | End: 2022-05-03 | Stop reason: DRUGHIGH

## 2022-04-05 NOTE — PROGRESS NOTES
Jonathan Murillo is a 48 y.o. right handed female     Patient was followed by neurology in the hospital for BLE weakness    PMH of covid 19 in November 2021 lumbar epidurals    Her symptoms began on 11/28/2021. She was diagnosed with COVID-19 on 11/20/2021. Prior to this she was fully functional and walking independently at baseline. 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 She had received antibiotic infusion. She started noticing numbness and tingling on 11/23/2021. She was seen in the ED and was diagnosed with \"serum sickness\" and placed on steroids and Benadryl. This did nothing for symptoms and continue to slowly progressed with development of weakness on the 28th. She had labs done by her PCP which revealed a B12 of 165 and she was started on B12 injections on 12/10. She received her second injection on 12/17. Initially, she was able to still ambulate with a walker. Her weakness continued to progress and she presented to the hospital again. B12 level on 12/22 was 575. She does have a history of low back pain and sciatica and received epidural spinal injections with the last injection in September 2021. MRIs of the brain, cervical, thoracic and lumbar spine were completed which did not show any acute findings responsible for current symptoms.   EMG showed a predominantly neuropathic process which upon review with Dr Hannah Carlson felt secondary to her B12 deficiency    Her levels have remarkably improved and her neurological issues have also markedly improved    She is now ambulating with minimal assistance  She is completing therapy without utilization of her AFO braces  Thankfully no falls    She is complaining of burning sensations and tingling sensations in her lower extremities primarily from her knees down  She also notices discomfort in her right sciatica    NCV's reviewed with her-examination also reviewed with her during her hospitalization    No chest pain or palpitations  No coughing or wheezing    No itching or bruising appreciated  No speech or swallowing troubles    ROS otherwise negative      Objective:     BP (!) 148/84   Pulse 85   Temp 97.9 °F (36.6 °C)   Ht 5' (1.524 m)   Wt 225 lb (102.1 kg)   SpO2 97%   BMI 43.94 kg/m²     General appearance: alert, appears stated age, cooperative and no distress sitting up in the chair  Head: normocephalic, without obvious abnormality, atraumatic  Neck: Full range of motion without cervicalgia  Lungs: respirations non labored     Mental Status: Alert, oriented x4 pleasant and cooperative    Speech: no dysarthria  Language: no aphasias     Cranial Nerves:  I: smell    II: visual acuity     II: visual fields Full to confrontation   II: pupils JONNIE   III,VII: ptosis None   III,IV,VI: extraocular muscles  Full ROM   V: mastication Normal   V: facial light touch sensation  Normal   V,VII: corneal reflex     VII: facial muscle function - upper  Normal   VII: facial muscle function - lower Normal   VIII: hearing Normal   IX: soft palate elevation  Normal   IX,X: gag reflex    XI: trapezius strength  5/5   XI: sternocleidomastoid strength 5/5   XI: neck extension strength  5/5   XII: tongue strength  Normal     Motor:  5/5 BUE    4+/5 bilateral IPS  4+/5 bilateral anterior tibialis and gastrocnemius    Sensory:  LT normal in all limbs  Vibration minimally decreased in ankles and knees    No sensory level    Coordination:   FN normal    [place]  Wide-based gait    DTR:   +3 arms and knees   +2 ankles     No Montoya's for me    Laboratory/Radiology:     [id number] 2022-893 --- she reports recent level with PCP 1012    MRI L spine: 1.  Slightly limited exam  2.  There appear to be post-surgical alterations of prior left-sided  hemilaminotomy at L3 and L4, for prior partial discectomy at L3/4 and L4/5.  3.  Moderate bilateral facet joint effusions and heterogeneous enhancement  involve both L4/5 facet joints and surrounding marrow, with slightly lesser  extension into both subjacent posterolateral aspects of the traversing  epidural space, followed by surrounding paraspinal musculature, lessening  both superiorly to L3/4, and inferiorly to L5/S1.  While the above findings  could be degenerative and/or inflammatory arthro-pathic in nature, clinical  correlation is advised to exclude developing bilateral septic  arthritis/facet-itis. 4.  At L4/5, there is minimal enhancement of several traversing bilateral nerve roots, and clinical correlation is advised to exclude early  Arachnoiditis. 5.  Otherwise, no significant change since the most recent MR and CT  examinations of the lumbar spine. MRI brain: 1. No evidence of acute intracranial abnormality. 2. Mild white matter signal abnormality as described above.  No significant  change.  The findings could be related to chronic microvascular disease  chronic migraines, vasculitis or Lyme disease.  The possibility of  demyelinating disease cannot be excluded although the findings are not  typical for demyelinating plaques. MRI cervical and thoracic spines: Somewhat limited exams   2. Given technical artifacts, the visualized portions of the spinal cord and intracranial contents appear grossly negative for pathologic enhancement and otherwise unremarkable/not significantly changed, within the limits of this exam.   3. Mildly-exaggerated thoracic kyphosis, with grossly unchanged multilevel degenerative disease, without high-grade central spinal canal stenosis, neural foraminal narrowing, or mass effect upon neural elements throughout the cervical and thoracic region, as described. 4. Mild diffuse paraspinal muscular atrophy   5. Otherwise, no definite correlate for the patient's reported bilateral lower extremity weakness, within the limits of these studies.    6. Incidental findings, most notable for small fluid signal is scattered about the left mastoid air cell complex, with trace involvement on the right side, incompletely and only incidentally demonstrated     EMG   Study compatible with the following:  #1 nerve conduction studies reflect diminished amplitude in the tibial distribution bilaterally, reflecting axonal pathology. Minor sensory abnormalities in the left sural distribution reflex possible changes in the mylin portion of this nerve. .  #2 following nerves were within normal limits to stimulation: Right peroneal, left peroneal, right superficial peroneal, left superficial peroneal and right sural.  #3 on insertional examination changes of serrated potentials, polyphasic units, and change in amplitude and duration as well as numbers reflect neuropathic changes and pathology diffusely involving L2-S1 fibers both in anterior and posterior rami distributions     Recommendations: This is predominantly a neuropathic process, not myopathic in nature. Post viral inflammatory processes can present early on with this picture however usually progress to development of slowing of velocity with the development of myelin abnormalities as well as increasing axonal pathology. 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      Exam was not consistent with GBS-this was conveyed to her at this time    Plan:     Continue B12 -- recent level 1012    Agree with therapy     We will add gabapentin to her baclofen administration    Port back in 4 weeks return to office in 8 weeks    LLUVIA Knutson - CNS  1:56 PM  4/5/2022

## 2022-04-06 ENCOUNTER — HOSPITAL ENCOUNTER (OUTPATIENT)
Dept: PHYSICAL THERAPY | Age: 47
Setting detail: THERAPIES SERIES
Discharge: HOME | End: 2022-04-06
Payer: COMMERCIAL

## 2022-04-06 PROCEDURE — 97110 THERAPEUTIC EXERCISES: CPT

## 2022-04-06 PROCEDURE — 97530 THERAPEUTIC ACTIVITIES: CPT

## 2022-04-06 NOTE — PROGRESS NOTES
[address] [facility]                Phone: [phone]   Fax: [phone]    Physical Therapy Daily Treatment Note  Date:  2022    Patient Name:  Mundo Gomes    :  1975  MRN: [id number]    Referring Physician:  Katya Smith  Insurance Information:  [employer]      Evaluation date:  22  Diagnosis:  Polyneuropathy, Weakness, Bilateral Foot drop  ICD-10 Codes:  G62.9, R53.1, M21.371, M21.372, R25.2, Z74.09, Z78.9  Evaluating Physical Therapist:  Gaston Castaneda, PT, DPT   Plan of care signed (Y/N):    Visit# / total visits: 15 / 12-18 (pt approved for 36 units of each: ther ex, ther activity, neuro, gait training from 22 to 22)       Subjective:   Pt reports that she was cleared to drive by neurology. No complaints prior to session today.       Exercises:   Exercise/Equipment Reps  During/ after  Other comments    Step One  10:00 min   Total steps:  73749   NT Bike    10 min       NT Standing terminal knee extensions  30x each  2 sec hold  Mini-flex band  No braces  In // bars    NT Sit to stands    4x20 Green chair  With AFO's today     Standing marches    3x12 // bars   5# weight      Calf raises on black wedge  3x12   No AFO's    NT Step Ups  [id number] AFO's    8\" box in // bars  5# weight   Focus on slow controlled movement and prevention of knee genu recurvatum  AFO's on today    NT Hamstring curls- standing  3x10  5# weight     NT Lateral side stepping  5 trips  5# weight     NT LAQ 3x10 each  2 sec hold   5# weight      Hip 3 way  30x each  5#    NT Supine bridges  4x15  Increased sets today     Stairs  4 sets of 12 stairs with reciprocal pattern with AFO on Modified independent level      Ambulation with LBQC  15'x3 with SBA no AFO's   Increased bilateral genu recurvatum and occasional toe drag on R without use of AFO's       Home Exercise Program:       Comments:  Pt tolerated session well today  Added calf raises on slant board  Focus more on strengthening today             Treatment/Activity Tolerance:  [x] Patient tolerated treatment well [] Patient limited by fatique  [] Patient limited by pain  [] Patient limited by other medical complications  [] Other:     Prognosis: [x] Good [x] Fair  [] Poor    Patient Requires Follow-up: [x] Yes  [] No    Plan:   [x] Continue per plan of care [] Alter current plan (see comments)  [] Plan of care initiated [] Hold pending MD visit [] Discharge    Plan for Next Session:    Continue to progress BLE strengthening, endurance, and gait training as tolerated next session.        See Weekly Progress Note: []  Yes  [x]  No  Next due:          CPT codes 6/2/2020 Units    Low Complexity PT evaluation 90637 22424    Moderate Complexity PT evaluation  06645    High Complexity PT evaluation 53807    PT Re-evaluation  06447    Gait training 23259    Manual therapy  78381    Therapeutic activities  51688 2   Therapeutic exercises  97871 2   Neuromuscular reeducation  [id number]        Time In: 2980  Time Out: 5710    Electronically signed by:    Abdiel Case, PT, DPT  [id number]

## 2022-04-11 ENCOUNTER — HOSPITAL ENCOUNTER (OUTPATIENT)
Dept: PHYSICAL THERAPY | Age: 47
Setting detail: THERAPIES SERIES
Discharge: HOME | End: 2022-04-11
Payer: COMMERCIAL

## 2022-04-11 PROCEDURE — 97110 THERAPEUTIC EXERCISES: CPT

## 2022-04-11 PROCEDURE — 97530 THERAPEUTIC ACTIVITIES: CPT

## 2022-04-11 NOTE — PROGRESS NOTES
[address] [facility]                Phone: [phone]   Fax: [phone]    Physical Therapy Daily Treatment Note  Date:  2022    Patient Name:  Reshma Marx    :  1975  MRN: [id number]    Referring Physician:  Yusuf Gonzalez  Insurance Information:  [address] [address]      Evaluation date:  22  Diagnosis:  Polyneuropathy, Weakness, Bilateral Foot drop  ICD-10 Codes:  G62.9, R53.1, M21.371, M21.372, R25.2, Z74.09, Z78.9  Evaluating Physical Therapist:  Yamileth Watson, PT, DPT   Plan of care signed (Y/N):    Visit# / total visits: - (pt approved for 36 units of each: ther ex, ther activity, neuro, gait training from 22 to 22)       Subjective:   Pt with no complaints prior to session today.        Exercises:   Exercise/Equipment Reps  During/ after  Other comments    Step One  10:00 min   Total steps:  1265   Without AFO's today    NT Bike    10 min       NT Standing terminal knee extensions  30x each  2 sec hold  Mini-flex band  No braces  In // bars     Sit to stands    4x20 with bilateral shoulder press  Green chair   2# DB for presses With AFO's today     Standing marches    3x15 // bars   5# weight      Calf raises on black wedge  3x12   No AFO's     Step Ups  3x10     8\" box in // bars  5# weight   Focus on slow controlled movement and prevention of knee genu recurvatum  AFO's on today    NT Hamstring curls- standing  3x10  5# weight     NT Lateral side stepping  5 trips  5# weight     NT LAQ 3x10 each  2 sec hold   5# weight     NT Hip 3 way  30x each  5#     Supine bridges  4x15       Ambulation in // bars  8 min   Without AFO's   Working on increasing heel strike and toe off     Stairs  4 sets of 12 stairs with reciprocal pattern with AFO on Modified independent level      Ambulation with LBQC  15'x3 with SBA no AFO's   Increased bilateral genu recurvatum and occasional toe drag on R without use of AFO's       Home Exercise Program:       Comments:  Pt tolerated session well today  No AFO's on for entire session today   Endurance and strength continue to improve  Pt reports 8-9/10 BLE endurance by end of session      Treatment/Activity Tolerance:  [x] Patient tolerated treatment well [] Patient limited by fatique  [] Patient limited by pain  [] Patient limited by other medical complications  [] Other:     Prognosis: [x] Good [x] Fair  [] Poor    Patient Requires Follow-up: [x] Yes  [] No    Plan:   [x] Continue per plan of care [] Alter current plan (see comments)  [] Plan of care initiated [] Hold pending MD visit [] Discharge    Plan for Next Session:    Continue to progress BLE strengthening, endurance, and gait training as tolerated next session.        See Weekly Progress Note: []  Yes  [x]  No  Next due:          CPT codes 6/2/2020 Units    Low Complexity PT evaluation 65230 42791    Moderate Complexity PT evaluation  89802    High Complexity PT evaluation 50202    PT Re-evaluation  51855    Gait training 62729    Manual therapy  43408    Therapeutic activities  80512 2   Therapeutic exercises  70303 2   Neuromuscular reeducation  [phone]        Time In: 6801  Time Out: 1400    Electronically signed by:    Rehana Rodas, PT, DPT  [id number]

## 2022-04-13 ENCOUNTER — HOSPITAL ENCOUNTER (OUTPATIENT)
Dept: PHYSICAL THERAPY | Age: 47
Setting detail: THERAPIES SERIES
Discharge: HOME | End: 2022-04-13
Payer: COMMERCIAL

## 2022-04-13 PROCEDURE — 97530 THERAPEUTIC ACTIVITIES: CPT

## 2022-04-13 PROCEDURE — 97110 THERAPEUTIC EXERCISES: CPT

## 2022-04-13 NOTE — PROGRESS NOTES
[address] [facility]                Phone: [phone]   Fax: [phone]    Physical Therapy Daily Treatment Note  Date:  2022    Patient Name:  Reshma Marx    :  1975  MRN: [id number]    Referring Physician:  Yusuf Gonzalez  Insurance Information:  [employer]      Evaluation date:  22  Diagnosis:  Polyneuropathy, Weakness, Bilateral Foot drop  ICD-10 Codes:  G62.9, R53.1, M21.371, M21.372, R25.2, Z74.09, Z78.9  Evaluating Physical Therapist:  Yamileth Watson, PT, DPT   Plan of care signed (Y/N):    Visit# / total visits: 15 / 12- (pt approved for 36 units of each: ther ex, ther activity, neuro, gait training from 22 to 22)       Subjective:   Pt with no complaints prior to session today. She reports that her legs were fatigued following last session.         Exercises:   Exercise/Equipment Reps  During/ after  Other comments    Step One  10:00 min   Total steps:  1250       NT Bike    10 min       NT Standing terminal knee extensions  30x each  2 sec hold  Mini-flex band  No braces  In // bars     Sit to stands    4x20 with bilateral shoulder press  Green chair   2# DB for presses With AFO's today        NT Calf raises on black wedge  3x12       Step Ups with hip flexion combo  3x15    8\" box in // bars  5# weight   Focus on slow controlled movement and prevention of knee genu recurvatum  AFO's on today    NT Hamstring curls- standing  3x10  5# weight     NT Lateral side stepping  5 trips  5# weight      LAQ 3x10 each  2 sec hold   5# weight     NT Hip 3 way  30x each  5#    NT Supine bridges  4x15       Ambulation in // bars  8 min   Without AFO's   Working on increasing heel strike and toe off    NT Stairs  4 sets of 12 stairs with reciprocal pattern with AFO on Modified independent level     NT Ambulation with LBQC  15'x3 with SBA no AFO's   Increased bilateral genu recurvatum and occasional toe drag on R without use of AFO's Home Exercise Program:       Comments:  Pt tolerated session well today  AFO's on throughout session   Pt reports 8.5/10 fatigue       Treatment/Activity Tolerance:  [x] Patient tolerated treatment well [] Patient limited by fatique  [] Patient limited by pain  [] Patient limited by other medical complications  [] Other:     Prognosis: [x] Good [x] Fair  [] Poor    Patient Requires Follow-up: [x] Yes  [] No    Plan:   [x] Continue per plan of care [] Alter current plan (see comments)  [] Plan of care initiated [] Hold pending MD visit [] Discharge    Plan for Next Session:    Continue to progress BLE strengthening, endurance, and gait training as tolerated next session.        See Weekly Progress Note: []  Yes  [x]  No  Next due:          CPT codes 6/2/2020 Units    Low Complexity PT evaluation 44768 62318    Moderate Complexity PT evaluation  73198    High Complexity PT evaluation 96139    PT Re-evaluation  05550    Gait training 72510    Manual therapy  80579    Therapeutic activities  52469 2   Therapeutic exercises  81718 2   Neuromuscular reeducation  [phone]        Time In: 9150  Time Out: 1661    Electronically signed by:    Jie Cannon, PT, DPT  [id number]

## 2022-04-20 ENCOUNTER — HOSPITAL ENCOUNTER (OUTPATIENT)
Dept: PHYSICAL THERAPY | Age: 47
Setting detail: THERAPIES SERIES
Discharge: HOME | End: 2022-04-20
Payer: COMMERCIAL

## 2022-04-20 PROCEDURE — 97530 THERAPEUTIC ACTIVITIES: CPT

## 2022-04-20 PROCEDURE — 97110 THERAPEUTIC EXERCISES: CPT

## 2022-04-20 NOTE — PROGRESS NOTES
[address] [facility]                Phone: [phone]   Fax: [phone]    Physical Therapy Daily Treatment Note  Date:  2022    Patient Name:  Adore Workman    :  1975  MRN: [id number]    Referring Physician:  Shaun Salcido  Insurance Information:  Diana Smith      Evaluation date:  22  Diagnosis:  Polyneuropathy, Weakness, Bilateral Foot drop  ICD-10 Codes:  G62.9, R53.1, M21.371, M21.372, R25.2, Z74.09, Z78.9  Evaluating Physical Therapist:  Stacy Duarte, PT, DPT   Plan of care signed (Y/N):    Visit# / total visits: -18 (pt approved for 36 units of each: ther ex, ther activity, neuro, gait training from 22 to 22)       Subjective:   Pt with no complaints prior to session today. She reports that she was able to lift her RLE into the car today without assistance.        Exercises:   Exercise/Equipment Reps  During/ after  Other comments    Step One  10:00 min   Total steps:  1219       NT Bike    10 min       NT Standing terminal knee extensions  30x each  2 sec hold  Mini-flex band  No braces  In // bars     Sit to stands    4x20 with bilateral shoulder press  Green chair   3# DB for presses With AFO's today        NT Calf raises on black wedge  3x12       Step Ups with hip flexion combo  3x15    8\" box in // bars  5# weight   Focus on slow controlled movement and prevention of knee genu recurvatum  AFO's on today     Hamstring curls- standing  3x10  5# weight      Lateral side stepping  10 trips  5# weight      LAQ 3x12 each  2 sec hold   5# weight      Hip 3 way  30x each  5#    NT Supine bridges  4x15      NT Ambulation in // bars  8 min   Without AFO's   Working on increasing heel strike and toe off     Stairs  4 sets of 12 stairs with reciprocal pattern with AFO on Modified independent level     NT Ambulation with LBQC  15'x3 with SBA no AFO's   Increased bilateral genu recurvatum and occasional toe drag on R without use of SUMI's       Home Exercise Program:       Comments:  Pt tolerated session well today  AFO's on throughout session   Pt reports 7.5/10 fatigue today which is slightly less than less session  She reports HEP is getting easier each week       Treatment/Activity Tolerance:  [x] Patient tolerated treatment well [] Patient limited by fatique  [] Patient limited by pain  [] Patient limited by other medical complications  [] Other:     Prognosis: [x] Good [x] Fair  [] Poor    Patient Requires Follow-up: [x] Yes  [] No    Plan:   [x] Continue per plan of care [] Alter current plan (see comments)  [] Plan of care initiated [] Hold pending MD visit [] Discharge    Plan for Next Session:    Continue to progress BLE strengthening, endurance, and gait training as tolerated next session.        See Weekly Progress Note: []  Yes  [x]  No  Next due:          CPT codes 6/2/2020 Units    Low Complexity PT evaluation 75329 96145    Moderate Complexity PT evaluation  17667    High Complexity PT evaluation 11853    PT Re-evaluation  33770    Gait training 25786    Manual therapy  27611    Therapeutic activities  42112 2   Therapeutic exercises  87469 2   Neuromuscular reeducation  [address]        Time In: 4825  Time Out: 7064     Electronically signed by:    Abdiel Case, PT, DPT  [id number]

## 2022-04-22 ENCOUNTER — HOSPITAL ENCOUNTER (OUTPATIENT)
Dept: PHYSICAL THERAPY | Age: 47
Setting detail: THERAPIES SERIES
Discharge: HOME | End: 2022-04-22
Payer: COMMERCIAL

## 2022-04-22 PROCEDURE — 97110 THERAPEUTIC EXERCISES: CPT

## 2022-04-22 PROCEDURE — 97530 THERAPEUTIC ACTIVITIES: CPT

## 2022-04-22 NOTE — PROGRESS NOTES
[address] [facility]                Phone: [phone]   Fax: [phone]    Physical Therapy Daily Treatment Note  Date:  2022    Patient Name:  Adore Workman    :  1975  MRN: [id number]    Referring Physician:  Shaun Salcido  Insurance Information:  Diana Smith      Evaluation date:  22  Diagnosis:  Polyneuropathy, Weakness, Bilateral Foot drop  ICD-10 Codes:  G62.9, R53.1, M21.371, M21.372, R25.2, Z74.09, Z78.9  Evaluating Physical Therapist:  Stacy Duarte, PT, DPT   Plan of care signed (Y/N):    Visit# / total visits: - (pt approved for 36 units of each: ther ex, ther activity, neuro, gait training from 22 to 22)       Subjective:   Pt with no complaints prior to session today.       Exercises:   Exercise/Equipment Reps  During/ after  Other comments    Step One  10:00 min   Total steps:  1130  Level 1.3         Band resisted step outs in // bars- band on ankle  15 trips       Step overs  2x10 RLE   2x10 LLE  8\" box in // bars     NT Standing terminal knee extensions  30x each  2 sec hold  Mini-flex band  No braces  In // bars    NT Sit to stands    4x20 with bilateral shoulder press  Green chair   3# DB for presses With AFO's today        NT Calf raises on black wedge  3x12      NT Step Ups with hip flexion combo  3x15    8\" box in // bars  5# weight   Focus on slow controlled movement and prevention of knee genu recurvatum  AFO's on today    NT Hamstring curls- standing  3x10  5# weight     NT Lateral side stepping  10 trips  5# weight     NT LAQ 3x12 each  2 sec hold   5# weight     NT Hip 3 way  30x each  5#    NT Supine bridges  4x15      NT Ambulation in // bars  8 min   Without AFO's   Working on increasing heel strike and toe off     Stairs  4 sets of 12 stairs with reciprocal pattern with AFO on Modified independent level     NT Ambulation with LBQC  15'x3 with SBA no AFO's   Increased bilateral genu recurvatum and occasional toe drag on R without use of AFO's       Home Exercise Program:       Comments:  Pt tolerated session well today  AFO's on throughout session   Pt reports 8/10 fatigue today   Performed resisted walking and step overs in // bars         Treatment/Activity Tolerance:  [x] Patient tolerated treatment well [] Patient limited by fatique  [] Patient limited by pain  [] Patient limited by other medical complications  [] Other:     Prognosis: [x] Good [x] Fair  [] Poor    Patient Requires Follow-up: [x] Yes  [] No    Plan:   [x] Continue per plan of care [] Alter current plan (see comments)  [] Plan of care initiated [] Hold pending MD visit [] Discharge    Plan for Next Session:    Continue to progress BLE strengthening, endurance, and gait training as tolerated next session.        See Weekly Progress Note: []  Yes  [x]  No  Next due:          CPT codes 6/2/2020 Units    Low Complexity PT evaluation 30383 51685    Moderate Complexity PT evaluation  28712    High Complexity PT evaluation 61625    PT Re-evaluation  98928    Gait training 40785    Manual therapy  51980    Therapeutic activities  36788 2   Therapeutic exercises  48789 1   Neuromuscular reeducation  [phone]        Time In: 1310  Time Out: 1400     Electronically signed by:    Shahram Merrill, PT, DPT  [id number]

## 2022-04-27 ENCOUNTER — HOSPITAL ENCOUNTER (OUTPATIENT)
Dept: PHYSICAL THERAPY | Age: 47
Setting detail: THERAPIES SERIES
Discharge: HOME | End: 2022-04-27
Payer: COMMERCIAL

## 2022-04-27 PROCEDURE — 97530 THERAPEUTIC ACTIVITIES: CPT

## 2022-04-27 PROCEDURE — 97110 THERAPEUTIC EXERCISES: CPT

## 2022-04-27 NOTE — PROGRESS NOTES
[address] [facility]                Phone: [phone]   Fax: [phone]    Physical Therapy Daily Treatment Note  Date:  2022    Patient Name:  Echo Andrade    :  1975  MRN: [id number]    Referring Physician:  Adilene Ortega  Insurance Information:  Lottie Every      Evaluation date:  22  Diagnosis:  Polyneuropathy, Weakness, Bilateral Foot drop  ICD-10 Codes:  G62.9, R53.1, M21.371, M21.372, R25.2, Z74.09, Z78.9  Evaluating Physical Therapist:  Blu Israel, PT, DPT   Plan of care signed (Y/N):    Visit# / total visits: - (pt approved for 36 units of each: ther ex, ther activity, neuro, gait training from 22 to 22)       Subjective:   Pt with no complaints prior to session today.       Exercises:   Exercise/Equipment Reps  During/ after  Other comments    Step One  10:00 min   Total steps:  1130  Level 1.5 (increased today)         Rocker board 3x30 R/L   3x30 F/ B       Mini-lunges  3x10 each leg       Lateral lunges with thera-band around ankles  10 trips  YTB    NT Band resisted step outs in // bars- band on ankle  15 trips      NT Step overs  2x10 RLE   2x10 LLE  8\" box in // bars     NT Standing terminal knee extensions  30x each  2 sec hold  Mini-flex band  No braces  In // bars     Sit to stands    4x20 with  Green chair     YTB around ankles  With AFO's today        NT Calf raises on black wedge  3x12      NT Step Ups with hip flexion combo  3x15    8\" box in // bars  5# weight   Focus on slow controlled movement and prevention of knee genu recurvatum  AFO's on today    NT Hamstring curls- standing  3x10  5# weight            NT LAQ 3x12 each  2 sec hold   5# weight     NT Hip 3 way  30x each  5#    NT Supine bridges  4x15      NT Ambulation in // bars  8 min   Without AFO's   Working on increasing heel strike and toe off    NT Stairs  4 sets of 12 stairs with reciprocal pattern with AFO on Modified independent level     NT Ambulation with LBQC  15'x3 with SBA no AFO's   Increased bilateral genu recurvatum and occasional toe drag on R without use of AFO's       Home Exercise Program:       Comments:  Pt tolerated session well today  Ambulation still with slight bilateral heel whip on toe off   Pt reports 9/10 BLE fatigue following session today         Treatment/Activity Tolerance:  [x] Patient tolerated treatment well [] Patient limited by fatique  [] Patient limited by pain  [] Patient limited by other medical complications  [] Other:     Prognosis: [x] Good [x] Fair  [] Poor    Patient Requires Follow-up: [x] Yes  [] No    Plan:   [x] Continue per plan of care [] Alter current plan (see comments)  [] Plan of care initiated [] Hold pending MD visit [] Discharge    Plan for Next Session:    Continue to progress BLE strengthening, endurance, and gait training as tolerated next session.        See Weekly Progress Note: []  Yes  [x]  No  Next due:          CPT codes 6/2/2020 Units    Low Complexity PT evaluation 21991 72061    Moderate Complexity PT evaluation  48335    High Complexity PT evaluation 85808    PT Re-evaluation  64789    Gait training 84614    Manual therapy  68053    Therapeutic activities  82879 2   Therapeutic exercises  18897 2   Neuromuscular reeducation  [phone]        Time In: 2111  Time Out: 1500     Electronically signed by:    Rehana Rodas, PT, DPT  [id number]

## 2022-04-29 ENCOUNTER — HOSPITAL ENCOUNTER (OUTPATIENT)
Dept: PHYSICAL THERAPY | Age: 47
Setting detail: THERAPIES SERIES
Discharge: HOME | End: 2022-04-29
Payer: COMMERCIAL

## 2022-04-29 PROCEDURE — 97530 THERAPEUTIC ACTIVITIES: CPT

## 2022-04-29 PROCEDURE — 97110 THERAPEUTIC EXERCISES: CPT

## 2022-04-29 NOTE — PROGRESS NOTES
[address] [facility]                Phone: [phone]   Fax: [phone]    Physical Therapy Daily Treatment Note  Date:  2022    Patient Name:  Atul Aguilar    :  1975  MRN: [id number]    Referring Physician:  Laura Bonilla  Insurance Information:  Isak Ortiz      Evaluation date:  22  Diagnosis:  Polyneuropathy, Weakness, Bilateral Foot drop  ICD-10 Codes:  G62.9, R53.1, M21.371, M21.372, R25.2, Z74.09, Z78.9  Evaluating Physical Therapist:  Seema Polk, PT, DPT   Plan of care signed (Y/N):    Visit# / total visits: - (pt approved for 36 units of each: ther ex, ther activity, neuro, gait training from 22 to 22)       Subjective:   Pt with no complaints prior to session today. Left AFO's at home today.       Exercises:   Exercise/Equipment Reps  During/ after  Other comments    Step One  10:00 min   Total steps:  1130  Level 1.5     Without AFO's today     Rocker board 3x30 R/L   3x30 F/ B       Mini-lunges  3x10 each leg       Lateral lunges with thera-band around ankles  10 trips  YTB    NT Band resisted step outs in // bars- band on ankle  15 trips      NT Step overs  2x10 RLE   2x10 LLE  8\" box in // bars     NT Standing terminal knee extensions  30x each  2 sec hold  Mini-flex band  No braces  In // bars     Sit to stands    4x20 with  Green chair     YTB around ankles      Standing marches    3x15 // bars       NT Calf raises on black wedge  3x12      NT Step Ups with hip flexion combo  3x15    8\" box in // bars  5# weight   Focus on slow controlled movement and prevention of knee genu recurvatum  AFO's on today    NT Hamstring curls- standing  3x10  5# weight            NT LAQ 3x12 each  2 sec hold   5# weight     NT Hip 3 way  30x each  5#    NT Supine bridges  4x15      NT Ambulation in // bars  8 min   Without AFO's   Working on increasing heel strike and toe off    NT Stairs  4 sets of 12 stairs with reciprocal pattern with AFO on Modified independent level     NT Ambulation with LBQC  15'x3 with SBA no AFO's   Increased bilateral genu recurvatum and occasional toe drag on R without use of AFO's       Home Exercise Program:       Comments:  Pt tolerated session well today without wearing AFO's   Pt reports BLE fatigue as 8.5/10          Treatment/Activity Tolerance:  [x] Patient tolerated treatment well [] Patient limited by fatique  [] Patient limited by pain  [] Patient limited by other medical complications  [] Other:     Prognosis: [x] Good [x] Fair  [] Poor    Patient Requires Follow-up: [x] Yes  [] No    Plan:   [x] Continue per plan of care [] Alter current plan (see comments)  [] Plan of care initiated [] Hold pending MD visit [] Discharge    Plan for Next Session:    Continue to progress BLE strengthening, endurance, and gait training as tolerated next session.        See Weekly Progress Note: []  Yes  [x]  No  Next due:          CPT codes 6/2/2020 Units    Low Complexity PT evaluation 76366 58379    Moderate Complexity PT evaluation  65752    High Complexity PT evaluation 07857    PT Re-evaluation  31163    Gait training 57627    Manual therapy  93405    Therapeutic activities  17995 1   Therapeutic exercises  67086 2   Neuromuscular reeducation  [phone]        Time In: 4101  Time Out: 3487     Electronically signed by:    Rehana Rodas, PT, DPT  [id number]

## 2022-05-03 ENCOUNTER — TELEPHONE (OUTPATIENT)
Dept: NEUROLOGY | Age: 47
End: 2022-05-03

## 2022-05-03 RX ORDER — GABAPENTIN 100 MG/1
200 CAPSULE ORAL 2 TIMES DAILY
Qty: 120 CAPSULE | Refills: 2 | Status: SHIPPED
Start: 2022-05-03 | End: 2022-06-22

## 2022-05-03 NOTE — TELEPHONE ENCOUNTER
Patient called in stating that so far, her Gabapentin is not working. She stated that at times it seemed it could be taking the edge off but other than that nothing. Does she need to be increased? Please advise.

## 2022-05-04 ENCOUNTER — HOSPITAL ENCOUNTER (OUTPATIENT)
Dept: PHYSICAL THERAPY | Age: 47
Setting detail: THERAPIES SERIES
Discharge: HOME | End: 2022-05-04
Payer: COMMERCIAL

## 2022-05-04 PROCEDURE — 97530 THERAPEUTIC ACTIVITIES: CPT

## 2022-05-04 PROCEDURE — 97110 THERAPEUTIC EXERCISES: CPT

## 2022-05-04 NOTE — PROGRESS NOTES
[address] [facility]                Phone: [phone]   Fax: [phone]    Physical Therapy Daily Treatment Note  Date:  2022    Patient Name:  Livia Blanco    :  1975  MRN: [id number]    Referring Physician:  Shirin Castillo  Insurance Information:  Dale Perdue      Evaluation date:  22  Diagnosis:  Polyneuropathy, Weakness, Bilateral Foot drop  ICD-10 Codes:  G62.9, R53.1, M21.371, M21.372, R25.2, Z74.09, Z78.9  Evaluating Physical Therapist:  Deb Maravilla, PT, DPT   Plan of care signed (Y/N):    Visit# / total visits: - (pt approved for 36 units of each: ther ex, ther activity, neuro, gait training from 22 to 22)       Subjective:   Pt reports 2/10 soreness just inferior to patella from falling out of recliner yesterday when her dogs got into a fight. Came into today without AFO's on.            Exercises:   Exercise/Equipment Reps  During/ after  Other comments    Step One  10:00 min   Total steps:  1261  Level 1.5     Without AFO's today     Rocker board 3x30 R/L   3x30 F/ B       Mini-lunges  3x10 each leg       Lateral lunges with thera-band around ankles  10 trips  YTB    NT Band resisted step outs in // bars- band on ankle  15 trips      NT Step overs  2x10 RLE   2x10 LLE  8\" box in // bars     NT Standing terminal knee extensions  30x each  2 sec hold  Mini-flex band  No braces  In // bars     Sit to stands    4x20 with  Green chair     YTB around ankles      Standing marches    3x15 // bars        Calf raises on black wedge  3x12      NT Step Ups with hip flexion combo  3x15    8\" box in // bars  5# weight   Focus on slow controlled movement and prevention of knee genu recurvatum  AFO's on today    NT Hamstring curls- standing  3x10  5# weight             LAQ 3x12 each  2 sec hold       NT Ambulation in // bars  8 min   Without AFO's   Working on increasing heel strike and toe off    NT Stairs  4 sets of 12 stairs with reciprocal pattern with AFO on Modified independent level     NT Ambulation with LBQC  15'x3 with SBA no AFO's   Increased bilateral genu recurvatum and occasional toe drag on R without use of AFO's       Home Exercise Program:       Comments:  Pt tolerated session well today without wearing AFO's   Endurance continues to improve each session   Balance improving   Pt reports BLE fatigue as 8/10          Treatment/Activity Tolerance:  [x] Patient tolerated treatment well [] Patient limited by fatique  [] Patient limited by pain  [] Patient limited by other medical complications  [] Other:     Prognosis: [x] Good [x] Fair  [] Poor    Patient Requires Follow-up: [x] Yes  [] No    Plan:   [x] Continue per plan of care [] Alter current plan (see comments)  [] Plan of care initiated [] Hold pending MD visit [] Discharge    Plan for Next Session:    Continue to progress BLE strengthening, endurance, and gait training as tolerated next session.        See Weekly Progress Note: []  Yes  [x]  No  Next due:          CPT codes 6/2/2020 Units    Low Complexity PT evaluation 56599 23992    Moderate Complexity PT evaluation  78494    High Complexity PT evaluation 01363    PT Re-evaluation  51793    Gait training 13357    Manual therapy  38240    Therapeutic activities  27963 1   Therapeutic exercises  71996 2   Neuromuscular reeducation  [phone]        Time In: 2843  Time Out: 2075     Electronically signed by:    Jaiden Hua, PT, DPT  [id number]

## 2022-05-06 ENCOUNTER — HOSPITAL ENCOUNTER (OUTPATIENT)
Dept: PHYSICAL THERAPY | Age: 47
Setting detail: THERAPIES SERIES
Discharge: HOME | End: 2022-05-06
Payer: COMMERCIAL

## 2022-05-06 PROCEDURE — 97112 NEUROMUSCULAR REEDUCATION: CPT

## 2022-05-06 PROCEDURE — 97116 GAIT TRAINING THERAPY: CPT

## 2022-05-06 PROCEDURE — 97530 THERAPEUTIC ACTIVITIES: CPT

## 2022-05-06 PROCEDURE — 97110 THERAPEUTIC EXERCISES: CPT

## 2022-05-06 NOTE — PROGRESS NOTES
[address] [facility]                Phone: [phone]   Fax: [phone]    Physical Therapy Daily Treatment Note  Date:  2022    Patient Name:  Joseluis Fish    :  1975  MRN: [id number]    Referring Physician:  Fior Mcgregor  Insurance Information:  Corina Eaton      Evaluation date:  22  Diagnosis:  Polyneuropathy, Weakness, Bilateral Foot drop  ICD-10 Codes:  G62.9, R53.1, M21.371, M21.372, R25.2, Z74.09, Z78.9  Evaluating Physical Therapist:  Xin Fernandes, PT, DPT   Plan of care signed (Y/N):    Visit# / total visits: - (pt approved for 36 units of each: ther ex, ther activity, neuro, gait training from 22 to 22)       Subjective:   Pt reports 3-4/10 pain in R kneecap area today. No AFO's today.     Exercises:   Exercise/Equipment Reps  During/ after  Other comments   HEP Step One  10:00 min   Total steps:  1261  Level 1.5     Without AFO's today     Bike  10 min  Level 3 Difficulty keeping R foot on pedals     Rocker board 3x30 R/L   3x30 F/ B       Mini-lunges  3x15 each leg       Lateral lunges with thera-band around ankles  10 trips  YTB    NT Band resisted step outs in // bars- band on ankle  15 trips      NT Step overs  2x10 RLE   2x10 LLE  8\" box in // bars     NT Standing terminal knee extensions  30x each  2 sec hold  Mini-flex band  No braces  In // bars     Sit to stands    4x20 with  Green chair     YTB around knees     Standing marches    3x15 // bars        Calf raises on black wedge  3x12      NT Step Ups with hip flexion combo  3x15    8\" box in // bars  5# weight   Focus on slow controlled movement and prevention of knee genu recurvatum  AFO's on today    NT Hamstring curls- standing  3x10  5# weight             LAQ 3x12 each  2 sec hold       NT Ambulation in // bars  8 min   Without AFO's   Working on increasing heel strike and toe off    NT Stairs  4 sets of 12 stairs with reciprocal pattern with AFO on Modified independent level      Ambulation with ' with SBA no AFO's   Much better control of bilateral knee genu recurvatum  Good gait speed  Pt progressing well        Home Exercise Program:       Comments:  Pt tolerated session well today without wearing AFO's   Endurance continues to improve each session   Balance improving  Gait with SPC much better today with bilateral knee genu recurvatum       Treatment/Activity Tolerance:  [x] Patient tolerated treatment well [] Patient limited by fatique  [] Patient limited by pain  [] Patient limited by other medical complications  [] Other:     Prognosis: [x] Good [x] Fair  [] Poor    Patient Requires Follow-up: [x] Yes  [] No    Plan:   [x] Continue per plan of care [] Alter current plan (see comments)  [] Plan of care initiated [] Hold pending MD visit [] Discharge    Plan for Next Session:    Continue to progress BLE strengthening, endurance, and gait training as tolerated next session.        See Weekly Progress Note: []  Yes  [x]  No  Next due:          CPT codes 6/2/2020 Units    Low Complexity PT evaluation 13135 90547    Moderate Complexity PT evaluation  88729    High Complexity PT evaluation 70068    PT Re-evaluation  55318    Gait training 37022 1   Manual therapy  02918    Therapeutic activities  86437 1   Therapeutic exercises  92019 1   Neuromuscular reeducation  [phone]       Time In: 1521  Time Out: 31807 [address]    Electronically signed by:    Faizan Doty, PT, DPT  [id number]

## 2022-05-11 ENCOUNTER — HOSPITAL ENCOUNTER (OUTPATIENT)
Dept: PHYSICAL THERAPY | Age: 47
Setting detail: THERAPIES SERIES
Discharge: HOME | End: 2022-05-11
Payer: COMMERCIAL

## 2022-05-11 PROCEDURE — 97112 NEUROMUSCULAR REEDUCATION: CPT

## 2022-05-11 PROCEDURE — 97530 THERAPEUTIC ACTIVITIES: CPT

## 2022-05-11 PROCEDURE — 97110 THERAPEUTIC EXERCISES: CPT

## 2022-05-11 NOTE — PROGRESS NOTES
[address] [facility]                Phone: [phone]   Fax: [phone]    Physical Therapy Daily Treatment Note  Date:  2022    Patient Name:  Caitlin Armas    :  1975  MRN: [id number]    Referring Physician:  Kyle Doll  Insurance Information:  Yanelis Reyes      Evaluation date:  22  Diagnosis:  Polyneuropathy, Weakness, Bilateral Foot drop  ICD-10 Codes:  G62.9, R53.1, M21.371, M21.372, R25.2, Z74.09, Z78.9  Evaluating Physical Therapist:  Rose Allison, PT, DPT   Plan of care signed (Y/N):    Visit# / total visits: - (pt approved for 36 units of each: ther ex, ther activity, neuro, gait training from 22 to 22)       Subjective:   Pt reports some itching and pain in hands and feet.   She reports that she thinks that it is due to her nerves continuing to heal.      Exercises:   Exercise/Equipment Reps  During/ after  Other comments   HEP Step One  10:00 min   Total steps:  1285  Level 1.6  Without AFO's today    NT Bike  10 min  Level 3 Difficulty keeping R foot on pedals     Rocker board 3x30 R/L   3x30 F/ B       Mini-lunges  3x15 each leg       Lateral lunges with thera-band around ankles  10 trips  YTB    NT Band resisted step outs in // bars- band on ankle  15 trips      NT Step overs  2x10 RLE   2x10 LLE  8\" box in // bars     NT Standing terminal knee extensions  30x each  2 sec hold  Mini-flex band  No braces  In // bars     Sit to stands    4x20 with  Green chair     YTB around knees     Standing marches    3x15 // bars        Calf raises on black wedge  3x12      NT Step Ups with hip flexion combo  3x15    8\" box in // bars  5# weight   Focus on slow controlled movement and prevention of knee genu recurvatum  AFO's on today    NT Hamstring curls- standing  3x10  5# weight             LAQ 3x12 each  2 sec hold       NT Ambulation in // bars  8 min   Without AFO's   Working on increasing heel strike and toe off    NT Stairs  4 sets of 12 stairs with reciprocal pattern with AFO on Modified independent level     NT Ambulation with ' with SBA no AFO's   Much better control of bilateral knee genu recurvatum  Good gait speed  Pt progressing well        Home Exercise Program:       Comments:  Pt tolerated session well today  No complaints of increased pain   Decreased rest breaks between sets/ exercises today         Treatment/Activity Tolerance:  [x] Patient tolerated treatment well [] Patient limited by fatique  [] Patient limited by pain  [] Patient limited by other medical complications  [] Other:     Prognosis: [x] Good [x] Fair  [] Poor    Patient Requires Follow-up: [x] Yes  [] No    Plan:   [x] Continue per plan of care [] Alter current plan (see comments)  [] Plan of care initiated [] Hold pending MD visit [] Discharge    Plan for Next Session:    Continue to progress BLE strengthening, endurance, and gait training as tolerated next session.        See Weekly Progress Note: []  Yes  [x]  No  Next due:          CPT codes 6/2/2020 Units    Low Complexity PT evaluation 93917 98646    Moderate Complexity PT evaluation  76716    High Complexity PT evaluation 67504    PT Re-evaluation  99538    Gait training 35730    Manual therapy  33895    Therapeutic activities  43111 1   Therapeutic exercises  19558 1   Neuromuscular reeducation  80639 2       Time In: 1400  Time Out: 5608    Electronically signed by:    Xin Fernandes, PT, DPT  [id number]

## 2022-05-13 ENCOUNTER — APPOINTMENT (OUTPATIENT)
Dept: PHYSICAL THERAPY | Age: 47
End: 2022-05-13
Payer: COMMERCIAL

## 2022-05-18 ENCOUNTER — HOSPITAL ENCOUNTER (OUTPATIENT)
Dept: PHYSICAL THERAPY | Age: 47
Setting detail: THERAPIES SERIES
Discharge: HOME | End: 2022-05-18
Payer: COMMERCIAL

## 2022-05-18 PROCEDURE — 97110 THERAPEUTIC EXERCISES: CPT

## 2022-05-18 PROCEDURE — 97530 THERAPEUTIC ACTIVITIES: CPT

## 2022-05-18 PROCEDURE — 97112 NEUROMUSCULAR REEDUCATION: CPT

## 2022-05-18 NOTE — PROGRESS NOTES
[address] [facility]                Phone: [phone]   Fax: [phone]    Physical Therapy Daily Treatment Note  Date:  2022    Patient Name:  Adore Workman    :  1975  MRN: [id number]    Referring Physician:  Shaun Salcido  Insurance Information:  Diana Smith      Evaluation date:  22  Diagnosis:  Polyneuropathy, Weakness, Bilateral Foot drop  ICD-10 Codes:  G62.9, R53.1, M21.371, M21.372, R25.2, Z74.09, Z78.9  Evaluating Physical Therapist:  Stacy Duarte, PT, DPT   Plan of care signed (Y/N):    Visit# / total visits: - (pt approved for 36 units of each: ther ex, ther activity, neuro, gait training from 22 to 22)       Subjective:   Pt reports that she has been ambulating with SPC and no AFO's since the last few weeks. No pain prior to session today.      Exercises:   Exercise/Equipment Reps  During/ after  Other comments   HEP Step One  10:00 min   Total steps:  ?  Level 2 (BLE only)  Without AFO's today    NT Bike  10 min  Level 3 Difficulty keeping R foot on pedals    NT Rocker board 3x30 R/L   3x30 F/ B      NT Mini-lunges  3x15 each leg      NT Lateral lunges with thera-band around ankles  10 trips  YTB    NT Band resisted step outs in // bars- band on ankle  15 trips      NT Step overs  2x10 RLE   2x10 LLE  8\" box in // bars     NT Standing terminal knee extensions  30x each  2 sec hold  Mini-flex band  No braces  In // bars     Sit to stands into calf raise  3x20  Green chair       NT  Standing marches    3x15 // bars        Standing hip 3 way  3x15 each   Keep glutes and core engaged while standing     Step Ups with hip flexion combo  3x15    8\" box in // bars   Focus on slow controlled movement and prevention of knee genu recurvatum  No knee genu recurvatum in bilateral knees today    NT Hamstring curls- standing  3x10  5# weight            NT LAQ 3x12 each  2 sec hold       NT Ambulation in // bars  8 min   Without AFO's   Working on increasing heel strike and toe off    NT Stairs  4 sets of 12 stairs with reciprocal pattern with AFO on Modified independent level      Ambulation with ' modified independent with no AFO's   Much better control of bilateral knee genu recurvatum  Good gait speed  Pt progressing well        Home Exercise Program:       Comments:  Pt tolerated session well today  No complaints of increased pain   Decreased rest breaks between sets/ exercises today  Combining multiple exercises at once to challenge endurance, strength, and balance        Treatment/Activity Tolerance:  [x] Patient tolerated treatment well [] Patient limited by fatique  [] Patient limited by pain  [] Patient limited by other medical complications  [] Other:     Prognosis: [x] Good [x] Fair  [] Poor    Patient Requires Follow-up: [x] Yes  [] No    Plan:   [x] Continue per plan of care [] Alter current plan (see comments)  [] Plan of care initiated [] Hold pending MD visit [] Discharge    Plan for Next Session:    Continue to progress BLE strengthening, endurance, and gait training as tolerated next session.        See Weekly Progress Note: []  Yes  [x]  No  Next due:          CPT codes 6/2/2020 Units    Low Complexity PT evaluation 88563 91414    Moderate Complexity PT evaluation  50951    High Complexity PT evaluation 19485    PT Re-evaluation  28794    Gait training 20122    Manual therapy  73069    Therapeutic activities  29136 1   Therapeutic exercises  15210 1   Neuromuscular reeducation  02021 2       Time In: 4077  Time Out: 1500    Electronically signed by:    Shaggy Gilmore, PT, DPT  [id number]

## 2022-05-20 ENCOUNTER — HOSPITAL ENCOUNTER (OUTPATIENT)
Dept: PHYSICAL THERAPY | Age: 47
Setting detail: THERAPIES SERIES
Discharge: HOME | End: 2022-05-20
Payer: COMMERCIAL

## 2022-05-20 DIAGNOSIS — R25.2 SPASTICITY: ICD-10-CM

## 2022-05-20 PROCEDURE — 99421 OL DIG E/M SVC 5-10 MIN: CPT | Performed by: PHYSICAL MEDICINE & REHABILITATION

## 2022-05-20 PROCEDURE — 97112 NEUROMUSCULAR REEDUCATION: CPT

## 2022-05-20 PROCEDURE — 97116 GAIT TRAINING THERAPY: CPT

## 2022-05-20 RX ORDER — BACLOFEN 10 MG/1
TABLET ORAL
Qty: 120 TABLET | Refills: 2 | Status: SHIPPED
Start: 2022-05-20 | End: 2022-09-07

## 2022-05-20 NOTE — TELEPHONE ENCOUNTER
I am covering for Dr. Seun Campos. Reviewed Dr. Elijah Sotelo last office notes and CMP from 12/26/22. LFT at that time was normal.    Refill request is appropriate. Total time spent 5 minutes. Swapna Lucero D.O., P.T.   Board Certified Physical Medicine and Rehabilitation  Board Certified Electrodiagnostic Medicine

## 2022-05-20 NOTE — TELEPHONE ENCOUNTER
Patient left a voice message requesting refill of Baclofen     Patient of Dr. Jamel Pelaez     No OARRS Access         Impression:   1. Polyneuropathy    2. Paraparesis ([address]    3. Bilateral foot-drop    4. Impaired gait    5. Spasticity    6. Impaired mobility and ADLs        Last Dispensed:     baclofen (LIORESAL) 10 MG tablet [[id number]]     Order Details  Dose, Route, Frequency: As Directed   Dispense Quantity: 120 tablet Refills: 2          Sig: Take 10mg by mouth every morning, and 10mg every afternoon, and 20mg every night.         Start Date: 02/04/22 End Date: --   Written Date: 02/04/22         Last Office Visit: 3/14/2022         Order pended and routed for decision and signature.      Pharmacy: [facility] Appointments   Date Time Provider Jordi Doherty   5/25/2022  2:00 PM Oleg Crocker SEYZ PT [facility]   6/22/2022  3:20 PM LLUVIA Hawthorne - CNS [facility]   8/15/2022 10:40 AM Shannan Talbot MD BDM PMR [facility]

## 2022-05-20 NOTE — PROGRESS NOTES
[address] [facility]                Phone: [phone]   Fax: [phone]    Physical Therapy Daily Treatment Note  Date:  2022    Patient Name:  Christie Luciano    :  1975  MRN: [id number]    Referring Physician:  Nicolas Doss  Insurance Information:  [address]      Evaluation date:  22  Diagnosis:  Polyneuropathy, Weakness, Bilateral Foot drop  ICD-10 Codes:  G62.9, R53.1, M21.371, M21.372, R25.2, Z74.09, Z78.9  Evaluating Physical Therapist:  Brianda Kim, PT, DPT   Plan of care signed (Y/N):    Visit# / total visits: - (pt approved for 36 units of each: ther ex, ther activity, neuro, gait training from 22 to 22)       Subjective:   Pt reports that she has been ambulating with SPC and no AFO's since the last few weeks. No pain prior to session today. When asked how she felt after last session she said \"not bad\" and then discussed issue of chiropractor telling her she had \"part of her spine missing\". Extensive pt education was given on the how imaging does not always correlate with clinical findings and symptoms. She verbalized understanding.       Exercises:   Exercise/Equipment Reps  During/ after  Other comments   HEP Step One  10:00 min   Total steps:  ?  Level 2 (BLE only)  Without AFO's today    NT Bike  10 min  Level 3 Difficulty keeping R foot on pedals     Rocker board 3x30 R/L   3x30 F/ B      NT Mini-lunges  3x15 each leg       Lateral lunges with thera-band around ankles  10 trips  YTB     Sit to stands into calf raise  3x20  Green chair       NT Standing hip 3 way  3x15 each   Keep glutes and core engaged while standing    NT Step Ups with hip flexion combo  3x15    8\" box in // bars   Focus on slow controlled movement and prevention of knee genu recurvatum  No knee genu recurvatum in bilateral knees today    NT LAQ 3x12 each  2 sec hold       NT Ambulation in // bars  8 min   Without AFO's   Working on increasing heel strike and toe off    NT Stairs  4 sets of 12 stairs with reciprocal pattern with AFO on Modified independent level      Ambulation with ' modified independent with no AFO's   Much better control of bilateral knee genu recurvatum  Good gait speed  Pt progressing well        Home Exercise Program:       Comments:  Pt tolerated session well today  Combining multiple exercises at once to challenge endurance, strength, and balance    Pt reported \"hot poker\" feeling in R glute while performing standing hip 3 way, required extra breaks to sit down in order for feeling to diminish  Pt reported 4/10 back pain and 7.5/10 R buttock pain after session       Treatment/Activity Tolerance:  [x] Patient tolerated treatment well [] Patient limited by fatique  [] Patient limited by pain  [] Patient limited by other medical complications  [] Other:     Prognosis: [x] Good [x] Fair  [] Poor    Patient Requires Follow-up: [x] Yes  [] No    Plan:   [x] Continue per plan of care [] Alter current plan (see comments)  [] Plan of care initiated [] Hold pending MD visit [] Discharge    Plan for Next Session:    Continue to progress BLE strengthening, endurance, and gait training as tolerated next session.        See Weekly Progress Note: []  Yes  [x]  No  Next due:          CPT codes 6/2/2020 Units    Low Complexity PT evaluation 80174 18631    Moderate Complexity PT evaluation  50991    High Complexity PT evaluation 25604    PT Re-evaluation  19234    Gait training 80362 1   Manual therapy  50220    Therapeutic activities  34232    Therapeutic exercises  50823    Neuromuscular reeducation  16320 3       Time In: 2317  Time Out: 1500    Electronically signed by:    Jaiden Hua, PT, DPT  [id number]

## 2022-05-23 ENCOUNTER — TELEPHONE (OUTPATIENT)
Dept: NEUROLOGY | Age: 47
End: 2022-05-23

## 2022-05-23 DIAGNOSIS — M43.00 PARS DEFECT: Primary | ICD-10-CM

## 2022-05-23 NOTE — TELEPHONE ENCOUNTER
Request for gabapentin dosage to be 400 mg per PCP. Patient has been enduring \"severe\" pain. Please advise.

## 2022-05-25 ENCOUNTER — HOSPITAL ENCOUNTER (OUTPATIENT)
Dept: PHYSICAL THERAPY | Age: 47
Setting detail: THERAPIES SERIES
Discharge: HOME | End: 2022-05-25
Payer: COMMERCIAL

## 2022-05-25 NOTE — PROGRESS NOTES
[address] [facility]                Phone: [phone]  Fax: [phone]    Physical Therapy  Cancellation/No-show Note  Patient Name:  Mirtha Messina  :  1975   Date:  2022    For today's appointment patient:  [x]  Cancelled  [x]  Rescheduled appointment  []  No-show     Reason given by patient:  []  Patient ill  []  Conflicting appointment  []  No transportation    []  Conflict with work  []  No reason given  [x]  Other:  Pt reports increased R low back and and RLE the last few days. States that she thinks she \"over did it\" with walking the other day. Discussed with pt and decided to hold therapy today. Visit was rescheduled for next week. Pt instructed to use cane for ambulation and avoid any strenuous activities until next visit to see if low back and RLE pain improves.           Electronically signed by:    Rehana Rodas, PT, DPT  [id number]

## 2022-06-01 ENCOUNTER — HOSPITAL ENCOUNTER (OUTPATIENT)
Dept: PHYSICAL THERAPY | Age: 47
Setting detail: THERAPIES SERIES
Discharge: HOME | End: 2022-06-01

## 2022-06-01 NOTE — PROGRESS NOTES
[address] [facility]                Phone: [phone]  Fax: [phone]    Physical Therapy  Cancellation/No-show Note  Patient Name:  Caitlin Armas  :  1975   Date:  2022    For today's appointment patient:  [x]  Cancelled  []  Rescheduled appointment  []  No-show     Reason given by patient:  []  Patient ill  []  Conflicting appointment  []  No transportation    []  Conflict with work  []  No reason given  [x]  Other:  Spoke with pt on the phone today. She reports that she has follow-up appointment with chiropractor tomorrow on 22. She also states that she is waiting to see if MRI of lumbar spine is going to be approved by her insurance company due to increase in low back pain recently. Pt is going to call me back next week to discuss POC moving forward once she has more information after these appointments with her physicians.           Electronically signed by:    Rose Allison, PT, DPT  [id number]

## 2022-06-01 NOTE — PROGRESS NOTES
[address] [facility]                Phone: [phone]  Fax: [phone]    Physical Therapy  Cancellation/No-show Note  Patient Name:  Andrew Pool  :  1975   Date:  2022    For today's appointment patient:  [x]  Cancelled  []  Rescheduled appointment  []  No-show     Reason given by patient:  []  Patient ill  []  Conflicting appointment  []  No transportation    []  Conflict with work  []  No reason given  [x]  Other:  Pt reports that she is still having a lot of low back pain. Awaiting MRI to be approved.           Electronically signed by:    Kamla Rivas, PT, DPT  [id number]

## 2022-06-02 ENCOUNTER — HOSPITAL ENCOUNTER (OUTPATIENT)
Dept: PHYSICAL THERAPY | Age: 47
Setting detail: THERAPIES SERIES
Discharge: HOME | End: 2022-06-02

## 2022-06-06 NOTE — TELEPHONE ENCOUNTER
Patient went to chiropractor. He thinks the bone may be septic and a pars defect. Brother does have pars defect. Pain burns from lower back to hip. Cannot endure physical therapy.    gabapentin dosage to be 400 mg per PCP. Patient has been enduring \"severe\" pain. Please advise.

## 2022-06-22 ENCOUNTER — OFFICE VISIT (OUTPATIENT)
Dept: NEUROLOGY | Age: 47
End: 2022-06-22
Payer: COMMERCIAL

## 2022-06-22 VITALS
HEART RATE: 90 BPM | DIASTOLIC BLOOD PRESSURE: 93 MMHG | WEIGHT: 229 LBS | BODY MASS INDEX: 44.96 KG/M2 | SYSTOLIC BLOOD PRESSURE: 165 MMHG | TEMPERATURE: 97.7 F | OXYGEN SATURATION: 95 % | HEIGHT: 60 IN

## 2022-06-22 DIAGNOSIS — M43.00 PARS DEFECT: ICD-10-CM

## 2022-06-22 DIAGNOSIS — E53.8 B12 DEFICIENCY: Primary | ICD-10-CM

## 2022-06-22 PROCEDURE — 1036F TOBACCO NON-USER: CPT | Performed by: CLINICAL NURSE SPECIALIST

## 2022-06-22 PROCEDURE — 99214 OFFICE O/P EST MOD 30 MIN: CPT | Performed by: CLINICAL NURSE SPECIALIST

## 2022-06-22 PROCEDURE — G8427 DOCREV CUR MEDS BY ELIG CLIN: HCPCS | Performed by: CLINICAL NURSE SPECIALIST

## 2022-06-22 PROCEDURE — G8417 CALC BMI ABV UP PARAM F/U: HCPCS | Performed by: CLINICAL NURSE SPECIALIST

## 2022-06-22 RX ORDER — GABAPENTIN 300 MG/1
300 CAPSULE ORAL 3 TIMES DAILY
Qty: 90 CAPSULE | Refills: 2 | Status: SHIPPED
Start: 2022-06-22 | End: 2022-09-16

## 2022-06-22 NOTE — PROGRESS NOTES
Caitlin Armas is a 48 y.o. right handed female     Patient was followed by neurology in the hospital for BLE weakness    PMH of covid 19 in November 2021 lumbar epidurals    Her symptoms began on 11/28/2021. She was diagnosed with COVID-19 on 11/20/2021. Prior to this she was fully functional and walking independently at baseline. 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 She had received antibiotic infusion. She started noticing numbness and tingling on 11/23/2021. She was seen in the ED and was diagnosed with \"serum sickness\" and placed on steroids and Benadryl. This did nothing for symptoms and continue to slowly progressed with development of weakness on the 28th. She had labs done by her PCP which revealed a B12 of 165 and she was started on B12 injections on 12/10. She received her second injection on 12/17. Initially, she was able to still ambulate with a walker. Her weakness continued to progress and she presented to the hospital again. B12 level on 12/22 was 575. She does have a history of low back pain and sciatica and received epidural spinal injections with the last injection in September 2021. MRIs of the brain, cervical, thoracic and lumbar spine were completed which did not show any acute findings responsible for current symptoms.   EMG showed a predominantly neuropathic process which upon review with Dr Matt Bentley felt secondary to her B12 deficiency    Her levels have remarkably improved and her neurological issues have also markedly improved   When he was ealuated in the hospital at 0/5 strength in her distal lower extremities and last appointment was +4/5    She is now ambulating with minimal assistance  She is completing therapy without utilization of her AFO braces  Thankfully no falls    She is complaining of burning sensations and tingling sensations in her lower extremities primarily from her knees down  She also me    Laboratory/Radiology:     [id number] 2022-893 --- she reports recent level with PCP 1012    MRI L spine: 1.  Slightly limited exam  2.  There appear to be post-surgical alterations of prior left-sided  hemilaminotomy at L3 and L4, for prior partial discectomy at L3/4 and L4/5.  3.  Moderate bilateral facet joint effusions and heterogeneous enhancement  involve both L4/5 facet joints and surrounding marrow, with slightly lesser  extension into both subjacent posterolateral aspects of the traversing  epidural space, followed by surrounding paraspinal musculature, lessening  both superiorly to L3/4, and inferiorly to L5/S1.  While the above findings  could be degenerative and/or inflammatory arthro-pathic in nature, clinical  correlation is advised to exclude developing bilateral septic  arthritis/facet-itis. 4.  At L4/5, there is minimal enhancement of several traversing bilateral nerve roots, and clinical correlation is advised to exclude early  Arachnoiditis. 5.  Otherwise, no significant change since the most recent MR and CT  examinations of the lumbar spine. MRI brain: 1. No evidence of acute intracranial abnormality. 2. Mild white matter signal abnormality as described above.  No significant  change.  The findings could be related to chronic microvascular disease  chronic migraines, vasculitis or Lyme disease.  The possibility of  demyelinating disease cannot be excluded although the findings are not  typical for demyelinating plaques. MRI cervical and thoracic spines: Somewhat limited exams   2.  Given technical artifacts, the visualized portions of the spinal cord and intracranial contents appear grossly negative for pathologic enhancement and otherwise unremarkable/not significantly changed, within the limits of this exam.   3. Mildly-exaggerated thoracic kyphosis, with grossly unchanged multilevel degenerative disease, without high-grade central spinal canal stenosis, neural foraminal narrowing, or mass effect upon neural elements throughout the cervical and thoracic region, as described. 4. Mild diffuse paraspinal muscular atrophy   5. Otherwise, no definite correlate for the patient's reported bilateral lower extremity weakness, within the limits of these studies. 6. Incidental findings, most notable for small fluid signal is scattered about the left mastoid air cell complex, with trace involvement on the right side, incompletely and only incidentally demonstrated     EMG   Study compatible with the following:  #1 nerve conduction studies reflect diminished amplitude in the tibial distribution bilaterally, reflecting axonal pathology. Minor sensory abnormalities in the left sural distribution reflex possible changes in the mylin portion of this nerve. .  #2 following nerves were within normal limits to stimulation: Right peroneal, left peroneal, right superficial peroneal, left superficial peroneal and right sural.  #3 on insertional examination changes of serrated potentials, polyphasic units, and change in amplitude and duration as well as numbers reflect neuropathic changes and pathology diffusely involving L2-S1 fibers both in anterior and posterior rami distributions     Recommendations: This is predominantly a neuropathic process, not myopathic in nature. Post viral inflammatory processes can present early on with this picture however usually progress to development of slowing of velocity with the development of myelin abnormalities as well as increasing axonal pathology. 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      Exam was not consistent with GBS-this was conveyed to her at this time    Plan:     Continue B12 -- recent level 1012    Agree with therapy     Titrate gabapentin to 300 mg 3 times a day  Scheduled for an MRI tomorrow at [facility]    Have her reestablish with neurosurgery    LLUVIA Gutiérrez - CNS  3:56 PM  6/22/2022

## 2022-07-21 ENCOUNTER — OFFICE VISIT (OUTPATIENT)
Dept: NEUROSURGERY | Age: 47
End: 2022-07-21
Payer: COMMERCIAL

## 2022-07-21 VITALS
OXYGEN SATURATION: 97 % | DIASTOLIC BLOOD PRESSURE: 97 MMHG | TEMPERATURE: 98.1 F | RESPIRATION RATE: 20 BRPM | HEART RATE: 91 BPM | SYSTOLIC BLOOD PRESSURE: 149 MMHG | WEIGHT: 229 LBS | BODY MASS INDEX: 44.96 KG/M2 | HEIGHT: 60 IN

## 2022-07-21 DIAGNOSIS — E53.8 B12 DEFICIENCY: Primary | ICD-10-CM

## 2022-07-21 DIAGNOSIS — G62.9 POLYNEUROPATHY: ICD-10-CM

## 2022-07-21 DIAGNOSIS — R53.81 PHYSICAL DECONDITIONING: ICD-10-CM

## 2022-07-21 DIAGNOSIS — M54.40 BILATERAL LOW BACK PAIN WITH SCIATICA, SCIATICA LATERALITY UNSPECIFIED, UNSPECIFIED CHRONICITY: ICD-10-CM

## 2022-07-21 PROCEDURE — 99212 OFFICE O/P EST SF 10 MIN: CPT | Performed by: NEUROLOGICAL SURGERY

## 2022-07-21 PROCEDURE — 1036F TOBACCO NON-USER: CPT | Performed by: NEUROLOGICAL SURGERY

## 2022-07-21 PROCEDURE — G8417 CALC BMI ABV UP PARAM F/U: HCPCS | Performed by: NEUROLOGICAL SURGERY

## 2022-07-21 PROCEDURE — 99204 OFFICE O/P NEW MOD 45 MIN: CPT | Performed by: NEUROLOGICAL SURGERY

## 2022-07-21 PROCEDURE — G8427 DOCREV CUR MEDS BY ELIG CLIN: HCPCS | Performed by: NEUROLOGICAL SURGERY

## 2022-07-21 RX ORDER — PROPYLENE GLYCOL/PEG 400 0.3 %-0.4%
DROPS OPHTHALMIC (EYE)
COMMUNITY
Start: 2022-07-11

## 2022-07-21 RX ORDER — CYCLOSPORINE 0.5 MG/ML
EMULSION OPHTHALMIC
COMMUNITY
Start: 2022-06-30

## 2022-07-21 NOTE — PROGRESS NOTES
[facility] NEUROSURGERY  [address]  [phone]       Chief Complaint:   Chief Complaint   Patient presents with    Back Pain     Back pain is constant, pt has had PT and chiropractic care       HPI:     I had the pleasure of seeing Lilian Guevara today in neurosurgical clinic. As you know this 68-year-old patient presents with low back pain with bilateral leg weakness. She denied any edson numbness. Walking tends to improve her back pain. She denied any loss of bowel or bladder function. History reviewed. No pertinent past medical history.   Past Surgical History:   Procedure Laterality Date    ANKLE SURGERY      bilateral    APPENDECTOMY      DILATION AND CURETTAGE OF UTERUS      ELBOW SURGERY      right    ENDOMETRIAL ABLATION  2016    GALLBLADDER SURGERY      LEEP      cervical conization    TUBAL LIGATION        Family History   Problem Relation Age of Onset    Heart Failure Mother       Social History     Socioeconomic History    Marital status:      Spouse name: Not on file    Number of children: Not on file    Years of education: Not on file    Highest education level: Not on file   Occupational History    Not on file   Tobacco Use    Smoking status: Former     Packs/day: 1.00     Types: Cigarettes     Quit date: 2021     Years since quittin.6    Smokeless tobacco: Never   Vaping Use    Vaping Use: Never used   Substance and Sexual Activity    Alcohol use: Yes     Comment: rarely per patient questionnaire    Drug use: No    Sexual activity: Not on file   Other Topics Concern    Not on file   Social History Narrative    Not on file     Social Determinants of Health     Financial Resource Strain: Not on file   Food Insecurity: Not on file   Transportation Needs: Not on file   Physical Activity: Not on file   Stress: Not on file   Social Connections: Not on file   Intimate Partner Violence: Not on file   Housing Stability: Not on file       Medications:   Current Outpatient Medications   Medication Sig Dispense Refill    RESTASIS 0.05 % ophthalmic emulsion       SYSTANE ULTRA 0.4-0.3 % ophthalmic solution       gabapentin (NEURONTIN) 300 MG capsule Take 1 capsule by mouth 3 times daily for 90 days. Intended supply: 90 days 90 capsule 2    baclofen (LIORESAL) 10 MG tablet Take 10mg by mouth every morning, and 10mg every afternoon, and 20mg every night. 120 tablet 2    diclofenac sodium (VOLTAREN) 1 % GEL Apply 4 g topically 4 times daily as needed for Pain 697 g 0    folic acid (FOLVITE) 1 MG tablet Take 1 tablet by mouth daily 30 tablet 0    vitamin B-12 2000 MCG tablet Take 1 tablet by mouth daily 30 tablet 3    Multiple Vitamin (MULTIVITAMIN) TABS tablet Take 1 tablet by mouth daily 30 tablet 0    acetaminophen (TYLENOL) 500 MG tablet Take 500 mg by mouth every 6 hours as needed for Pain      loratadine (CLARITIN) 10 MG tablet Take 10 mg by mouth daily      mirabegron (MYRBETRIQ) 25 MG TB24 Take 25 mg by mouth daily      fluticasone (FLONASE) 50 MCG/ACT nasal spray 1 spray by Nasal route daily       No current facility-administered medications for this visit. Allergies:    Patient has no known allergies. Review of Systems:    Denies any chest pain, shortness of breath, headache, dyspnea, recent weight loss, fevers, chills or night sweats. Physical Examination:    BP (!) 149/97   Pulse 91   Temp 98.1 °F (36.7 °C)   Resp 20   Ht 5' (1.524 m)   Wt 229 lb (103.9 kg)   SpO2 97%   BMI 44.72 kg/m²      On focused neurological examination, she  is awake alert oriented and rationally conversant. Speech is clear and fluent. Pupils are equal and reactive to light bilaterally, extraocular movements are intact, visual fields are full to confrontation. Her  face is symmetric and grossly intact to fine touch. Uvula and tongue are both midline.   Shoulder shrug is symmetric and strong. Cervical spine is well aligned and nontender to direct palpation. She  can forward flex, extend , laterally rotate and laterally extend without limitation or pain. Motor examination reveals preserved power in the upper and lower extremities at 5 out of 5 throughout. Reflexes are symmetric and brisk. Plantar responses are downgoing. There is no clonus. Patient is intact to fine touch in all dermatomes throughout. Straight leg raise is negative. Palpation of the spine reveals no kyphotic or scoliotic gross deformities. She  can forward flex to well below the knee. There is no pain to deep percussion nor palpation. ASSESSMENT:    I personally reviewed Justus Porter's radiographic images, particularly her MRI of the lumbar spine dated 23 June 2022 which fails to demonstrate any significant stenosis or neural compromise. She does have degenerative disc disease. 1. B12 deficiency  2. Polyneuropathy  3. Physical deconditioning  -     [facility] - Physical Therapy, [place]/[facility]ness  -     XR LUMBAR SPINE FLEXION AND EXTENSION ONLY; Future  4. Bilateral low back pain with sciatica, sciatica laterality unspecified, unspecified chronicity  -     [facility] - Physical [place]  -     XR LUMBAR SPINE FLEXION AND EXTENSION ONLY; Future    MEDICAL DECISION MAKING & PLAN:    I showed Ms. Skye Thurston her films and explained the findings. We will obtain flexion-extension x-rays to ensure that no occult listhesis is contributing to this woman's complaints. There wise I did recommend physical therapy and a referral was provided as such. I did  her on weight loss. Should she develop new neurosurgical issues we be happy to see her again in clinic. Thank you so much for allowing us to participate in the care of this patient. Return if symptoms worsen or fail to improve.       Electronically signed by Dania Prieto MD on 7/28/2022 at 12:00 PM       NOTE: This report was

## 2022-08-01 ENCOUNTER — HOSPITAL ENCOUNTER (OUTPATIENT)
Age: 47
Discharge: HOME | End: 2022-08-01
Payer: COMMERCIAL

## 2022-08-01 DIAGNOSIS — R79.82 ELEVATED C-REACTIVE PROTEIN: ICD-10-CM

## 2022-08-01 PROCEDURE — 36415 COLL VENOUS BLD VENIPUNCTURE: CPT

## 2022-08-01 PROCEDURE — 87040 BLOOD CULTURE FOR BACTERIA: CPT

## 2022-08-06 LAB
BLOOD CULTURE, ROUTINE: NORMAL
CULTURE, BLOOD 2: NORMAL

## 2022-08-29 ENCOUNTER — HOSPITAL ENCOUNTER (OUTPATIENT)
Age: 47
Discharge: HOME | End: 2022-08-29
Payer: COMMERCIAL

## 2022-08-29 LAB
ALBUMIN SERPL-MCNC: 4.4 G/DL (ref 3.5–5.2)
ALP BLD-CCNC: 50 U/L (ref 35–104)
ALT SERPL-CCNC: 17 U/L (ref 0–32)
ANION GAP SERPL CALCULATED.3IONS-SCNC: 13 MMOL/L (ref 7–16)
AST SERPL-CCNC: 15 U/L (ref 0–31)
BASOPHILS ABSOLUTE: 0.05 E9/L (ref 0–0.2)
BASOPHILS RELATIVE PERCENT: 0.7 % (ref 0–2)
BILIRUB SERPL-MCNC: 0.2 MG/DL (ref 0–1.2)
BUN BLDV-MCNC: 12 MG/DL (ref 6–20)
C-REACTIVE PROTEIN: 1 MG/DL (ref 0–0.4)
CALCIUM SERPL-MCNC: 9.3 MG/DL (ref 8.6–10.2)
CHLORIDE BLD-SCNC: 105 MMOL/L (ref 98–107)
CO2: 20 MMOL/L (ref 22–29)
CREAT SERPL-MCNC: 0.6 MG/DL (ref 0.5–1)
EOSINOPHILS ABSOLUTE: 0.17 E9/L (ref 0.05–0.5)
EOSINOPHILS RELATIVE PERCENT: 2.2 % (ref 0–6)
GFR AFRICAN AMERICAN: >60
GFR NON-AFRICAN AMERICAN: >60 ML/MIN/1.73
GLUCOSE BLD-MCNC: 102 MG/DL (ref 74–99)
HCT VFR BLD CALC: 39.6 % (ref 34–48)
HEMOGLOBIN: 13.7 G/DL (ref 11.5–15.5)
IMMATURE GRANULOCYTES #: 0.01 E9/L
IMMATURE GRANULOCYTES %: 0.1 % (ref 0–5)
LYMPHOCYTES ABSOLUTE: 2.77 E9/L (ref 1.5–4)
LYMPHOCYTES RELATIVE PERCENT: 36.2 % (ref 20–42)
MCH RBC QN AUTO: 31.2 PG (ref 26–35)
MCHC RBC AUTO-ENTMCNC: 34.6 % (ref 32–34.5)
MCV RBC AUTO: 90.2 FL (ref 80–99.9)
MONOCYTES ABSOLUTE: 0.5 E9/L (ref 0.1–0.95)
MONOCYTES RELATIVE PERCENT: 6.5 % (ref 2–12)
NEUTROPHILS ABSOLUTE: 4.15 E9/L (ref 1.8–7.3)
NEUTROPHILS RELATIVE PERCENT: 54.3 % (ref 43–80)
PDW BLD-RTO: 13.7 FL (ref 11.5–15)
PLATELET # BLD: 299 E9/L (ref 130–450)
PMV BLD AUTO: 10.1 FL (ref 7–12)
POTASSIUM SERPL-SCNC: 4 MMOL/L (ref 3.5–5)
PROCALCITONIN: 0.05 NG/ML (ref 0–0.08)
RBC # BLD: 4.39 E12/L (ref 3.5–5.5)
SEDIMENTATION RATE, ERYTHROCYTE: 17 MM/HR (ref 0–20)
SODIUM BLD-SCNC: 138 MMOL/L (ref 132–146)
TOTAL PROTEIN: 7.1 G/DL (ref 6.4–8.3)
WBC # BLD: 7.7 E9/L (ref 4.5–11.5)

## 2022-08-29 PROCEDURE — 84145 PROCALCITONIN (PCT): CPT

## 2022-08-29 PROCEDURE — 86140 C-REACTIVE PROTEIN: CPT

## 2022-08-29 PROCEDURE — 36415 COLL VENOUS BLD VENIPUNCTURE: CPT

## 2022-08-29 PROCEDURE — 85025 COMPLETE CBC W/AUTO DIFF WBC: CPT

## 2022-08-29 PROCEDURE — 85651 RBC SED RATE NONAUTOMATED: CPT

## 2022-08-29 PROCEDURE — 80053 COMPREHEN METABOLIC PANEL: CPT

## 2022-09-02 ENCOUNTER — HOSPITAL ENCOUNTER (OUTPATIENT)
Dept: PHYSICAL THERAPY | Age: 47
Setting detail: THERAPIES SERIES
Discharge: HOME | End: 2022-09-02

## 2022-09-02 DIAGNOSIS — R25.2 SPASTICITY: Primary | ICD-10-CM

## 2022-09-02 NOTE — PROGRESS NOTES
[address] [facility]                Phone: [phone]   Fax: [phone]      Physical Therapy  Non compliance/Attendance Issue    DATE:   9/2/2022            MRN: [id number]     PATIENT NAME:  Billy Mari              Diagnosis:  Polyneuropathy, Weakness, Bilateral Foot drop    Total Visits to Date: 25  Cancels/No shows to Date: 3 cancels     Dear Dr. Chetna Maldonado    This is to notify you that per our physical therapy department policy your patient, noted above, is being discharged from Physical Therapy due to the following reason(s):     If a Physical Therapy out patient is absent from three consecutive scheduled appointments without notification    If a Physical Therapy out patient is absent for 50% of the scheduled visits     Pt's last contact with PT department was on 5/20/22. She wanted to hold PT until she got an MRI due to her increase in chronic low back pain. I have not heard back from pt since 6/2/22. If you have any questions, please feel free to contact me at [phone].     Thank You,    Electronically Signed by:   Darryle Pancake, PT, DPT        [id number]  9/2/2022

## 2022-09-07 RX ORDER — BACLOFEN 10 MG/1
TABLET ORAL
Qty: 120 TABLET | Refills: 2 | Status: SHIPPED | OUTPATIENT
Start: 2022-09-07

## 2022-09-16 RX ORDER — GABAPENTIN 300 MG/1
CAPSULE ORAL
Qty: 90 CAPSULE | Refills: 2 | Status: SHIPPED | OUTPATIENT
Start: 2022-09-16 | End: 2022-12-15

## 2022-09-27 NOTE — PROGRESS NOTES
Matti Swartz is a 48 y.o. right handed female     Patient was followed by neurology in the hospital for BLE weakness    PMH of covid 19 in November 2021 lumbar epidurals    Her symptoms began on 11/28/2021. She was diagnosed with COVID-19 on 11/20/2021. Prior to this she was fully functional and walking independently at baseline. 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 She had received antibiotic infusion. She started noticing numbness and tingling on 11/23/2021. She was seen in the ED and was diagnosed with \"serum sickness\" and placed on steroids and Benadryl. This did nothing for symptoms and continue to slowly progressed with development of weakness on the 28th. She had labs done by her PCP which revealed a B12 of 165 and she was started on B12 injections on 12/10. She received her second injection on 12/17. Initially, she was able to still ambulate with a walker. Her weakness continued to progress and she presented to the hospital again. B12 level on 12/22 was 575. She does have a history of low back pain and sciatica and received epidural spinal injections with the last injection in September 2021. MRIs of the brain, cervical, thoracic and lumbar spine were completed which did not show any acute findings responsible for current symptoms.   EMG showed a predominantly neuropathic process which upon review with Dr Romina Cruz felt secondary to her B12 deficiency    Her levels have remarkably improved and her neurological issues have also markedly improved   When he was ealuated in the hospital at 0/5 strength in her distal lower extremities and last appointment was +4/5    B12 level was 1012 in July 2022    She is now ambulating with minimal assistance  She is completing therapy without utilization of her AFO braces  Thankfully no falls    She is complaining of burning sensations and tingling sensations in her lower extremities primarily from her knees down  She also notices discomfort in her right sciatica. She followed with chiropractic medicine who obtained an x-ray and recommended repeat MRI of her lumbar spine.   She was following with neurosurgery - now ID -- PET tomorrow     NCV's reviewed with her-examination also reviewed with her during her hospitalization    No chest pain or palpitations  No coughing or wheezing    No itching or bruising appreciated  No speech or swallowing troubles    ROS otherwise negative      Objective:     BP (!) 165/107 (Site: Left Upper Arm)   Pulse 88   Temp 98.4 °F (36.9 °C)   Wt 229 lb (103.9 kg)   SpO2 97%   BMI 44.72 kg/m²     General appearance: alert, appears stated age, cooperative and no distress sitting up in the chair  Head: normocephalic, without obvious abnormality, atraumatic  Neck: Full range of motion without cervicalgia  Lungs: respirations non labored     Mental Status: Alert, oriented x4 -- pleasant and cooperative    Speech: no dysarthria  Language: no aphasias     Cranial Nerves:  I: smell    II: visual acuity     II: visual fields Full to confrontation   II: pupils JONNIE   III,VII: ptosis None   III,IV,VI: extraocular muscles  Full ROM   V: mastication Normal   V: facial light touch sensation  Normal   V,VII: corneal reflex     VII: facial muscle function - upper  Normal   VII: facial muscle function - lower Normal   VIII: hearing Normal   IX: soft palate elevation  Normal   IX,X: gag reflex    XI: trapezius strength  5/5   XI: sternocleidomastoid strength 5/5   XI: neck extension strength  5/5   XII: tongue strength  Normal     Motor:  5/5 BUE    5/5 bilateral IPS  5/5 bilateral anterior tibialis and gastrocnemius    Sensory:  LT normal in all limbs  Vibration minimally decreased in ankles and knees    No sensory level    Coordination:   FN normal    Marked Gowers  Wide-based gait    DTR:   +3 arms and knees   +2 ankles     No Montoya's for me    Laboratory/Radiology:     [id number] --- she reports recent level with PCP 1012 in July 2022    MRI L spine: 1. Slightly limited exam  2. There appear to be post-surgical alterations of prior left-sided  hemilaminotomy at L3 and L4, for prior partial discectomy at L3/4 and L4/5.  3.  Moderate bilateral facet joint effusions and heterogeneous enhancement  involve both L4/5 facet joints and surrounding marrow, with slightly lesser  extension into both subjacent posterolateral aspects of the traversing  epidural space, followed by surrounding paraspinal musculature, lessening  both superiorly to L3/4, and inferiorly to L5/S1. While the above findings  could be degenerative and/or inflammatory arthro-pathic in nature, clinical  correlation is advised to exclude developing bilateral septic  arthritis/facet-itis. 4.  At L4/5, there is minimal enhancement of several traversing bilateral nerve roots, and clinical correlation is advised to exclude early  Arachnoiditis. 5.  Otherwise, no significant change since the most recent MR and CT  examinations of the lumbar spine. MRI brain: 1. No evidence of acute intracranial abnormality. 2. Mild white matter signal abnormality as described above. No significant  change. The findings could be related to chronic microvascular disease  chronic migraines, vasculitis or Lyme disease. The possibility of  demyelinating disease cannot be excluded although the findings are not  typical for demyelinating plaques. MRI cervical and thoracic spines: Somewhat limited exams   2.  Given technical artifacts, the visualized portions of the spinal cord and intracranial contents appear grossly negative for pathologic enhancement and otherwise unremarkable/not significantly changed, within the limits of this exam.   3. Mildly-exaggerated thoracic kyphosis, with grossly unchanged multilevel degenerative disease, without high-grade central spinal canal stenosis, neural foraminal narrowing, or mass effect upon neural elements throughout the cervical and thoracic region, as described. 4. Mild diffuse paraspinal muscular atrophy   5. Otherwise, no definite correlate for the patient's reported bilateral lower extremity weakness, within the limits of these studies. 6. Incidental findings, most notable for small fluid signal is scattered about the left mastoid air cell complex, with trace involvement on the right side, incompletely and only incidentally demonstrated     EMG   Study compatible with the following:  #1 nerve conduction studies reflect diminished amplitude in the tibial distribution bilaterally, reflecting axonal pathology. Minor sensory abnormalities in the left sural distribution reflex possible changes in the mylin portion of this nerve. .  #2 following nerves were within normal limits to stimulation: Right peroneal, left peroneal, right superficial peroneal, left superficial peroneal and right sural.  #3 on insertional examination changes of serrated potentials, polyphasic units, and change in amplitude and duration as well as numbers reflect neuropathic changes and pathology diffusely involving L2-S1 fibers both in anterior and posterior rami distributions     Recommendations: This is predominantly a neuropathic process, not myopathic in nature. Post viral inflammatory processes can present early on with this picture however usually progress to development of slowing of velocity with the development of myelin abnormalities as well as increasing axonal pathology. 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      Exam was not consistent with GBS-this was conveyed to her at this time    Plan:     Continue B12 -- recent level 1012 (July 2022)    Agree with therapy     Titrate gabapentin to 300 mg 3 times a day    LLUVIA Ramierz - CNS  9:02 AM  9/28/2022

## 2022-09-28 ENCOUNTER — OFFICE VISIT (OUTPATIENT)
Dept: NEUROLOGY | Age: 47
End: 2022-09-28
Payer: COMMERCIAL

## 2022-09-28 VITALS
SYSTOLIC BLOOD PRESSURE: 165 MMHG | BODY MASS INDEX: 44.72 KG/M2 | DIASTOLIC BLOOD PRESSURE: 107 MMHG | TEMPERATURE: 98.4 F | HEART RATE: 88 BPM | WEIGHT: 229 LBS | OXYGEN SATURATION: 97 %

## 2022-09-28 DIAGNOSIS — E53.8 B12 DEFICIENCY: Primary | ICD-10-CM

## 2022-09-28 DIAGNOSIS — M48.061 SPINAL STENOSIS OF LUMBAR REGION, UNSPECIFIED WHETHER NEUROGENIC CLAUDICATION PRESENT: ICD-10-CM

## 2022-09-28 DIAGNOSIS — M43.00 PARS DEFECT: ICD-10-CM

## 2022-09-28 PROCEDURE — 99214 OFFICE O/P EST MOD 30 MIN: CPT | Performed by: CLINICAL NURSE SPECIALIST

## 2022-09-28 PROCEDURE — G8427 DOCREV CUR MEDS BY ELIG CLIN: HCPCS | Performed by: CLINICAL NURSE SPECIALIST

## 2022-09-28 PROCEDURE — 4004F PT TOBACCO SCREEN RCVD TLK: CPT | Performed by: CLINICAL NURSE SPECIALIST

## 2022-09-28 PROCEDURE — G8417 CALC BMI ABV UP PARAM F/U: HCPCS | Performed by: CLINICAL NURSE SPECIALIST

## 2022-10-31 ENCOUNTER — OFFICE VISIT (OUTPATIENT)
Dept: PHYSICAL MEDICINE AND REHAB | Age: 47
End: 2022-10-31
Payer: COMMERCIAL

## 2022-10-31 VITALS
HEART RATE: 92 BPM | DIASTOLIC BLOOD PRESSURE: 82 MMHG | HEIGHT: 60 IN | SYSTOLIC BLOOD PRESSURE: 140 MMHG | WEIGHT: 229 LBS | OXYGEN SATURATION: 98 % | BODY MASS INDEX: 44.96 KG/M2

## 2022-10-31 DIAGNOSIS — Z78.9 IMPAIRED MOBILITY AND ADLS: ICD-10-CM

## 2022-10-31 DIAGNOSIS — R25.2 SPASTICITY: ICD-10-CM

## 2022-10-31 DIAGNOSIS — Z74.09 IMPAIRED MOBILITY AND ADLS: ICD-10-CM

## 2022-10-31 DIAGNOSIS — R26.9 IMPAIRED GAIT: ICD-10-CM

## 2022-10-31 DIAGNOSIS — M21.371 BILATERAL FOOT-DROP: ICD-10-CM

## 2022-10-31 DIAGNOSIS — G62.9 POLYNEUROPATHY: Primary | ICD-10-CM

## 2022-10-31 DIAGNOSIS — G82.20 PARAPARESIS (HCC): ICD-10-CM

## 2022-10-31 DIAGNOSIS — M21.372 BILATERAL FOOT-DROP: ICD-10-CM

## 2022-10-31 PROCEDURE — 99214 OFFICE O/P EST MOD 30 MIN: CPT | Performed by: PHYSICAL MEDICINE & REHABILITATION

## 2022-10-31 NOTE — PROGRESS NOTES
Unremarkable        PMH: No pertinent history reported.      Past Surgical History:   Procedure Laterality Date    ANKLE SURGERY      bilateral    APPENDECTOMY      DILATION AND CURETTAGE OF UTERUS      ELBOW SURGERY      right    ENDOMETRIAL ABLATION  2016    GALLBLADDER SURGERY      LEEP      cervical conization    TUBAL LIGATION         Social History     Socioeconomic History    Marital status:      Spouse name: Not on file    Number of children: Not on file    Years of education: Not on file    Highest education level: Not on file   Occupational History    Not on file   Tobacco Use    Smoking status: Every Day     Packs/day: 1.00     Types: Cigarettes     Last attempt to quit: 2021     Years since quittin.8    Smokeless tobacco: Never   Vaping Use    Vaping Use: Never used   Substance and Sexual Activity    Alcohol use: Yes     Comment: rarely per patient questionnaire    Drug use: No    Sexual activity: Not on file   Other Topics Concern    Not on file   Social History Narrative    Not on file     Social Determinants of Health     Financial Resource Strain: Not on file   Food Insecurity: Not on file   Transportation Needs: Not on file   Physical Activity: Not on file   Stress: Not on file   Social Connections: Not on file   Intimate Partner Violence: Not on file   Housing Stability: Not on file         Family History   Problem Relation Age of Onset    Heart Failure Mother        No Known Allergies    Current Outpatient Medications   Medication Sig Dispense Refill    gabapentin (NEURONTIN) 300 MG capsule take 1 capsule by mouth three times a day 90 capsule 2    baclofen (LIORESAL) 10 MG tablet take 1 tablet by mouth every morning and 1 tablet by mouth EVERY AFTERNOON and 2 tablets by mouth every evening 120 tablet 2    RESTASIS 0.05 % ophthalmic emulsion       SYSTANE ULTRA 0.4-0.3 % ophthalmic solution       folic acid (FOLVITE) 1 MG tablet Take 1 tablet by mouth daily 30 tablet 0 vitamin B-12 2000 MCG tablet Take 1 tablet by mouth daily 30 tablet 3    Multiple Vitamin (MULTIVITAMIN) TABS tablet Take 1 tablet by mouth daily 30 tablet 0    acetaminophen (TYLENOL) 500 MG tablet Take 500 mg by mouth every 6 hours as needed for Pain      loratadine (CLARITIN) 10 MG tablet Take 10 mg by mouth daily      mirabegron (MYRBETRIQ) 25 MG TB24 Take 25 mg by mouth daily      fluticasone (FLONASE) 50 MCG/ACT nasal spray 1 spray by Nasal route daily      diclofenac sodium (VOLTAREN) 1 % GEL Apply 4 g topically 4 times daily as needed for Pain (Patient not taking: Reported on 10/31/2022) 350 g 0     No current facility-administered medications for this visit. Review of Systems  General: No chills, fatigue, fever, malaise, night sweats, weight gain,  weight loss. Psychological: No anxiety, depression, suicidal ideation   Ophthalmic: No blurry vision, decreased vision, double vision, loss of vision  Ear Nose Throat: No hearing loss, tinnitus, phonophobia, sensitivity to smells, vertigo, or vocal changes. Allergy/Immunology: No watery eyes, itchy eyes, frequent infections. Hematological and Lymphatic: No bleeding problems, blood clots, bruising  Endocrine:  No polydypsia, polyuria, temperature intolerance. Respiratory: No cough, shortness of breath, wheezing. Cardiovascular: No syncope, chest pain, dyspnea on exertion,palpitations. Gastrointestinal: No abdominal pain, hematemesis, melena, nausea, vomiting, stool incontinence  Genito-Urinary: No dysuria, hematuria, incontinence   Musculoskeletal: Per HPI  Neurological: Per HPI  Dermatological:  No rash      Physical Exam:   Blood pressure (!) 140/82, pulse 92, height 5' (1.524 m), weight 229 lb (103.9 kg), SpO2 98 %. General: The patient is in no apparent distress. HEENT: No rhinorrhea, sneezing, yawning, or lacrimation. No scleral icterus or conjunctival injection. SKIN: No piloerection. No track marks. No rash. Normal turgor.  No erythema or ecchymosis. Psychological: Mood and affect are appropriate. Hygiene is appropriate. Cardiovascular:  Heart is regular rate. There is no edema. Respiratory: Respirations are regular and unlabored. There is no cyanosis. Gastrointestinal: No abdominal distention   Genitourinary: No costovertebral angle tenderness. MSK: extremities atraumatic, no cyanosis or edema  Neurologic: Awake, alert and oriented in three planes. Speech is fluent. No facial weakness. Tongue is midline. Hearing is intact for conversation. Pupils are equal and round. Extraocular muscles are intact. Shoulder shrug symmetric. Strength:   R  L  Deltoid   5  5  Biceps   5  5  Triceps  5  5  Wrist Ext  5  5  Finger Abd  5  5  Hip Flex  5  5  Knee Ext  5  5  Ankle dorsi  5  5  EHL   5 5  Ankle Plantar  5  5    Bilateral plantar flexor spasticity MAS 1 at ankles (gastrocnemius), L>R. Sensory:  Intact for light touch in all upper and lower extremity dermatomes     Reflexes:   R  L  Biceps   2 2  Triceps  2 2  BR   2 2   Patellar  3 3   Ankle Jerk  3 3    No pathological reflexes     Gait: Sit to stand independently, no James's appreciated today. Still slightly wide based gait       Impression:   1. Polyneuropathy    2. Paraparesis ([address]    3. Bilateral foot-drop    4. Impaired gait    5. Spasticity    6. Impaired mobility and ADLs        Plan:   Completed PT. Continue home exercise program.  Continue gabapentin for neuropathic pain  Continue Baclofen for spasticity. She is taking 10mg in the am and 20mg QHS. No longer requires AFO braces due to improved lower extremity strength. Patient is now independent for ADLs and mobility  She will continue to follow with [facility] for her low back pain. Patient's primary care physician is monitoring her B12 levels, which have been appropriate. The patient was educated about the diagnosis, prognosis, indications, risks and benefits of treatment.  An opportunity to ask questions was given to the patient and questions were answered. The patient agreed to proceed with the recommended treatment as described above. Follow up 8 months or sooner if needed          Merritt Cockayne, M.D.   Physical Medicine and Rehabilitation

## 2022-11-28 ENCOUNTER — HOSPITAL ENCOUNTER (OUTPATIENT)
Age: 47
Discharge: HOME | End: 2022-11-28
Payer: COMMERCIAL

## 2022-11-28 DIAGNOSIS — R79.82 ELEVATED C-REACTIVE PROTEIN: ICD-10-CM

## 2022-11-28 DIAGNOSIS — R93.89 ABNORMAL MRI: ICD-10-CM

## 2022-11-28 LAB
ALBUMIN SERPL-MCNC: 4.1 G/DL (ref 3.5–5.2)
ALP BLD-CCNC: 60 U/L (ref 35–104)
ALT SERPL-CCNC: 16 U/L (ref 0–32)
ANION GAP SERPL CALCULATED.3IONS-SCNC: 13 MMOL/L (ref 7–16)
AST SERPL-CCNC: 13 U/L (ref 0–31)
BASOPHILS ABSOLUTE: 0.07 E9/L (ref 0–0.2)
BASOPHILS RELATIVE PERCENT: 0.5 % (ref 0–2)
BILIRUB SERPL-MCNC: <0.2 MG/DL (ref 0–1.2)
BUN BLDV-MCNC: 12 MG/DL (ref 6–20)
C-REACTIVE PROTEIN: 0.7 MG/DL (ref 0–0.4)
CALCIUM SERPL-MCNC: 9.8 MG/DL (ref 8.6–10.2)
CHLORIDE BLD-SCNC: 100 MMOL/L (ref 98–107)
CO2: 24 MMOL/L (ref 22–29)
CREAT SERPL-MCNC: 0.6 MG/DL (ref 0.5–1)
EOSINOPHILS ABSOLUTE: 0.28 E9/L (ref 0.05–0.5)
EOSINOPHILS RELATIVE PERCENT: 1.9 % (ref 0–6)
GFR SERPL CREATININE-BSD FRML MDRD: >60 ML/MIN/1.73
GLUCOSE BLD-MCNC: 99 MG/DL (ref 74–99)
HCT VFR BLD CALC: 43.1 % (ref 34–48)
HEMOGLOBIN: 15.2 G/DL (ref 11.5–15.5)
IMMATURE GRANULOCYTES #: 0.07 E9/L
IMMATURE GRANULOCYTES %: 0.5 % (ref 0–5)
LYMPHOCYTES ABSOLUTE: 3.05 E9/L (ref 1.5–4)
LYMPHOCYTES RELATIVE PERCENT: 20.7 % (ref 20–42)
MCH RBC QN AUTO: 31.7 PG (ref 26–35)
MCHC RBC AUTO-ENTMCNC: 35.3 % (ref 32–34.5)
MCV RBC AUTO: 90 FL (ref 80–99.9)
MONOCYTES ABSOLUTE: 0.81 E9/L (ref 0.1–0.95)
MONOCYTES RELATIVE PERCENT: 5.5 % (ref 2–12)
NEUTROPHILS ABSOLUTE: 10.46 E9/L (ref 1.8–7.3)
NEUTROPHILS RELATIVE PERCENT: 70.9 % (ref 43–80)
PDW BLD-RTO: 13.3 FL (ref 11.5–15)
PLATELET # BLD: 358 E9/L (ref 130–450)
PMV BLD AUTO: 9.4 FL (ref 7–12)
POTASSIUM SERPL-SCNC: 3.9 MMOL/L (ref 3.5–5)
RBC # BLD: 4.79 E12/L (ref 3.5–5.5)
SEDIMENTATION RATE, ERYTHROCYTE: 13 MM/HR (ref 0–20)
SODIUM BLD-SCNC: 137 MMOL/L (ref 132–146)
TOTAL PROTEIN: 7.4 G/DL (ref 6.4–8.3)
WBC # BLD: 14.7 E9/L (ref 4.5–11.5)

## 2022-11-28 PROCEDURE — 85651 RBC SED RATE NONAUTOMATED: CPT

## 2022-11-28 PROCEDURE — 36415 COLL VENOUS BLD VENIPUNCTURE: CPT

## 2022-11-28 PROCEDURE — 85025 COMPLETE CBC W/AUTO DIFF WBC: CPT

## 2022-11-28 PROCEDURE — 86140 C-REACTIVE PROTEIN: CPT

## 2022-11-28 PROCEDURE — 80053 COMPREHEN METABOLIC PANEL: CPT

## 2022-12-04 DIAGNOSIS — R25.2 SPASTICITY: ICD-10-CM

## 2022-12-08 RX ORDER — BACLOFEN 10 MG/1
TABLET ORAL
Qty: 120 TABLET | Refills: 2 | Status: SHIPPED | OUTPATIENT
Start: 2022-12-08

## 2022-12-14 RX ORDER — GABAPENTIN 300 MG/1
CAPSULE ORAL
Qty: 90 CAPSULE | Refills: 2 | Status: SHIPPED | OUTPATIENT
Start: 2022-12-14 | End: 2023-03-14

## 2023-01-09 ENCOUNTER — HOSPITAL ENCOUNTER (OUTPATIENT)
Age: 48
Discharge: HOME | End: 2023-01-09
Payer: COMMERCIAL

## 2023-01-09 LAB
ALBUMIN SERPL-MCNC: 3.9 G/DL (ref 3.5–5.2)
ALP BLD-CCNC: 58 U/L (ref 35–104)
ALT SERPL-CCNC: 16 U/L (ref 0–32)
ANION GAP SERPL CALCULATED.3IONS-SCNC: 15 MMOL/L (ref 7–16)
AST SERPL-CCNC: 14 U/L (ref 0–31)
BASOPHILS ABSOLUTE: 0.04 E9/L (ref 0–0.2)
BASOPHILS RELATIVE PERCENT: 0.5 % (ref 0–2)
BILIRUB SERPL-MCNC: <0.2 MG/DL (ref 0–1.2)
BUN BLDV-MCNC: 14 MG/DL (ref 6–20)
C-REACTIVE PROTEIN: 1.5 MG/DL (ref 0–0.4)
CALCIUM SERPL-MCNC: 9.2 MG/DL (ref 8.6–10.2)
CHLORIDE BLD-SCNC: 98 MMOL/L (ref 98–107)
CO2: 25 MMOL/L (ref 22–29)
CREAT SERPL-MCNC: 0.7 MG/DL (ref 0.5–1)
EOSINOPHILS ABSOLUTE: 0.13 E9/L (ref 0.05–0.5)
EOSINOPHILS RELATIVE PERCENT: 1.6 % (ref 0–6)
GFR SERPL CREATININE-BSD FRML MDRD: >60 ML/MIN/1.73
GLUCOSE BLD-MCNC: 112 MG/DL (ref 74–99)
HCT VFR BLD CALC: 44.3 % (ref 34–48)
HEMOGLOBIN: 15.5 G/DL (ref 11.5–15.5)
IMMATURE GRANULOCYTES #: 0.03 E9/L
IMMATURE GRANULOCYTES %: 0.4 % (ref 0–5)
LYMPHOCYTES ABSOLUTE: 2.24 E9/L (ref 1.5–4)
LYMPHOCYTES RELATIVE PERCENT: 27.8 % (ref 20–42)
MCH RBC QN AUTO: 31.7 PG (ref 26–35)
MCHC RBC AUTO-ENTMCNC: 35 % (ref 32–34.5)
MCV RBC AUTO: 90.6 FL (ref 80–99.9)
MONOCYTES ABSOLUTE: 0.51 E9/L (ref 0.1–0.95)
MONOCYTES RELATIVE PERCENT: 6.3 % (ref 2–12)
NEUTROPHILS ABSOLUTE: 5.1 E9/L (ref 1.8–7.3)
NEUTROPHILS RELATIVE PERCENT: 63.4 % (ref 43–80)
PDW BLD-RTO: 13.5 FL (ref 11.5–15)
PLATELET # BLD: 265 E9/L (ref 130–450)
PMV BLD AUTO: 9.6 FL (ref 7–12)
POTASSIUM SERPL-SCNC: 4.4 MMOL/L (ref 3.5–5)
RBC # BLD: 4.89 E12/L (ref 3.5–5.5)
SEDIMENTATION RATE, ERYTHROCYTE: 5 MM/HR (ref 0–20)
SODIUM BLD-SCNC: 138 MMOL/L (ref 132–146)
TOTAL PROTEIN: 7.1 G/DL (ref 6.4–8.3)
WBC # BLD: 8.1 E9/L (ref 4.5–11.5)

## 2023-01-09 PROCEDURE — 85651 RBC SED RATE NONAUTOMATED: CPT

## 2023-01-09 PROCEDURE — 36415 COLL VENOUS BLD VENIPUNCTURE: CPT

## 2023-01-09 PROCEDURE — 80053 COMPREHEN METABOLIC PANEL: CPT

## 2023-01-09 PROCEDURE — 85025 COMPLETE CBC W/AUTO DIFF WBC: CPT

## 2023-01-09 PROCEDURE — 86140 C-REACTIVE PROTEIN: CPT

## 2023-03-06 ENCOUNTER — OFFICE VISIT (OUTPATIENT)
Dept: NEUROLOGY | Age: 48
End: 2023-03-06
Payer: COMMERCIAL

## 2023-03-06 VITALS
WEIGHT: 232 LBS | DIASTOLIC BLOOD PRESSURE: 104 MMHG | HEIGHT: 60 IN | BODY MASS INDEX: 45.55 KG/M2 | TEMPERATURE: 97.6 F | SYSTOLIC BLOOD PRESSURE: 164 MMHG | OXYGEN SATURATION: 97 % | HEART RATE: 82 BPM

## 2023-03-06 DIAGNOSIS — E53.8 SUBACUTE COMBINED DEGENERATION (HCC): Primary | ICD-10-CM

## 2023-03-06 DIAGNOSIS — G32.0 SUBACUTE COMBINED DEGENERATION (HCC): Primary | ICD-10-CM

## 2023-03-06 DIAGNOSIS — E53.8 B12 DEFICIENCY: ICD-10-CM

## 2023-03-06 PROCEDURE — 99214 OFFICE O/P EST MOD 30 MIN: CPT | Performed by: CLINICAL NURSE SPECIALIST

## 2023-03-06 PROCEDURE — G8428 CUR MEDS NOT DOCUMENT: HCPCS | Performed by: CLINICAL NURSE SPECIALIST

## 2023-03-06 PROCEDURE — G8484 FLU IMMUNIZE NO ADMIN: HCPCS | Performed by: CLINICAL NURSE SPECIALIST

## 2023-03-06 PROCEDURE — 4004F PT TOBACCO SCREEN RCVD TLK: CPT | Performed by: CLINICAL NURSE SPECIALIST

## 2023-03-06 PROCEDURE — G8417 CALC BMI ABV UP PARAM F/U: HCPCS | Performed by: CLINICAL NURSE SPECIALIST

## 2023-03-06 RX ORDER — GABAPENTIN 300 MG/1
CAPSULE ORAL
Qty: 90 CAPSULE | Refills: 11 | Status: SHIPPED | OUTPATIENT
Start: 2023-03-06 | End: 2024-03-06

## 2023-03-06 RX ORDER — OLOPATADINE HYDROCHLORIDE 1 MG/ML
SOLUTION/ DROPS OPHTHALMIC
COMMUNITY
Start: 2023-02-27

## 2023-03-06 NOTE — PROGRESS NOTES
Marija Hickey is a 50 y.o. right handed female     Patient was followed by neurology in the hospital for BLE weakness    PMH of covid 19 in November 2021 lumbar epidurals    Her symptoms began on 11/28/2021. She was diagnosed with COVID-19 on 11/20/2021. Prior to this she was fully functional and walking independently at baseline. 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 She had received antibiotic infusion. She started noticing numbness and tingling on 11/23/2021. She was seen in the ED and was diagnosed with \"serum sickness\" and placed on steroids and Benadryl. This did nothing for symptoms and continue to slowly progressed with development of weakness on the 28th. She had labs done by her PCP which revealed a B12 of 165 and she was started on B12 injections on 12/10. She received her second injection on 12/17. Initially, she was able to still ambulate with a walker. Her weakness continued to progress and she presented to the hospital again. B12 level on 12/22 was 575. She does have a history of low back pain and sciatica and received epidural spinal injections with the last injection in September 2021. MRIs of the brain, cervical, thoracic and lumbar spine were completed which did not show any acute findings responsible for current symptoms.   EMG showed a predominantly neuropathic process which upon review with Dr Antonio Mcdonnell felt secondary to her B12 deficiency    Her levels have remarkably improved and her neurological issues have also markedly improved   When he was ealuated in the hospital at 0/5 strength in her distal lower extremities and last appointment was +4/5    B12 level was recently around 1000    She is now ambulating with minimal assistance  She is completing therapy without utilization of her AFO braces  Thankfully no falls    Recently returned from [place] was able to walk thousands of steps in the day with minimal difficulty    She is complaining of burning sensations and tingling sensations in her lower extremities primarily from her knees down  She also notices discomfort in her right sciatica. She followed with chiropractic medicine who obtained an x-ray and recommended repeat MRI of her lumbar spine.   She was following with neurosurgery - now ID --repeat MRI this week    NCV's reviewed with her-examination also reviewed with her during her hospitalization    No chest pain or palpitations  No coughing or wheezing    No itching or bruising appreciated  No speech or swallowing troubles    ROS otherwise negative      Objective:     BP (!) 164/104 (Site: Right Upper Arm)   Pulse 82   Temp 97.6 °F (36.4 °C)   Ht 5' (1.524 m)   Wt 232 lb (105.2 kg)   SpO2 97%   BMI 45.31 kg/m²     General appearance: alert, appears stated age, cooperative and no distress sitting up in the chair  Head: normocephalic, without obvious abnormality, atraumatic  Neck: Full range of motion without cervicalgia  Lungs: respirations non labored     Mental Status: Alert, oriented x4 -- pleasant and cooperative    Speech: no dysarthria  Language: no aphasias     Cranial Nerves:  I: smell    II: visual acuity     II: visual fields Full to confrontation   II: pupils JONNIE   III,VII: ptosis None   III,IV,VI: extraocular muscles  Full ROM   V: mastication Normal   V: facial light touch sensation  Normal   V,VII: corneal reflex     VII: facial muscle function - upper  Normal   VII: facial muscle function - lower Normal   VIII: hearing Normal   IX: soft palate elevation  Normal   IX,X: gag reflex    XI: trapezius strength  5/5   XI: sternocleidomastoid strength 5/5   XI: neck extension strength  5/5   XII: tongue strength  Normal     Motor:  5/5 BUE  5/5 bilateral IPS  5/5 bilateral anterior tibialis and gastrocnemius    Sensory:  LT normal in all limbs  Vibration minimally decreased in ankles and knees    No sensory level    Coordination:   FN normal    Marked Gowers  Wide-based gait    DTR:   +3 arms and knees   +2 ankles     No Montoya's for me    Laboratory/Radiology:     she reports recent level with PCP around the thousand last month    MRI L spine: 1.  Slightly limited exam  2.  There appear to be post-surgical alterations of prior left-sided  hemilaminotomy at L3 and L4, for prior partial discectomy at L3/4 and L4/5.  3.  Moderate bilateral facet joint effusions and heterogeneous enhancement  involve both L4/5 facet joints and surrounding marrow, with slightly lesser  extension into both subjacent posterolateral aspects of the traversing  epidural space, followed by surrounding paraspinal musculature, lessening  both superiorly to L3/4, and inferiorly to L5/S1.  While the above findings  could be degenerative and/or inflammatory arthro-pathic in nature, clinical  correlation is advised to exclude developing bilateral septic  arthritis/facet-itis.  4.  At L4/5, there is minimal enhancement of several traversing bilateral nerve roots, and clinical correlation is advised to exclude early  Arachnoiditis. 5.  Otherwise, no significant change since the most recent MR and CT  examinations of the lumbar spine.    MRI brain: 1. No evidence of acute intracranial abnormality.  2. Mild white matter signal abnormality as described above.  No significant  change.  The findings could be related to chronic microvascular disease  chronic migraines, vasculitis or Lyme disease.  The possibility of  demyelinating disease cannot be excluded although the findings are not  typical for demyelinating plaques.    MRI cervical and thoracic spines: Somewhat limited exams   2. Given technical artifacts, the visualized portions of the spinal cord and intracranial contents appear grossly negative for pathologic enhancement and otherwise unremarkable/not significantly changed, within the limits of this exam.   3. Mildly-exaggerated thoracic kyphosis, with grossly unchanged  multilevel degenerative disease, without high-grade central spinal canal stenosis, neural foraminal narrowing, or mass effect upon neural elements throughout the cervical and thoracic region, as described. 4. Mild diffuse paraspinal muscular atrophy   5. Otherwise, no definite correlate for the patient's reported bilateral lower extremity weakness, within the limits of these studies. 6. Incidental findings, most notable for small fluid signal is scattered about the left mastoid air cell complex, with trace involvement on the right side, incompletely and only incidentally demonstrated     EMG   Study compatible with the following:  #1 nerve conduction studies reflect diminished amplitude in the tibial distribution bilaterally, reflecting axonal pathology. Minor sensory abnormalities in the left sural distribution reflex possible changes in the mylin portion of this nerve. .  #2 following nerves were within normal limits to stimulation: Right peroneal, left peroneal, right superficial peroneal, left superficial peroneal and right sural.  #3 on insertional examination changes of serrated potentials, polyphasic units, and change in amplitude and duration as well as numbers reflect neuropathic changes and pathology diffusely involving L2-S1 fibers both in anterior and posterior rami distributions     Recommendations: This is predominantly a neuropathic process, not myopathic in nature. Post viral inflammatory processes can present early on with this picture however usually progress to development of slowing of velocity with the development of myelin abnormalities as well as increasing axonal pathology. 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      Exam was not consistent with GBS    Plan:     Continue B12 -- recent level around 1000 last month    Agree with therapy     Continue gabapentin to 300 mg 3 times a day    RTO next year    Naomi Devries, LLUVIA - CNS  9:19 AM  3/6/2023

## 2023-03-07 DIAGNOSIS — R25.2 SPASTICITY: ICD-10-CM

## 2023-03-07 RX ORDER — BACLOFEN 10 MG/1
TABLET ORAL
Qty: 120 TABLET | Refills: 2 | Status: SHIPPED | OUTPATIENT
Start: 2023-03-07

## 2023-03-09 ENCOUNTER — HOSPITAL ENCOUNTER (OUTPATIENT)
Dept: MRI IMAGING | Age: 48
Discharge: HOME | End: 2023-03-11
Payer: COMMERCIAL

## 2023-03-09 DIAGNOSIS — M46.20 OSTEOMYELITIS OF VERTEBRA (HCC): ICD-10-CM

## 2023-03-09 PROCEDURE — 6360000004 HC RX CONTRAST MEDICATION: Performed by: RADIOLOGY

## 2023-03-09 PROCEDURE — 72158 MRI LUMBAR SPINE W/O & W/DYE: CPT

## 2023-03-09 PROCEDURE — A9579 GAD-BASE MR CONTRAST NOS,1ML: HCPCS | Performed by: RADIOLOGY

## 2023-03-09 RX ADMIN — GADOTERIDOL 20 ML: 279.3 INJECTION, SOLUTION INTRAVENOUS at 09:00

## 2023-03-14 RX ORDER — GABAPENTIN 300 MG/1
CAPSULE ORAL
Qty: 90 CAPSULE | Refills: 11 | Status: SHIPPED | OUTPATIENT
Start: 2023-03-14 | End: 2024-03-13

## 2023-03-22 ENCOUNTER — HOSPITAL ENCOUNTER (OUTPATIENT)
Age: 48
Discharge: HOME | End: 2023-03-24

## 2023-03-22 PROCEDURE — 86901 BLOOD TYPING SEROLOGIC RH(D): CPT

## 2023-03-22 PROCEDURE — 86900 BLOOD TYPING SEROLOGIC ABO: CPT

## 2023-03-22 PROCEDURE — 86850 RBC ANTIBODY SCREEN: CPT

## 2023-03-22 PROCEDURE — 85027 COMPLETE CBC AUTOMATED: CPT

## 2023-03-23 LAB
ABO + RH BLD: NORMAL
BLD GP AB SCN SERPL QL: NORMAL
ERYTHROCYTE [DISTWIDTH] IN BLOOD BY AUTOMATED COUNT: 14.1 FL (ref 11.5–15)
HCT VFR BLD AUTO: 45.4 % (ref 34–48)
HGB BLD-MCNC: 14.8 G/DL (ref 11.5–15.5)
MCH RBC QN AUTO: 31 PG (ref 26–35)
MCHC RBC AUTO-ENTMCNC: 32.6 % (ref 32–34.5)
MCV RBC AUTO: 95.2 FL (ref 80–99.9)
PLATELET # BLD AUTO: 330 E9/L (ref 130–450)
PMV BLD AUTO: 10.7 FL (ref 7–12)
RBC # BLD AUTO: 4.77 E12/L (ref 3.5–5.5)
WBC # BLD: 14 E9/L (ref 4.5–11.5)

## 2023-03-28 ENCOUNTER — HOSPITAL ENCOUNTER (OUTPATIENT)
Age: 48
Discharge: HOME | End: 2023-03-30

## 2023-03-28 PROCEDURE — 88307 TISSUE EXAM BY PATHOLOGIST: CPT

## 2023-03-29 ENCOUNTER — HOSPITAL ENCOUNTER (OUTPATIENT)
Age: 48
Discharge: HOME | End: 2023-03-31

## 2023-03-29 LAB
ANION GAP SERPL CALCULATED.3IONS-SCNC: 13 MMOL/L (ref 7–16)
BUN SERPL-MCNC: 11 MG/DL (ref 6–20)
CALCIUM SERPL-MCNC: 8.7 MG/DL (ref 8.6–10.2)
CHLORIDE SERPL-SCNC: 101 MMOL/L (ref 98–107)
CO2 SERPL-SCNC: 23 MMOL/L (ref 22–29)
CREAT SERPL-MCNC: 0.5 MG/DL (ref 0.5–1)
ERYTHROCYTE [DISTWIDTH] IN BLOOD BY AUTOMATED COUNT: 13.9 FL (ref 11.5–15)
GLUCOSE SERPL-MCNC: 161 MG/DL (ref 74–99)
HCT VFR BLD AUTO: 41.2 % (ref 34–48)
HGB BLD-MCNC: 13.1 G/DL (ref 11.5–15.5)
MCH RBC QN AUTO: 30.8 PG (ref 26–35)
MCHC RBC AUTO-ENTMCNC: 31.8 % (ref 32–34.5)
MCV RBC AUTO: 96.9 FL (ref 80–99.9)
PLATELET # BLD AUTO: 301 E9/L (ref 130–450)
PMV BLD AUTO: 10.1 FL (ref 7–12)
POTASSIUM SERPL-SCNC: 3.8 MMOL/L (ref 3.5–5)
RBC # BLD AUTO: 4.25 E12/L (ref 3.5–5.5)
SODIUM SERPL-SCNC: 137 MMOL/L (ref 132–146)
WBC # BLD: 14.9 E9/L (ref 4.5–11.5)

## 2023-03-29 PROCEDURE — 80048 BASIC METABOLIC PNL TOTAL CA: CPT

## 2023-03-29 PROCEDURE — 85027 COMPLETE CBC AUTOMATED: CPT

## 2023-04-04 ENCOUNTER — TELEPHONE (OUTPATIENT)
Dept: PHYSICAL MEDICINE AND REHAB | Age: 48
End: 2023-04-04

## 2023-04-04 NOTE — TELEPHONE ENCOUNTER
Bre Daria called with complaints of leg cramping at night for past 2 weeks. She also has increased her exercise and using steps more than usual.I did offer her the next available appointment 4/17 but time not suitable for her.  Please advise    Orlando Lr MA

## 2023-04-06 ENCOUNTER — TELEPHONE (OUTPATIENT)
Dept: PHYSICAL MEDICINE AND REHAB | Age: 48
End: 2023-04-06

## 2023-05-23 ENCOUNTER — OFFICE VISIT (OUTPATIENT)
Dept: PHYSICAL MEDICINE AND REHAB | Age: 48
End: 2023-05-23
Payer: COMMERCIAL

## 2023-05-23 VITALS
HEART RATE: 68 BPM | BODY MASS INDEX: 45.94 KG/M2 | DIASTOLIC BLOOD PRESSURE: 70 MMHG | OXYGEN SATURATION: 99 % | SYSTOLIC BLOOD PRESSURE: 128 MMHG | WEIGHT: 234 LBS | HEIGHT: 60 IN

## 2023-05-23 DIAGNOSIS — G62.9 POLYNEUROPATHY: Primary | ICD-10-CM

## 2023-05-23 DIAGNOSIS — R25.2 SPASTICITY: ICD-10-CM

## 2023-05-23 DIAGNOSIS — Z78.9 IMPAIRED MOBILITY AND ADLS: ICD-10-CM

## 2023-05-23 DIAGNOSIS — M21.371 BILATERAL FOOT-DROP: ICD-10-CM

## 2023-05-23 DIAGNOSIS — R26.9 IMPAIRED GAIT: ICD-10-CM

## 2023-05-23 DIAGNOSIS — G82.20 PARAPARESIS (HCC): ICD-10-CM

## 2023-05-23 DIAGNOSIS — M21.372 BILATERAL FOOT-DROP: ICD-10-CM

## 2023-05-23 DIAGNOSIS — G89.29 CHRONIC PAIN OF RIGHT KNEE: ICD-10-CM

## 2023-05-23 DIAGNOSIS — M25.561 CHRONIC PAIN OF RIGHT KNEE: ICD-10-CM

## 2023-05-23 DIAGNOSIS — M54.41 CHRONIC MIDLINE LOW BACK PAIN WITH RIGHT-SIDED SCIATICA: ICD-10-CM

## 2023-05-23 DIAGNOSIS — Z74.09 IMPAIRED MOBILITY AND ADLS: ICD-10-CM

## 2023-05-23 DIAGNOSIS — G89.29 CHRONIC MIDLINE LOW BACK PAIN WITH RIGHT-SIDED SCIATICA: ICD-10-CM

## 2023-05-23 PROCEDURE — G8417 CALC BMI ABV UP PARAM F/U: HCPCS | Performed by: PHYSICAL MEDICINE & REHABILITATION

## 2023-05-23 PROCEDURE — G8427 DOCREV CUR MEDS BY ELIG CLIN: HCPCS | Performed by: PHYSICAL MEDICINE & REHABILITATION

## 2023-05-23 PROCEDURE — 4004F PT TOBACCO SCREEN RCVD TLK: CPT | Performed by: PHYSICAL MEDICINE & REHABILITATION

## 2023-05-23 PROCEDURE — 99214 OFFICE O/P EST MOD 30 MIN: CPT | Performed by: PHYSICAL MEDICINE & REHABILITATION

## 2023-05-23 NOTE — PROGRESS NOTES
(MULTIVITAMIN) TABS tablet Take 1 tablet by mouth daily 30 tablet 0    acetaminophen (TYLENOL) 500 MG tablet Take 1 tablet by mouth every 6 hours as needed for Pain      loratadine (CLARITIN) 10 MG tablet Take 1 tablet by mouth daily      mirabegron (MYRBETRIQ) 25 MG TB24 Take 1 tablet by mouth daily      fluticasone (FLONASE) 50 MCG/ACT nasal spray 1 spray by Nasal route daily      SYSTANE ULTRA 0.4-0.3 % ophthalmic solution  (Patient not taking: Reported on 3/6/2023)      diclofenac sodium (VOLTAREN) 1 % GEL Apply 4 g topically 4 times daily as needed for Pain (Patient not taking: Reported on 3/6/2023) 350 g 0     No current facility-administered medications for this visit. Review of Systems  General: No chills, fatigue, fever, malaise, night sweats, weight gain,  weight loss. Psychological: No anxiety, depression, suicidal ideation   Ophthalmic: No blurry vision, decreased vision, double vision, loss of vision  Ear Nose Throat: No hearing loss, tinnitus, phonophobia, sensitivity to smells, vertigo, or vocal changes. Allergy/Immunology: No watery eyes, itchy eyes, frequent infections. Hematological and Lymphatic: No bleeding problems, blood clots, bruising  Endocrine:  No polydypsia, polyuria, temperature intolerance. Respiratory: No cough, shortness of breath, wheezing. Cardiovascular: No syncope, chest pain, dyspnea on exertion,palpitations. Gastrointestinal: No abdominal pain, hematemesis, melena, nausea, vomiting, stool incontinence  Genito-Urinary: No dysuria, hematuria, incontinence   Musculoskeletal: Per HPI  Neurological: Per HPI  Dermatological:  No rash      Physical Exam:   Blood pressure 128/70, pulse 68, height 5' (1.524 m), weight 234 lb (106.1 kg), SpO2 99 %. General: The patient is in no apparent distress. HEENT: No rhinorrhea, sneezing, yawning, or lacrimation. No scleral icterus or conjunctival injection. SKIN: No piloerection. No track marks. No rash. Normal turgor.  No

## 2023-06-20 ENCOUNTER — HOSPITAL ENCOUNTER (OUTPATIENT)
Dept: PHYSICAL THERAPY | Age: 48
Setting detail: THERAPIES SERIES
Discharge: HOME | End: 2023-06-20
Payer: COMMERCIAL

## 2023-06-20 PROCEDURE — 97161 PT EVAL LOW COMPLEX 20 MIN: CPT

## 2023-06-23 NOTE — PROGRESS NOTES
Physical Therapy  Initial Assessment  Date: 23  Patient Name: Shannan Khanna  MRN: [id number]  : 1975    Referring Physician: Lian Fong MD     PCP: Ang Bolden DO     Medical Diagnosis: Polyneuropathy, unspecified [G62.9]  Paraplegia, unspecified [G82.20]  Cramp and spasm [R25.2]  Other reduced mobility [Z74.09]  Other specified health status [Z78.9]  Pain in left knee [M25.562]  Other chronic pain [G89.29]  Lumbago with sciatica, right side [M54.41] Back Pain LE Weakness  No data recorded    Insurance: Payor: [employer] / Plan: Vidit Husbands / Product Type: *No Product type* /   Insurance ID: [id number] - (Medicaid Managed)      Restrictions:       Subjective:   General  Chart Reviewed: Yes  Patient Assessed for Rehabilitation Services: Yes  Additional Pertinent Hx: Patient presents to PT to jamaal prescott aquatic based active exercise addressing multiple issues. Patient reports a past hx of chronic back pain with dx of multi level stenosis and multiple disc bulges. Patient reports she suffered significant post COVID effects including bilateral LE neuropathy with loss of muscle strength mass and sensation. Patient is currently under care by Dr Cory Reyes as well as Pain Management.  Patient is engaging in independent exercise and is seeking to try aqautic based exercise as an additional/adjunctal exercise modality  History obtained from[de-identified] Patient, Chart Review  Family/Caregiver Present: Yes  Diagnosis: Back Pain LE Weakness  Follows Commands: Within Functional Limits  PT Visit Information  Onset Date: 23  PT Insurance Information: [employer]  Subjective  Subjective: Limited tolerance for functkional ADL's and ambulation Pain with sustained activity General weakness Loss of sensation  Prior diagnostic testing[de-identified] MRI, CT Scan, X-ray  Previous treatments prior to current episode?: Injections, Medications, Chiropractor  Pain Screening  Patient Currently in Pain: Yes

## 2023-06-23 NOTE — PLAN OF CARE
[address] [facility] PHYSICAL THERAPY  [place] [address]  Dept: [address]: [phone]    PHYSICAL THERAPY PLAN OF CARE: INITIAL EVALUATION    Patient: Gerhardt Rio (24 y.o. female)   Examination Date: 581  Plan of Care Certification Period: 2023 to        :  1975  MRN: [id number]  CSN: [id number]   Insurance: Payor: Fely Kirkland / Plan: Elizabeth Bosch / Product Type: *No Product type* /   Insurance ID: [id number] - (Medicaid Managed) Secondary Insurance (if applicable):    Referring Physician: Bonny Murguia MD     PCP: Makayla Duarte DO Visits to Date/Visits Approved:   /      No Show/Cancelled Appts:   /       Medical Diagnosis: Polyneuropathy, unspecified [G62.9]  Paraplegia, unspecified [G82.20]  Cramp and spasm [R25.2]  Other reduced mobility [Z74.09]  Other specified health status [Z78.9]  Pain in left knee [M25.562]  Other chronic pain [G89.29]  Lumbago with sciatica, right side [M54.41] Back Pain LE Weakness  No data recorded     SUBJECTIVE EXAMINATION      History obtained from[de-identified] Patient, Chart Review,      Family/Caregiver Present: Yes     Subjective History: Onset Date: 23  Subjective: Limited tolerance for functkional ADL's and ambulation Pain with sustained activity General weakness Loss of sensation  Additional Pertinent Hx (if applicable): Patient presents to PT to levi kenyon aquatic based active exercise addressing multiple issues. Patient reports a past hx of chronic back pain with dx of multi level stenosis and multiple disc bulges. Patient reports she suffered significant post COVID effects including bilateral LE neuropathy with loss of muscle strength mass and sensation. Patient is currently under care by Dr Octavio Sorto as well as Pain Management.  Patient is engaging in independent exercise and is seeking to try aqautic based exercise as an additional/adjunctal exercise modality   Prior diagnostic testing[de-identified] MRI, CT Scan,

## 2023-07-05 ENCOUNTER — HOSPITAL ENCOUNTER (OUTPATIENT)
Dept: PHYSICAL THERAPY | Age: 48
Setting detail: THERAPIES SERIES
Discharge: HOME | End: 2023-07-05

## 2023-07-10 ENCOUNTER — HOSPITAL ENCOUNTER (OUTPATIENT)
Dept: PHYSICAL THERAPY | Age: 48
Setting detail: THERAPIES SERIES
Discharge: HOME | End: 2023-07-10
Payer: COMMERCIAL

## 2023-07-10 PROCEDURE — 97113 AQUATIC THERAPY/EXERCISES: CPT

## 2023-07-12 ENCOUNTER — HOSPITAL ENCOUNTER (OUTPATIENT)
Dept: PHYSICAL THERAPY | Age: 48
Setting detail: THERAPIES SERIES
Discharge: HOME | End: 2023-07-12
Payer: COMMERCIAL

## 2023-07-13 NOTE — PROGRESS NOTES
Knee extension  5    Braiding    Pelvic tilts  5    Bicycling  5  Scap squeezes          Shoulder flex/ext      Functional    Shoulder abd/add      Step    Shoulder H. abd/add      Lifting    Shoulder IR/ER      Hand to opp knee    Rowing      Push down squat    Bilateral pull down      UE PNF    Push/pull      LE PNF    Push downs      Wall push ups    Arm circles      SLS    Elbow flex/ext          Chin tuck      Stretching    UT shrugs/rolls      Gastroc/Soleus    Rocking horse      Hamstring          SKTC    Other      Piriformis          Hip flexor          Ladder pull          Pec stretch          Post deltoid           Timed Code Treatment Minutes:  60    Total Treatment Minutes:  60    Treatment/Activity Tolerance:  [] Patient tolerated treatment well [x] Patient limited by fatique  [x] Patient limited by pain [] Patient limited by other medical complications  [] Other:     Prognosis: [] Good [x] Fair  [] Poor    Patient Requires Follow-up: [x] Yes  [] No    Plan:   [x] Continue per plan of care [] Alter current plan (see comments)  [] Plan of care initiated [] Hold pending MD visit [] Discharge    Treatment Charges: Mins Units   Initial Evaluation     Re-Evaluation     Ther Exercise         TE     Aquatic Treatment 60 4   Ther Activities        TA     Gait Training          GT     Neuro Re-education NR     Modalities     Non-Billable Service Time     Other     Total Time/Units 60 4           Electronically signed by:  Minerva Dutton PTA [id number]

## 2023-07-14 ENCOUNTER — OFFICE VISIT (OUTPATIENT)
Dept: ORTHOPEDIC SURGERY | Age: 48
End: 2023-07-14

## 2023-07-14 VITALS — HEIGHT: 60 IN | WEIGHT: 225 LBS | BODY MASS INDEX: 44.17 KG/M2

## 2023-07-14 DIAGNOSIS — M25.561 RIGHT KNEE PAIN, UNSPECIFIED CHRONICITY: Primary | ICD-10-CM

## 2023-07-14 RX ORDER — BUPIVACAINE HYDROCHLORIDE 2.5 MG/ML
2 INJECTION, SOLUTION INFILTRATION; PERINEURAL ONCE
Status: COMPLETED | OUTPATIENT
Start: 2023-07-14 | End: 2023-07-14

## 2023-07-14 RX ORDER — TRIAMCINOLONE ACETONIDE 40 MG/ML
40 INJECTION, SUSPENSION INTRA-ARTICULAR; INTRAMUSCULAR ONCE
Status: COMPLETED | OUTPATIENT
Start: 2023-07-14 | End: 2023-07-14

## 2023-07-14 RX ADMIN — BUPIVACAINE HYDROCHLORIDE 5 MG: 2.5 INJECTION, SOLUTION INFILTRATION; PERINEURAL at 12:15

## 2023-07-14 RX ADMIN — TRIAMCINOLONE ACETONIDE 40 MG: 40 INJECTION, SUSPENSION INTRA-ARTICULAR; INTRAMUSCULAR at 12:16

## 2023-07-14 NOTE — PROGRESS NOTES
[facility]  ORTHOPAEDICS AND SPORTS MEDICINE  DATE OF VISIT: 07/14/23  New Knee Patient     Referring Provider:   No referring provider defined for this encounter. CHIEF COMPLAINT:   Chief Complaint   Patient presents with    Knee Pain     Rt knee pain since 6/2023, wears a neoprene sleeve, states that she has been doing a lot of walking recently, thinks twisted the knee; has been evaluated by her chiropractor and told her tore her meniscus, she states that she say PCP and they referred to Ortho and no one can get her into office until end of office and that is why she came today for evaluation    Pain     Rt knee 6 - 8 out of 10 - medial aspect - knee naseem when walking     Results     Rt knee XR - [facility] - [facility]         HPI:      Rhett Carrion is a 50y.o. year old female who is seen today  for evaluation of right knee pain. Patient reports onset of pain 1 month ago the beginning of June. States that she twisted her knee falling backwards landing onto her buttocks. Does not recall injuring the knee specifically does not recall feeling a pop or tearing sensation. Denies mechanical symptoms however has been experiencing random sharp pains followed by buckling of her knee. Denies recurrence of injury. Was evaluated by her PCP provider who placed referral to orthopedics however she cannot be seen by their office until the end of August.  Presents to walk-in care today for evaluation. PAST MEDICAL HISTORY  History reviewed. No pertinent past medical history.     PAST SURGICAL HISTORY  Past Surgical History:   Procedure Laterality Date    ANKLE SURGERY      bilateral    APPENDECTOMY      DILATION AND CURETTAGE OF UTERUS      ELBOW SURGERY      right    ENDOMETRIAL ABLATION  07/2016    GALLBLADDER SURGERY      LEEP      cervical conization    TUBAL LIGATION           FAMILY HISTORY   Family History   Problem Relation Age of Onset    Heart Failure Mother        SOCIAL HISTORY  Social History

## 2023-07-17 ENCOUNTER — HOSPITAL ENCOUNTER (OUTPATIENT)
Dept: PHYSICAL THERAPY | Age: 48
Setting detail: THERAPIES SERIES
Discharge: HOME | End: 2023-07-17
Payer: COMMERCIAL

## 2023-07-17 PROCEDURE — 97113 AQUATIC THERAPY/EXERCISES: CPT

## 2023-07-19 ENCOUNTER — HOSPITAL ENCOUNTER (OUTPATIENT)
Dept: PHYSICAL THERAPY | Age: 48
Setting detail: THERAPIES SERIES
Discharge: HOME | End: 2023-07-19
Payer: COMMERCIAL

## 2023-07-19 PROCEDURE — 97110 THERAPEUTIC EXERCISES: CPT

## 2023-07-24 ENCOUNTER — HOSPITAL ENCOUNTER (OUTPATIENT)
Dept: PHYSICAL THERAPY | Age: 48
Setting detail: THERAPIES SERIES
Discharge: HOME | End: 2023-07-24
Payer: COMMERCIAL

## 2023-07-24 PROCEDURE — 97110 THERAPEUTIC EXERCISES: CPT

## 2023-07-25 NOTE — PROGRESS NOTES
Ladder pull          Pec stretch          Post deltoid           Timed Code Treatment Minutes:  60    Total Treatment Minutes:  60    Treatment/Activity Tolerance:  [] Patient tolerated treatment well [x] Patient limited by fatique  [x] Patient limited by pain [] Patient limited by other medical complications  [] Other:     Prognosis: [] Good [x] Fair  [] Poor    Patient Requires Follow-up: [x] Yes  [] No    Plan: Aquatic therapy to Increase functional mobility ; Increase ROM; Increase strength; Increase endurance; Improve posture;Decrease pain   [x] Continue per plan of care [] Alter current plan (see comments)  [] Plan of care initiated [] Hold pending MD visit [] Discharge    Treatment Charges: Mins Units   Initial Evaluation     Re-Evaluation     Ther Exercise         TE 60 4   Aquatic Treatment     Ther Activities        TA     Gait Training          GT     Neuro Re-education NR     Modalities     Non-Billable Service Time     Other     Total Time/Units 60 4           Electronically signed by:  Isabelle Esposito PTA [id number]

## 2023-07-26 ENCOUNTER — HOSPITAL ENCOUNTER (OUTPATIENT)
Dept: PHYSICAL THERAPY | Age: 48
Setting detail: THERAPIES SERIES
Discharge: HOME | End: 2023-07-26
Payer: COMMERCIAL

## 2023-08-06 DIAGNOSIS — R25.2 SPASTICITY: ICD-10-CM

## 2023-08-07 ENCOUNTER — APPOINTMENT (OUTPATIENT)
Dept: PHYSICAL THERAPY | Age: 48
End: 2023-08-07
Payer: COMMERCIAL

## 2023-08-08 ENCOUNTER — TELEPHONE (OUTPATIENT)
Dept: PHYSICAL MEDICINE AND REHAB | Age: 48
End: 2023-08-08

## 2023-08-09 ENCOUNTER — HOSPITAL ENCOUNTER (OUTPATIENT)
Dept: PHYSICAL THERAPY | Age: 48
Setting detail: THERAPIES SERIES
Discharge: HOME | End: 2023-08-09

## 2023-08-09 DIAGNOSIS — R25.2 SPASTICITY: ICD-10-CM

## 2023-08-13 RX ORDER — BACLOFEN 10 MG/1
TABLET ORAL
Qty: 120 TABLET | Refills: 2 | Status: SHIPPED | OUTPATIENT
Start: 2023-08-13

## 2023-08-14 ENCOUNTER — HOSPITAL ENCOUNTER (OUTPATIENT)
Dept: PHYSICAL THERAPY | Age: 48
Setting detail: THERAPIES SERIES
Discharge: HOME | End: 2023-08-14

## 2023-08-16 ENCOUNTER — HOSPITAL ENCOUNTER (OUTPATIENT)
Dept: PHYSICAL THERAPY | Age: 48
Setting detail: THERAPIES SERIES
Discharge: HOME | End: 2023-08-16

## 2023-08-16 ENCOUNTER — TELEPHONE (OUTPATIENT)
Dept: PHYSICAL MEDICINE AND REHAB | Age: 48
End: 2023-08-16

## 2023-08-16 DIAGNOSIS — G62.9 POLYNEUROPATHY: Primary | ICD-10-CM

## 2023-08-16 DIAGNOSIS — R26.9 IMPAIRED GAIT: ICD-10-CM

## 2023-08-16 DIAGNOSIS — R53.1 WEAKNESS: ICD-10-CM

## 2023-08-24 ENCOUNTER — OFFICE VISIT (OUTPATIENT)
Dept: ORTHOPEDIC SURGERY | Age: 48
End: 2023-08-24
Payer: COMMERCIAL

## 2023-08-24 ENCOUNTER — TELEPHONE (OUTPATIENT)
Dept: PHYSICAL MEDICINE AND REHAB | Age: 48
End: 2023-08-24

## 2023-08-24 VITALS — WEIGHT: 225 LBS | HEIGHT: 60 IN | BODY MASS INDEX: 44.17 KG/M2

## 2023-08-24 DIAGNOSIS — M25.561 RIGHT KNEE PAIN, UNSPECIFIED CHRONICITY: Primary | ICD-10-CM

## 2023-08-24 PROCEDURE — 4004F PT TOBACCO SCREEN RCVD TLK: CPT | Performed by: NURSE PRACTITIONER

## 2023-08-24 PROCEDURE — G8427 DOCREV CUR MEDS BY ELIG CLIN: HCPCS | Performed by: NURSE PRACTITIONER

## 2023-08-24 PROCEDURE — G8417 CALC BMI ABV UP PARAM F/U: HCPCS | Performed by: NURSE PRACTITIONER

## 2023-08-24 PROCEDURE — 99213 OFFICE O/P EST LOW 20 MIN: CPT | Performed by: NURSE PRACTITIONER

## 2023-08-24 NOTE — PROGRESS NOTES
[facility]   ORTHOPAEDIC SURGERY AND SPORTS MEDICINE  DATE OF VISIT: 08/24/23  Follow Up Visit     CHIEF COMPLAINT:   Chief Complaint   Patient presents with    Knee Pain     Right knee pain, pt states her knee pain is on the medial aspect of her knee. She rates her knee pain a 4 out of 10 currently. HPI:    Randall Kincaid is a 50y.o. year old female who presented to the office today for follow up of right knee pain, previously evaluated on 7/14/2023. Previous treatment has included right knee corticosteroid injection, home exercises. Patient reports symptoms have improved but not completely. She still reports some mild to moderate pain. Overall has responded to previous treatment. REVIEW OF SYSTEMS:     General: Negative Fever, chills, fatigue   Cardiovascular: Negative chest pain, palpitations  Respiratory: Equal chest expansion, negative shortness of breath   Skin: Negative rash, open wounds  Psych: Normal affect, mood stable  Neurologic: sensation grossly intact in extremities   Musculoskeletal: See HPI      Physical Exam:     Height: 5' (1.524 m), Weight - Scale: 225 lb (102.1 kg)    General: Alert and oriented x3, no acute distress  Cardiovascular/pulmonary: No labored breathing, peripheral perfusion intact  Musculoskeletal:    Right knee exam negative swelling or deformity. Range of motion full. Mild tenderness across the anterior joint line. Stable ligamentous testing. Patella tracks midline. Controlled Substances Monitoring:      Imaging:  Previous imaging reviewed display maintained joint space      Assessment: Right knee pain      Plan: Today we discussed her right knee. Following corticosteroid injection 6 weeks ago and home exercises she reports improvement of the symptoms. However symptoms have not completely resolved she still reports mild to moderate pain at time of visit today she rates it a 4 on a 0-to-10 scale.   Treatment options discussed with her today included continued

## 2023-08-31 RX ORDER — BACLOFEN 10 MG/1
TABLET ORAL
Qty: 120 TABLET | Refills: 2 | OUTPATIENT
Start: 2023-08-31

## 2023-09-11 ENCOUNTER — HOSPITAL ENCOUNTER (OUTPATIENT)
Dept: MRI IMAGING | Age: 48
Discharge: HOME | End: 2023-09-13
Payer: COMMERCIAL

## 2023-09-11 DIAGNOSIS — M25.561 RIGHT KNEE PAIN, UNSPECIFIED CHRONICITY: ICD-10-CM

## 2023-09-11 PROCEDURE — 73721 MRI JNT OF LWR EXTRE W/O DYE: CPT

## 2023-10-04 ENCOUNTER — HOSPITAL ENCOUNTER (OUTPATIENT)
Dept: PHYSICAL THERAPY | Age: 48
Setting detail: THERAPIES SERIES
Discharge: HOME | End: 2023-10-04
Payer: COMMERCIAL

## 2023-10-04 PROCEDURE — 97164 PT RE-EVAL EST PLAN CARE: CPT

## 2023-10-04 PROCEDURE — 97161 PT EVAL LOW COMPLEX 20 MIN: CPT

## 2023-10-04 NOTE — PROGRESS NOTES
Left Strength  Right Strength         Strength LLE  L Hip Flexion: 3+/5  L Hip Extension: 3/5  L Hip ABduction: 3/5    Strength RLE  R Hip Flexion: 3/5  R Hip Extension: 3/5  R Hip ABduction: 3/5     Cervical Assessment               Thoracic Assessment             Lumbar Assessment     AROM Lumbar Spine   Lumbar Spine AROM : Painful  Measured as: Degrees  Flexion: 65  Extension: 25       Trunk Strength     Trunk Strength  Trunk Flexion: 3/5  Trunk Extension: 3/5     Muscle Length/Flexibility: Muscle Length LE  Right Psoas / Iliacus: Tight  Right Hamstrings: Tight  Left Psoas / Iliacus: Tight  Left Hamstrings: Tight    Joint Mobility (if applicable):        Special Tests:        Balance/Gait Assessment(s) Performed:   Not Assessed    Additional Finding(s) (if applicable):    NA       ASSESSMENT     Impression:      Body Structures, Functions, Activity Limitations Requiring Skilled Therapeutic Intervention: Decreased functional mobility , Decreased ROM, Decreased strength, Decreased endurance, Decreased balance, Decreased coordination, Decreased posture, Increased pain    Statement of Medical Necessity: Physical Therapy is both indicated and medically necessary as outlined in the POC to increase the likelihood of meeting the functionally related goals stated below. Patient's Activity Tolerance: Patient tolerated evaluation without incident      Patient's rehabilitation potential/prognosis is considered to be:  Fair    Factors which may impact rehabilitation potential include: Chronicity of problem, Medical co-morbidities        GOALS   Patient Goal(s):    Short Term Goals Completed by   Goal Status                                     Long Term Goals Completed by 5 weeks Goal Status   Strength: Trunk/core 3 to 3+/5 LE Strength 3+ to 4-/5 New   Endurance with ADL's and ambulation FAir or better New   Patient will be able to maintain and self direct an appropriate follow up independent exercise program

## 2023-10-04 NOTE — PLAN OF CARE
Statement of Medical Necessity: Physical Therapy is both indicated and medically necessary as outlined in the POC to increase the likelihood of meeting the functionally related goals stated below. Patient's Activity Tolerance: Patient tolerated evaluation without incident      Patient's rehabilitation potential/prognosis is considered to be: Fair     Factors which may impact rehabilitation potential include: Chronicity of problem, Medical co-morbidities     Conditions Requiring Skilled Therapeutic Intervention  Body Structures, Functions, Activity Limitations Requiring Skilled Therapeutic Intervention: Decreased functional mobility ; Decreased ROM; Decreased strength;Decreased endurance;Decreased balance;Decreased coordination;Decreased posture; Increased pain  Therapy Prognosis: Fair  Activity Tolerance  Activity Tolerance: Patient tolerated evaluation without incident  Activity Tolerance: Patient tolerated evaluation without incident       Physcial Therapy Plan  Plan weeks: 5 weeks  Current Treatment Recommendations: Strengthening, ROM, Balance training, Functional mobility training, Endurance training, Home exercise program, Patient/Caregiver education & training, Modalities, Aquatics    G-Code:       Balance and Gait:  Not Assessed    OutComes Score:                                                     AM-PAC Score:             Goals:  Long Term Goals  Time Frame for Long Term Goals : 5 weeks  Long Term Goal 1: Strength: Trunk/core 3 to 3+/5 LE Strength 3+ to 4-/5  LTG Goal 1 Status[de-identified] New  Long Term Goal 2: Endurance with ADL's and ambulation FAir or better  LTG Goal 2 Status[de-identified] New  Long Term Goal 3: Patient will be able to maintain and self direct an appropriate follow up independent exercise program  Patient Goals   Patient Goals :  Increase trunk and LE strength Improve pain control with ADL's      Patient Status: [x] Continue / Initiate Plan of Care   Frequency / Duration:  Patient to be seen 2 times per

## 2023-10-06 ENCOUNTER — OFFICE VISIT (OUTPATIENT)
Dept: ORTHOPEDIC SURGERY | Age: 48
End: 2023-10-06

## 2023-10-06 DIAGNOSIS — M25.561 RIGHT KNEE PAIN, UNSPECIFIED CHRONICITY: Primary | ICD-10-CM

## 2023-10-06 RX ORDER — BUPIVACAINE HYDROCHLORIDE 2.5 MG/ML
2 INJECTION, SOLUTION INFILTRATION; PERINEURAL ONCE
Status: COMPLETED | OUTPATIENT
Start: 2023-10-06 | End: 2023-10-06

## 2023-10-06 RX ORDER — TRIAMCINOLONE ACETONIDE 40 MG/ML
40 INJECTION, SUSPENSION INTRA-ARTICULAR; INTRAMUSCULAR ONCE
Status: COMPLETED | OUTPATIENT
Start: 2023-10-06 | End: 2023-10-06

## 2023-10-06 RX ADMIN — TRIAMCINOLONE ACETONIDE 40 MG: 40 INJECTION, SUSPENSION INTRA-ARTICULAR; INTRAMUSCULAR at 08:45

## 2023-10-06 RX ADMIN — BUPIVACAINE HYDROCHLORIDE 5 MG: 2.5 INJECTION, SOLUTION INFILTRATION; PERINEURAL at 08:44

## 2023-10-06 NOTE — PROGRESS NOTES
[facility]   ORTHOPAEDIC SURGERY AND SPORTS MEDICINE  DATE OF VISIT: 10/06/23  Follow Up Visit     CHIEF COMPLAINT:   Chief Complaint   Patient presents with    Results     R knee MRI review        HPI:    Callie Garcia is a 50y.o. year old female who presented to the office today for follow up of right knee pain, previously evaluated on 8/24/23. Previous treatment has included injection, MRI. She reports symptoms are mildly improve. She is here to review MRI findings. REVIEW OF SYSTEMS:     Constitutional:  Negative for weight loss, fevers, chills, fatigue  Cardiovascular: Negative for chest pain, palpitations  Pulmonary: Negative for shortness of breath, labored breathing, cough  GI: negative for abdominal pain, nausea, vomiting   MSK: per HPI  Skin: negative for rash, open wounds    All other systems reviewed and are negative       Physical Exam:     No data recorded    General: Alert and oriented x3, no acute distress  Cardiovascular/pulmonary: No labored breathing, peripheral perfusion intact  Musculoskeletal:    Right knee exam displays range of motion 0-1 30, positive medial joint line tenderness. Negative swelling or palpable effusion. Stable valgus varus stress testing. Intact posterior drawer and Lachman test.  Knee is grossly stable on exam.  Patella track midline    Controlled Substances Monitoring:      Imaging:  MRI shows evidence of radial tear to the posterior horn of the medial meniscus, medial meniscus root tear. Evidence of full-thickness cartilage loss of the medial compartment, chondromalacia to the patellofemoral compartment    Procedure Note: Knee Cortisone injection     The left knee was identified as the injection site. The risk and benefits of a cortisone injection were explained and the patient consented to the injection. Under sterile conditions, the knee was injected with a mixture of 40 mg of Kenalog and local anesthetic without complication.  A sterile bandage was

## 2023-11-01 ENCOUNTER — HOSPITAL ENCOUNTER (OUTPATIENT)
Dept: PHYSICAL THERAPY | Age: 48
Setting detail: THERAPIES SERIES
Discharge: HOME | End: 2023-11-01

## 2023-11-06 ENCOUNTER — HOSPITAL ENCOUNTER (OUTPATIENT)
Dept: PHYSICAL THERAPY | Age: 48
Setting detail: THERAPIES SERIES
Discharge: HOME | End: 2023-11-06
Payer: COMMERCIAL

## 2023-11-08 ENCOUNTER — HOSPITAL ENCOUNTER (OUTPATIENT)
Dept: PHYSICAL THERAPY | Age: 48
Setting detail: THERAPIES SERIES
Discharge: HOME | End: 2023-11-08
Payer: COMMERCIAL

## 2023-11-08 DIAGNOSIS — R25.2 SPASTICITY: ICD-10-CM

## 2023-11-08 RX ORDER — BACLOFEN 10 MG/1
TABLET ORAL
Qty: 120 TABLET | Refills: 2 | Status: SHIPPED | OUTPATIENT
Start: 2023-11-08

## 2023-11-13 ENCOUNTER — HOSPITAL ENCOUNTER (OUTPATIENT)
Dept: PHYSICAL THERAPY | Age: 48
Setting detail: THERAPIES SERIES
Discharge: HOME | End: 2023-11-13
Payer: COMMERCIAL

## 2023-11-15 ENCOUNTER — HOSPITAL ENCOUNTER (OUTPATIENT)
Dept: PHYSICAL THERAPY | Age: 48
Setting detail: THERAPIES SERIES
Discharge: HOME | End: 2023-11-15
Payer: COMMERCIAL

## 2023-11-20 ENCOUNTER — HOSPITAL ENCOUNTER (OUTPATIENT)
Dept: PHYSICAL THERAPY | Age: 48
Setting detail: THERAPIES SERIES
Discharge: HOME | End: 2023-11-20
Payer: COMMERCIAL

## 2023-11-29 ENCOUNTER — HOSPITAL ENCOUNTER (OUTPATIENT)
Dept: PHYSICAL THERAPY | Age: 48
Setting detail: THERAPIES SERIES
Discharge: HOME | End: 2023-11-29
Payer: COMMERCIAL

## 2023-12-04 ENCOUNTER — HOSPITAL ENCOUNTER (OUTPATIENT)
Dept: PHYSICAL THERAPY | Age: 48
Setting detail: THERAPIES SERIES
Discharge: HOME | End: 2023-12-04

## 2023-12-06 ENCOUNTER — HOSPITAL ENCOUNTER (OUTPATIENT)
Dept: PHYSICAL THERAPY | Age: 48
Setting detail: THERAPIES SERIES
Discharge: HOME | End: 2023-12-06

## 2023-12-11 ENCOUNTER — HOSPITAL ENCOUNTER (OUTPATIENT)
Dept: PHYSICAL THERAPY | Age: 48
Setting detail: THERAPIES SERIES
End: 2023-12-11
Payer: COMMERCIAL

## 2023-12-13 ENCOUNTER — TELEPHONE (OUTPATIENT)
Dept: PHYSICAL MEDICINE AND REHAB | Age: 48
End: 2023-12-13

## 2023-12-13 ENCOUNTER — APPOINTMENT (OUTPATIENT)
Dept: PHYSICAL THERAPY | Age: 48
End: 2023-12-13
Payer: COMMERCIAL

## 2024-01-02 ENCOUNTER — OFFICE VISIT (OUTPATIENT)
Dept: ORTHOPEDIC SURGERY | Age: 49
End: 2024-01-02
Payer: COMMERCIAL

## 2024-01-02 VITALS — BODY MASS INDEX: 44.17 KG/M2 | WEIGHT: 225 LBS | HEIGHT: 60 IN

## 2024-01-02 DIAGNOSIS — M25.561 RIGHT KNEE PAIN, UNSPECIFIED CHRONICITY: Primary | ICD-10-CM

## 2024-01-02 PROCEDURE — 4004F PT TOBACCO SCREEN RCVD TLK: CPT | Performed by: NURSE PRACTITIONER

## 2024-01-02 PROCEDURE — G8427 DOCREV CUR MEDS BY ELIG CLIN: HCPCS | Performed by: NURSE PRACTITIONER

## 2024-01-02 PROCEDURE — 3017F COLORECTAL CA SCREEN DOC REV: CPT | Performed by: NURSE PRACTITIONER

## 2024-01-02 PROCEDURE — 20610 DRAIN/INJ JOINT/BURSA W/O US: CPT | Performed by: NURSE PRACTITIONER

## 2024-01-02 PROCEDURE — G8484 FLU IMMUNIZE NO ADMIN: HCPCS | Performed by: NURSE PRACTITIONER

## 2024-01-02 PROCEDURE — G8417 CALC BMI ABV UP PARAM F/U: HCPCS | Performed by: NURSE PRACTITIONER

## 2024-01-02 PROCEDURE — 99213 OFFICE O/P EST LOW 20 MIN: CPT | Performed by: NURSE PRACTITIONER

## 2024-01-02 RX ORDER — TRIAMCINOLONE ACETONIDE 40 MG/ML
40 INJECTION, SUSPENSION INTRA-ARTICULAR; INTRAMUSCULAR ONCE
Status: COMPLETED | OUTPATIENT
Start: 2024-01-02 | End: 2024-01-02

## 2024-01-02 RX ORDER — BUPIVACAINE HYDROCHLORIDE 2.5 MG/ML
2 INJECTION, SOLUTION INFILTRATION; PERINEURAL ONCE
Status: COMPLETED | OUTPATIENT
Start: 2024-01-02 | End: 2024-01-02

## 2024-01-02 RX ADMIN — TRIAMCINOLONE ACETONIDE 40 MG: 40 INJECTION, SUSPENSION INTRA-ARTICULAR; INTRAMUSCULAR at 11:17

## 2024-01-02 RX ADMIN — BUPIVACAINE HYDROCHLORIDE 5 MG: 2.5 INJECTION, SOLUTION INFILTRATION; PERINEURAL at 11:17

## 2024-01-02 NOTE — PROGRESS NOTES
know once injections are available if she would like to proceed.      Demetrio Hoffman, LLUVIA-CNP  Orthopaedic Surgery   1/2/24  11:28 AM

## 2024-01-03 ENCOUNTER — TELEPHONE (OUTPATIENT)
Dept: ORTHOPEDIC SURGERY | Age: 49
End: 2024-01-03

## 2024-01-03 NOTE — TELEPHONE ENCOUNTER
Patient was seen in office on 1/2/2024, they would like to proceed with right knee visco supplementation authorization.     Form filled out / process started for right knee Gelsyn ~ 3 authorization

## 2024-01-08 ENCOUNTER — OFFICE VISIT (OUTPATIENT)
Dept: PHYSICAL MEDICINE AND REHAB | Age: 49
End: 2024-01-08
Payer: COMMERCIAL

## 2024-01-08 VITALS
BODY MASS INDEX: 41.03 KG/M2 | HEART RATE: 78 BPM | WEIGHT: 209 LBS | OXYGEN SATURATION: 99 % | SYSTOLIC BLOOD PRESSURE: 126 MMHG | DIASTOLIC BLOOD PRESSURE: 82 MMHG | HEIGHT: 60 IN

## 2024-01-08 DIAGNOSIS — M21.372 BILATERAL FOOT-DROP: ICD-10-CM

## 2024-01-08 DIAGNOSIS — G62.9 POLYNEUROPATHY: Primary | ICD-10-CM

## 2024-01-08 DIAGNOSIS — M21.371 BILATERAL FOOT-DROP: ICD-10-CM

## 2024-01-08 DIAGNOSIS — G89.29 CHRONIC MIDLINE LOW BACK PAIN WITH RIGHT-SIDED SCIATICA: ICD-10-CM

## 2024-01-08 DIAGNOSIS — Z78.9 IMPAIRED MOBILITY AND ADLS: ICD-10-CM

## 2024-01-08 DIAGNOSIS — G89.29 CHRONIC PAIN OF RIGHT KNEE: ICD-10-CM

## 2024-01-08 DIAGNOSIS — R26.9 IMPAIRED GAIT: ICD-10-CM

## 2024-01-08 DIAGNOSIS — Z74.09 IMPAIRED MOBILITY AND ADLS: ICD-10-CM

## 2024-01-08 DIAGNOSIS — R25.2 SPASTICITY: ICD-10-CM

## 2024-01-08 DIAGNOSIS — G82.20 PARAPARESIS (HCC): ICD-10-CM

## 2024-01-08 DIAGNOSIS — M25.561 CHRONIC PAIN OF RIGHT KNEE: ICD-10-CM

## 2024-01-08 DIAGNOSIS — M54.41 CHRONIC MIDLINE LOW BACK PAIN WITH RIGHT-SIDED SCIATICA: ICD-10-CM

## 2024-01-08 PROCEDURE — G8417 CALC BMI ABV UP PARAM F/U: HCPCS | Performed by: PHYSICAL MEDICINE & REHABILITATION

## 2024-01-08 PROCEDURE — G8427 DOCREV CUR MEDS BY ELIG CLIN: HCPCS | Performed by: PHYSICAL MEDICINE & REHABILITATION

## 2024-01-08 PROCEDURE — 4004F PT TOBACCO SCREEN RCVD TLK: CPT | Performed by: PHYSICAL MEDICINE & REHABILITATION

## 2024-01-08 PROCEDURE — 99214 OFFICE O/P EST MOD 30 MIN: CPT | Performed by: PHYSICAL MEDICINE & REHABILITATION

## 2024-01-08 PROCEDURE — 3017F COLORECTAL CA SCREEN DOC REV: CPT | Performed by: PHYSICAL MEDICINE & REHABILITATION

## 2024-01-08 PROCEDURE — G8484 FLU IMMUNIZE NO ADMIN: HCPCS | Performed by: PHYSICAL MEDICINE & REHABILITATION

## 2024-01-08 NOTE — PROGRESS NOTES
L  Biceps   2 2  Triceps  2 2  BR   2 2   Patellar  3 3   Ankle Jerk  3 3    No pathological reflexes     Gait: Sit to stand independently. Still slightly wide based gait, normal stride length. Steady in office.         Impression:   1. Polyneuropathy    2. Paraparesis (HCC)    3. Bilateral foot-drop    4. Impaired gait    5. Spasticity    6. Impaired mobility and ADLs    7. Chronic pain of right knee    8. Chronic midline low back pain with right-sided sciatica         Plan:   Begin Aquatic therapy. Orders placed.   Continue home exercise program.  Continue gabapentin 300mg TID for neuropathic pain  Continue Baclofen for spasticity.    No longer requires AFO braces due to improved lower extremity strength.  Patient is now independent for ADLs and mobility  She will continue to follow with [facility] Pain management for her low back pain.   Patient's primary care physician is monitoring her B12 levels, which have been appropriate.   The patient was educated about the diagnosis, prognosis, indications, risks and benefits of treatment. An opportunity to ask questions was given to the patient and questions were answered.  The patient agreed to proceed with the recommended treatment as described above.   Follow up 1 year       Gabriella Miles M.D.  Physical Medicine and Rehabilitation

## 2024-01-17 ENCOUNTER — TELEPHONE (OUTPATIENT)
Dept: ADMINISTRATIVE | Age: 49
End: 2024-01-17

## 2024-01-17 NOTE — TELEPHONE ENCOUNTER
1/11/2024 - [employer] - Auth # [id number] - Gelsyn ~ 3 - Right knee - Apporved - 1/3/2024 thru 7/3/2024

## 2024-01-30 DIAGNOSIS — R25.2 SPASTICITY: ICD-10-CM

## 2024-01-31 RX ORDER — GABAPENTIN 300 MG/1
CAPSULE ORAL
Qty: 90 CAPSULE | Refills: 11 | OUTPATIENT
Start: 2024-01-31 | End: 2025-01-29

## 2024-01-31 RX ORDER — BACLOFEN 10 MG/1
TABLET ORAL
Qty: 120 TABLET | Refills: 5 | Status: SHIPPED | OUTPATIENT
Start: 2024-01-31

## 2024-02-01 ENCOUNTER — OFFICE VISIT (OUTPATIENT)
Dept: ORTHOPEDIC SURGERY | Age: 49
End: 2024-02-01
Payer: COMMERCIAL

## 2024-02-01 VITALS — WEIGHT: 225 LBS | HEIGHT: 60 IN | BODY MASS INDEX: 44.17 KG/M2

## 2024-02-01 DIAGNOSIS — M17.11 OSTEOARTHRITIS OF RIGHT KNEE, UNSPECIFIED OSTEOARTHRITIS TYPE: Primary | ICD-10-CM

## 2024-02-01 PROCEDURE — 99999 PR OFFICE/OUTPT VISIT,PROCEDURE ONLY: CPT | Performed by: NURSE PRACTITIONER

## 2024-02-01 PROCEDURE — 20610 DRAIN/INJ JOINT/BURSA W/O US: CPT | Performed by: NURSE PRACTITIONER

## 2024-02-01 RX ORDER — BUPIVACAINE HYDROCHLORIDE 2.5 MG/ML
2 INJECTION, SOLUTION INFILTRATION; PERINEURAL ONCE
Status: COMPLETED | OUTPATIENT
Start: 2024-02-01 | End: 2024-02-01

## 2024-02-01 RX ORDER — TRIAMCINOLONE ACETONIDE 40 MG/ML
40 INJECTION, SUSPENSION INTRA-ARTICULAR; INTRAMUSCULAR ONCE
Status: COMPLETED | OUTPATIENT
Start: 2024-02-01 | End: 2024-02-01

## 2024-02-01 RX ADMIN — TRIAMCINOLONE ACETONIDE 40 MG: 40 INJECTION, SUSPENSION INTRA-ARTICULAR; INTRAMUSCULAR at 15:12

## 2024-02-01 RX ADMIN — BUPIVACAINE HYDROCHLORIDE 5 MG: 2.5 INJECTION, SOLUTION INFILTRATION; PERINEURAL at 15:12

## 2024-02-01 NOTE — PROGRESS NOTES
[facility]  ORTHOPAEDICS AND SPORTS MEDICINE  DATE OF VISIT: 02/02/24  Follow Up Visit for Visco Injection    CHIEF COMPLAINT:   Chief Complaint   Patient presents with    Knee Pain     Pt is here today for right knee Gelysn injection # 1 of 3.          Nhung Porter returns for follow up visit for visco supplement injection 1.  She reports symptoms are unchanged    Physical Exam:   General: Alert and oriented x3, no acute distress  Cardiovascular/pulmonary: No labored breathing, peripheral perfusion intact  Musculoskeletal:    right knee:    Range of motion is functional   Patella is stable tracking midline  There is no laxity with varus/valgus stress at 0 and 30 degrees of flexion.    There is no tenderness to palpation along the medial joint line.    There is no tenderness to palpation along the lateral joint line       Imaging: Reviewed     Procedure:  Right knee Baker's cyst aspiration    The risks and benefits of knee aspiration were explained and the patient elected to proceed.  The Right knee was prepped in sterile fashion and the skin was anesthetized with ethyl chloride spray.  The joint was entered from a posterior location distal to proximal approach under ultrasound with an 18g needle and 10 cc of clear yellow serosanguineous fluid was aspirated without difficulty.  The fluid was discarded.  The site was covered with a bandaid.  The patient tolerated well.        Procedure Note: Knee viscosupplementation injection     The right knee was identified as the injection site. The risk and benefits of injection were explained and the patient consented to the injection. Under sterile conditions, the knee was injected with a full dose of Gelsyn-3. A sterile bandage was applied.    Administrations This Visit         sodium hyaluronate (Viscosup) injection SOSY 16.8 mg       Admin Date  02/02/2024 Action  Given Dose  16.8 mg Route  Intra-artICUlar Administered By  Chika Pressley CMA

## 2024-02-08 ENCOUNTER — NURSE ONLY (OUTPATIENT)
Dept: ORTHOPEDIC SURGERY | Age: 49
End: 2024-02-08
Payer: COMMERCIAL

## 2024-02-08 ENCOUNTER — HOSPITAL ENCOUNTER (OUTPATIENT)
Dept: PHYSICAL THERAPY | Age: 49
Setting detail: THERAPIES SERIES
Discharge: HOME | End: 2024-02-08
Payer: COMMERCIAL

## 2024-02-08 VITALS — HEIGHT: 60 IN | BODY MASS INDEX: 44.17 KG/M2 | WEIGHT: 225 LBS

## 2024-02-08 DIAGNOSIS — M17.11 OSTEOARTHRITIS OF RIGHT KNEE, UNSPECIFIED OSTEOARTHRITIS TYPE: Primary | ICD-10-CM

## 2024-02-08 PROCEDURE — 97161 PT EVAL LOW COMPLEX 20 MIN: CPT | Performed by: PHYSICAL THERAPIST

## 2024-02-08 PROCEDURE — 20610 DRAIN/INJ JOINT/BURSA W/O US: CPT | Performed by: NURSE PRACTITIONER

## 2024-02-08 ASSESSMENT — PAIN DESCRIPTION - PAIN TYPE: TYPE: CHRONIC PAIN

## 2024-02-08 ASSESSMENT — PAIN SCALES - GENERAL: PAINLEVEL_OUTOF10: 4

## 2024-02-08 ASSESSMENT — PAIN DESCRIPTION - LOCATION: LOCATION: KNEE;BACK

## 2024-02-08 NOTE — PROGRESS NOTES
Physical Therapy    Physical Therapy: Initial Evaluation    Patient: Nhung Porter (48 y.o. female)   Examination Date: 2024  Plan of Care Certification Period: 2024 to        :  1975 ;    Confirmed: Yes MRN: [id number]  CSN: [id number]   Insurance: Payor: [facility] / Plan: [facility] MEDICAID / Product Type: *No Product type* /   Insurance ID: [id number] - (Medicaid Managed) Secondary Insurance (if applicable):    Referring Physician: Gabriella Miles MD     PCP: Cornel Ornelas DO Visits to Date/Visits Approved: 1 /      No Show/Cancelled Appts:   /       Medical Diagnosis: Polyneuropathy, unspecified [G62.9]  Paraplegia, unspecified [G82.20]  Cramp and spasm [R25.2]  Other reduced mobility [Z74.09]  Other specified health status [Z78.9]  Pain in left knee [M25.562]  Other chronic pain [G89.29]  Lumbago with sciatica, right side [M54.41]    Treatment Diagnosis:       PERTINENT MEDICAL HISTORY           Medical History: Chart Reviewed: Yes No past medical history on file.  Surgical History:   Past Surgical History:   Procedure Laterality Date    ANKLE SURGERY      bilateral    APPENDECTOMY      DILATION AND CURETTAGE OF UTERUS      ELBOW SURGERY      right    ENDOMETRIAL ABLATION  2016    GALLBLADDER SURGERY      LEEP      cervical conization    TUBAL LIGATION         Medications:   Current Outpatient Medications:     baclofen (LIORESAL) 10 MG tablet, TAKE 1 TABLET BY MOUTH EVERY MORNING , 1 EVERY AFTERNOON AND 2 TABLETS EVERY EVENING, Disp: 120 tablet, Rfl: 5    diclofenac sodium (VOLTAREN) 1 % GEL, Apply 4 g topically 4 times daily as needed for Pain, Disp: 350 g, Rfl: 4    gabapentin (NEURONTIN) 300 MG capsule, take 1 capsule by mouth three times a day, Disp: 90 capsule, Rfl: 11    olopatadine (PATANOL) 0.1 % ophthalmic solution, , Disp: , Rfl:     RESTASIS 0.05 % ophthalmic emulsion, , Disp: , Rfl:     folic acid (FOLVITE) 1 MG tablet, Take 1 tablet by mouth daily, Disp: 30

## 2024-02-22 ENCOUNTER — NURSE ONLY (OUTPATIENT)
Dept: ORTHOPEDIC SURGERY | Age: 49
End: 2024-02-22
Payer: COMMERCIAL

## 2024-02-22 VITALS — WEIGHT: 209 LBS | BODY MASS INDEX: 41.03 KG/M2 | HEIGHT: 60 IN

## 2024-02-22 DIAGNOSIS — M17.11 OSTEOARTHRITIS OF RIGHT KNEE, UNSPECIFIED OSTEOARTHRITIS TYPE: Primary | ICD-10-CM

## 2024-02-22 PROCEDURE — 20610 DRAIN/INJ JOINT/BURSA W/O US: CPT | Performed by: NURSE PRACTITIONER

## 2024-03-01 RX ORDER — GABAPENTIN 300 MG/1
CAPSULE ORAL
Qty: 90 CAPSULE | Refills: 5 | Status: SHIPPED | OUTPATIENT
Start: 2024-03-01 | End: 2024-08-01

## 2024-03-25 ENCOUNTER — OFFICE VISIT (OUTPATIENT)
Dept: NEUROLOGY | Age: 49
End: 2024-03-25
Payer: COMMERCIAL

## 2024-03-25 VITALS
BODY MASS INDEX: 40.82 KG/M2 | SYSTOLIC BLOOD PRESSURE: 130 MMHG | OXYGEN SATURATION: 96 % | DIASTOLIC BLOOD PRESSURE: 84 MMHG | WEIGHT: 209 LBS | HEART RATE: 76 BPM | TEMPERATURE: 97.6 F

## 2024-03-25 DIAGNOSIS — E53.8 B12 DEFICIENCY: ICD-10-CM

## 2024-03-25 DIAGNOSIS — G32.0 SUBACUTE COMBINED DEGENERATION (HCC): Primary | ICD-10-CM

## 2024-03-25 DIAGNOSIS — E53.8 SUBACUTE COMBINED DEGENERATION (HCC): Primary | ICD-10-CM

## 2024-03-25 PROCEDURE — 4004F PT TOBACCO SCREEN RCVD TLK: CPT | Performed by: CLINICAL NURSE SPECIALIST

## 2024-03-25 PROCEDURE — 99214 OFFICE O/P EST MOD 30 MIN: CPT | Performed by: CLINICAL NURSE SPECIALIST

## 2024-03-25 PROCEDURE — 3017F COLORECTAL CA SCREEN DOC REV: CPT | Performed by: CLINICAL NURSE SPECIALIST

## 2024-03-25 PROCEDURE — G8417 CALC BMI ABV UP PARAM F/U: HCPCS | Performed by: CLINICAL NURSE SPECIALIST

## 2024-03-25 PROCEDURE — G8484 FLU IMMUNIZE NO ADMIN: HCPCS | Performed by: CLINICAL NURSE SPECIALIST

## 2024-03-25 PROCEDURE — G8427 DOCREV CUR MEDS BY ELIG CLIN: HCPCS | Performed by: CLINICAL NURSE SPECIALIST

## 2024-03-25 RX ORDER — ACETAMINOPHEN 160 MG
2000 TABLET,DISINTEGRATING ORAL DAILY
COMMUNITY
Start: 2024-03-20

## 2024-03-25 NOTE — PROGRESS NOTES
scattered about the left mastoid air cell complex, with trace involvement on the right side, incompletely and only incidentally demonstrated     EMG   Study compatible with the following:  #1 nerve conduction studies reflect diminished amplitude in the tibial distribution bilaterally, reflecting axonal pathology.  Minor sensory abnormalities in the left sural distribution reflex possible changes in the mylin portion of this nerve..  #2 following nerves were within normal limits to stimulation: Right peroneal, left peroneal, right superficial peroneal, left superficial peroneal and right sural.  #3 on insertional examination changes of serrated potentials, polyphasic units, and change in amplitude and duration as well as numbers reflect neuropathic changes and pathology diffusely involving L2-S1 fibers both in anterior and posterior rami distributions     Recommendations:  This is predominantly a neuropathic process, not myopathic in nature.  Post viral inflammatory processes can present early on with this picture however usually progress to development of slowing of velocity with the development of myelin abnormalities as well as increasing axonal pathology.    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     Exam was not consistent with GBS    Plan:     Continue B12 -- recent level around 600    Agree with therapy     Continue gabapentin to 300 mg 3 times a day    RTO next year    Feliciano Dietz, APRN - CNS  10:04 AM  3/25/2024

## 2024-04-17 NOTE — TELEPHONE ENCOUNTER
Lissa,   I received 2 requests for this with 2 different pharmacies attached. Which pharmacy is the correct one?

## 2024-05-02 ENCOUNTER — TELEPHONE (OUTPATIENT)
Dept: PHYSICAL MEDICINE AND REHAB | Age: 49
End: 2024-05-02

## 2024-05-29 ENCOUNTER — HOSPITAL ENCOUNTER (OUTPATIENT)
Dept: PHYSICAL THERAPY | Age: 49
Setting detail: THERAPIES SERIES
Discharge: HOME | End: 2024-05-29
Payer: COMMERCIAL

## 2024-05-29 PROCEDURE — 97161 PT EVAL LOW COMPLEX 20 MIN: CPT | Performed by: PHYSICAL THERAPIST

## 2024-05-29 ASSESSMENT — PAIN DESCRIPTION - PAIN TYPE: TYPE: CHRONIC PAIN

## 2024-05-29 ASSESSMENT — PAIN SCALES - GENERAL: PAINLEVEL_OUTOF10: 5

## 2024-05-29 ASSESSMENT — PAIN DESCRIPTION - LOCATION: LOCATION: BACK;KNEE

## 2024-05-29 ASSESSMENT — PAIN DESCRIPTION - ORIENTATION: ORIENTATION: RIGHT

## 2024-05-30 ENCOUNTER — OFFICE VISIT (OUTPATIENT)
Dept: ORTHOPEDIC SURGERY | Age: 49
End: 2024-05-30
Payer: COMMERCIAL

## 2024-05-30 VITALS — HEIGHT: 60 IN | WEIGHT: 209 LBS | BODY MASS INDEX: 41.03 KG/M2

## 2024-05-30 DIAGNOSIS — M79.642 LEFT HAND PAIN: ICD-10-CM

## 2024-05-30 DIAGNOSIS — M17.11 OSTEOARTHRITIS OF RIGHT KNEE, UNSPECIFIED OSTEOARTHRITIS TYPE: Primary | ICD-10-CM

## 2024-05-30 DIAGNOSIS — M17.12 PRIMARY OSTEOARTHRITIS OF LEFT KNEE: ICD-10-CM

## 2024-05-30 PROCEDURE — 99213 OFFICE O/P EST LOW 20 MIN: CPT | Performed by: NURSE PRACTITIONER

## 2024-05-31 NOTE — PROGRESS NOTES
[facility]   ORTHOPAEDIC SURGERY AND SPORTS MEDICINE  DATE OF VISIT: 05/31/24  Follow Up Visit     CHIEF COMPLAINT:   Chief Complaint   Patient presents with    Knee Pain     Right knee pain/ 3 month follow up.  Patient states injection has  helped 80 %.     Carpal Tunnel     Left hand pain at thumb for about 6 months.  Pain described as sharp. Grasping items creates pain.       HPI:    Nhung Porter is a 48 y.o. year old female who presented to the office today for follow up of right knee pain, previously evaluated on 2/22/2024. Previous treatment has included completion of viscosupplement injections.  She reports symptoms are improved.        REVIEW OF SYSTEMS:     General: Negative Fever, chills, fatigue   Cardiovascular: Negative chest pain, palpitations  Respiratory: Equal chest expansion, negative shortness of breath   Skin: Negative rash, open wounds  Psych: Normal affect, mood stable  Neurologic: sensation grossly intact in extremities   Musculoskeletal: See HPI      Physical Exam:     Height: 1.524 m (5'), Weight - Scale: 94.8 kg (208 lb 15.9 oz)    General: Alert and oriented x3, no acute distress  Cardiovascular/pulmonary: No labored breathing, peripheral perfusion intact  Musculoskeletal:    Right knee exam displays near full range of motion.  Stable valgus and varus stress testing.  Negative swelling effusion.  Negative tenderness on palpation.  Knee is grossly stable on exam    Controlled Substances Monitoring:      Imaging:  Previous imaging reviewed       Assessment: Right knee pain with meniscus tear, medial compartment osteoarthritis      Plan:   Today we discussed her right knee.  Patient reports symptoms remain improved following completion of viscosupplement injections.  She has resumed normal activities tolerating well.  She can continue with over-the-counter anti-inflammatories as needed for symptom relief.  Briefly discussed her hand she is having pain at the base of her thumb and we will

## 2024-06-02 SDOH — HEALTH STABILITY: PHYSICAL HEALTH: ON AVERAGE, HOW MANY MINUTES DO YOU ENGAGE IN EXERCISE AT THIS LEVEL?: 90 MIN

## 2024-06-02 SDOH — HEALTH STABILITY: PHYSICAL HEALTH: ON AVERAGE, HOW MANY DAYS PER WEEK DO YOU ENGAGE IN MODERATE TO STRENUOUS EXERCISE (LIKE A BRISK WALK)?: 5 DAYS

## 2024-06-03 ENCOUNTER — HOSPITAL ENCOUNTER (OUTPATIENT)
Dept: PHYSICAL THERAPY | Age: 49
Setting detail: THERAPIES SERIES
Discharge: HOME | End: 2024-06-03
Payer: COMMERCIAL

## 2024-06-03 PROCEDURE — 97112 NEUROMUSCULAR REEDUCATION: CPT

## 2024-06-03 NOTE — PROGRESS NOTES
[facility]  Phone: [phone] Fax: [phone]       Physical Therapy Daily Treatment Note  Date:  6/3/2024    Patient Name:  Nhung Porter    :  1975  MRN: [id number]           Physical Therapy: Initial Evaluation    Patient: Nhung Porter (48 y.o. female)   Examination Date: 2024  Plan of Care Certification Period: 2024 to     :  1975 ;    Confirmed: Yes MRN: [id number]  CSN: [id number]   Insurance: Payor: [employer] / Plan: [employer] / Product Type: *No Product type* /   Insurance ID: [id number] - (Medicare Managed) Secondary Insurance (if applicable): [place]ICAID [place]   Referring Physician: Cornel Ornelas DO     PCP: Cornel Ornelas DO Visits to Date/Visits Approved: 1 /      No Show/Cancelled Appts:   /       Medical Diagnosis: Polyneuropathy, unspecified [G62.9]  Paraplegia, unspecified [G82.20]  Unspecified abnormalities of gait and mobility [R26.9]  Cramp and spasm [R25.2]  Lumbago with sciatica, right side [M54.41]  Other chronic pain [G89.29]    Treatment Diagnosis:          Plan of care signed (Y/N):  Y  Visit# / total visits:  2/10  Pain level: 4/10  Time In:    0900  Time Out:    09    Subjective:  Patient reports to therapy and states she just performed exercises in the pool prior to coming this day. States she has current pain in the low back about 4/10.    Exercises:  Exercise/Equipment Resistance/Repetitions Other comments                   Supine bridges 2x10 5s holds       Supine bridges c hip adduction iso 2x10 5s holds     Supine bridges c hip abduction 2x10 5s holds      Supine BKFO 2x10 5s holds             BOSU Flat side      -2 min balance c no HHA    -2 min balance c head turns       Resisted ambulation X5 reps each direction -fwd/lat/bwd                                                                              Other Therapeutic Activities:      Home Exercise Program:      Manual Treatments:      Modalities:

## 2024-06-04 ENCOUNTER — OFFICE VISIT (OUTPATIENT)
Dept: ORTHOPEDIC SURGERY | Age: 49
End: 2024-06-04
Payer: COMMERCIAL

## 2024-06-04 VITALS — WEIGHT: 208 LBS | BODY MASS INDEX: 40.84 KG/M2 | HEIGHT: 60 IN

## 2024-06-04 DIAGNOSIS — M18.12 ARTHRITIS OF CARPOMETACARPAL (CMC) JOINT OF LEFT THUMB: Primary | ICD-10-CM

## 2024-06-04 DIAGNOSIS — M79.642 LEFT HAND PAIN: ICD-10-CM

## 2024-06-04 PROCEDURE — 20604 DRAIN/INJ JOINT/BURSA W/US: CPT | Performed by: FAMILY MEDICINE

## 2024-06-04 PROCEDURE — 99203 OFFICE O/P NEW LOW 30 MIN: CPT | Performed by: FAMILY MEDICINE

## 2024-06-04 RX ORDER — TRIAMCINOLONE ACETONIDE 40 MG/ML
20 INJECTION, SUSPENSION INTRA-ARTICULAR; INTRAMUSCULAR ONCE
Status: COMPLETED | OUTPATIENT
Start: 2024-06-04 | End: 2024-06-04

## 2024-06-04 RX ORDER — LIDOCAINE HYDROCHLORIDE 10 MG/ML
0.5 INJECTION, SOLUTION INFILTRATION; PERINEURAL ONCE
Status: COMPLETED | OUTPATIENT
Start: 2024-06-04 | End: 2024-06-04

## 2024-06-04 RX ADMIN — LIDOCAINE HYDROCHLORIDE 0.5 ML: 10 INJECTION, SOLUTION INFILTRATION; PERINEURAL at 09:24

## 2024-06-04 RX ADMIN — TRIAMCINOLONE ACETONIDE 20 MG: 40 INJECTION, SUSPENSION INTRA-ARTICULAR; INTRAMUSCULAR at 09:24

## 2024-06-04 NOTE — PROGRESS NOTES
ophthalmic emulsion       folic acid (FOLVITE) 1 MG tablet Take 1 tablet by mouth daily 30 tablet 0    vitamin B-12 2000 MCG tablet Take 1 tablet by mouth daily 30 tablet 3    Multiple Vitamin (MULTIVITAMIN) TABS tablet Take 1 tablet by mouth daily 30 tablet 0    acetaminophen (TYLENOL) 500 MG tablet Take 1 tablet by mouth every 6 hours as needed for Pain      loratadine (CLARITIN) 10 MG tablet Take 1 tablet by mouth daily      mirabegron (MYRBETRIQ) 25 MG TB24 Take 1 tablet by mouth daily      fluticasone (FLONASE) 50 MCG/ACT nasal spray 1 spray by Nasal route daily       Current Facility-Administered Medications   Medication Dose Route Frequency Provider Last Rate Last Admin    lidocaine 1 % injection 0.5 mL  0.5 mL Intra-artICUlar Once Cornel Bishop MD        triamcinolone acetonide (KENALOG-40) injection 20 mg  20 mg Intra-artICUlar Once Cornel Bishop MD          ______________________________________________________________________    Physical Exam :    Vitals: Ht 1.524 m (5')   Wt 94.3 kg (208 lb)   BMI 40.62 kg/m²   General Appearance: Nontoxic, awake, alert, and in no acute distress.  Chest wall/Lung: Respirations regular and unlabored. No cyanosis.  Heart: RRR, distal pulses intact.  Neurologic: Alert&Oriented x3. Sensation grossly intact. No focal motor deficits detected.  Musculoskeletal: LEFT Hand:  No ecchymosis or swelling. No active triggering. No obvious deformities.  No hypothenar or thenar atrophy.  TTP: ( - ) Scaphoid, ( - ) Ulnar Styloid, ( - ) Distal Radius, ( - ) A1 Pulley  Carpal Tunnel: ( - ) Tinels, ( +mild ) Phalens, ( - ) Carpal Tunnel Compression  Special Tests: ( + ) CMC Compression, ( - ) Finkelsteins, ( - ) Thumb UCL Stress Test  Strength and sensation intact.   ______________________________________________________________________    Assessment & Plan :    1. Left hand pain  2. Arthritis of carpometacarpal (CMC) joint of left thumb  Patient presents to the office today for

## 2024-06-04 NOTE — PROGRESS NOTES
complications including but not necessarily limited to infection, allergic reaction, local tissue breakdown, aseptic effusion potentially necessitating aspiration and corticosteroid injection, elevation of blood glucose, injury to soft tissue and/or nerves, and seizure, the patient indicated their understanding and agreed to proceed.    FOLLOW UP    Follow up in 6-8 weeks.    Electronically signed by Cornel Bishop MD on 6/4/2024 at 9:18 AM

## 2024-06-05 ENCOUNTER — HOSPITAL ENCOUNTER (OUTPATIENT)
Dept: PHYSICAL THERAPY | Age: 49
Setting detail: THERAPIES SERIES
Discharge: HOME | End: 2024-06-05
Payer: COMMERCIAL

## 2024-06-05 PROCEDURE — 97112 NEUROMUSCULAR REEDUCATION: CPT

## 2024-06-05 NOTE — PROGRESS NOTES
[facility]  Phone: [phone] Fax: [phone]       Physical Therapy Daily Treatment Note  Date:  2024    Patient Name:  Nhung Porter    :  1975  MRN: [id number]           Physical Therapy: Initial Evaluation    Patient: Nhung Porter (48 y.o. female)   Examination Date: 2024  Plan of Care Certification Period: 2024 to     :  1975 ;    Confirmed: Yes MRN: [id number]  CSN: [id number]   Insurance: Payor: [employer] / Plan: [employer] / Product Type: *No Product type* /   Insurance ID: [id number] - (Medicare Managed) Secondary Insurance (if applicable): [place]ICA[place]   Referring Physician: Cornel Ornelas DO     PCP: Cornel Ornelas DO Visits to Date/Visits Approved: 1 /      No Show/Cancelled Appts:   /       Medical Diagnosis: Polyneuropathy, unspecified [G62.9]  Paraplegia, unspecified [G82.20]  Unspecified abnormalities of gait and mobility [R26.9]  Cramp and spasm [R25.2]  Lumbago with sciatica, right side [M54.41]  Other chronic pain [G89.29]    Treatment Diagnosis:          Plan of care signed (Y/N):  Y  Visit# / total visits:  3/10  Pain level: 4/10  Time In:    0900  Time Out:    0940    Subjective:  Patient reports to therapy and states she noted increased soreness after previous tx session.       Exercises:  Exercise/Equipment Resistance/Repetitions Other comments                   Supine bridges 2x10 5s holds       Supine bridges c hip adduction iso 2x10 5s holds     Supine bridges c hip abduction 2x10 5s holds               Resisted ambulation X10 reps each direction -fwd/lat/bwd       Standing SLRs X20 reps  Green band       Standing squats X20       Supine SLR c ecc control (core activation)  X20                                                        Other Therapeutic Activities:      Home Exercise Program:  (24) - Supine bridging progression, Standing SLRs c theraband & Supine SLRs c core activation.    Manual

## 2024-06-10 ENCOUNTER — HOSPITAL ENCOUNTER (OUTPATIENT)
Dept: PHYSICAL THERAPY | Age: 49
Setting detail: THERAPIES SERIES
Discharge: HOME | End: 2024-06-10
Payer: COMMERCIAL

## 2024-06-10 PROCEDURE — 97112 NEUROMUSCULAR REEDUCATION: CPT

## 2024-06-17 ENCOUNTER — HOSPITAL ENCOUNTER (OUTPATIENT)
Dept: PHYSICAL THERAPY | Age: 49
Setting detail: THERAPIES SERIES
Discharge: HOME | End: 2024-06-17
Payer: COMMERCIAL

## 2024-06-17 PROCEDURE — 97112 NEUROMUSCULAR REEDUCATION: CPT

## 2024-06-17 NOTE — PROGRESS NOTES
Activities:      Home Exercise Program:  (6/5/24) - Supine bridging progression, Standing SLRs c theraband & Supine SLRs c core activation.    Manual Treatments:      Modalities:      Timed Code Treatment Minutes:      Total Treatment Minutes:     Treatment/Activity Tolerance:  [x] Patient tolerated treatment well [] Patient limited by fatigue  [] Patient limited by pain  [] Patient limited by other medical complications  [] Other: Patient performs therapy c proper form and pacing techniques. Continued to progress c core stabilization exercises in order to improve functional mobility/strength needed for daily ADL activity. She exhibits inc fatigue post tx session this day.    Plan:   [x] Continue per plan of care [] Alter current plan (see comments)  [] Plan of care initiated [] Hold pending MD visit [] Discharge  Plan for Next Session:         Treatment Charges: Mins Units   Initial Evaluation     Re-Evaluation     Ther Exercise         TE     Manual Therapy     MT     Ther Activities        TA     Gait Training          GT     Neuro Re-education NR 40 3   Modalities     Non-Billable Service Time     Other     Total Time/Units 40 3     Electronically signed by:  Jocelin Covarrubias, PTA [id number]

## 2024-06-19 ENCOUNTER — HOSPITAL ENCOUNTER (OUTPATIENT)
Dept: PHYSICAL THERAPY | Age: 49
Setting detail: THERAPIES SERIES
Discharge: HOME | End: 2024-06-19
Payer: COMMERCIAL

## 2024-06-19 PROCEDURE — 97112 NEUROMUSCULAR REEDUCATION: CPT

## 2024-06-19 NOTE — PROGRESS NOTES
[facility]  Phone: [phone] Fax: [phone]       Physical Therapy Daily Treatment Note  Date:  2024    Patient Name:  Nhung Porter    :  1975  MRN: [id number]           Physical Therapy: Initial Evaluation    Patient: Nhung Porter (48 y.o. female)   Examination Date: 2024  Plan of Care Certification Period: 2024 to     :  1975 ;    Confirmed: Yes MRN: [id number]  CSN: [id number]   Insurance: Payor: [employer] / Plan: [employer] / Product Type: *No Product type* /   Insurance ID: [id number] - (Medicare Managed) Secondary Insurance (if applicable): [place]   Referring Physician: Cornel Ornelas DO     PCP: Cornel Ornelas DO Visits to Date/Visits Approved: 1 /      No Show/Cancelled Appts:   /       Medical Diagnosis: Polyneuropathy, unspecified [G62.9]  Paraplegia, unspecified [G82.20]  Unspecified abnormalities of gait and mobility [R26.9]  Cramp and spasm [R25.2]  Lumbago with sciatica, right side [M54.41]  Other chronic pain [G89.29]    Treatment Diagnosis:          Plan of care signed (Y/N):  Y  Visit# / total visits:  6/10  Pain level: 4/10  Time In:  0825  Time Out:   0900    Subjective:  Patient reports to therapy and states she feels that she is most limited by her balance. States she feels improvement in regards to LB pain since starting therapy and that the intensity is less.    Exercises:  Exercise/Equipment Resistance/Repetitions Other comments                  Supine bridges 2x10 5s holds      Supine bridges c hip adduction iso 2x10 5s holds    Supine bridges c hip abduction 2x10 5s holds Green band    Side-lying hip abd c green band 10s x 10 reps Green band    Prone hip ext  10s x 10 reps  Green band                     Resisted ambulation X10 reps each direction -fwd/lat/bwd       Standing SLRs X20 reps  Green band       Standing squats X20  On bosu      Supine SLR c ecc control (core activation)  X20

## 2024-06-24 ENCOUNTER — HOSPITAL ENCOUNTER (OUTPATIENT)
Dept: PHYSICAL THERAPY | Age: 49
Setting detail: THERAPIES SERIES
Discharge: HOME | End: 2024-06-24
Payer: COMMERCIAL

## 2024-06-24 PROCEDURE — 97112 NEUROMUSCULAR REEDUCATION: CPT

## 2024-06-24 NOTE — PROGRESS NOTES
[facility]  Phone: [phone] Fax: [phone]       Physical Therapy Daily Treatment Note  Date:  2024    Patient Name:  Nhung Porter    :  1975  MRN: [id number]           Physical Therapy: Initial Evaluation    Patient: Nhung Porter (48 y.o. female)   Examination Date: 2024  Plan of Care Certification Period: 2024 to     :  1975 ;    Confirmed: Yes MRN: [id number]  CSN: [id number]   Insurance: Payor: [employer] / Plan: [employer]ANS / Product Type: *No Product type* /   Insurance ID: [id number] - (Medicare Managed) Secondary Insurance (if applicable): [place]   Referring Physician: Cornel Ornelas DO     PCP: Cornel Ornelas DO Visits to Date/Visits Approved: 1 /      No Show/Cancelled Appts:   /       Medical Diagnosis: Polyneuropathy, unspecified [G62.9]  Paraplegia, unspecified [G82.20]  Unspecified abnormalities of gait and mobility [R26.9]  Cramp and spasm [R25.2]  Lumbago with sciatica, right side [M54.41]  Other chronic pain [G89.29]    Treatment Diagnosis:          Plan of care signed (Y/N):  Y  Visit# / total visits:  7/10  Pain level: 410  Time In:  0900  Time Out:  0930     Subjective:  Patient reports to therapy and states she has made 50-75% progress thus far.     Exercises:  Exercise/Equipment Resistance/Repetitions Other comments                 Supine bridges X10 10s holds     Supine bridges c hip adduction iso X10 10s holds     Supine bridges c hip abduction 2x10 5s holds Green band    Side-lying hip abd c green band 10s x 10 reps Green band    Prone hip ext  10s x 10 reps  Green band    Supine bridges c SB  X10 10s holds                   Resisted ambulation X10 reps each direction -fwd/lat/bwd   Green band    On bosu                Bike  10 mins       Birddog  X10 reps BLE      Birddog focusing on hip ext X10 reps BLE                     -NBOS                                           Other Therapeutic

## 2024-06-26 ENCOUNTER — HOSPITAL ENCOUNTER (OUTPATIENT)
Dept: PHYSICAL THERAPY | Age: 49
Setting detail: THERAPIES SERIES
Discharge: HOME | End: 2024-06-26
Payer: COMMERCIAL

## 2024-06-26 NOTE — PROGRESS NOTES
[facility]  Phone: [phone] Fax: [phone]       Physical Therapy Daily Treatment Note  Date:  2024    Patient Name:  Nhung Porter    :  1975  MRN: [id number]           Physical Therapy: Initial Evaluation    Patient: Nhung Porter (48 y.o. female)   Examination Date: 2024  Plan of Care Certification Period: 2024 to     :  1975 ;    Confirmed: Yes MRN: [id number]  CSN: [id number]   Insurance: Payor: [employer] / Plan: [employer] / Product Type: *No Product type* /   Insurance ID: [id number] - (Medicare Managed) Secondary Insurance (if applicable): [place]   Referring Physician: Cornel Ornelas DO     PCP: Cornel Ornelas DO Visits to Date/Visits Approved: 1 /      No Show/Cancelled Appts:   /       Medical Diagnosis: Polyneuropathy, unspecified [G62.9]  Paraplegia, unspecified [G82.20]  Unspecified abnormalities of gait and mobility [R26.9]  Cramp and spasm [R25.2]  Lumbago with sciatica, right side [M54.41]  Other chronic pain [G89.29]    Treatment Diagnosis:          Plan of care signed (Y/N):  Y  Visit# / total visits:  8/10  Pain level: 410  Time In:  0900  Time Out:  09     Subjective:  Patient reports to therapy and feels that she is continuing to make improvements in regards to PT.     Exercises:  Exercise/Equipment Resistance/Repetitions Other comments                 Supine bridges X10 10s holds     Supine bridges c hip adduction iso X10 10s holds     Supine bridges c hip abduction 2x10 5s holds Green band    Side-lying hip abd c green band 10s x 10 reps Green band    Green band    Supine bridges c SB  X10 10s holds     Supine SLR bridge  X10 10s holds     Supine 90/90 heel taps c core activation X20     Side-lying clamshells  X20  GTB                    Resisted ambulation X10 reps each direction -fwd/lat/bwd   Green band    On bosu                Bike  10 mins                               -NBOS

## 2024-07-01 ENCOUNTER — TELEPHONE (OUTPATIENT)
Dept: NEUROLOGY | Age: 49
End: 2024-07-01

## 2024-07-01 ENCOUNTER — HOSPITAL ENCOUNTER (OUTPATIENT)
Dept: PHYSICAL THERAPY | Age: 49
Setting detail: THERAPIES SERIES
Discharge: HOME | End: 2024-07-01
Payer: COMMERCIAL

## 2024-07-01 PROCEDURE — 97112 NEUROMUSCULAR REEDUCATION: CPT

## 2024-07-01 NOTE — TELEPHONE ENCOUNTER
Patient is wanting to wean herself off of Gabapentin. She is taking it twice a day now.     Please advise.

## 2024-07-01 NOTE — PROGRESS NOTES
[facility]  Phone: [phone] Fax: [phone]       Physical Therapy Daily Treatment Note  Date:  2024    Patient Name:  Nhung Porter    :  1975  MRN: [id number]           Physical Therapy: Initial Evaluation    Patient: Nhung Porter (48 y.o. female)   Examination Date: 2024  Plan of Care Certification Period: 2024 to     :  1975 ;    Confirmed: Yes MRN: [id number]  CSN: [id number]   Insurance: Payor: [employer] / Plan: [employer] / Product Type: *No Product type* /   Insurance ID: [id number] - (Medicare Managed) Secondary Insurance (if applicable): [place]   Referring Physician: Cornel Ornelas DO     PCP: Cornel Ornelas DO Visits to Date/Visits Approved: 1 /      No Show/Cancelled Appts:   /       Medical Diagnosis: Polyneuropathy, unspecified [G62.9]  Paraplegia, unspecified [G82.20]  Unspecified abnormalities of gait and mobility [R26.9]  Cramp and spasm [R25.2]  Lumbago with sciatica, right side [M54.41]  Other chronic pain [G89.29]    Treatment Diagnosis:          Plan of care signed (Y/N):  Y  Visit# / total visits:  9/10  Pain level: 4/10  Time In:  0900  Time Out:  0940     Subjective:  Patient reports she notices progress c swimming longer distances. States she also is able to ambulate from her car into the [facility] c less difficulty overall.     Exercises:  Exercise/Equipment Resistance/Repetitions Other comments                 Supine bridges X10 10s holds     Supine bridges c hip adduction iso X10 10s holds     Supine bridges c hip abduction 2x10 5s holds Green band    Side-lying hip abd c green band 10s x 10 reps Green band    Green band    Supine bridges c SB  X10 10s holds     Supine SLR bridge  X10 10s holds     Supine 90/90 heel taps c core activation X20     GTB          Resisted ambulation X10 reps each direction -fwd/lat/bwd   Green band    On bosu                Bike  10 mins

## 2024-07-03 ENCOUNTER — HOSPITAL ENCOUNTER (OUTPATIENT)
Dept: PHYSICAL THERAPY | Age: 49
Setting detail: THERAPIES SERIES
Discharge: HOME | End: 2024-07-03
Payer: COMMERCIAL

## 2024-07-03 PROCEDURE — 97112 NEUROMUSCULAR REEDUCATION: CPT

## 2024-07-03 PROCEDURE — 97110 THERAPEUTIC EXERCISES: CPT

## 2024-07-03 NOTE — TELEPHONE ENCOUNTER
Patient will keep taking BID then on Sunday she will go to once a day for a week then stop and call us after that.

## 2024-07-03 NOTE — PROGRESS NOTES
[facility]  Phone: [phone] Fax: [phone]       Physical Therapy Daily Treatment Note  Date:  7/3/2024    Patient Name:  Nhung Porter    :  1975  MRN: [id number]           Physical Therapy: Initial Evaluation    Patient: Nhung Porter (48 y.o. female)   Examination Date: 2024  Plan of Care Certification Period: 2024 to     :  1975 ;    Confirmed: Yes MRN: [id number]  CSN: [id number]   Insurance: Payor: [employer] / Plan: [employer] PLANS / Product Type: *No Product type* /   Insurance ID: [id number] - (Medicare Managed) Secondary Insurance (if applicable): [place]   Referring Physician: Cornel Ornelas DO     PCP: Cornel Ornelas DO Visits to Date/Visits Approved: 1 /      No Show/Cancelled Appts:   /       Medical Diagnosis: Polyneuropathy, unspecified [G62.9]  Paraplegia, unspecified [G82.20]  Unspecified abnormalities of gait and mobility [R26.9]  Cramp and spasm [R25.2]  Lumbago with sciatica, right side [M54.41]  Other chronic pain [G89.29]    Treatment Diagnosis:          Plan of care signed (Y/N):  Y  Visit# / total visits:  9/10  Pain level: 4/10  Time In:  0900  Time Out:  09    Subjective:      Exercises:  Exercise/Equipment Resistance/Repetitions Other comments                 Supine bridges X10 10s holds     Supine bridges c hip adduction iso X10 10s holds     Supine bridges c hip abduction 2x10 5s holds Green band    Side-lying hip abd c green band 10s x 10 reps Green band    Green band    Supine bridges c SB  X10 10s holds     Supine SLR bridge  X10 10s holds     Supine 90/90 heel taps c core activation X20     GTB          Resisted ambulation X10 reps each direction -fwd/lat/bwd   Green band    On bosu                Bike  10 mins                               -NBOS                 Standing steamboats X20  BTB                        Other Therapeutic Activities:      Home Exercise Program:  (24) - Supine

## 2024-07-09 SDOH — HEALTH STABILITY: PHYSICAL HEALTH: ON AVERAGE, HOW MANY MINUTES DO YOU ENGAGE IN EXERCISE AT THIS LEVEL?: 90 MIN

## 2024-07-09 SDOH — HEALTH STABILITY: PHYSICAL HEALTH: ON AVERAGE, HOW MANY DAYS PER WEEK DO YOU ENGAGE IN MODERATE TO STRENUOUS EXERCISE (LIKE A BRISK WALK)?: 6 DAYS

## 2024-07-11 ENCOUNTER — OFFICE VISIT (OUTPATIENT)
Dept: FAMILY MEDICINE CLINIC | Age: 49
End: 2024-07-11
Payer: COMMERCIAL

## 2024-07-11 VITALS
HEIGHT: 60 IN | DIASTOLIC BLOOD PRESSURE: 88 MMHG | HEART RATE: 73 BPM | WEIGHT: 182 LBS | TEMPERATURE: 97.4 F | BODY MASS INDEX: 35.73 KG/M2 | OXYGEN SATURATION: 97 % | SYSTOLIC BLOOD PRESSURE: 146 MMHG | RESPIRATION RATE: 16 BRPM

## 2024-07-11 DIAGNOSIS — M79.89 SOFT TISSUE MASS: ICD-10-CM

## 2024-07-11 DIAGNOSIS — Z12.11 COLON CANCER SCREENING: ICD-10-CM

## 2024-07-11 DIAGNOSIS — G62.9 POLYNEUROPATHY: ICD-10-CM

## 2024-07-11 DIAGNOSIS — Z13.220 ENCOUNTER FOR LIPID SCREENING FOR CARDIOVASCULAR DISEASE: ICD-10-CM

## 2024-07-11 DIAGNOSIS — R73.9 HYPERGLYCEMIA: Primary | ICD-10-CM

## 2024-07-11 DIAGNOSIS — Z13.6 ENCOUNTER FOR LIPID SCREENING FOR CARDIOVASCULAR DISEASE: ICD-10-CM

## 2024-07-11 PROCEDURE — 99396 PREV VISIT EST AGE 40-64: CPT

## 2024-07-11 RX ORDER — LOTILANER OPHTHALMIC SOLUTION 2.5 MG/ML
SOLUTION/ DROPS OPHTHALMIC
COMMUNITY
Start: 2024-05-29

## 2024-07-11 SDOH — ECONOMIC STABILITY: FOOD INSECURITY: WITHIN THE PAST 12 MONTHS, YOU WORRIED THAT YOUR FOOD WOULD RUN OUT BEFORE YOU GOT MONEY TO BUY MORE.: NEVER TRUE

## 2024-07-11 SDOH — ECONOMIC STABILITY: HOUSING INSECURITY
IN THE LAST 12 MONTHS, WAS THERE A TIME WHEN YOU DID NOT HAVE A STEADY PLACE TO SLEEP OR SLEPT IN A SHELTER (INCLUDING NOW)?: NO

## 2024-07-11 SDOH — ECONOMIC STABILITY: INCOME INSECURITY: HOW HARD IS IT FOR YOU TO PAY FOR THE VERY BASICS LIKE FOOD, HOUSING, MEDICAL CARE, AND HEATING?: NOT HARD AT ALL

## 2024-07-11 SDOH — ECONOMIC STABILITY: FOOD INSECURITY: WITHIN THE PAST 12 MONTHS, THE FOOD YOU BOUGHT JUST DIDN'T LAST AND YOU DIDN'T HAVE MONEY TO GET MORE.: NEVER TRUE

## 2024-07-11 ASSESSMENT — PATIENT HEALTH QUESTIONNAIRE - PHQ9
SUM OF ALL RESPONSES TO PHQ9 QUESTIONS 1 & 2: 0
2. FEELING DOWN, DEPRESSED OR HOPELESS: NOT AT ALL
SUM OF ALL RESPONSES TO PHQ QUESTIONS 1-9: 0
1. LITTLE INTEREST OR PLEASURE IN DOING THINGS: NOT AT ALL
SUM OF ALL RESPONSES TO PHQ QUESTIONS 1-9: 0

## 2024-07-11 NOTE — PROGRESS NOTES
[facility]  Precepting Note    Subjective:  Here to establish, annual exam. UTD on mammogram. Need records  Significant issues with COVID a few years ago-improving from a functional standpoint and also working on weight loss/improving overall health.  Current smoker  R knee pain/swelling, not significant pain  Lump on medial portion of clavicle    ROS otherwise negative     Past medical, surgical, family and social history were reviewed, non-contributory, and unchanged unless otherwise stated.    Objective:    Pulse 73   Temp 97.4 °F (36.3 °C) (Temporal)   Resp 16   Ht 1.524 m (5')   Wt 82.6 kg (182 lb)   SpO2 97%   BMI 35.54 kg/m²     Exam is as noted by resident with the following changes, additions or corrections:    General:  NAD; alert & oriented x 3   Heart:  RRR, no murmurs, gallops, or rubs.  Lungs:  CTA bilaterally, no wheeze, rales or rhonchi  Abd: bowel sounds present, nontender, nondistended, no masses  Extrem:  No clubbing, cyanosis, or edema    Assessment/Plan:  Annual exam  Cologuard ordered  Obtain breast cervical ca screening records  Encouraged continued healthy habits     Attending Physician Statement  I have reviewed the chart, including any radiology or labs. I have discussed the case, including pertinent history and exam findings with the resident. I saw and examined the patient. I agree with the assessment, plan and orders as documented by the resident.  Please refer to the resident note for additional information.      Electronically signed by Tal Rooney MD on 7/11/2024 at 10:13 AM

## 2024-07-11 NOTE — PROGRESS NOTES
[facility]  Family Medicine Residency Program  Phone: [phone]  Fax: [phone]    Patient:  Nhung Porter 48 y.o. female                                 Date of Service: 7/11/24                            Chiefcomplaint:   Chief Complaint   Patient presents with    Annual Exam     Lump on L side of neck    Health Maintenance     Declined vaccines         History of Present Illness:     The patient is a 48 y.o. female who presented to the clinic to establish care with a new PCP after recently switching over to Medicare.  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     She has some right knee pain and swelling due to a meniscus tear last year. She states that the pain is minimal and does not bother her or interfere with her activities of daily living. She has recently completed 10 PT sessions and is now starting physical training to improve upper body strength and balance.    At present she feels better about her health and does not have any other concerns. She is highly motivated to improve her overall health and lose weight that she put on during the course of her illness.  She states that she eats healthy and tries to watch her calories and has lost 49 lbs in the last year.     She reports a small lump on her neck that she noticed recently. The lump is not painful and has not grown in size.     She is a current smoker and smokes a pack a day. She understands that it is detrimental for her health and is willing to quit eventually.    Review of Systems:   Review of Systems   Constitutional: Negative.  Negative for chills, diaphoresis and fever.   HENT: Negative.     Respiratory: Negative.     Cardiovascular: Negative.    Gastrointestinal:  Positive for constipation.        Doculax helps    Genitourinary:  Positive for urgency. Negative for dysuria, enuresis and flank pain.   Musculoskeletal:  Positive for joint swelling.

## 2024-07-17 ENCOUNTER — LAB (OUTPATIENT)
Dept: FAMILY MEDICINE CLINIC | Age: 49
End: 2024-07-17

## 2024-07-17 DIAGNOSIS — R73.9 HYPERGLYCEMIA: ICD-10-CM

## 2024-07-17 DIAGNOSIS — G62.9 POLYNEUROPATHY: ICD-10-CM

## 2024-07-17 DIAGNOSIS — Z13.6 ENCOUNTER FOR LIPID SCREENING FOR CARDIOVASCULAR DISEASE: ICD-10-CM

## 2024-07-17 DIAGNOSIS — Z13.220 ENCOUNTER FOR LIPID SCREENING FOR CARDIOVASCULAR DISEASE: ICD-10-CM

## 2024-07-17 LAB
ALBUMIN: 4.3 G/DL (ref 3.5–5.2)
ALP BLD-CCNC: 66 U/L (ref 35–104)
ALT SERPL-CCNC: 13 U/L (ref 0–32)
ANION GAP SERPL CALCULATED.3IONS-SCNC: 15 MMOL/L (ref 7–16)
AST SERPL-CCNC: 13 U/L (ref 0–31)
BASOPHILS ABSOLUTE: 0.03 K/UL (ref 0–0.2)
BASOPHILS RELATIVE PERCENT: 0 % (ref 0–2)
BILIRUB SERPL-MCNC: 0.3 MG/DL (ref 0–1.2)
BUN BLDV-MCNC: 10 MG/DL (ref 6–20)
CALCIUM SERPL-MCNC: 9.4 MG/DL (ref 8.6–10.2)
CHLORIDE BLD-SCNC: 106 MMOL/L (ref 98–107)
CHOLESTEROL, TOTAL: 222 MG/DL
CO2: 20 MMOL/L (ref 22–29)
CREAT SERPL-MCNC: 0.6 MG/DL (ref 0.5–1)
EOSINOPHILS ABSOLUTE: 0.07 K/UL (ref 0.05–0.5)
EOSINOPHILS RELATIVE PERCENT: 1 % (ref 0–6)
FOLATE: >20 NG/ML (ref 4.8–24.2)
GFR, ESTIMATED: >90 ML/MIN/1.73M2
GLUCOSE BLD-MCNC: 110 MG/DL (ref 74–99)
HBA1C MFR BLD: 5.7 % (ref 4–5.6)
HCT VFR BLD CALC: 46.5 % (ref 34–48)
HDLC SERPL-MCNC: 46 MG/DL
HEMOGLOBIN: 16.3 G/DL (ref 11.5–15.5)
IMMATURE GRANULOCYTES %: 1 % (ref 0–5)
IMMATURE GRANULOCYTES ABSOLUTE: 0.06 K/UL (ref 0–0.58)
LDL CHOLESTEROL: 148 MG/DL
LYMPHOCYTES ABSOLUTE: 2.31 K/UL (ref 1.5–4)
LYMPHOCYTES RELATIVE PERCENT: 20 % (ref 20–42)
MCH RBC QN AUTO: 32.1 PG (ref 26–35)
MCHC RBC AUTO-ENTMCNC: 35.1 G/DL (ref 32–34.5)
MCV RBC AUTO: 91.5 FL (ref 80–99.9)
MONOCYTES ABSOLUTE: 0.49 K/UL (ref 0.1–0.95)
MONOCYTES RELATIVE PERCENT: 4 % (ref 2–12)
NEUTROPHILS ABSOLUTE: 8.76 K/UL (ref 1.8–7.3)
NEUTROPHILS RELATIVE PERCENT: 75 % (ref 43–80)
PDW BLD-RTO: 12.8 % (ref 11.5–15)
PLATELET # BLD: 317 K/UL (ref 130–450)
PMV BLD AUTO: 11.2 FL (ref 7–12)
POTASSIUM SERPL-SCNC: 4.2 MMOL/L (ref 3.5–5)
RBC # BLD: 5.08 M/UL (ref 3.5–5.5)
SODIUM BLD-SCNC: 141 MMOL/L (ref 132–146)
TOTAL PROTEIN: 7.5 G/DL (ref 6.4–8.3)
TRIGL SERPL-MCNC: 140 MG/DL
VITAMIN B-12: 1110 PG/ML (ref 211–946)
VLDLC SERPL CALC-MCNC: 28 MG/DL
WBC # BLD: 11.7 K/UL (ref 4.5–11.5)

## 2024-07-24 LAB — NONINV COLON CA DNA+OCC BLD SCRN STL QL: NEGATIVE

## 2024-07-29 ENCOUNTER — HOSPITAL ENCOUNTER (OUTPATIENT)
Dept: ULTRASOUND IMAGING | Age: 49
Discharge: HOME | End: 2024-07-31
Payer: COMMERCIAL

## 2024-07-29 DIAGNOSIS — M79.89 SOFT TISSUE MASS: ICD-10-CM

## 2024-07-29 PROCEDURE — 76999 ECHO EXAMINATION PROCEDURE: CPT

## 2024-10-08 ENCOUNTER — OFFICE VISIT (OUTPATIENT)
Dept: FAMILY MEDICINE CLINIC | Age: 49
End: 2024-10-08
Payer: COMMERCIAL

## 2024-10-08 VITALS
OXYGEN SATURATION: 97 % | WEIGHT: 178.79 LBS | HEART RATE: 82 BPM | SYSTOLIC BLOOD PRESSURE: 133 MMHG | DIASTOLIC BLOOD PRESSURE: 86 MMHG | RESPIRATION RATE: 16 BRPM | BODY MASS INDEX: 35.1 KG/M2 | TEMPERATURE: 97.8 F | HEIGHT: 60 IN

## 2024-10-08 DIAGNOSIS — R79.89 ELEVATED VITAMIN B12 LEVEL: ICD-10-CM

## 2024-10-08 DIAGNOSIS — M54.40 CHRONIC BILATERAL LOW BACK PAIN WITH SCIATICA, SCIATICA LATERALITY UNSPECIFIED: Primary | ICD-10-CM

## 2024-10-08 DIAGNOSIS — G89.29 CHRONIC BILATERAL LOW BACK PAIN WITH SCIATICA, SCIATICA LATERALITY UNSPECIFIED: Primary | ICD-10-CM

## 2024-10-08 DIAGNOSIS — E78.5 HYPERLIPIDEMIA, UNSPECIFIED HYPERLIPIDEMIA TYPE: ICD-10-CM

## 2024-10-08 DIAGNOSIS — Z87.891 PERSONAL HISTORY OF TOBACCO USE: ICD-10-CM

## 2024-10-08 PROCEDURE — G0008 ADMIN INFLUENZA VIRUS VAC: HCPCS | Performed by: FAMILY MEDICINE

## 2024-10-08 PROCEDURE — 99214 OFFICE O/P EST MOD 30 MIN: CPT

## 2024-10-08 PROCEDURE — 90661 CCIIV3 VAC ABX FR 0.5 ML IM: CPT | Performed by: FAMILY MEDICINE

## 2024-10-08 RX ORDER — FOLIC ACID 1 MG/1
1 TABLET ORAL DAILY
Qty: 90 TABLET | Refills: 1 | Status: SHIPPED | OUTPATIENT
Start: 2024-10-08

## 2024-10-08 RX ORDER — WHEAT DEXTRIN 3 G/3.8 G
4 POWDER (GRAM) ORAL DAILY
Qty: 80 G | Refills: 0 | Status: SHIPPED | OUTPATIENT
Start: 2024-10-08

## 2024-10-08 NOTE — PROGRESS NOTES
S: 48 y.o. female with   Chief Complaint   Patient presents with    Mass     Mass L side front of neck on collarbone, starting to bother her again    Blood Pressure Check    Health Maintenance     Declined all vaccines except flu       49 yo female here for 3 month fu. Chronic (since 2020) low back pain and sciatica. Getting epidurals q 4 months. Getting water therapy and physical therapy. - Feels pain is improving - thinking about spacing out epidurals d/t this. Has stopped gabapentin and baclofen - neither needed anymore.  Has lost 60 pounds over past 1 year. Using OTC ibuprofen or tylenol prn. Also on diclofenac gel.    Lump over medial L clavical region. US shows normal appearing lymph node (0.7cm) and a fatty hylum.    Stool incontinence since having covid in 2022. Gets very little warning of having to move her bowels - has had 2-3 episodes of stool incontinence in the past 3 years d/t this. Had cholecystectomy.    Labs done 3 months ago - show elevated Vitamin B12 level - pt taking for peripheral neuropathy from covid - helped a lot. Advised to DC the B12 at that time, but has only reduced the dose of B12 (worried about neuropathy recurring).    Tobacco abuse (since 12 yo) - was smoking 2 PPD - decreased to 1 PPD over past 6 months. Smokes as a stress reliever.    O: VS:  height is 1.524 m (5') and weight is 81.1 kg (178 lb 12.7 oz). Her temporal temperature is 97.8 °F (36.6 °C). Her blood pressure is 133/86 and her pulse is 82. Her respiration is 16 and oxygen saturation is 97%.   BP Readings from Last 3 Encounters:   10/08/24 133/86   07/11/24 (!) 146/88   03/25/24 130/84     See resident note  Alert and oriented x 3 and in no acute distress.  Neck without thyromegaly or carotid bruits.  Isolated less than 1 cm nodule noted over medial left clavicle.  No supraclavicular lymphadenopathy.  Lungs with coarse breath sounds without rales or wheeze.  CV-RRR without MRG.    Impression/Plan:   1) Chronic low back

## 2024-10-08 NOTE — PROGRESS NOTES
[facility]  Family Medicine Residency Program  Phone: [phone]  Fax: [phone]    Patient:  Nhung Porter 48 y.o. female                                 Date of Service: 10/8/24                            Chiefcomplaint:   Chief Complaint   Patient presents with    Mass     Mass L side front of neck on collarbone, starting to bother her again    Blood Pressure Check    Health Maintenance     Declined all vaccines except flu         History of Present Illness:     The patient is a 48 y.o. female with chronic lower back pain secondary to herniated disc who presented to the clinic for follow-up    Today the patient states that she feels her pain is better controlled and not as sharp as it was before.  She has completed supervised physical therapy and water therapy and is now continuing with activities on her own 2 to 3 days/week.  She was previously getting epidurals once every 4 months for pain.  Her last epidural was in July 2024.  Patient states that she is hoping to space out her epidurals. She has stopped taking gabapentin and baclofen but continues to use ibuprofen or Tylenol as needed.     At her last visit patient had complained of a lump on her neck which began in June this year.  Patient is unsure if the lump has grown in size since last time, but states that it is tender to touch.  States that it irritates her sometimes when she sleeps on her side with her arm up    Patient continues to smoke.  Previously she was smoking 2 packs/day but has brought it down to 1 pack/day in the last 6 months.  States that her goal is to bring it down to half a pack soon but states that she needs to smoke more during winter so it will be difficult for her in the coming days.    Patient reports 2-3 episodes of stool Incontinence in the past 3 years. Hx of chloesystectomy. She also has urinary incontinence well controlled on myrabetriq.     Patient is also working on her overall health.  She has

## 2024-10-10 ENCOUNTER — HOSPITAL ENCOUNTER (OUTPATIENT)
Age: 49
Discharge: HOME | End: 2024-10-10
Payer: COMMERCIAL

## 2024-10-10 LAB
CHOLEST SERPL-MCNC: 208 MG/DL
HDLC SERPL-MCNC: 44 MG/DL
LDLC SERPL CALC-MCNC: 123 MG/DL
TRIGL SERPL-MCNC: 203 MG/DL
VLDLC SERPL CALC-MCNC: 41 MG/DL

## 2024-10-10 PROCEDURE — 82607 VITAMIN B-12: CPT

## 2024-10-10 PROCEDURE — 36415 COLL VENOUS BLD VENIPUNCTURE: CPT

## 2024-10-10 PROCEDURE — 80061 LIPID PANEL: CPT

## 2024-10-11 LAB — VIT B12 SERPL-MCNC: 1390 PG/ML (ref 211–946)

## 2024-10-15 ENCOUNTER — TELEPHONE (OUTPATIENT)
Dept: FAMILY MEDICINE CLINIC | Age: 49
End: 2024-10-15

## 2024-10-15 NOTE — TELEPHONE ENCOUNTER
Pt's B12 level came back 1390. Informed patient of the elevated level and instructed to stop taking B12 supplements. Will recheck in 4 weeks.

## 2024-11-06 ENCOUNTER — TELEPHONE (OUTPATIENT)
Dept: ORTHOPEDIC SURGERY | Age: 49
End: 2024-11-06

## 2024-11-06 NOTE — TELEPHONE ENCOUNTER
Patient called office on 11/5/2024 , she was was last seen in office on 5/30/2024, they would like to proceed with right knee visco supplementation authorization. She states the Gelsyn ~ 3 has really helped the past several months with knee pain and discomfort.     Form filled out / process started for right knee Gelsyn ~ 3 authorization

## 2024-11-08 ENCOUNTER — TELEPHONE (OUTPATIENT)
Dept: FAMILY MEDICINE CLINIC | Age: 49
End: 2024-11-08

## 2024-11-08 DIAGNOSIS — R79.89 ELEVATED VITAMIN B12 LEVEL: Primary | ICD-10-CM

## 2024-11-08 NOTE — TELEPHONE ENCOUNTER
Last Appointment   10/8/2024  Next Appointment  1/7/2025  Patient calling for b12 lab order to be put in for recheck, please let patient know when this is in.

## 2024-11-11 ENCOUNTER — HOSPITAL ENCOUNTER (OUTPATIENT)
Age: 49
Discharge: HOME | End: 2024-11-11
Payer: COMMERCIAL

## 2024-11-11 DIAGNOSIS — R79.89 ELEVATED VITAMIN B12 LEVEL: ICD-10-CM

## 2024-11-11 LAB
CHOLEST SERPL-MCNC: 246 MG/DL
HDLC SERPL-MCNC: 43 MG/DL
LDLC SERPL CALC-MCNC: 141 MG/DL
TRIGL SERPL-MCNC: 311 MG/DL
VIT B12 SERPL-MCNC: 975 PG/ML (ref 211–946)
VLDLC SERPL CALC-MCNC: 62 MG/DL

## 2024-11-11 PROCEDURE — 36415 COLL VENOUS BLD VENIPUNCTURE: CPT

## 2024-11-11 PROCEDURE — 82607 VITAMIN B-12: CPT

## 2024-11-11 PROCEDURE — 80061 LIPID PANEL: CPT

## 2024-11-12 NOTE — RESULT ENCOUNTER NOTE
Please let Ms. Porter know that her lab work returned.  Her cholesterol/lipids are slightly elevated mainly her triglycerides, she should work on diet and exercise with improvement in eating more fresh fruits and vegetables and lean proteins and decrease amount of fried fatty foods.  I will defer to Dr. Dietz on whether or not to start medication, can be discussed at either her next visit or sooner if she would like, thank you.  B12 and folate came back normal, B12 slightly elevated but not concerning.  Thank you

## 2024-11-14 ENCOUNTER — HOSPITAL ENCOUNTER (OUTPATIENT)
Dept: CT IMAGING | Age: 49
Discharge: HOME | End: 2024-11-16
Payer: COMMERCIAL

## 2024-11-14 DIAGNOSIS — Z87.891 PERSONAL HISTORY OF TOBACCO USE: ICD-10-CM

## 2024-11-14 PROCEDURE — 71271 CT THORAX LUNG CANCER SCR C-: CPT

## 2024-11-14 NOTE — RESULT ENCOUNTER NOTE
This is Dr. Han covering for Dr. Dietz, please let patient know her CT of the chest screening for lung cancer came back reassuring.  She does not have any suspicious lung nodules or masses.  Incidentally it was found that she does have coronary artery disease, also known as heart disease, she should make an appointment to discuss this further, she should not worry in the meantime as heart disease is very common and they are a multitude of treatments that can slow progression.  Thanks

## 2024-11-26 ENCOUNTER — OFFICE VISIT (OUTPATIENT)
Dept: PHYSICAL MEDICINE AND REHAB | Age: 49
End: 2024-11-26
Payer: COMMERCIAL

## 2024-11-26 VITALS
BODY MASS INDEX: 35.34 KG/M2 | HEIGHT: 60 IN | HEART RATE: 76 BPM | DIASTOLIC BLOOD PRESSURE: 84 MMHG | SYSTOLIC BLOOD PRESSURE: 135 MMHG | WEIGHT: 180 LBS | OXYGEN SATURATION: 100 % | TEMPERATURE: 98.1 F

## 2024-11-26 DIAGNOSIS — G89.29 CHRONIC PAIN OF RIGHT KNEE: ICD-10-CM

## 2024-11-26 DIAGNOSIS — M54.41 CHRONIC MIDLINE LOW BACK PAIN WITH RIGHT-SIDED SCIATICA: ICD-10-CM

## 2024-11-26 DIAGNOSIS — M21.371 BILATERAL FOOT-DROP: ICD-10-CM

## 2024-11-26 DIAGNOSIS — R26.9 IMPAIRED GAIT: ICD-10-CM

## 2024-11-26 DIAGNOSIS — M25.561 CHRONIC PAIN OF RIGHT KNEE: ICD-10-CM

## 2024-11-26 DIAGNOSIS — G89.29 CHRONIC MIDLINE LOW BACK PAIN WITH RIGHT-SIDED SCIATICA: ICD-10-CM

## 2024-11-26 DIAGNOSIS — Z74.09 IMPAIRED MOBILITY AND ADLS: ICD-10-CM

## 2024-11-26 DIAGNOSIS — G62.9 POLYNEUROPATHY: Primary | ICD-10-CM

## 2024-11-26 DIAGNOSIS — R25.2 SPASTICITY: ICD-10-CM

## 2024-11-26 DIAGNOSIS — Z78.9 IMPAIRED MOBILITY AND ADLS: ICD-10-CM

## 2024-11-26 DIAGNOSIS — G82.20 PARAPARESIS (HCC): ICD-10-CM

## 2024-11-26 DIAGNOSIS — M21.372 BILATERAL FOOT-DROP: ICD-10-CM

## 2024-11-26 PROCEDURE — 99213 OFFICE O/P EST LOW 20 MIN: CPT | Performed by: PHYSICAL MEDICINE & REHABILITATION

## 2024-11-26 NOTE — PROGRESS NOTES
Gabriella Miles M.D.  [facility] [place] [facility] PHYSICAL MEDICINE AND REHABILITAION  [address]  [address]  [place] [id number]  Dept: [phone]  Dept Fax: [phone]  Loc: [phone]    PCP: Cole Dietz MD  Date of visit: 11/26/24      Chief Complaint   Patient presents with    Pain     Polyneuropathy, patient states she is feeling much better. Not currently taking Baclofen or Gabapentin. Taking Motrin instead.          HPI:   Nhung Porter is a 49 y.o.  female who presents for follow up of rehab needs. Patient initially presented to [facility] on 12/20/2021 due to progressively worsening weakness of her lower extremities.  She reports that she had Covid-19 in November with mild symptoms and received monoclonal antibody infusion (Regeneron) on 11/23/2021 due to risk factors. She states that after receiving the antibody infusion she began having progressive weakness in her legs, sensory changes and paresthesias in the lower extremities up to mid torso, and increasing difficulty walking--started on 11/28/2021. She was seen in the ED a few times, felt to have serum sickness and treated for such. However, continued to worsen and was sent to the ED on 12/20/2021. She had extensive work up. MRI of brain, cervical, thoracic, and lumbar spine were completed. She was noted to have severe bilateral NF narrowing at L4-L5 and chronic degenerative changes, that were not felt to explain patient symptoms per NSGY. EMG showed diffuse abnormalities consistent with a neuropathic process, not consistent with GBS. She was found to have severe B12 deficiency and Neurology felt symptoms likely due to neuropathy related to the B12 deficiency. She was started on B12 injections and her B12 levels improved. Once medically stabilized she was admitted for the Acute inpatient rehab program. She participated in the Acute Rehab program from

## 2024-12-05 ENCOUNTER — OFFICE VISIT (OUTPATIENT)
Dept: ORTHOPEDIC SURGERY | Age: 49
End: 2024-12-05
Payer: COMMERCIAL

## 2024-12-05 VITALS
HEART RATE: 70 BPM | BODY MASS INDEX: 35.34 KG/M2 | RESPIRATION RATE: 18 BRPM | OXYGEN SATURATION: 98 % | HEIGHT: 60 IN | WEIGHT: 180 LBS | TEMPERATURE: 97.9 F | SYSTOLIC BLOOD PRESSURE: 158 MMHG | DIASTOLIC BLOOD PRESSURE: 94 MMHG

## 2024-12-05 DIAGNOSIS — M17.11 OSTEOARTHRITIS OF RIGHT KNEE, UNSPECIFIED OSTEOARTHRITIS TYPE: Primary | ICD-10-CM

## 2024-12-05 PROCEDURE — 20610 DRAIN/INJ JOINT/BURSA W/O US: CPT | Performed by: NURSE PRACTITIONER

## 2024-12-05 NOTE — PROGRESS NOTES
[facility]  ORTHOPAEDICS AND SPORTS MEDICINE  DATE OF VISIT: 12/05/24  Follow Up Visit for Visco Injection    CHIEF COMPLAINT:   Chief Complaint   Patient presents with    Injections     Pt is here today for her right knee Gelsyn~3 injection #1 of 3.          Nhung Porter returns for follow up visit for visco supplement injection 1.  She reports symptoms are reports return of pain over the past month and a half.  She has had increased swelling to the posterior knee and is noticed a reoccurrence of her previously aspirated Baker's cyst.  She is requesting aspiration today.    Physical Exam:   General: Alert and oriented x3, no acute distress  Cardiovascular/pulmonary: No labored breathing, peripheral perfusion intact  Musculoskeletal:    Right knee:    Range of motion is functional   Patella is stable tracking midline  There is no laxity with varus/valgus stress at 0 and 30 degrees of flexion.    There is no tenderness to palpation along the medial joint line.    There is no tenderness to palpation along the lateral joint line  Palpable Baker's cyst to the posterior knee without significant tenderness.  No signs or symptoms of infection.    Imaging: Reviewed     Procedure: Right knee Baker's cyst aspiration    The risk and benefits of knee aspiration were explained to the patient.  Patient elected to proceed with aspiration of right knee Baker's cyst.  Skin was prepped in sterile fashion and was anesthetized with ethyl chloride spray.  Numbing track of lidocaine with a 25-gauge needle was utilized.  The joint was entered from the posterior location distal to the proximal approach under ultrasound with an 18-gauge needle and 14 cc of clear yellow serosanguineous fluid was aspirated without difficulty.  Fluid was discarded.  The site was covered with a Band-Aid.  Patient tolerated well.    Procedure Note: Knee viscosupplementation injection     The right knee was identified as the injection site. The risk and 
100

## 2024-12-10 ENCOUNTER — OFFICE VISIT (OUTPATIENT)
Dept: ORTHOPEDIC SURGERY | Age: 49
End: 2024-12-10
Payer: COMMERCIAL

## 2024-12-10 ENCOUNTER — OFFICE VISIT (OUTPATIENT)
Dept: FAMILY MEDICINE CLINIC | Age: 49
End: 2024-12-10
Payer: COMMERCIAL

## 2024-12-10 ENCOUNTER — OFFICE VISIT (OUTPATIENT)
Dept: ORTHOPEDIC SURGERY | Age: 49
End: 2024-12-10

## 2024-12-10 VITALS
BODY MASS INDEX: 35.34 KG/M2 | HEART RATE: 80 BPM | WEIGHT: 180 LBS | TEMPERATURE: 98.3 F | HEIGHT: 60 IN | RESPIRATION RATE: 16 BRPM | DIASTOLIC BLOOD PRESSURE: 86 MMHG | SYSTOLIC BLOOD PRESSURE: 142 MMHG | OXYGEN SATURATION: 97 %

## 2024-12-10 VITALS
RESPIRATION RATE: 18 BRPM | WEIGHT: 180 LBS | HEIGHT: 60 IN | OXYGEN SATURATION: 90 % | SYSTOLIC BLOOD PRESSURE: 158 MMHG | HEART RATE: 88 BPM | BODY MASS INDEX: 35.34 KG/M2 | DIASTOLIC BLOOD PRESSURE: 100 MMHG | TEMPERATURE: 98.1 F

## 2024-12-10 VITALS
HEIGHT: 60 IN | DIASTOLIC BLOOD PRESSURE: 97 MMHG | BODY MASS INDEX: 35.34 KG/M2 | HEART RATE: 82 BPM | TEMPERATURE: 98 F | OXYGEN SATURATION: 97 % | WEIGHT: 180 LBS | RESPIRATION RATE: 18 BRPM | SYSTOLIC BLOOD PRESSURE: 136 MMHG

## 2024-12-10 DIAGNOSIS — M18.12 ARTHRITIS OF CARPOMETACARPAL (CMC) JOINT OF LEFT THUMB: Primary | ICD-10-CM

## 2024-12-10 DIAGNOSIS — M79.642 LEFT HAND PAIN: ICD-10-CM

## 2024-12-10 DIAGNOSIS — Z72.0 TOBACCO USE: ICD-10-CM

## 2024-12-10 DIAGNOSIS — M17.11 OSTEOARTHRITIS OF RIGHT KNEE, UNSPECIFIED OSTEOARTHRITIS TYPE: Primary | ICD-10-CM

## 2024-12-10 DIAGNOSIS — R03.0 ELEVATED BLOOD PRESSURE READING: Primary | ICD-10-CM

## 2024-12-10 DIAGNOSIS — J30.89 ALLERGIC RHINITIS DUE TO OTHER ALLERGIC TRIGGER, UNSPECIFIED SEASONALITY: ICD-10-CM

## 2024-12-10 PROCEDURE — 20610 DRAIN/INJ JOINT/BURSA W/O US: CPT | Performed by: NURSE PRACTITIONER

## 2024-12-10 PROCEDURE — 99213 OFFICE O/P EST LOW 20 MIN: CPT | Performed by: STUDENT IN AN ORGANIZED HEALTH CARE EDUCATION/TRAINING PROGRAM

## 2024-12-10 RX ORDER — ADHESIVE BANDAGE 3/4"
1 BANDAGE TOPICAL DAILY
Qty: 1 EACH | Refills: 0 | Status: SHIPPED | OUTPATIENT
Start: 2024-12-10

## 2024-12-10 RX ORDER — FLUTICASONE PROPIONATE 50 MCG
1 SPRAY, SUSPENSION (ML) NASAL DAILY PRN
Qty: 16 G | Refills: 2 | Status: SHIPPED | OUTPATIENT
Start: 2024-12-10

## 2024-12-10 RX ORDER — NICOTINE 21 MG/24HR
1 PATCH, TRANSDERMAL 24 HOURS TRANSDERMAL DAILY
Qty: 14 PATCH | Refills: 5 | Status: SHIPPED | OUTPATIENT
Start: 2024-12-10 | End: 2025-03-04

## 2024-12-10 RX ORDER — LIDOCAINE HYDROCHLORIDE 10 MG/ML
0.5 INJECTION, SOLUTION INFILTRATION; PERINEURAL ONCE
Status: COMPLETED | OUTPATIENT
Start: 2024-12-10 | End: 2024-12-10

## 2024-12-10 RX ORDER — TRIAMCINOLONE ACETONIDE 40 MG/ML
20 INJECTION, SUSPENSION INTRA-ARTICULAR; INTRAMUSCULAR ONCE
Status: COMPLETED | OUTPATIENT
Start: 2024-12-10 | End: 2024-12-10

## 2024-12-10 RX ADMIN — LIDOCAINE HYDROCHLORIDE 0.5 ML: 10 INJECTION, SOLUTION INFILTRATION; PERINEURAL at 10:18

## 2024-12-10 RX ADMIN — TRIAMCINOLONE ACETONIDE 20 MG: 40 INJECTION, SUSPENSION INTRA-ARTICULAR; INTRAMUSCULAR at 10:18

## 2024-12-10 NOTE — PROGRESS NOTES
S: 49 y.o. female with   Chief Complaint   Patient presents with    Hypertension       Seen by Orthopedic surgeon twice today (2 different injection sites, 2 separate visits to same office). BP during 2nd ortho appt was 157/100.  Now at this visit. 142/86.  Hx smoking 1PPD - now at about 1/2 PPD - wants to continue to cut back further.      O: VS:  height is 1.524 m (5') and weight is 81.6 kg (180 lb). Her temporal temperature is 98.3 °F (36.8 °C). Her blood pressure is 142/86 (abnormal) and her pulse is 80. Her respiration is 16 and oxygen saturation is 97%.   BP Readings from Last 3 Encounters:   12/10/24 (!) 142/86   12/10/24 (!) 158/100   12/10/24 (!) 136/97     See resident note      Impression/Plan:   1) Elevated BP - How much related to visits for procedures rather than true   HBP?   Pt to check home BP's - bring in next visit  2) Tobacco abuse   Nicotine patches  2) FU 2 wks      Health Maintenance Due   Topic Date Due    Pneumococcal 0-64 years Vaccine (1 of 2 - PCV) Never done    HIV screen  Never done    Hepatitis C screen  Never done    Hepatitis B vaccine (1 of 3 - 19+ 3-dose series) Never done    DTaP/Tdap/Td vaccine (1 - Tdap) Never done    Cervical cancer screen  Never done    Shingles vaccine (1 of 2) Never done    Annual Wellness Visit (Medicare Advantage)  Never done    COVID-19 Vaccine (1 - 2023-24 season) Never done         Attending Physician Statement  I have discussed the case, including pertinent history and exam findings with the resident. I agree with the documented assessment and plan.      Gregg Moreno MD

## 2024-12-10 NOTE — PROGRESS NOTES
[facility]  Family Medicine Residency  Phone: [phone]  Fax: [phone]    Patient:  Nhung Porter 49 y.o. female                                 Date of Service: 12/10/24                            Chiefcomplaint:   Chief Complaint   Patient presents with    Hypertension         History of Present Illness:     The patient is a 49 y.o. female  presented to the clinic with complaints as above.    BP was 158/100 at orthopedic surgery office. Had two separate appointments and two separate injections. Right knee got the hyaluronic acid. Left [facility] cortisone shot.     This Am 136/97 and then in PM was 158/100. In our office 142/86.    Does report a headache and has bad sinuses, and has sinus headaches. Headache started last couple of weeks ago with the weather change. Not worse headache of her life. Located in front of the face around sinuses. Did not awake her from sleep. No change in cognition, has chronic numbness and pins and needs, no recent change.     Smokes 4 packs/week. Used to be 7 packs/week. Interested in nicotine replacement therapy.     Was recommended to come here. Not on any antihypertensives    New puppy and having difficulty getting the new puppy and old dog to do well with each other.     Past Medical History:  No past medical history on file.    Past Surgical History:        Procedure Laterality Date    ANKLE SURGERY      bilateral    APPENDECTOMY      DILATION AND CURETTAGE OF UTERUS      ELBOW SURGERY      right    ENDOMETRIAL ABLATION  07/2016    GALLBLADDER SURGERY      LEEP      cervical conization    TUBAL LIGATION         Allergies:    Patient has no known allergies.    Social History:   Social History     Socioeconomic History    Marital status:      Spouse name: Not on file    Number of children: Not on file    Years of education: Not on file    Highest education level: Not on file   Occupational History    Not on file   Tobacco Use    Smoking status:

## 2024-12-10 NOTE — PROGRESS NOTES
[facility]  ORTHOPAEDICS AND SPORTS MEDICINE  DATE OF VISIT: 12/10/24  Follow Up Visit for Visco Injection    CHIEF COMPLAINT:   Chief Complaint   Patient presents with    Injections     Pt is here today for her right knee Gelsyn~3 injection # 2 of 3.          Nhung Porter returns for follow up visit for visco supplement injection 2.  She reports symptoms are unchanged    Physical Exam:   General: Alert and oriented x3, no acute distress  Cardiovascular/pulmonary: No labored breathing, peripheral perfusion intact  Musculoskeletal:    Right knee:    Range of motion is functional   Patella is stable tracking midline  There is no laxity with varus/valgus stress at 0 and 30 degrees of flexion.    There is no tenderness to palpation along the medial joint line.    There is no tenderness to palpation along the lateral joint line       Imaging: Reviewed     Procedure Note: Knee viscosupplementation injection     The Right knee was identified as the injection site. The risk and benefits of injection were explained and the patient consented to the injection. Under sterile conditions, the knee was injected with a full dose of Gelsyn-3. A sterile bandage was applied.    Administrations This Visit       sodium hyaluronate (Viscosup) injection SOSY 16.8 mg       Admin Date  12/10/2024 Action  Given Dose  16.8 mg Route  Intra-artICUlar Documented By  Beulah Taylor MA                      Assessment/Plan: S/p Right knee Gelsyn-3 injection #2 for osteoarthritis  -Continue activity modification/NSAIDS/ICE prn  -f/u next week for final injection      LLUVIA Lagunas-CNP  Orthopedic Surgery   12/10/24  4:01 PM

## 2024-12-17 ENCOUNTER — OFFICE VISIT (OUTPATIENT)
Dept: ORTHOPEDIC SURGERY | Age: 49
End: 2024-12-17
Payer: COMMERCIAL

## 2024-12-17 VITALS
DIASTOLIC BLOOD PRESSURE: 88 MMHG | TEMPERATURE: 98 F | SYSTOLIC BLOOD PRESSURE: 150 MMHG | OXYGEN SATURATION: 100 % | HEART RATE: 59 BPM | RESPIRATION RATE: 18 BRPM

## 2024-12-17 DIAGNOSIS — M17.11 OSTEOARTHRITIS OF RIGHT KNEE, UNSPECIFIED OSTEOARTHRITIS TYPE: Primary | ICD-10-CM

## 2024-12-17 PROCEDURE — 20610 DRAIN/INJ JOINT/BURSA W/O US: CPT | Performed by: NURSE PRACTITIONER

## 2024-12-17 NOTE — PROGRESS NOTES
[facility]  ORTHOPAEDICS AND SPORTS MEDICINE  DATE OF VISIT: 12/17/24  Follow Up Visit for Visco Injection    CHIEF COMPLAINT:   Chief Complaint   Patient presents with    Injections     Right knee Gelsyn ~ 3 - 3/3    Pain     5 / 10          Nhung Porter returns for follow up visit for visco supplement injection 3.  She reports symptoms are improved    Physical Exam:   General: Alert and oriented x3, no acute distress  Cardiovascular/pulmonary: No labored breathing, peripheral perfusion intact  Musculoskeletal:    Right knee:    Range of motion is functional   Patella is stable tracking midline  There is no laxity with varus/valgus stress at 0 and 30 degrees of flexion.    There is no tenderness to palpation along the medial joint line.    There is no tenderness to palpation along the lateral joint line       Imaging: Reviewed     Procedure Note: Knee viscosupplementation injection     The Right knee was identified as the injection site. The risk and benefits of injection were explained and the patient consented to the injection. Under sterile conditions, the knee was injected with a full dose of Gelsyn ~ 3\. A sterile bandage was applied.    Administrations This Visit       sodium hyaluronate (Viscosup) injection SOSY 16.8 mg       Admin Date  12/17/2024 Action  Given Dose  16.8 mg Route  Intra-artICUlar Documented By  Demetrio Hoffman APRN - CNP                      Assessment/Plan: S/p Right knee Gelsyn ~ 3 injection #3 for osteoarthritis  -Continue activity modification/NSAIDS/ICE prn  -f/u  PRN or 3 months      JOVANA Lagunas  Orthopedic Surgery   12/17/24  3:58 PM

## 2024-12-27 ENCOUNTER — OFFICE VISIT (OUTPATIENT)
Dept: FAMILY MEDICINE CLINIC | Age: 49
End: 2024-12-27
Payer: COMMERCIAL

## 2024-12-27 VITALS
TEMPERATURE: 97.3 F | SYSTOLIC BLOOD PRESSURE: 152 MMHG | HEART RATE: 66 BPM | HEIGHT: 60 IN | DIASTOLIC BLOOD PRESSURE: 88 MMHG | OXYGEN SATURATION: 98 % | RESPIRATION RATE: 16 BRPM | WEIGHT: 185 LBS | BODY MASS INDEX: 36.32 KG/M2

## 2024-12-27 DIAGNOSIS — B96.89 ACUTE BACTERIAL SINUSITIS: Primary | ICD-10-CM

## 2024-12-27 DIAGNOSIS — J34.89 NASAL DRAINAGE: ICD-10-CM

## 2024-12-27 DIAGNOSIS — J01.90 ACUTE BACTERIAL SINUSITIS: Primary | ICD-10-CM

## 2024-12-27 DIAGNOSIS — R03.0 ELEVATED BLOOD PRESSURE READING: ICD-10-CM

## 2024-12-27 PROCEDURE — 99213 OFFICE O/P EST LOW 20 MIN: CPT

## 2024-12-27 RX ORDER — LORATADINE 10 MG/1
10 TABLET ORAL DAILY
Qty: 30 TABLET | Refills: 1 | Status: SHIPPED | OUTPATIENT
Start: 2024-12-27

## 2024-12-27 NOTE — PROGRESS NOTES
[facility]  Precepting Note    Subjective:  USI   Sinus pain, drainage  Seen 2 weeks ago, advised likely viral  Worsening issues since then  Took left over Keflex 2 days ago with some modest improvement      ROS otherwise negative     Past medical, surgical, family and social history were reviewed, non-contributory, and unchanged unless otherwise stated.    Objective:    BP (!) 152/88   Pulse 66   Temp 97.3 °F (36.3 °C) (Temporal)   Resp 16   Ht 1.524 m (5')   Wt 83.9 kg (185 lb)   LMP 11/19/2021   SpO2 98%   BMI 36.13 kg/m²     Exam is as noted by resident with the following changes, additions or corrections:    General:  NAD; alert & oriented x 3   ENT: TM's with effusion; +maxillary sinus pressure   Heart:  RRR, no murmurs, gallops, or rubs.  Lungs:  CTA bilaterally, no wheeze, rales or rhonchi      Assessment/Plan:  Cute max sinusitis   Augmentin   Flonase/Claritin  HTN   Bring in cuff readings   RTO in 1 month for recheck and may need meds      Attending Physician Statement  I have reviewed the chart, including any radiology or labs. I have discussed the case, including pertinent history and exam findings with the resident.  I agree with the assessment, plan and orders as documented by the resident.  Please refer to the resident note for additional information.      Electronically signed by Gregg Isidro MD on 12/27/2024 at 10:24 AM

## 2024-12-27 NOTE — PROGRESS NOTES
[facility]  Department of Family Medicine  Family Medicine Residency Program      Patient: Nhung Porter 49 y.o. female     Date of Service: 12/27/24      Chief complaint:   Chief Complaint   Patient presents with    Pharyngitis     About 4 or 5 days. Dry cough. Taking keflex from dentist.       HISTORY OF PRESENTING ILLNESS     49 y.o. female presented to the clinic for the chief complaint mentioned above:      Sinusitis  Sinuses have been bothering for the last few weeks  Developed a sore throat on christmas, excessive post nasal discharge  Had rx of Keflex from a previous prescription by her dentist, took 2 on christmas, 4 on Thursday   Has been coughing, mostly non productive every couple  Has been using flonase  No wheezing, or shorntess of breath  Right ear was hurting as well  No fever, no chills  No sick contacts  Claritin D does help but has been out of it      Elevated blood pressure  It has been two weeks since she quit smoking  Ever since she stopped the nicotie patch bp is better, 125/82, if no caffiene  Had 4 cups of coffee this morning  Has sinus pressure on the forehead but denies headache, vision changes, dizziness, chest pain      Health Maintenance:  Health Maintenance Due   Topic Date Due    HIV screen  Never done    Hepatitis C screen  Never done    Hepatitis B vaccine (1 of 3 - 19+ 3-dose series) Never done    DTaP/Tdap/Td vaccine (1 - Tdap) Never done    Cervical cancer screen  Never done    Shingles vaccine (1 of 2) Never done    Annual Wellness Visit (Medicare Advantage)  Never done    COVID-19 Vaccine (1 - 2023-24 season) Never done     Past Medical History:  No past medical history on file.  Past Surgical History:        Procedure Laterality Date    ANKLE SURGERY      bilateral    APPENDECTOMY      DILATION AND CURETTAGE OF UTERUS      ELBOW SURGERY      right    ENDOMETRIAL ABLATION  07/2016    GALLBLADDER SURGERY      LEEP      cervical conization    TUBAL

## 2024-12-30 ASSESSMENT — ENCOUNTER SYMPTOMS
APNEA: 0
ABDOMINAL DISTENTION: 0
NAUSEA: 0
ABDOMINAL PAIN: 0
RHINORRHEA: 0
WHEEZING: 0
DIARRHEA: 0
VOMITING: 0
COUGH: 1

## 2025-01-21 ENCOUNTER — TELEPHONE (OUTPATIENT)
Dept: FAMILY MEDICINE CLINIC | Age: 50
End: 2025-01-21

## 2025-01-21 DIAGNOSIS — R79.89 ELEVATED VITAMIN B12 LEVEL: Primary | ICD-10-CM

## 2025-01-21 NOTE — TELEPHONE ENCOUNTER
Last Appointment   12/27/2024  Next Appointment  1/28/2025  Patient sees you in the 28th and would like an order for her b12 levels to be checked prior to seeing you. Please let patient know when order is in.  Thanks

## 2025-01-23 ENCOUNTER — HOSPITAL ENCOUNTER (OUTPATIENT)
Age: 50
Discharge: HOME | End: 2025-01-23
Payer: COMMERCIAL

## 2025-01-23 LAB
FOLATE SERPL-MCNC: >20 NG/ML (ref 4.8–24.2)
VIT B12 SERPL-MCNC: 826 PG/ML (ref 211–946)

## 2025-01-23 PROCEDURE — 82607 VITAMIN B-12: CPT

## 2025-01-23 PROCEDURE — 36415 COLL VENOUS BLD VENIPUNCTURE: CPT

## 2025-01-23 PROCEDURE — 82746 ASSAY OF FOLIC ACID SERUM: CPT

## 2025-01-25 SDOH — ECONOMIC STABILITY: FOOD INSECURITY: WITHIN THE PAST 12 MONTHS, THE FOOD YOU BOUGHT JUST DIDN'T LAST AND YOU DIDN'T HAVE MONEY TO GET MORE.: NEVER TRUE

## 2025-01-25 SDOH — ECONOMIC STABILITY: TRANSPORTATION INSECURITY
IN THE PAST 12 MONTHS, HAS LACK OF TRANSPORTATION KEPT YOU FROM MEETINGS, WORK, OR FROM GETTING THINGS NEEDED FOR DAILY LIVING?: YES

## 2025-01-25 SDOH — ECONOMIC STABILITY: FOOD INSECURITY: WITHIN THE PAST 12 MONTHS, YOU WORRIED THAT YOUR FOOD WOULD RUN OUT BEFORE YOU GOT MONEY TO BUY MORE.: NEVER TRUE

## 2025-01-25 SDOH — ECONOMIC STABILITY: INCOME INSECURITY: IN THE LAST 12 MONTHS, WAS THERE A TIME WHEN YOU WERE NOT ABLE TO PAY THE MORTGAGE OR RENT ON TIME?: YES

## 2025-01-25 SDOH — ECONOMIC STABILITY: TRANSPORTATION INSECURITY
IN THE PAST 12 MONTHS, HAS THE LACK OF TRANSPORTATION KEPT YOU FROM MEDICAL APPOINTMENTS OR FROM GETTING MEDICATIONS?: YES

## 2025-01-25 ASSESSMENT — PATIENT HEALTH QUESTIONNAIRE - PHQ9
1. LITTLE INTEREST OR PLEASURE IN DOING THINGS: NOT AT ALL
SUM OF ALL RESPONSES TO PHQ QUESTIONS 1-9: 0
SUM OF ALL RESPONSES TO PHQ9 QUESTIONS 1 & 2: 0
SUM OF ALL RESPONSES TO PHQ QUESTIONS 1-9: 0
SUM OF ALL RESPONSES TO PHQ9 QUESTIONS 1 & 2: 0
SUM OF ALL RESPONSES TO PHQ QUESTIONS 1-9: 0
SUM OF ALL RESPONSES TO PHQ QUESTIONS 1-9: 0
1. LITTLE INTEREST OR PLEASURE IN DOING THINGS: NOT AT ALL
2. FEELING DOWN, DEPRESSED OR HOPELESS: NOT AT ALL
2. FEELING DOWN, DEPRESSED OR HOPELESS: NOT AT ALL

## 2025-01-28 ENCOUNTER — OFFICE VISIT (OUTPATIENT)
Dept: FAMILY MEDICINE CLINIC | Age: 50
End: 2025-01-28
Payer: COMMERCIAL

## 2025-01-28 VITALS
TEMPERATURE: 97.1 F | WEIGHT: 190 LBS | OXYGEN SATURATION: 98 % | DIASTOLIC BLOOD PRESSURE: 88 MMHG | HEART RATE: 94 BPM | BODY MASS INDEX: 37.3 KG/M2 | RESPIRATION RATE: 18 BRPM | SYSTOLIC BLOOD PRESSURE: 122 MMHG | HEIGHT: 60 IN

## 2025-01-28 DIAGNOSIS — G62.9 POLYNEUROPATHY: Primary | ICD-10-CM

## 2025-01-28 PROCEDURE — 99212 OFFICE O/P EST SF 10 MIN: CPT

## 2025-01-28 NOTE — PROGRESS NOTES
S: 49 y.o. female with   Chief Complaint   Patient presents with    Hypertension     Follow up on elevated BP reading       48 yo female - routine FU.  Hx HBP - home BP's have been good, as is today's BP at the office.  Quit smoking in December - improved sense of smell. Some gain in wt since stopping cigarettes - but had also laid off her exercises.  6 falls since Dec. - + Hx of bilateral foot neuropathy. Each of these episodes were tied to snow/ice. No signifcant injury noted from any of these falls.    O: VS:  height is 1.524 m (5') and weight is 86.2 kg (190 lb). Her temporal temperature is 97.1 °F (36.2 °C). Her blood pressure is 122/88 and her pulse is 94. Her respiration is 18 and oxygen saturation is 98%.   BP Readings from Last 3 Encounters:   01/28/25 122/88   12/27/24 (!) 152/88   12/17/24 (!) 150/88     See resident note      Impression/Plan:   1) HBP - stable - no changes needed.  2) OBTW: 6 falls in past month. Pt with known bilateral Lower extremity peripheral neuropathies:   Refer to PT - eval and recommendations  3) tobacco abuse - 1 month abstinent - encouraged  top continue      Health Maintenance Due   Topic Date Due    HIV screen  Never done    Hepatitis C screen  Never done    Hepatitis B vaccine (1 of 3 - 19+ 3-dose series) Never done    DTaP/Tdap/Td vaccine (1 - Tdap) Never done    Cervical cancer screen  Never done    Shingles vaccine (1 of 2) Never done    COVID-19 Vaccine (1 - 2023-24 season) Never done    Annual Wellness Visit (Medicare Advantage)  Never done         Attending Physician Statement  I have discussed the case, including pertinent history and exam findings with the resident. I agree with the documented assessment and plan.      Gregg Moreno MD  
months (around 4/28/2025).    Counseled regarding above diagnosis, including possible risks and complications, especially if left uncontrolled. Counseled regarding the possible side effects, risks, benefits and alternatives to treatment; patient and/or guardian verbalizes understanding, agrees, feels comfortable with and wishes to proceed with above treatment plan.    Call or go to ED immediately if symptoms worsen or persist. Advised patient to call with any new medication issues, and, as applicable, read all Rx info from pharmacy to assure aware of all possible risks and side effects of medication before taking.     Patient and/or guardian given opportunity to ask questions/raise concerns.The patient verbalized comfort and understanding of instructions.     I encourage further reading and education about your health conditions.  Information on many healthconditions is provided by the American Academy of Family [employer]icians: https://familydoctor.org/  Please bring any questions to me at your next visit.    This document may have been prepared at least partiallythrough the use of voice recognition software. Although effort is taken to assure the accuracy of this document, it is possible that grammatical, syntax,  or spelling errors may occur.    Medication List:    Current Outpatient Medications   Medication Sig Dispense Refill    loratadine (CLARITIN) 10 MG tablet Take 1 tablet by mouth daily 30 tablet 1    fluticasone (FLONASE) 50 MCG/ACT nasal spray 1 spray by Nasal route daily as needed for Allergies 16 g 2    Blood Pressure Monitoring (BLOOD PRESSURE CUFF) MISC 1 Device by Does not apply route daily Dx:  Hypertension with labile blood pressure 1 each 0    Wheat Dextrin (BENEFIBER) POWD Take 4 g by mouth daily 80 g 0    diclofenac sodium (VOLTAREN) 1 % GEL Apply 4 g topically 4 times daily as needed for Pain 350 g 4    VITAMIN D3 50 MCG (2000 UT) CAPS capsule Take 1 capsule by mouth daily      olopatadine

## 2025-01-30 ENCOUNTER — TELEPHONE (OUTPATIENT)
Dept: PHYSICAL THERAPY | Age: 50
End: 2025-01-30

## 2025-02-06 ENCOUNTER — HOSPITAL ENCOUNTER (OUTPATIENT)
Dept: PHYSICAL THERAPY | Age: 50
Setting detail: THERAPIES SERIES
Discharge: HOME | End: 2025-02-06
Payer: COMMERCIAL

## 2025-02-06 PROCEDURE — 97161 PT EVAL LOW COMPLEX 20 MIN: CPT

## 2025-02-06 ASSESSMENT — PAIN DESCRIPTION - ORIENTATION: ORIENTATION: RIGHT

## 2025-02-06 ASSESSMENT — PAIN SCALES - GENERAL: PAINLEVEL_OUTOF10: 5

## 2025-02-06 ASSESSMENT — PAIN DESCRIPTION - DESCRIPTORS: DESCRIPTORS: ACHING;SORE

## 2025-02-06 ASSESSMENT — PAIN DESCRIPTION - PAIN TYPE: TYPE: CHRONIC PAIN

## 2025-02-06 ASSESSMENT — PAIN DESCRIPTION - LOCATION: LOCATION: KNEE

## 2025-02-06 NOTE — PROGRESS NOTES
Physical Therapy    Physical Therapy: Initial Evaluation    Patient: Nhung Porter (49 y.o. female)   Examination Date: 2025  Plan of Care Certification Period: 2025 to        :  1975 ;    Confirmed: Yes MRN: [id number]  CSN: [id number]   Insurance: Payor: [employer]AN / Plan: DEVOTED HEALTH PLANS / Product Type: *No Product type* /   Insurance ID: [id number] - (Medicare Managed) Secondary Insurance (if applicable):    Referring Physician: Cole Dietz MD     PCP: Cole Dietz MD Visits to Date/Visits Approved:   /      No Show/Cancelled Appts:   /       Medical Diagnosis: Polyneuropathy [G62.9]    Treatment Diagnosis:       PERTINENT MEDICAL HISTORY           Medical History:   No past medical history on file.  Surgical History:   Past Surgical History:   Procedure Laterality Date    ANKLE SURGERY      bilateral    APPENDECTOMY      DILATION AND CURETTAGE OF UTERUS      ELBOW SURGERY      right    ENDOMETRIAL ABLATION  2016    GALLBLADDER SURGERY      LEEP      cervical conization    TUBAL LIGATION         Medications:   Current Outpatient Medications:     loratadine (CLARITIN) 10 MG tablet, Take 1 tablet by mouth daily, Disp: 30 tablet, Rfl: 1    fluticasone (FLONASE) 50 MCG/ACT nasal spray, 1 spray by Nasal route daily as needed for Allergies, Disp: 16 g, Rfl: 2    Blood Pressure Monitoring (BLOOD PRESSURE CUFF) MISC, 1 Device by Does not apply route daily Dx:  Hypertension with labile blood pressure, Disp: 1 each, Rfl: 0    Wheat Dextrin (BENEFIBER) POWD, Take 4 g by mouth daily, Disp: 80 g, Rfl: 0    diclofenac sodium (VOLTAREN) 1 % GEL, Apply 4 g topically 4 times daily as needed for Pain, Disp: 350 g, Rfl: 4    VITAMIN D3 50 MCG ( UT) CAPS capsule, Take 1 capsule by mouth daily, Disp: , Rfl:     olopatadine (PATANOL) 0.1 % ophthalmic solution, , Disp: , Rfl:     RESTASIS 0.05 % ophthalmic emulsion, , Disp: , Rfl:     Multiple Vitamin (MULTIVITAMIN) TABS tablet, Take

## 2025-03-11 ENCOUNTER — OFFICE VISIT (OUTPATIENT)
Dept: ORTHOPEDIC SURGERY | Age: 50
End: 2025-03-11
Payer: COMMERCIAL

## 2025-03-11 VITALS
HEART RATE: 82 BPM | SYSTOLIC BLOOD PRESSURE: 129 MMHG | HEIGHT: 60 IN | TEMPERATURE: 98.2 F | RESPIRATION RATE: 20 BRPM | OXYGEN SATURATION: 98 % | WEIGHT: 190 LBS | BODY MASS INDEX: 37.3 KG/M2 | DIASTOLIC BLOOD PRESSURE: 84 MMHG

## 2025-03-11 DIAGNOSIS — M17.11 OSTEOARTHRITIS OF RIGHT KNEE, UNSPECIFIED OSTEOARTHRITIS TYPE: Primary | ICD-10-CM

## 2025-03-11 PROCEDURE — 99213 OFFICE O/P EST LOW 20 MIN: CPT | Performed by: NURSE PRACTITIONER

## 2025-03-11 NOTE — PROGRESS NOTES
[facility]   ORTHOPAEDIC SURGERY AND SPORTS MEDICINE  DATE OF VISIT: 03/11/25  Follow Up Knee Visit     CHIEF COMPLAINT:   Chief Complaint   Patient presents with    Knee Pain     Patient presents into the office today for a recheck of the R knee. She was last seen in 12/2024 and given Gelsyn injections. Patient states I provided slight relief. She adds she did fall a few times in January causing pain to the knees. Patient describes the pain today as a constant aching.        HPI:    Nhung Porter is a 49 y.o. year old female who presented to the office today for follow up of right knee pain, previously evaluated on 12/17/2024. Previous treatment has included completion of Visco supplement injections. She reports symptoms improved. The patient has responded to the treatment.      REVIEW OF SYSTEMS:     General: Negative Fever, chills, fatigue   Cardiovascular: Negative chest pain, palpitations  Respiratory: Equal chest expansion, negative shortness of breath   Skin: Negative rash, open wounds  Psych: Normal affect, mood stable  Neurologic: sensation grossly intact in extremities   Musculoskeletal: See HPI      Physical Exam:     Height: 1.524 m (5'), Weight - Scale: 86.2 kg (190 lb), BP: 129/84    General: Alert and oriented x3, no acute distress  Cardiovascular/pulmonary: No labored breathing, peripheral perfusion intact  Musculoskeletal:    Right Knee:    negative swelling/deformity/effusion  Range of motion is 0 to 125.     Patella is stable tracking midline, negative patellofemoral crepitus  There is no laxity with varus/valgus stress at 0 and 30 degrees of flexion.    There is tenderness to palpation along the medial joint line.    There is no tenderness to palpation along the lateral joint line        Controlled Substances Monitoring:      Imaging:  Reviewed      Assessment: Right knee osteoarthritis      Plan:   Patient presents to the office today for evaluation of right knee osteoarthritis.  History,

## 2025-04-16 ENCOUNTER — LAB (OUTPATIENT)
Dept: FAMILY MEDICINE CLINIC | Age: 50
End: 2025-04-16

## 2025-04-16 DIAGNOSIS — R79.89 ELEVATED VITAMIN B12 LEVEL: Primary | ICD-10-CM

## 2025-04-16 DIAGNOSIS — R25.2 SPASTICITY: ICD-10-CM

## 2025-04-16 DIAGNOSIS — R26.9 IMPAIRED GAIT: ICD-10-CM

## 2025-04-16 DIAGNOSIS — G82.20 PARAPARESIS (HCC): ICD-10-CM

## 2025-04-16 DIAGNOSIS — G62.9 POLYNEUROPATHY: ICD-10-CM

## 2025-04-16 DIAGNOSIS — G89.29 CHRONIC MIDLINE LOW BACK PAIN WITH RIGHT-SIDED SCIATICA: ICD-10-CM

## 2025-04-16 DIAGNOSIS — M54.41 CHRONIC MIDLINE LOW BACK PAIN WITH RIGHT-SIDED SCIATICA: ICD-10-CM

## 2025-04-16 LAB — VITAMIN B-12: 636 PG/ML (ref 232–1245)

## 2025-04-29 ENCOUNTER — OFFICE VISIT (OUTPATIENT)
Dept: FAMILY MEDICINE CLINIC | Age: 50
End: 2025-04-29
Payer: COMMERCIAL

## 2025-04-29 VITALS
TEMPERATURE: 97.8 F | HEART RATE: 89 BPM | DIASTOLIC BLOOD PRESSURE: 75 MMHG | WEIGHT: 198 LBS | BODY MASS INDEX: 38.87 KG/M2 | HEIGHT: 60 IN | RESPIRATION RATE: 16 BRPM | OXYGEN SATURATION: 98 % | SYSTOLIC BLOOD PRESSURE: 125 MMHG

## 2025-04-29 DIAGNOSIS — G56.01 CARPAL TUNNEL SYNDROME OF RIGHT WRIST: Primary | ICD-10-CM

## 2025-04-29 PROCEDURE — 99214 OFFICE O/P EST MOD 30 MIN: CPT

## 2025-04-29 NOTE — PROGRESS NOTES
[facility]  Family Medicine Residency Program  Phone: [phone]  Fax: [phone]    Patient:  Nhung Porter 49 y.o. female                                 Date of Service: 4/29/25                            Chiefcomplaint:   Chief Complaint   Patient presents with    Peripheral Neuropathy     Overall improved  New: Waking up at night with numb fingers at times    Hypertension         History of Present Illness:     The patient is a 49 y.o. female with a polyneuropathy, CMC joint arthritis, sciatica, vitamin B12 deficiency, obesity who presented to the clinic for numbness in fingers.    Patient reports bilateral numbness on the first 3 digits of both upper extremities that started around 2 to 3 weeks ago.  She reports that the fingers are hard to bend because of this.  She denies tingling, numbness on arm/forearm.  Patient has a history of left CMC joint arthritis for which she has had ultrasound-guided corticosteroid injections.    Recently stopped smoking December 2024 and thinks she has gained 15 pounds since then.  She has been having some mild cravings for nicotine but they are not strong.  She has started working out again, is doing back and core exercises along with treadmill.  Will start swimming again later in summer.    Past Medical History:  No past medical history on file.    Past Surgical History:        Procedure Laterality Date    ANKLE SURGERY      bilateral    APPENDECTOMY      DILATION AND CURETTAGE OF UTERUS      ELBOW SURGERY      right    ENDOMETRIAL ABLATION  07/2016    GALLBLADDER SURGERY      LEEP      cervical conization    TUBAL LIGATION         Allergies:    Patient has no known allergies.    Social History:   Social History     Socioeconomic History    Marital status:      Spouse name: Not on file    Number of children: Not on file    Years of education: Not on file    Highest education level: Not on file   Occupational History    Not on file

## 2025-04-29 NOTE — PROGRESS NOTES
S: 49 y.o. female with   Chief Complaint   Patient presents with    Peripheral Neuropathy     Overall improved  New: Waking up at night with numb fingers at times    Hypertension       HTN - Well controlled today, tolerating medication well    Peripheral Neuropathy - bilateral numbness and tingling in the first 3 digits of the hands, started 2-3 weeks ago, reports fingers are hard to bend, history of CMC joint arthritis    Obesity - Concerned about weight gain especially after stopping smoking, trying to increased protein intake    Tobacco abuse - quit in December 2024    O: VS:  height is 1.524 m (5') and weight is 89.8 kg (198 lb). Her temporal temperature is 97.8 °F (36.6 °C). Her blood pressure is 125/75 and her pulse is 89. Her respiration is 16 and oxygen saturation is 98%.   BP Readings from Last 3 Encounters:   04/29/25 125/75   03/11/25 129/84   01/28/25 122/88     See resident note  General:  NAD; alert & oriented x 3   Heart:  RRR, no murmurs, gallops, or rubs.  Lungs:  CTA bilaterally, no wheeze, rales or rhonchi  Abd: bowel sounds present, nontender, nondistended, no masses  Extrem:  No clubbing, cyanosis, or edema    Impression/Plan:   1) HTN - well controlled, tolerating medications well  2) Obesity - dietary counseling, encouraged increased exercise  3) Carpal Tunnel Syndrome/Peripheral Neuropathy - encouraged to follow up with sports medicine physician, previous B12 deficiency. Follow up with neurology scheduled      Health Maintenance Due   Topic Date Due    HIV screen  Never done    Hepatitis C screen  Never done    Hepatitis B vaccine (1 of 3 - 19+ 3-dose series) Never done    DTaP/Tdap/Td vaccine (1 - Tdap) Never done    Cervical cancer screen  Never done    Shingles vaccine (1 of 2) Never done    Pneumococcal 50+ years Vaccine (1 of 1 - PCV) Never done    COVID-19 Vaccine (1 - 2024-25 season) Never done    Annual Wellness Visit (Medicare Advantage)  Never done         Attending Physician

## 2025-05-02 LAB — NON-GYN CYTOLOGY REPORT: NORMAL

## 2025-05-27 ENCOUNTER — OFFICE VISIT (OUTPATIENT)
Dept: NEUROLOGY | Age: 50
End: 2025-05-27
Payer: COMMERCIAL

## 2025-05-27 VITALS
TEMPERATURE: 97.7 F | SYSTOLIC BLOOD PRESSURE: 130 MMHG | OXYGEN SATURATION: 97 % | HEART RATE: 67 BPM | DIASTOLIC BLOOD PRESSURE: 86 MMHG

## 2025-05-27 DIAGNOSIS — E53.8 SUBACUTE COMBINED DEGENERATION (HCC): Primary | ICD-10-CM

## 2025-05-27 DIAGNOSIS — E53.8 B12 DEFICIENCY: ICD-10-CM

## 2025-05-27 DIAGNOSIS — G32.0 SUBACUTE COMBINED DEGENERATION (HCC): Primary | ICD-10-CM

## 2025-05-27 PROCEDURE — 99214 OFFICE O/P EST MOD 30 MIN: CPT | Performed by: CLINICAL NURSE SPECIALIST

## 2025-05-27 RX ORDER — MULTIVITAMIN WITH IRON
500 TABLET ORAL DAILY
COMMUNITY

## 2025-05-27 RX ORDER — VITAMIN B COMPLEX
1 CAPSULE ORAL DAILY
COMMUNITY

## 2025-05-27 NOTE — PROGRESS NOTES
Nhung Porter is a 49 y.o. right handed female     Patient was followed by neurology in the hospital for BLE weakness    PMH of covid 19 in November 2021 lumbar epidurals    Her symptoms began on 11/28/2021.  She was diagnosed with COVID-19 on 11/20/2021.  Prior to this she was fully functional and walking independently at baseline.  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  She had received antibiotic infusion.  She started noticing numbness and tingling on 11/23/2021.  She was seen in the ED and was diagnosed with \"serum sickness\" and placed on steroids and Benadryl.  This did nothing for symptoms and continue to slowly progressed with development of weakness on the 28th.  She had labs done by her PCP which revealed a B12 of 165 and she was started on B12 injections on 12/10.  She received her second injection on 12/17.  Initially, she was able to still ambulate with a walker.  Her weakness continued to progress and she presented to the hospital again.  B12 level on 12/22 was 575.  She does have a history of low back pain and sciatica and received epidural spinal injections with the last injection in September 2021.  MRIs of the brain, cervical, thoracic and lumbar spine were completed which did not show any acute findings responsible for current symptoms.  EMG showed a predominantly neuropathic process which upon review with Dr Fischer felt secondary to her B12 deficiency    Her levels have remarkably improved and her neurological issues have also markedly improved   When he was ealuated in the hospital at 0/5 strength in her distal lower extremities and last appointment was 5/5    B12 level was recently 636    She is now walking upwards from 4 to 6 miles per day and feels that she is doing extremely well    NCV's reviewed with her-examination also reviewed with her during her hospitalization    Objective:     /86 (BP Site: Left Upper Arm,

## 2025-06-09 NOTE — PROGRESS NOTES
[facility]  ORTHOPAEDICS AND SPORTS MEDICINE  DATE OF VISIT: 02/22/24  Follow Up Visit for Visco Injection    CHIEF COMPLAINT:   Chief Complaint   Patient presents with    Injections     Pt is here today for a right knee Gelsyn injection # 3 of 3.          Nhung Porter returns for follow up visit for visco supplement injection 3.  She reports symptoms are improved    Physical Exam:   General: Alert and oriented x3, no acute distress  Cardiovascular/pulmonary: No labored breathing, peripheral perfusion intact  Musculoskeletal:    right knee:    Range of motion is functional   Patella is stable tracking midline  There is no laxity with varus/valgus stress at 0 and 30 degrees of flexion.    There is no tenderness to palpation along the medial joint line.    There is no tenderness to palpation along the lateral joint line       Imaging: Reviewed     Procedure Note: Knee viscosupplementation injection     The right knee was identified as the injection site. The risk and benefits of injection were explained and the patient consented to the injection. Under sterile conditions, the knee was injected with a full dose of Gelsyn-3. A sterile bandage was applied.    Administrations This Visit       sodium hyaluronate (Viscosup) injection SOSY 16.8 mg       Admin Date  02/22/2024 Action  Given Dose  16.8 mg Route  Intra-artICUlar Administered By  Chika Pressley CMA                      Assessment/Plan: S/p right knee Gelsyn-3 injection #3 for osteoarthritis  -Continue activity modification/NSAIDS/ICE prn  -f/u in 3 months or as needed    LLUVIA Lagunas-CNP  Orthopedic Surgery   02/22/24  2:52 PM           2 (mild pain)

## 2025-06-11 DIAGNOSIS — J30.89 ALLERGIC RHINITIS DUE TO OTHER ALLERGIC TRIGGER, UNSPECIFIED SEASONALITY: ICD-10-CM

## 2025-06-11 RX ORDER — FLUTICASONE PROPIONATE 50 MCG
SPRAY, SUSPENSION (ML) NASAL
Qty: 16 G | Refills: 2 | Status: SHIPPED | OUTPATIENT
Start: 2025-06-11

## 2025-06-11 NOTE — TELEPHONE ENCOUNTER
Name of Medication(s) Requested:  Requested Prescriptions     Pending Prescriptions Disp Refills    fluticasone (FLONASE) 50 MCG/ACT nasal spray [Pharmacy Med Name: FLUTICASONE 50MCG NASAL SP (120) RX] 16 g 2     Sig: SHAKE LIQUID AND USE 1 SPRAY IN EACH NOSTRIL DAILY AS NEEDED FOR ALLERGIES       Medication is on current medication list Yes    Dosage and directions were verified? Yes    Quantity verified: 90 day supply     Pharmacy Verified?  Yes    Last Appointment:  12/10/2024    Future appts:  Future Appointments   Date Time Provider Department Center   7/9/2025  3:00 PM Cole Dietz MD [facility]   7/15/2025  3:30 PM Demetrio Hoffman, APRN - CNP [facility]        (If no appt send self scheduling link. .REFILLAPPT)  Scheduling request sent?     [] Yes  [x] No    Does patient need updated?  [x] Yes  [] No

## 2025-06-19 LAB — PAP SMEAR, EXTERNAL: NEGATIVE

## 2025-07-07 ENCOUNTER — LAB (OUTPATIENT)
Dept: FAMILY MEDICINE CLINIC | Age: 50
End: 2025-07-07

## 2025-07-09 ENCOUNTER — OFFICE VISIT (OUTPATIENT)
Dept: FAMILY MEDICINE CLINIC | Age: 50
End: 2025-07-09
Payer: COMMERCIAL

## 2025-07-09 VITALS
DIASTOLIC BLOOD PRESSURE: 76 MMHG | OXYGEN SATURATION: 97 % | TEMPERATURE: 97.7 F | HEART RATE: 73 BPM | SYSTOLIC BLOOD PRESSURE: 142 MMHG | BODY MASS INDEX: 40.17 KG/M2 | WEIGHT: 204.6 LBS | RESPIRATION RATE: 16 BRPM | HEIGHT: 60 IN

## 2025-07-09 DIAGNOSIS — Z12.31 SCREENING MAMMOGRAM FOR BREAST CANCER: ICD-10-CM

## 2025-07-09 DIAGNOSIS — Z11.4 ENCOUNTER FOR SCREENING FOR HIV: ICD-10-CM

## 2025-07-09 DIAGNOSIS — M17.11 OSTEOARTHRITIS OF RIGHT KNEE, UNSPECIFIED OSTEOARTHRITIS TYPE: ICD-10-CM

## 2025-07-09 DIAGNOSIS — G62.9 POLYNEUROPATHY: Primary | ICD-10-CM

## 2025-07-09 DIAGNOSIS — R73.03 PREDIABETES: ICD-10-CM

## 2025-07-09 DIAGNOSIS — Z23 ENCOUNTER FOR PREVNAR PNEUMOCOCCAL VACCINATION: ICD-10-CM

## 2025-07-09 DIAGNOSIS — Z11.59 NEED FOR HEPATITIS C SCREENING TEST: ICD-10-CM

## 2025-07-09 LAB
FOLATE: 18.5 NG/ML (ref 4.6–34.8)
HBA1C MFR BLD: 5.5 % (ref 4–5.6)
HEPATITIS C ANTIBODY: NONREACTIVE
HIV AG/AB: NONREACTIVE
VITAMIN B-12: 761 PG/ML (ref 232–1245)

## 2025-07-09 PROCEDURE — 90677 PCV20 VACCINE IM: CPT | Performed by: FAMILY MEDICINE

## 2025-07-09 PROCEDURE — G0009 ADMIN PNEUMOCOCCAL VACCINE: HCPCS | Performed by: FAMILY MEDICINE

## 2025-07-09 ASSESSMENT — PATIENT HEALTH QUESTIONNAIRE - PHQ9
SUM OF ALL RESPONSES TO PHQ QUESTIONS 1-9: 0
1. LITTLE INTEREST OR PLEASURE IN DOING THINGS: NOT AT ALL
2. FEELING DOWN, DEPRESSED OR HOPELESS: NOT AT ALL
SUM OF ALL RESPONSES TO PHQ QUESTIONS 1-9: 0

## 2025-07-09 ASSESSMENT — LIFESTYLE VARIABLES
HOW OFTEN DO YOU HAVE A DRINK CONTAINING ALCOHOL: MONTHLY OR LESS
HOW MANY STANDARD DRINKS CONTAINING ALCOHOL DO YOU HAVE ON A TYPICAL DAY: 1 OR 2

## 2025-07-09 NOTE — PROGRESS NOTES
Medicare Annual Wellness Visit    Nhung Porter is here for Medicare AWV    Assessment & Plan   Polyneuropathy  -     Vitamin B12 & Folate  Encounter for screening for HIV  -     HIV Screen  Need for hepatitis C screening test  -     Hepatitis C Antibody  Screening mammogram for breast cancer  -     CONSTANZA DIGITAL SCREEN BILATERAL PER PROTOCOL; Future  Prediabetes  -     Hemoglobin A1C  Encounter for Prevnar pneumococcal vaccination  -     Pneumococcal, PCV20, PREVNAR 20, (age 6w+), IM, PF  Osteoarthritis of right knee, unspecified osteoarthritis type  -     diclofenac sodium (VOLTAREN) 1 % GEL; Apply 4 g topically 4 times daily as needed for Pain, Topical, 4 TIMES DAILY PRN Starting Wed 7/9/2025, Disp-350 g, R-4, Normal  Hypertension  - Blood pressure elevated in clinic 155/96, repeat 142/76.  Previously has been well-controlled and patient has not required medications in the past.  Will repeat in 1 week and start medication if continues to remain elevated     Return in about 4 weeks (around 8/6/2025) for 4 week NV for BP check, 6 month FU for weight .     Subjective       Patient's complete Health Risk Assessment and screening values have been reviewed and are found in Flowsheets. The following problems were reviewed today and where indicated follow up appointments were made and/or referrals ordered.    Positive Risk Factor Screenings with Interventions:    Fall Risk:  Do you feel unsteady or are you worried about falling? : no  2 or more falls in past year?: (!) yes  Fall with injury in past year?: no  Interventions:    Patient comments: Slipped on ice twice earlier this year, already evaluated by physical therapy and determined to not require any further PT services  Reviewed medications, home hazards, visual acuity, and co-morbidities that can increase risk for falls  Referral: Physical Therapy (PT)                 Abnormal BMI (obese):  Body mass index is 39.96 kg/m². (!) Abnormal  Interventions:  eliminate/reduce

## 2025-07-09 NOTE — PROGRESS NOTES
S: 49 y.o. female with   Chief Complaint   Patient presents with    Medicare AWV       AWV-reviewed medical risks and health maintenance  Hypertension-asymptomatic    O: VS:  height is 1.524 m (5') and weight is 92.8 kg (204 lb 9.6 oz). Her temporal temperature is 97.7 °F (36.5 °C). Her blood pressure is 155/96 (abnormal) and her pulse is 73. Her respiration is 16 and oxygen saturation is 97%.   BP Readings from Last 3 Encounters:   07/09/25 (!) 155/96   05/27/25 130/86   04/29/25 125/75     See resident note      Impression/Plan:   1) AWV-recommend health maintenance addressed.  2) hypertension-has not been elevated before we will recheck in the future.        Health Maintenance Due   Topic Date Due    HIV screen  Never done    Hepatitis C screen  Never done    Hepatitis B vaccine (1 of 3 - 19+ 3-dose series) Never done    DTaP/Tdap/Td vaccine (1 - Tdap) Never done    Shingles vaccine (1 of 2) Never done    Pneumococcal 50+ years Vaccine (1 of 1 - PCV) Never done    COVID-19 Vaccine (1 - 2024-25 season) Never done    Annual Wellness Visit (Medicare Advantage)  Never done    A1C test (Diabetic or Prediabetic)  07/17/2025         Attending Physician Statement  I have discussed the case, including pertinent history and exam findings with the resident. I agree with the documented assessment and plan.      Cornel Espinal MD

## 2025-07-15 ENCOUNTER — OFFICE VISIT (OUTPATIENT)
Dept: ORTHOPEDIC SURGERY | Age: 50
End: 2025-07-15

## 2025-07-15 ENCOUNTER — OFFICE VISIT (OUTPATIENT)
Dept: ORTHOPEDIC SURGERY | Age: 50
End: 2025-07-15
Payer: COMMERCIAL

## 2025-07-15 VITALS
HEIGHT: 60 IN | WEIGHT: 200 LBS | HEART RATE: 71 BPM | OXYGEN SATURATION: 94 % | BODY MASS INDEX: 39.27 KG/M2 | TEMPERATURE: 98.1 F | RESPIRATION RATE: 18 BRPM | SYSTOLIC BLOOD PRESSURE: 154 MMHG | DIASTOLIC BLOOD PRESSURE: 94 MMHG

## 2025-07-15 VITALS — WEIGHT: 200 LBS | HEIGHT: 60 IN | BODY MASS INDEX: 39.27 KG/M2

## 2025-07-15 DIAGNOSIS — M17.11 OSTEOARTHRITIS OF RIGHT KNEE, UNSPECIFIED OSTEOARTHRITIS TYPE: Primary | ICD-10-CM

## 2025-07-15 DIAGNOSIS — R20.0 NUMBNESS AND TINGLING IN RIGHT HAND: ICD-10-CM

## 2025-07-15 DIAGNOSIS — M18.12 ARTHRITIS OF CARPOMETACARPAL (CMC) JOINT OF LEFT THUMB: Primary | ICD-10-CM

## 2025-07-15 DIAGNOSIS — R20.2 NUMBNESS AND TINGLING IN RIGHT HAND: ICD-10-CM

## 2025-07-15 PROCEDURE — 99213 OFFICE O/P EST LOW 20 MIN: CPT | Performed by: NURSE PRACTITIONER

## 2025-07-15 RX ORDER — BUPIVACAINE HYDROCHLORIDE 2.5 MG/ML
0.5 INJECTION, SOLUTION INFILTRATION; PERINEURAL ONCE
Status: COMPLETED | OUTPATIENT
Start: 2025-07-15 | End: 2025-07-15

## 2025-07-15 RX ORDER — TRIAMCINOLONE ACETONIDE 40 MG/ML
20 INJECTION, SUSPENSION INTRA-ARTICULAR; INTRAMUSCULAR ONCE
Status: COMPLETED | OUTPATIENT
Start: 2025-07-15 | End: 2025-07-15

## 2025-07-15 RX ADMIN — TRIAMCINOLONE ACETONIDE 20 MG: 40 INJECTION, SUSPENSION INTRA-ARTICULAR; INTRAMUSCULAR at 12:07

## 2025-07-15 RX ADMIN — BUPIVACAINE HYDROCHLORIDE 1.25 MG: 2.5 INJECTION, SOLUTION INFILTRATION; PERINEURAL at 12:07

## 2025-07-15 NOTE — PROGRESS NOTES
[facility]  PRIMARY CARE SPORTS MEDICINE  DATE OF VISIT : 07/15/2025     Patient : Nhung Porter  Age : 49 y.o.   : 1975  MRN : [id number]   ______________________________________________________________________    Chief Complaint :   Chief Complaint   Patient presents with    Follow-up     Pt presents this AM with c/o pain in L hand/thumb. LOV 12/10/2024. Interested in injection today.        HPI : Nhung Porter is 49 y.o. female who presented to the clinic today for evaluation of right hand numbness and tingling and repeat left thumb injection.  Onset of symptoms was a few months ago, with no known mechanism or injury.  Current symptoms include numbness and tingling involving the right hand.  Symptoms are exacerbated by repetitive activity.  Patient endorses nocturnal symptoms.  Evaluation to date: None.  Treatment today: Activity modification and OTC medications which are not very effective.    Past Medical History :  No past medical history on file.  Past Surgical History:   Procedure Laterality Date    ANKLE SURGERY      bilateral    APPENDECTOMY      DILATION AND CURETTAGE OF UTERUS      ELBOW SURGERY      right    ENDOMETRIAL ABLATION  2016    GALLBLADDER SURGERY      LEEP      cervical conization    TUBAL LIGATION         Allergies :   No Known Allergies    Medication List :    Current Outpatient Medications   Medication Sig Dispense Refill    diclofenac sodium (VOLTAREN) 1 % GEL Apply 4 g topically 4 times daily as needed for Pain 350 g 4    fluticasone (FLONASE) 50 MCG/ACT nasal spray SHAKE LIQUID AND USE 1 SPRAY IN EACH NOSTRIL DAILY AS NEEDED FOR ALLERGIES 16 g 2    Omega-3 Fatty Acids (FISH OIL PO) Take by mouth      vitamin B-12 (CYANOCOBALAMIN) 500 MCG tablet Take 1 tablet by mouth daily      b complex vitamins capsule Take 1 capsule by mouth daily      loratadine (CLARITIN) 10 MG tablet Take 1 tablet by mouth daily 30 tablet 1    Blood Pressure Monitoring (BLOOD PRESSURE CUFF) MISC 1 Device

## 2025-07-15 NOTE — PROGRESS NOTES
[facility]   ORTHOPAEDIC SURGERY AND SPORTS MEDICINE  DATE OF VISIT: 07/16/25  Follow Up Knee Visit     CHIEF COMPLAINT:   Chief Complaint   Patient presents with    Knee Pain     Patient presents today f/u for her Rt knee, she states her knee is worse than her last appointment on 3/11/25. Reports constant sharp/aching pain and occasional instability. Rates her pain a 5/10.        HPI:    Nhung Porter is a 49 y.o. year old female who presented to the office today for follow up of right knee pain, previously evaluated on 3/11/2025. Previous treatment has included right knee corticosteroid injection.  She reports minimal symptom relief following her previous injection.  She is considering surgical management.      REVIEW OF SYSTEMS:     General: Negative Fever, chills, fatigue   Cardiovascular: Negative chest pain, palpitations  Respiratory: Equal chest expansion, negative shortness of breath   Skin: Negative rash, open wounds  Psych: Normal affect, mood stable  Neurologic: sensation grossly intact in extremities   Musculoskeletal: See HPI      Physical Exam:     Height: 1.524 m (5'), Weight - Scale: 90.7 kg (200 lb), BP: (!) 154/94    General: Alert and oriented x3, no acute distress  Cardiovascular/pulmonary: No labored breathing, peripheral perfusion intact  Musculoskeletal:    Right Knee:    negative swelling/deformity/effusion  Range of motion is 0 to 125.     Patella is stable tracking midline, positive patellofemoral crepitus  There is no laxity with varus/valgus stress at 0 and 30 degrees of flexion.    There is tenderness to palpation along the medial joint line.    There is no tenderness to palpation along the lateral joint line        Controlled Substances Monitoring:      Imaging:  X-ray 4 views right knee display no compartment bone-on-bone osteoarthritis     Assessment: Right knee osteoarthritis      Plan:   Patient presents to the office today for evaluation of right knee osteoarthritis.  History,

## 2025-08-06 ENCOUNTER — CLINICAL SUPPORT (OUTPATIENT)
Dept: FAMILY MEDICINE CLINIC | Age: 50
End: 2025-08-06

## 2025-08-06 VITALS — DIASTOLIC BLOOD PRESSURE: 87 MMHG | SYSTOLIC BLOOD PRESSURE: 130 MMHG | HEART RATE: 89 BPM

## 2025-08-22 ENCOUNTER — TELEPHONE (OUTPATIENT)
Dept: PHYSICAL MEDICINE AND REHAB | Age: 50
End: 2025-08-22

## 2025-09-03 ENCOUNTER — OFFICE VISIT (OUTPATIENT)
Dept: ORTHOPEDIC SURGERY | Age: 50
End: 2025-09-03
Payer: COMMERCIAL

## 2025-09-03 VITALS
TEMPERATURE: 97.9 F | HEART RATE: 64 BPM | SYSTOLIC BLOOD PRESSURE: 180 MMHG | DIASTOLIC BLOOD PRESSURE: 107 MMHG | RESPIRATION RATE: 20 BRPM | HEIGHT: 60 IN | BODY MASS INDEX: 39.27 KG/M2 | WEIGHT: 200 LBS | OXYGEN SATURATION: 96 %

## 2025-09-03 DIAGNOSIS — M17.11 OSTEOARTHRITIS OF RIGHT KNEE, UNSPECIFIED OSTEOARTHRITIS TYPE: Primary | ICD-10-CM

## 2025-09-03 PROCEDURE — 99214 OFFICE O/P EST MOD 30 MIN: CPT | Performed by: ORTHOPAEDIC SURGERY

## 2025-09-04 ENCOUNTER — TELEPHONE (OUTPATIENT)
Dept: ORTHOPEDIC SURGERY | Age: 50
End: 2025-09-04

## 2025-09-04 PROBLEM — M17.11 OSTEOARTHRITIS OF RIGHT KNEE: Status: ACTIVE | Noted: 2025-09-04
